# Patient Record
Sex: MALE | Race: ASIAN | NOT HISPANIC OR LATINO | Employment: FULL TIME | URBAN - METROPOLITAN AREA
[De-identification: names, ages, dates, MRNs, and addresses within clinical notes are randomized per-mention and may not be internally consistent; named-entity substitution may affect disease eponyms.]

---

## 2017-01-01 ENCOUNTER — PATIENT MESSAGE (OUTPATIENT)
Dept: GASTROENTEROLOGY | Facility: CLINIC | Age: 72
End: 2017-01-01

## 2017-01-11 ENCOUNTER — TELEPHONE (OUTPATIENT)
Dept: FAMILY MEDICINE | Facility: CLINIC | Age: 72
End: 2017-01-11

## 2017-01-17 ENCOUNTER — TELEPHONE (OUTPATIENT)
Dept: FAMILY MEDICINE | Facility: CLINIC | Age: 72
End: 2017-01-17

## 2017-01-19 RX ORDER — ESOMEPRAZOLE MAGNESIUM 40 MG/1
CAPSULE, DELAYED RELEASE ORAL
Qty: 30 CAPSULE | Refills: 3 | Status: SHIPPED | OUTPATIENT
Start: 2017-01-19 | End: 2017-05-08

## 2017-01-24 ENCOUNTER — LAB VISIT (OUTPATIENT)
Dept: LAB | Facility: HOSPITAL | Age: 72
End: 2017-01-24
Attending: FAMILY MEDICINE
Payer: COMMERCIAL

## 2017-01-24 DIAGNOSIS — Z11.59 NEED FOR HEPATITIS C SCREENING TEST: ICD-10-CM

## 2017-01-24 DIAGNOSIS — R55 SYNCOPE, UNSPECIFIED SYNCOPE TYPE: ICD-10-CM

## 2017-01-24 LAB
ALBUMIN SERPL BCP-MCNC: 3.8 G/DL
ALP SERPL-CCNC: 96 U/L
ALT SERPL W/O P-5'-P-CCNC: 27 U/L
ANION GAP SERPL CALC-SCNC: 11 MMOL/L
AST SERPL-CCNC: 20 U/L
BASOPHILS # BLD AUTO: 0.01 K/UL
BASOPHILS NFR BLD: 0.1 %
BILIRUB SERPL-MCNC: 1 MG/DL
BUN SERPL-MCNC: 11 MG/DL
CALCIUM SERPL-MCNC: 8.9 MG/DL
CHLORIDE SERPL-SCNC: 100 MMOL/L
CHOLEST/HDLC SERPL: 3.5 {RATIO}
CO2 SERPL-SCNC: 27 MMOL/L
CREAT SERPL-MCNC: 1.3 MG/DL
DIFFERENTIAL METHOD: ABNORMAL
EOSINOPHIL # BLD AUTO: 0.9 K/UL
EOSINOPHIL NFR BLD: 9 %
ERYTHROCYTE [DISTWIDTH] IN BLOOD BY AUTOMATED COUNT: 12.5 %
EST. GFR  (AFRICAN AMERICAN): >60 ML/MIN/1.73 M^2
EST. GFR  (NON AFRICAN AMERICAN): 55 ML/MIN/1.73 M^2
ESTIMATED AVG GLUCOSE: 166 MG/DL
GLUCOSE SERPL-MCNC: 151 MG/DL
HBA1C MFR BLD HPLC: 7.4 %
HCT VFR BLD AUTO: 45.4 %
HCV AB SERPL QL IA: NEGATIVE
HDL/CHOLESTEROL RATIO: 28.8 %
HDLC SERPL-MCNC: 170 MG/DL
HDLC SERPL-MCNC: 49 MG/DL
HGB BLD-MCNC: 15.8 G/DL
LDLC SERPL CALC-MCNC: 81.4 MG/DL
LYMPHOCYTES # BLD AUTO: 2.7 K/UL
LYMPHOCYTES NFR BLD: 27 %
MCH RBC QN AUTO: 29.9 PG
MCHC RBC AUTO-ENTMCNC: 34.8 %
MCV RBC AUTO: 86 FL
MONOCYTES # BLD AUTO: 0.6 K/UL
MONOCYTES NFR BLD: 6.4 %
NEUTROPHILS # BLD AUTO: 5.7 K/UL
NEUTROPHILS NFR BLD: 57 %
NONHDLC SERPL-MCNC: 121 MG/DL
PLATELET # BLD AUTO: 226 K/UL
PMV BLD AUTO: 11.6 FL
POTASSIUM SERPL-SCNC: 3.6 MMOL/L
PROT SERPL-MCNC: 6.8 G/DL
RBC # BLD AUTO: 5.28 M/UL
SODIUM SERPL-SCNC: 138 MMOL/L
TRIGL SERPL-MCNC: 198 MG/DL
TSH SERPL DL<=0.005 MIU/L-ACNC: 2.15 UIU/ML
WBC # BLD AUTO: 9.9 K/UL

## 2017-01-24 PROCEDURE — 85025 COMPLETE CBC W/AUTO DIFF WBC: CPT

## 2017-01-24 PROCEDURE — 80053 COMPREHEN METABOLIC PANEL: CPT

## 2017-01-24 PROCEDURE — 80061 LIPID PANEL: CPT

## 2017-01-24 PROCEDURE — 86803 HEPATITIS C AB TEST: CPT

## 2017-01-24 PROCEDURE — 84443 ASSAY THYROID STIM HORMONE: CPT

## 2017-01-24 PROCEDURE — 83036 HEMOGLOBIN GLYCOSYLATED A1C: CPT

## 2017-01-24 PROCEDURE — 36415 COLL VENOUS BLD VENIPUNCTURE: CPT

## 2017-01-31 ENCOUNTER — OFFICE VISIT (OUTPATIENT)
Dept: FAMILY MEDICINE | Facility: CLINIC | Age: 72
End: 2017-01-31
Payer: COMMERCIAL

## 2017-01-31 VITALS
BODY MASS INDEX: 31.25 KG/M2 | WEIGHT: 211 LBS | HEART RATE: 88 BPM | SYSTOLIC BLOOD PRESSURE: 128 MMHG | OXYGEN SATURATION: 96 % | DIASTOLIC BLOOD PRESSURE: 87 MMHG | HEIGHT: 69 IN

## 2017-01-31 DIAGNOSIS — R19.7 DIARRHEA, UNSPECIFIED TYPE: ICD-10-CM

## 2017-01-31 DIAGNOSIS — F32.A DEPRESSION, UNSPECIFIED DEPRESSION TYPE: ICD-10-CM

## 2017-01-31 DIAGNOSIS — E11.22 TYPE 2 DIABETES MELLITUS WITH STAGE 3 CHRONIC KIDNEY DISEASE, WITHOUT LONG-TERM CURRENT USE OF INSULIN: Primary | ICD-10-CM

## 2017-01-31 DIAGNOSIS — I10 ESSENTIAL HYPERTENSION: ICD-10-CM

## 2017-01-31 DIAGNOSIS — J30.9 ALLERGIC RHINITIS, UNSPECIFIED ALLERGIC RHINITIS TRIGGER, UNSPECIFIED RHINITIS SEASONALITY: ICD-10-CM

## 2017-01-31 DIAGNOSIS — N18.30 TYPE 2 DIABETES MELLITUS WITH STAGE 3 CHRONIC KIDNEY DISEASE, WITHOUT LONG-TERM CURRENT USE OF INSULIN: Primary | ICD-10-CM

## 2017-01-31 DIAGNOSIS — N18.30 CKD (CHRONIC KIDNEY DISEASE) STAGE 3, GFR 30-59 ML/MIN: ICD-10-CM

## 2017-01-31 PROCEDURE — 1160F RVW MEDS BY RX/DR IN RCRD: CPT | Mod: S$GLB,,, | Performed by: FAMILY MEDICINE

## 2017-01-31 PROCEDURE — 1125F AMNT PAIN NOTED PAIN PRSNT: CPT | Mod: S$GLB,,, | Performed by: FAMILY MEDICINE

## 2017-01-31 PROCEDURE — 99999 PR PBB SHADOW E&M-EST. PATIENT-LVL III: CPT | Mod: PBBFAC,,, | Performed by: FAMILY MEDICINE

## 2017-01-31 PROCEDURE — 99214 OFFICE O/P EST MOD 30 MIN: CPT | Mod: S$GLB,,, | Performed by: FAMILY MEDICINE

## 2017-01-31 PROCEDURE — 1159F MED LIST DOCD IN RCRD: CPT | Mod: S$GLB,,, | Performed by: FAMILY MEDICINE

## 2017-01-31 PROCEDURE — 3079F DIAST BP 80-89 MM HG: CPT | Mod: S$GLB,,, | Performed by: FAMILY MEDICINE

## 2017-01-31 PROCEDURE — 3045F PR MOST RECENT HEMOGLOBIN A1C LEVEL 7.0-9.0%: CPT | Mod: S$GLB,,, | Performed by: FAMILY MEDICINE

## 2017-01-31 PROCEDURE — 4010F ACE/ARB THERAPY RXD/TAKEN: CPT | Mod: S$GLB,,, | Performed by: FAMILY MEDICINE

## 2017-01-31 PROCEDURE — 3074F SYST BP LT 130 MM HG: CPT | Mod: S$GLB,,, | Performed by: FAMILY MEDICINE

## 2017-01-31 PROCEDURE — 1157F ADVNC CARE PLAN IN RCRD: CPT | Mod: S$GLB,,, | Performed by: FAMILY MEDICINE

## 2017-01-31 RX ORDER — METFORMIN HYDROCHLORIDE 500 MG/1
1000 TABLET, EXTENDED RELEASE ORAL 2 TIMES DAILY WITH MEALS
Qty: 120 TABLET | Refills: 11 | Status: SHIPPED | OUTPATIENT
Start: 2017-01-31 | End: 2018-02-15 | Stop reason: SDUPTHER

## 2017-01-31 RX ORDER — AMOXICILLIN 500 MG
2 CAPSULE ORAL DAILY
COMMUNITY
End: 2018-10-02 | Stop reason: CLARIF

## 2017-01-31 RX ORDER — NAPROXEN SODIUM 220 MG/1
81 TABLET, FILM COATED ORAL DAILY
COMMUNITY
End: 2018-07-26

## 2017-01-31 NOTE — PROGRESS NOTES
(Portions of this note were dictated using voice recognition software and may contain dictation related errors in spelling/grammar/syntax not found on text review)    CC:   Chief Complaint   Patient presents with    Follow-up       HPI: 71 y.o. male     Jerardo Powers    LOV 11/16/16.  Was describing syncopal episodes at the time.  Had a remote history of similar symptoms and had cardiology workup in the past.  EKG in the office at that time was normal.  Had discussed neuro workup including MRI of the brain, if negative consider an cardiology workup such as tilt table testing.  Discussed possibility of vasovagal syndrome given some preceding symptoms of anxiety prior to the syncopal episodes.  He was set up for an MRI of the brain and carotid ultrasound although in the meantime had fallen and broken his arm and has not been able to do the studies yet.      ER visit on 11/20/16 for mechanical fall.  Imaging showed proximal humeral fracture with a greater tuberosity fragment.  Was referred to orthopedics for further management    Diabetes on metformin 1000 twice a day.  Last A1c as below was 7.4, up from 7.2 prior to last visit.  Does have complicating factor of CK D stage III.  Not on a statin. No exercise; dietary indiscretions. Does get diarrhea  ,  Not sure if this is IBS related or metformin related    Sneezing/coughing past couple of months with PND/congestion. No meds tried. Happen seasonally. Cough occ productive.    HTN: avapro and amlodipine, controlled.     Hypothyroid, controlled with synthroid    Anxiety: on celexa 20 mg daily, controlled    GERD: nexium as needed.     Past Medical History   Diagnosis Date    Anxiety     BPH (benign prostatic hypertrophy)     Cataract     CKD (chronic kidney disease) stage 3, GFR 30-59 ml/min 5/5/2014    Colon polyps     Diabetes mellitus type II     Emphysema NEC      mild    Gout     Hyperlipidemia     Hypertension     Hypothyroidism     IBS (irritable  bowel syndrome)     Kidney stones     JANEEN (obstructive sleep apnea)      not using CPAP    Plantar fasciitis     Reflux        Past Surgical History   Procedure Laterality Date    Kidney stone surgery      Cataract extraction  1/28/2013     RIGHT EYE    Cataract extraction       left eye    Colonoscopy  Sep 2011     Repeat recommended in 5 years    Colonoscopy N/A 10/10/2016     Procedure: COLONOSCOPY;  Surgeon: Noah Colunga MD;  Location: [facility] ([address] [place] [address]);  Service: Endoscopy;  Laterality: N/A;  PM Prep       Family History   Problem Relation Age of Onset    Cancer Brother      non-hodgkins T cell lymphoma    Diabetes Mother     Coronary artery disease Neg Hx     Colon cancer Neg Hx     Prostate cancer Neg Hx        Social History     Social History    Marital status:      Spouse name: N/A    Number of children: N/A    Years of education: N/A     Occupational History    Not on file.     Social History Main Topics    Smoking status: Former Smoker     Years: 40.00     Quit date: 9/4/2007    Smokeless tobacco: Never Used    Alcohol use 0.0 oz/week      Comment: one drink per week    Drug use: No    Sexual activity: No     Other Topics Concern    Not on file     Social History Narrative     SCREENINGS  Colonoscopy: [facility] 2011  Prostate : [facility], Dr. Murray.     Immunizations  pvx 2013  Pcv: may have gotten at [facility] will have to find out  Tetanus 7/20/15  Flu   Stress echo August 2015, normal  EKGs at 2014 normal  48 hour Holter monitor April 2014. 3 episodes of shortness of breath reported, corresponding rhythm is sinus rhythm. One syncopal episode reported and corresponding rhythm was sinus rhythm at 79 bpm. One episode of lightheadedness reported, corresponding rhythm was sinus bradycardia 59 bpm. No high-grade AV block. Rare PVCs. No ventricular tachycardia. Very rare PACs    Lab Results   Component Value Date    WBC 9.90 01/24/2017    HGB 15.8 01/24/2017    HCT 45.4 01/24/2017    PLT  226 01/24/2017    CHOL 170 01/24/2017    TRIG 198 (H) 01/24/2017    HDL 49 01/24/2017    ALT 27 01/24/2017    AST 20 01/24/2017     01/24/2017    K 3.6 01/24/2017     01/24/2017    CREATININE 1.3 01/24/2017    BUN 11 01/24/2017    CO2 27 01/24/2017    TSH 2.152 01/24/2017    PSA 0.59 07/17/2015    INR 1.0 01/18/2014    HGBA1C 7.4 (H) 01/24/2017    LDLCALC 81.4 01/24/2017     (H) 01/24/2017       ROS:  GENERAL: No fever, chills, fatigability or weight loss.  SKIN: No rashes, no itching.  HEAD: No headaches.  EYES: No visual changes  EARS: No ear pain or changes in hearing.  NOSE: Above.  MOUTH & THROAT: No hoarseness, change in voice, or sore throat.  NODES: Denies swollen glands.  CHEST: Above  CARDIOVASCULAR: Denies chest pain, PND, orthopnea.  ABDOMEN: No nausea, vomiting, or changes in bowel function.  URINARY: No flank pain, dysuria or hematuria.  PERIPHERAL VASCULAR: No claudication or cyanosis.  MUSCULOSKELETAL: Still has some right arm pain from fracture.  He is currently in physical therapy  NEUROLOGIC: No weakness or numbness.    Vital signs reviewed  PE:   APPEARANCE: Well nourished, well developed, in no acute distress.    HEAD: Normocephalic, atraumatic.  No sinus tenderness  EYES: PERRL. EOMI.   Conjunctivae noninjected.  EARS: TM's intact. Light reflex normal. No retraction or perforation  NOSE: Mild nasal mucosal inflammation bilaterally  MOUTH & THROAT: No tonsillar enlargement. No pharyngeal erythema or exudate.   NECK: Supple with no cervical lymphadenopathy.  No carotid bruits.  No thyromegaly  CHEST: Good inspiratory effort. Lungs clear to auscultation with no wheezes or crackles.  CARDIOVASCULAR: Normal S1, S2. No rubs, murmurs, or gallops.  ABDOMEN: Bowel sounds normal. Not distended. Soft.  Mild left lower quadrant tenderness to palpation without rebound or guarding  EXTREMITIES: No edema.      IMPRESSION  1. Type 2 diabetes mellitus with stage 3 chronic kidney disease,  without long-term current use of insulin    2. CKD (chronic kidney disease) stage 3, GFR 30-59 ml/min    3. Depression, unspecified depression type    4. Essential hypertension    5. Diarrhea, unspecified type    6. Allergic rhinitis, unspecified allergic rhinitis trigger, unspecified rhinitis seasonality            PLAN    Diabetes health maintenance  -advise proper dietary and exercise modification  -advise medication compliance  -advise daily aspirin if there are no other contraindications  -advise consistent blood sugar monitoring  -advise regular dentist and ophthalmology visits  -advise regular foot checks  -Medication management: Switch over to extended release metformin 500 mg, 2 pills twice a day because of diarrhea concerns.  Discussed rationale behind increasing aerobic exercise and decreasing unhealthy foods to help lower her A1c.  We will follow-up in 3 months.  He does admit that some of his activity is certainly dropped off after his arm fracture    Chronic disease: Overall stable.  Continue to monitor next    Hypertension: Stable.  Continue current therapy    Not on a statin.  Discussed rationale behind statin given his diabetes status and with respect to cardiac risk reduction.  Patient elects to hold off on starting a statin    ALLERGIC rhinitis: Trial of Claritin or Zyrtec over-the-counter daily for the next month.  If ineffective, contrast nasal corticosteroids next    F/u 3 mo.

## 2017-02-08 ENCOUNTER — PATIENT MESSAGE (OUTPATIENT)
Dept: UROLOGY | Facility: CLINIC | Age: 72
End: 2017-02-08

## 2017-02-08 ENCOUNTER — TELEPHONE (OUTPATIENT)
Dept: UROLOGY | Facility: CLINIC | Age: 72
End: 2017-02-08

## 2017-02-08 NOTE — TELEPHONE ENCOUNTER
Told him to hold off ASA and fish oil for now until urine gets clear.  Has been on Sue.  May consider re-evaluation of hematuria or TURP if symptoms persist.  He will call me tomorrow whether I need to see him in clinic.

## 2017-02-09 ENCOUNTER — PATIENT MESSAGE (OUTPATIENT)
Dept: UROLOGY | Facility: CLINIC | Age: 72
End: 2017-02-09

## 2017-02-21 DIAGNOSIS — N40.1 BENIGN NON-NODULAR PROSTATIC HYPERPLASIA WITH LOWER URINARY TRACT SYMPTOMS: ICD-10-CM

## 2017-02-21 RX ORDER — DUTASTERIDE AND TAMSULOSIN HYDROCHLORIDE CAPSULES .5; .4 MG/1; MG/1
1 CAPSULE ORAL NIGHTLY
Qty: 90 CAPSULE | Refills: 3 | Status: SHIPPED | OUTPATIENT
Start: 2017-02-21 | End: 2018-02-08 | Stop reason: SDUPTHER

## 2017-03-10 RX ORDER — ALLOPURINOL 100 MG/1
TABLET ORAL
Qty: 90 TABLET | Refills: 3 | Status: SHIPPED | OUTPATIENT
Start: 2017-03-10 | End: 2018-03-09 | Stop reason: SDUPTHER

## 2017-05-03 ENCOUNTER — PATIENT MESSAGE (OUTPATIENT)
Dept: FAMILY MEDICINE | Facility: CLINIC | Age: 72
End: 2017-05-03

## 2017-05-03 ENCOUNTER — PATIENT MESSAGE (OUTPATIENT)
Dept: GASTROENTEROLOGY | Facility: CLINIC | Age: 72
End: 2017-05-03

## 2017-05-05 ENCOUNTER — PATIENT MESSAGE (OUTPATIENT)
Dept: GASTROENTEROLOGY | Facility: CLINIC | Age: 72
End: 2017-05-05

## 2017-05-08 ENCOUNTER — HOSPITAL ENCOUNTER (OUTPATIENT)
Dept: RADIOLOGY | Facility: HOSPITAL | Age: 72
Discharge: HOME | End: 2017-05-08
Attending: FAMILY MEDICINE
Payer: COMMERCIAL

## 2017-05-08 ENCOUNTER — OFFICE VISIT (OUTPATIENT)
Dept: FAMILY MEDICINE | Facility: CLINIC | Age: 72
End: 2017-05-08
Payer: COMMERCIAL

## 2017-05-08 VITALS
BODY MASS INDEX: 29.62 KG/M2 | DIASTOLIC BLOOD PRESSURE: 86 MMHG | RESPIRATION RATE: 16 BRPM | HEART RATE: 99 BPM | WEIGHT: 200 LBS | OXYGEN SATURATION: 97 % | SYSTOLIC BLOOD PRESSURE: 117 MMHG | HEIGHT: 69 IN | TEMPERATURE: 98 F

## 2017-05-08 DIAGNOSIS — R10.13 DYSPEPSIA: Primary | ICD-10-CM

## 2017-05-08 DIAGNOSIS — J40 BRONCHITIS: ICD-10-CM

## 2017-05-08 DIAGNOSIS — J40 BRONCHITIS: Primary | ICD-10-CM

## 2017-05-08 PROCEDURE — 1160F RVW MEDS BY RX/DR IN RCRD: CPT | Mod: S$GLB,,, | Performed by: FAMILY MEDICINE

## 2017-05-08 PROCEDURE — 99214 OFFICE O/P EST MOD 30 MIN: CPT | Mod: S$GLB,,, | Performed by: FAMILY MEDICINE

## 2017-05-08 PROCEDURE — 1126F AMNT PAIN NOTED NONE PRSNT: CPT | Mod: S$GLB,,, | Performed by: FAMILY MEDICINE

## 2017-05-08 PROCEDURE — 71020 XR CHEST PA AND LATERAL: CPT | Mod: 26,,, | Performed by: RADIOLOGY

## 2017-05-08 PROCEDURE — 1157F ADVNC CARE PLAN IN RCRD: CPT | Mod: 8P,S$GLB,, | Performed by: FAMILY MEDICINE

## 2017-05-08 PROCEDURE — 71020 XR CHEST PA AND LATERAL: CPT | Mod: TC

## 2017-05-08 PROCEDURE — 3079F DIAST BP 80-89 MM HG: CPT | Mod: S$GLB,,, | Performed by: FAMILY MEDICINE

## 2017-05-08 PROCEDURE — 1159F MED LIST DOCD IN RCRD: CPT | Mod: S$GLB,,, | Performed by: FAMILY MEDICINE

## 2017-05-08 PROCEDURE — 3074F SYST BP LT 130 MM HG: CPT | Mod: S$GLB,,, | Performed by: FAMILY MEDICINE

## 2017-05-08 PROCEDURE — 99999 PR PBB SHADOW E&M-EST. PATIENT-LVL III: CPT | Mod: PBBFAC,,, | Performed by: FAMILY MEDICINE

## 2017-05-08 RX ORDER — BENZONATATE 200 MG/1
200 CAPSULE ORAL 3 TIMES DAILY PRN
Qty: 30 CAPSULE | Refills: 0 | Status: SHIPPED | OUTPATIENT
Start: 2017-05-08 | End: 2017-05-15

## 2017-05-08 NOTE — MR AVS SNAPSHOT
[address]  [place] [address]  Phone: [phone]  Fax: [phone]                  Jerardo Powers   2017 4:00 PM   Office Visit    Description:  Male : 1945   Provider:  Admas Moore MD   Department:  [facility]           Reason for Visit     Diarrhea     Cough           Diagnoses this Visit        Comments    Bronchitis    -  Primary            To Do List           Goals (5 Years of Data)     None       These Medications        Disp Refills Start End    benzonatate (TESSALON) 200 MG capsule 30 capsule 0 2017 5/15/2017    Take 1 capsule (200 mg total) by mouth 3 (three) times daily as needed. - Oral    Pharmacy: [facility]/pharmacy #[id number] - [place] - [address] AT [facility] #: [phone]         [facility] On Call     [facility] On Call Nurse Care Line -  Assistance  Unless otherwise directed by your provider, please contact [facility], our nurse care line that is available for  assistance.     Registered nurses in the [facility] provide: appointment scheduling, clinical advisement, health education, and other advisory services.  Call: [phone] (toll free)               Medications           Message regarding Medications     Verify the changes and/or additions to your medication regime listed below are the same as discussed with your clinician today.  If any of these changes or additions are incorrect, please notify your healthcare provider.        START taking these NEW medications        Refills    benzonatate (TESSALON) 200 MG capsule 0    Sig: Take 1 capsule (200 mg total) by mouth 3 (three) times daily as needed.    Class: Normal    Route: Oral      STOP taking these medications     esomeprazole (NEXIUM) 40 MG capsule TAKE 1 CAPSULE (40 MG TOTAL) BY MOUTH BEFORE BREAKFAST.           Verify that the below list of medications is an accurate representation of  "the medications you are currently taking.  If none reported, the list may be blank. If incorrect, please contact your healthcare provider. Carry this list with you in case of emergency.           Current Medications     allopurinol (ZYLOPRIM) 100 MG tablet TAKE 1 TABLET (100 MG TOTAL) BY MOUTH ONCE DAILY.    amlodipine (NORVASC) 10 MG tablet TAKE 1 TABLET BY MOUTH EVERY DAY    aspirin 81 MG Chew Take 81 mg by mouth once daily.    blood sugar diagnostic Strp 1 strip by Misc.(Non-Drug; Combo Route) route 3 (three) times daily.    citalopram (CELEXA) 20 MG tablet TAKE 1 TABLET (20 MG TOTAL) BY MOUTH ONCE DAILY.    clorazepate (TRANXENE) 7.5 MG Tab Take 1 tablet (7.5 mg total) by mouth 3 (three) times daily.    dutasteride-tamsulosin (VIVIENNE) 0.5-0.4 mg CM24 Take 1 tablet by mouth every evening.    fish oil-omega-3 fatty acids 300-1,000 mg capsule Take 2 g by mouth once daily.    irbesartan (AVAPRO) 300 MG tablet Take 1 tablet (300 mg total) by mouth once daily.    lancets Misc Test sugars once daily.  Dispense 100 lancets (insurance covered brand)    levothyroxine (SYNTHROID) 75 MCG tablet Take 1 tablet (75 mcg total) by mouth once daily.    metformin (GLUCOPHAGE-XR) 500 MG 24 hr tablet Take 2 tablets (1,000 mg total) by mouth 2 (two) times daily with meals.    benzonatate (TESSALON) 200 MG capsule Take 1 capsule (200 mg total) by mouth 3 (three) times daily as needed.           Clinical Reference Information           Your Vitals Were     BP Pulse Temp Resp Height Weight    117/86 99 98 °F (36.7 °C) (Oral) 16 5' 9" (1.753 m) 90.7 kg (200 lb)    SpO2 BMI             97% 29.53 kg/m2         Blood Pressure          Most Recent Value    BP  117/86      Allergies as of 5/8/2017     Morphine      Immunizations Administered on Date of Encounter - 5/8/2017     None      Orders Placed During Today's Visit     Future Labs/Procedures Expected by Expires    X-Ray Chest PA And Lateral  5/8/2017 5/8/2018      Instructions  "     Bronchitis, Viral (Adult)    You have a viral bronchitis. Bronchitis is inflammation and swelling of the lining of the lungs. This is often caused by an infection. Symptoms include a dry, hacking cough that is worse at night. The cough may bring up yellow-green mucus. You may also feel short of breath or wheeze. Other symptoms may include tiredness, chest discomfort, and chills.  Bronchitis that is caused by a virus is not treated with antibiotics. Instead, medicines may be given to help relieve symptoms. Symptoms can last up to 2 weeks, although the cough may last much longer.  This illness is contagious during the first few days and is spread through the air by coughing and sneezing, or by direct contact (touching the sick person and then touching your own eyes, nose, or mouth).  Most viral illnesses resolve within 10 to 14 days with rest and simple home remedies, although they may sometimes last for several weeks.  Home care  · If symptoms are severe, rest at home for the first 2 to 3 days. When you go back to your usual activities, don't let yourself get too tired.  · Do not smoke. Also avoid being exposed to secondhand smoke.  · You may use over-the-counter medicine to control fever or pain, unless another pain medicine was prescribed. (Note: If you have chronic liver or kidney disease or have ever had a stomach ulcer or gastrointestinal bleeding, talk with your healthcare provider before using these medicines. Also talk to your provider if you are taking medicine to prevent blood clots.) Aspirin should never be given to anyone younger than 18 years of age who is ill with a viral infection or fever. It may cause severe liver or brain damage.  · Your appetite may be poor, so a light diet is fine. Avoid dehydration by drinking 6 to 8 glasses of fluids per day (such as water, soft drinks, sports drinks, juices, tea, or soup). Extra fluids will help loosen secretions in the nose and lungs.  · Over-the-counter  cough, cold, and sore-throat medicines will not shorten the length of the illness, but they may help to reduce symptoms. (Note: Do not use decongestants if you have high blood pressure.)  Follow-up care  Follow up with your healthcare provider, or as advised. If you had an X-ray or ECG (electrocardiogram), a specialist will review it. You will be notified of any new findings that may affect your care.  Note: If you are age 65 or older, or if you have a chronic lung disease or condition that affects your immune system, or you smoke, talk to your healthcare provider about having pneumococcal vaccinations and a yearly influenza vaccination (flu shot).  When to seek medical advice  Call your healthcare provider right away if any of these occur:  · Fever of 100.4°F (38°C) or higher  · Coughing up increased amounts of colored sputum  · Weakness, drowsiness, headache, facial pain, ear pain, or a stiff neck  Call 911, or get immediate medical care  Contact emergency services right away if any of these occur:  · Coughing up blood  · Worsening weakness, drowsiness, headache, or stiff neck  · Trouble breathing, wheezing, or pain with breathing  Date Last Reviewed: 9/13/2015 © 2000-2016 [employer]. [address] [place] [address]. All rights reserved. This information is not intended as a substitute for professional medical care. Always follow your healthcare professional's instructions.             Language Assistance Services     ATTENTION: Language assistance services are available, free of charge. Please call [phone].      ATENCIÓN: Si habla español, tiene a cardenas disposición servicios gratuitos de asistencia lingüística. Llame al [phone].     [facility] Ý: N?u b?n nói Ti?ng Vi?t, có các d?ch v? h? tr? ngôn ng? mi?n phí dành cho b?n. G?i s? [phone].         [facility] complies with applicable Federal civil rights laws and does not discriminate on the basis of race, color,  national origin, age, disability, or sex.

## 2017-05-08 NOTE — PATIENT INSTRUCTIONS
Bronchitis, Viral (Adult)    You have a viral bronchitis. Bronchitis is inflammation and swelling of the lining of the lungs. This is often caused by an infection. Symptoms include a dry, hacking cough that is worse at night. The cough may bring up yellow-green mucus. You may also feel short of breath or wheeze. Other symptoms may include tiredness, chest discomfort, and chills.  Bronchitis that is caused by a virus is not treated with antibiotics. Instead, medicines may be given to help relieve symptoms. Symptoms can last up to 2 weeks, although the cough may last much longer.  This illness is contagious during the first few days and is spread through the air by coughing and sneezing, or by direct contact (touching the sick person and then touching your own eyes, nose, or mouth).  Most viral illnesses resolve within 10 to 14 days with rest and simple home remedies, although they may sometimes last for several weeks.  Home care  · If symptoms are severe, rest at home for the first 2 to 3 days. When you go back to your usual activities, don't let yourself get too tired.  · Do not smoke. Also avoid being exposed to secondhand smoke.  · You may use over-the-counter medicine to control fever or pain, unless another pain medicine was prescribed. (Note: If you have chronic liver or kidney disease or have ever had a stomach ulcer or gastrointestinal bleeding, talk with your healthcare provider before using these medicines. Also talk to your provider if you are taking medicine to prevent blood clots.) Aspirin should never be given to anyone younger than 18 years of age who is ill with a viral infection or fever. It may cause severe liver or brain damage.  · Your appetite may be poor, so a light diet is fine. Avoid dehydration by drinking 6 to 8 glasses of fluids per day (such as water, soft drinks, sports drinks, juices, tea, or soup). Extra fluids will help loosen secretions in the nose and lungs.  · Over-the-counter  cough, cold, and sore-throat medicines will not shorten the length of the illness, but they may help to reduce symptoms. (Note: Do not use decongestants if you have high blood pressure.)  Follow-up care  Follow up with your healthcare provider, or as advised. If you had an X-ray or ECG (electrocardiogram), a specialist will review it. You will be notified of any new findings that may affect your care.  Note: If you are age 65 or older, or if you have a chronic lung disease or condition that affects your immune system, or you smoke, talk to your healthcare provider about having pneumococcal vaccinations and a yearly influenza vaccination (flu shot).  When to seek medical advice  Call your healthcare provider right away if any of these occur:  · Fever of 100.4°F (38°C) or higher  · Coughing up increased amounts of colored sputum  · Weakness, drowsiness, headache, facial pain, ear pain, or a stiff neck  Call 911, or get immediate medical care  Contact emergency services right away if any of these occur:  · Coughing up blood  · Worsening weakness, drowsiness, headache, or stiff neck  · Trouble breathing, wheezing, or pain with breathing  Date Last Reviewed: 9/13/2015  © 7905-4092 [employer]. [address] [place] [address], [place] [address]. All rights reserved. This information is not intended as a substitute for professional medical care. Always follow your healthcare professional's instructions.

## 2017-05-08 NOTE — PROGRESS NOTES
(Portions of this note were dictated using voice recognition software and may contain dictation related errors in spelling/grammar/syntax not found on text review)    CC: No chief complaint on file.      HPI: 71 y.o. male     1.  Cough for 3 weeks.  He went to urgent care about 10 days ago, got amoxicillin for 7 days.  He completed this therapy.  He also got some codeine cough syrup which made him very woozy.  He did not find that he got any better.  He has a constant dry cough but this is worse at night.  He does have some nasal stuffiness and rhinorrhea.  No fevers or headaches.  No shortness of breath no chest pain.  No abdominal pain or vomiting.  He does have some ALLERGIES and takes Zyrtec on occasion but not consistently.  He has Flonase at home but does not take this regularly either.    2.  Diarrhea, going on for several years.  He had seen his GI doctor recommended that he temporarily stop metformin.  For the time being he is staying on metformin but was asking for further discussion on this.  His last A1c in January was 7.4.  Sugars are around 150 range right now    Past Medical History:   Diagnosis Date    Anxiety     BPH (benign prostatic hypertrophy)     Cataract     CKD (chronic kidney disease) stage 3, GFR 30-59 ml/min 5/5/2014    Colon polyps     Diabetes mellitus type II     Emphysema NEC     mild    Gout     Hyperlipidemia     Hypertension     Hypothyroidism     IBS (irritable bowel syndrome)     Kidney stones     JANEEN (obstructive sleep apnea)     not using CPAP    Plantar fasciitis     Reflux        Past Surgical History:   Procedure Laterality Date    CATARACT EXTRACTION  1/28/2013    RIGHT EYE    CATARACT EXTRACTION      left eye    COLONOSCOPY  Sep 2011    Repeat recommended in 5 years    COLONOSCOPY N/A 10/10/2016    Procedure: COLONOSCOPY;  Surgeon: Noah Colunga MD;  Location: [facility] ([address] [place] [address]);  Service: Endoscopy;  Laterality: N/A;  PM Prep    KIDNEY STONE  SURGERY         Family History   Problem Relation Age of Onset    Cancer Brother      non-hodgkins T cell lymphoma    Diabetes Mother     Coronary artery disease Neg Hx     Colon cancer Neg Hx     Prostate cancer Neg Hx        Social History     Social History    Marital status:      Spouse name: N/A    Number of children: N/A    Years of education: N/A     Occupational History    Not on file.     Social History Main Topics    Smoking status: Former Smoker     Years: 40.00     Quit date: 9/4/2007    Smokeless tobacco: Never Used    Alcohol use 0.0 oz/week      Comment: one drink per week    Drug use: No    Sexual activity: No     Other Topics Concern    Not on file     Social History Narrative     Lab Results   Component Value Date    WBC 9.90 01/24/2017    HGB 15.8 01/24/2017    HCT 45.4 01/24/2017     01/24/2017    CHOL 170 01/24/2017    TRIG 198 (H) 01/24/2017    HDL 49 01/24/2017    ALT 27 01/24/2017    AST 20 01/24/2017     01/24/2017    K 3.6 01/24/2017     01/24/2017    CREATININE 1.3 01/24/2017    BUN 11 01/24/2017    CO2 27 01/24/2017    TSH 2.152 01/24/2017    PSA 0.59 07/17/2015    INR 1.0 01/18/2014    HGBA1C 7.4 (H) 01/24/2017    LDLCALC 81.4 01/24/2017     (H) 01/24/2017       ROS:  GENERAL: Fatigue loss.  SKIN: No rashes, no itching.  HEAD: No headaches.  EYES: No visual changes  EARS: No ear pain or changes in hearing.  NOSE: Above.  MOUTH & THROAT: No hoarseness, change in voice, or sore throat.  NODES: Denies swollen glands.  CHEST: Above.  Cough is nonproductive.  CARDIOVASCULAR: Denies chest pain, PND, orthopnea.  ABDOMEN: Above  URINARY: No flank pain, dysuria or hematuria.  PERIPHERAL VASCULAR: No claudication or cyanosis.  MUSCULOSKELETAL: No joint stiffness or swelling. Denies back pain.  NEUROLOGIC: No weakness or numbness.    Vital signs reviewed  PE:   APPEARANCE: Well nourished, well developed, in no acute distress.    HEAD: Normocephalic,  atraumatic.  EYES: PERRL. EOMI.   Conjunctivae noninjected.  EARS: Right TM intact.  Left TM of screw by cerumen  NOSE: Mucosa mildly inflamed bilaterally  MOUTH & THROAT: No tonsillar enlargement. No pharyngeal erythema or exudate.   NECK: Supple with no cervical lymphadenopathy.    CHEST: Good inspiratory effort. Lungs clear to auscultation but questionable subtle basilar crackles bilaterally.  No wheezing.  CARDIOVASCULAR: Normal S1, S2. No rubs, murmurs, or gallops.  ABDOMEN: Bowel sounds normal. Not distended. Soft. No tenderness or masses. No organomegaly.  EXTREMITIES: No edema, cyanosis, or clubbing.      IMPRESSION  1. Bronchitis            PLAN  Bronchitis, likely ALLERGIC versus viral.  Did not receive any benefit from antibiotics although if there was a typical pneumonia, amoxicillin would not technically cover this.  Advise getting on Zyrtec and Flonase daily for the next 2-3 weeks.  Trial of benzonatate.  Chest x-ray given mildly abnormal lung exam.  If Chest x-ray is abnormal, paid may potentially need treatment for atypical pneumonia.    Diarrhea: Once he is feeling better from the above, he can try to get off the metformin for 10-14 days.  If diarrhea gets better off this, this is likely from the medication.  If diarrhea is not affected by this, the metformin likely has nothing to do with his symptoms.  If the metformin is associated with his diarrhea, which could pursue further therapies for his diabetes.

## 2017-05-09 ENCOUNTER — PATIENT MESSAGE (OUTPATIENT)
Dept: FAMILY MEDICINE | Facility: CLINIC | Age: 72
End: 2017-05-09

## 2017-05-15 ENCOUNTER — PATIENT MESSAGE (OUTPATIENT)
Dept: FAMILY MEDICINE | Facility: CLINIC | Age: 72
End: 2017-05-15

## 2017-05-16 ENCOUNTER — PATIENT MESSAGE (OUTPATIENT)
Dept: FAMILY MEDICINE | Facility: CLINIC | Age: 72
End: 2017-05-16

## 2017-05-17 ENCOUNTER — PATIENT MESSAGE (OUTPATIENT)
Dept: FAMILY MEDICINE | Facility: CLINIC | Age: 72
End: 2017-05-17

## 2017-06-04 ENCOUNTER — ANESTHESIA EVENT (OUTPATIENT)
Dept: ENDOSCOPY | Facility: HOSPITAL | Age: 72
End: 2017-06-04
Payer: COMMERCIAL

## 2017-06-05 ENCOUNTER — HOSPITAL ENCOUNTER (OUTPATIENT)
Facility: HOSPITAL | Age: 72
Discharge: HOME | End: 2017-06-05
Attending: INTERNAL MEDICINE | Admitting: INTERNAL MEDICINE
Payer: COMMERCIAL

## 2017-06-05 ENCOUNTER — SURGERY (OUTPATIENT)
Age: 72
End: 2017-06-05

## 2017-06-05 ENCOUNTER — ANESTHESIA (OUTPATIENT)
Dept: ENDOSCOPY | Facility: HOSPITAL | Age: 72
End: 2017-06-05
Payer: COMMERCIAL

## 2017-06-05 VITALS
SYSTOLIC BLOOD PRESSURE: 125 MMHG | HEART RATE: 79 BPM | OXYGEN SATURATION: 98 % | BODY MASS INDEX: 28.49 KG/M2 | TEMPERATURE: 98 F | DIASTOLIC BLOOD PRESSURE: 69 MMHG | RESPIRATION RATE: 12 BRPM | WEIGHT: 199 LBS | HEIGHT: 70 IN | RESPIRATION RATE: 31 BRPM

## 2017-06-05 DIAGNOSIS — R10.9 ABDOMINAL PAIN: ICD-10-CM

## 2017-06-05 DIAGNOSIS — R19.7 DIARRHEA, UNSPECIFIED TYPE: ICD-10-CM

## 2017-06-05 DIAGNOSIS — R10.13 DYSPEPSIA: Primary | ICD-10-CM

## 2017-06-05 LAB — POCT GLUCOSE: 157 MG/DL (ref 70–110)

## 2017-06-05 PROCEDURE — 25000003 PHARM REV CODE 250: Performed by: INTERNAL MEDICINE

## 2017-06-05 PROCEDURE — 27201012 HC FORCEPS, HOT/COLD, DISP: Performed by: INTERNAL MEDICINE

## 2017-06-05 PROCEDURE — 43239 EGD BIOPSY SINGLE/MULTIPLE: CPT | Performed by: INTERNAL MEDICINE

## 2017-06-05 PROCEDURE — 88305 TISSUE EXAM BY PATHOLOGIST: CPT | Mod: 26,,,

## 2017-06-05 PROCEDURE — D9220A PRA ANESTHESIA: Mod: CRNA,,, | Performed by: NURSE ANESTHETIST, CERTIFIED REGISTERED

## 2017-06-05 PROCEDURE — 88305 TISSUE EXAM BY PATHOLOGIST: CPT

## 2017-06-05 PROCEDURE — D9220A PRA ANESTHESIA: Mod: ANES,,, | Performed by: ANESTHESIOLOGY

## 2017-06-05 PROCEDURE — 37000009 HC ANESTHESIA EA ADD 15 MINS: Performed by: INTERNAL MEDICINE

## 2017-06-05 PROCEDURE — 63600175 PHARM REV CODE 636 W HCPCS: Performed by: NURSE ANESTHETIST, CERTIFIED REGISTERED

## 2017-06-05 PROCEDURE — 43239 EGD BIOPSY SINGLE/MULTIPLE: CPT | Mod: ,,, | Performed by: INTERNAL MEDICINE

## 2017-06-05 PROCEDURE — 25000003 PHARM REV CODE 250: Performed by: NURSE ANESTHETIST, CERTIFIED REGISTERED

## 2017-06-05 PROCEDURE — 37000008 HC ANESTHESIA 1ST 15 MINUTES: Performed by: INTERNAL MEDICINE

## 2017-06-05 RX ORDER — LIDOCAINE HCL/PF 100 MG/5ML
SYRINGE (ML) INTRAVENOUS
Status: DISCONTINUED | OUTPATIENT
Start: 2017-06-05 | End: 2017-06-05

## 2017-06-05 RX ORDER — GLYCOPYRROLATE 0.2 MG/ML
INJECTION INTRAMUSCULAR; INTRAVENOUS
Status: DISCONTINUED | OUTPATIENT
Start: 2017-06-05 | End: 2017-06-05

## 2017-06-05 RX ORDER — PROPOFOL 10 MG/ML
VIAL (ML) INTRAVENOUS
Status: DISCONTINUED | OUTPATIENT
Start: 2017-06-05 | End: 2017-06-05

## 2017-06-05 RX ORDER — SODIUM CHLORIDE 9 MG/ML
INJECTION, SOLUTION INTRAVENOUS CONTINUOUS
Status: DISCONTINUED | OUTPATIENT
Start: 2017-06-05 | End: 2017-06-05 | Stop reason: HOSPADM

## 2017-06-05 RX ADMIN — PROPOFOL 100 MG: 10 INJECTION, EMULSION INTRAVENOUS at 11:06

## 2017-06-05 RX ADMIN — PROPOFOL 50 MG: 10 INJECTION, EMULSION INTRAVENOUS at 11:06

## 2017-06-05 RX ADMIN — LIDOCAINE HYDROCHLORIDE 100 MG: 20 INJECTION, SOLUTION INTRAVENOUS at 11:06

## 2017-06-05 RX ADMIN — SODIUM CHLORIDE: 0.9 INJECTION, SOLUTION INTRAVENOUS at 10:06

## 2017-06-05 RX ADMIN — GLYCOPYRROLATE 0.2 MG: 0.2 INJECTION, SOLUTION INTRAMUSCULAR; INTRAVENOUS at 11:06

## 2017-06-05 NOTE — H&P (VIEW-ONLY)
(Portions of this note were dictated using voice recognition software and may contain dictation related errors in spelling/grammar/syntax not found on text review)    CC: No chief complaint on file.      HPI: 71 y.o. male     1.  Cough for 3 weeks.  He went to urgent care about 10 days ago, got amoxicillin for 7 days.  He completed this therapy.  He also got some codeine cough syrup which made him very woozy.  He did not find that he got any better.  He has a constant dry cough but this is worse at night.  He does have some nasal stuffiness and rhinorrhea.  No fevers or headaches.  No shortness of breath no chest pain.  No abdominal pain or vomiting.  He does have some ALLERGIES and takes Zyrtec on occasion but not consistently.  He has Flonase at home but does not take this regularly either.    2.  Diarrhea, going on for several years.  He had seen his GI doctor recommended that he temporarily stop metformin.  For the time being he is staying on metformin but was asking for further discussion on this.  His last A1c in January was 7.4.  Sugars are around 150 range right now    Past Medical History:   Diagnosis Date    Anxiety     BPH (benign prostatic hypertrophy)     Cataract     CKD (chronic kidney disease) stage 3, GFR 30-59 ml/min 5/5/2014    Colon polyps     Diabetes mellitus type II     Emphysema NEC     mild    Gout     Hyperlipidemia     Hypertension     Hypothyroidism     IBS (irritable bowel syndrome)     Kidney stones     JANEEN (obstructive sleep apnea)     not using CPAP    Plantar fasciitis     Reflux        Past Surgical History:   Procedure Laterality Date    CATARACT EXTRACTION  1/28/2013    RIGHT EYE    CATARACT EXTRACTION      left eye    COLONOSCOPY  Sep 2011    Repeat recommended in 5 years    COLONOSCOPY N/A 10/10/2016    Procedure: COLONOSCOPY;  Surgeon: Noah Colunga MD;  Location: [facility] ([address] [place] [address]);  Service: Endoscopy;  Laterality: N/A;  PM Prep    KIDNEY STONE  SURGERY         Family History   Problem Relation Age of Onset    Cancer Brother      non-hodgkins T cell lymphoma    Diabetes Mother     Coronary artery disease Neg Hx     Colon cancer Neg Hx     Prostate cancer Neg Hx        Social History     Social History    Marital status:      Spouse name: N/A    Number of children: N/A    Years of education: N/A     Occupational History    Not on file.     Social History Main Topics    Smoking status: Former Smoker     Years: 40.00     Quit date: 9/4/2007    Smokeless tobacco: Never Used    Alcohol use 0.0 oz/week      Comment: one drink per week    Drug use: No    Sexual activity: No     Other Topics Concern    Not on file     Social History Narrative     Lab Results   Component Value Date    WBC 9.90 01/24/2017    HGB 15.8 01/24/2017    HCT 45.4 01/24/2017     01/24/2017    CHOL 170 01/24/2017    TRIG 198 (H) 01/24/2017    HDL 49 01/24/2017    ALT 27 01/24/2017    AST 20 01/24/2017     01/24/2017    K 3.6 01/24/2017     01/24/2017    CREATININE 1.3 01/24/2017    BUN 11 01/24/2017    CO2 27 01/24/2017    TSH 2.152 01/24/2017    PSA 0.59 07/17/2015    INR 1.0 01/18/2014    HGBA1C 7.4 (H) 01/24/2017    LDLCALC 81.4 01/24/2017     (H) 01/24/2017       ROS:  GENERAL: Fatigue loss.  SKIN: No rashes, no itching.  HEAD: No headaches.  EYES: No visual changes  EARS: No ear pain or changes in hearing.  NOSE: Above.  MOUTH & THROAT: No hoarseness, change in voice, or sore throat.  NODES: Denies swollen glands.  CHEST: Above.  Cough is nonproductive.  CARDIOVASCULAR: Denies chest pain, PND, orthopnea.  ABDOMEN: Above  URINARY: No flank pain, dysuria or hematuria.  PERIPHERAL VASCULAR: No claudication or cyanosis.  MUSCULOSKELETAL: No joint stiffness or swelling. Denies back pain.  NEUROLOGIC: No weakness or numbness.    Vital signs reviewed  PE:   APPEARANCE: Well nourished, well developed, in no acute distress.    HEAD: Normocephalic,  atraumatic.  EYES: PERRL. EOMI.   Conjunctivae noninjected.  EARS: Right TM intact.  Left TM of screw by cerumen  NOSE: Mucosa mildly inflamed bilaterally  MOUTH & THROAT: No tonsillar enlargement. No pharyngeal erythema or exudate.   NECK: Supple with no cervical lymphadenopathy.    CHEST: Good inspiratory effort. Lungs clear to auscultation but questionable subtle basilar crackles bilaterally.  No wheezing.  CARDIOVASCULAR: Normal S1, S2. No rubs, murmurs, or gallops.  ABDOMEN: Bowel sounds normal. Not distended. Soft. No tenderness or masses. No organomegaly.  EXTREMITIES: No edema, cyanosis, or clubbing.      IMPRESSION  1. Bronchitis            PLAN  Bronchitis, likely ALLERGIC versus viral.  Did not receive any benefit from antibiotics although if there was a typical pneumonia, amoxicillin would not technically cover this.  Advise getting on Zyrtec and Flonase daily for the next 2-3 weeks.  Trial of benzonatate.  Chest x-ray given mildly abnormal lung exam.  If Chest x-ray is abnormal, paid may potentially need treatment for atypical pneumonia.    Diarrhea: Once he is feeling better from the above, he can try to get off the metformin for 10-14 days.  If diarrhea gets better off this, this is likely from the medication.  If diarrhea is not affected by this, the metformin likely has nothing to do with his symptoms.  If the metformin is associated with his diarrhea, which could pursue further therapies for his diabetes.

## 2017-06-05 NOTE — TRANSFER OF CARE
"Anesthesia Transfer of Care Note    Patient: Jerardo Powers    Procedure(s) Performed: Procedure(s) (LRB):  ESOPHAGOGASTRODUODENOSCOPY (EGD) (N/A)    Patient location: PACU    Anesthesia Type: general    Transport from OR: Transported from OR on room air with adequate spontaneous ventilation    Post pain: adequate analgesia    Post assessment: no apparent anesthetic complications    Post vital signs: stable    Level of consciousness: awake and sedated    Nausea/Vomiting: no nausea/vomiting    Complications: none    Transfer of care protocol was followed      Last vitals:   Visit Vitals  /63   Pulse 78   Temp 36.7 °C (98 °F)   Resp 12   Ht 5' 9.5" (1.765 m)   Wt 90.3 kg (199 lb)   SpO2 (!) 94%   BMI 28.97 kg/m²     "

## 2017-06-05 NOTE — DISCHARGE INSTRUCTIONS

## 2017-06-05 NOTE — PATIENT INSTRUCTIONS
Discharge Summary/Instructions after an Endoscopic Procedure  Patient Name: Jerardo Powers  Patient MRN: [id number]  Patient YOB: 1945 Monday, June 05, 2017  Noah Colunga MD  RESTRICTIONS ON ACTIVITY:  - DO NOT drive a car, operate machinery, make legal/financial decisions, or   drink alcohol until the day after the procedure.    - The following day: return to full activity including work, except no heavy   lifting, straining or running for 3 days if polyps were removed.  - Diet: Eat and drink normally unless instructed otherwise.  TREATMENT FOR COMMON SIDE EFFECTS:  - Mild abdominal pain, bloating or excessive gas: rest, eat lightly and use   a heating pad.  - Sore Throat - treat with throat lozenges. Gargle with warm salt water.  SYMPTOMS TO WATCH FOR AND REPORT TO YOUR PHYSICIAN:  1. Severe abdominal pain or bloating.  2. Pain in chest.  3. Chills or fever occurring within 24 hours after a procedure.  4. A large amount of rectal bleeding, which would show as bright red,   maroon, or black stools. (A small amount of blood from the rectum is not   serious, especially if hemorrhoids are present.)  5. Because air was used during the procedure, expelling large amounts of air   from your rectum or belching is normal.  6. If a bowel prep was taken, you may not have a bowel movement for 1-3   days.  This is normal.  7. Go directly to the emergency room if you notice any of the following:   Chills and/or fever over 101 F   Persistent vomiting   Severe abdominal pain, other than gas cramps   Severe chest pain   Black, tarry stools   Any bleeding - exceeding one tablespoon  Your doctor recommends these additional instructions:  If any biopsies were performed, my office will call you in 5 to 6 business   days with any results.  You have a contact number available for emergencies.  The signs and symptoms   of potential delayed complications were discussed with you.  You may return   to normal activities  tomorrow.  Written discharge instructions were   provided to you.   You are being discharged to home.   Resume your previous diet.   Continue your present medications.   We are waiting for your pathology results.  For questions, problems or results please call your physician - Noah Colunga MD at Work:  [phone].  [facility], EMERGENCY ROOM PHONE NUMBER: [phone]  IF A COMPLICATION OR EMERGENCY SITUATION ARISES AND YOU ARE UNABLE TO REACH   YOUR PHYSICIAN - GO TO THE EMERGENCY ROOM.  Noah Colunga MD  6/5/2017 11:25:57 AM  This report has been verified and signed electronically.

## 2017-06-05 NOTE — ANESTHESIA PREPROCEDURE EVALUATION
06/05/2017  Jerardo Powers is a 71 y.o., male.    Anesthesia Evaluation         Review of Systems  Anesthesia Hx:  No problems with previous Anesthesia   Social:  Former Smoker, No Alcohol Use    Cardiovascular:   Exercise tolerance: good Hypertension, well controlled    Pulmonary:   Sleep Apnea Does not wear CPAP   Endocrine:   Diabetes        Physical Exam  General:  Well nourished    Airway/Jaw/Neck:  Airway Findings: Mouth Opening: Normal Tongue: Normal  General Airway Assessment: Adult  Mallampati: III  Improves to II with phonation.  TM Distance: Normal, at least 6 cm  Jaw/Neck Findings:  Neck ROM: Normal ROM      Dental:  Dental Findings: In tact   Chest/Lungs:  Chest/Lungs Findings: Clear to auscultation         Mental Status:  Mental Status Findings:  Cooperative         Anesthesia Plan  Type of Anesthesia, risks & benefits discussed:  Anesthesia Type:  general  Patient's Preference:   Intra-op Monitoring Plan: standard ASA monitors  Intra-op Monitoring Plan Comments:   Post Op Pain Control Plan:   Post Op Pain Control Plan Comments:   Induction:   IV  Beta Blocker:  Patient is on a Beta-Blocker and has received one dose within the past 24 hours (No further documentation required).       Informed Consent: Patient understands risks and agrees with Anesthesia plan.  Questions answered. Anesthesia consent signed with patient.  ASA Score: 2     Day of Surgery Review of History & Physical:    H&P update referred to the surgeon.         Ready For Surgery From Anesthesia Perspective.

## 2017-06-05 NOTE — ANESTHESIA RELEASE NOTE
"Anesthesia Release from PACU Note    Patient: Jerardo Powers    Procedure(s) Performed: Procedure(s) (LRB):  ESOPHAGOGASTRODUODENOSCOPY (EGD) (N/A)    Anesthesia type: general    Post pain: Adequate analgesia    Post assessment: no apparent anesthetic complications    Last Vitals:   Visit Vitals  /69   Pulse 79   Temp 36.7 °C (98 °F)   Resp 12   Ht 5' 9.5" (1.765 m)   Wt 90.3 kg (199 lb)   SpO2 98%   BMI 28.97 kg/m²       Post vital signs: stable    Level of consciousness: awake, alert  and oriented    Nausea/Vomiting: no nausea/no vomiting    Complications: none    Airway Patency: patent    Respiratory: unassisted    Cardiovascular: stable and blood pressure at baseline    Hydration: euvolemic  "

## 2017-06-05 NOTE — ANESTHESIA POSTPROCEDURE EVALUATION
"Anesthesia Post Evaluation    Patient: Jerardo Powers    Procedure(s) Performed: Procedure(s) (LRB):  ESOPHAGOGASTRODUODENOSCOPY (EGD) (N/A)    Final Anesthesia Type: general  Patient location during evaluation: PACU  Patient participation: Yes- Able to Participate  Level of consciousness: awake and alert  Post-procedure vital signs: reviewed and stable  Pain management: adequate  Airway patency: patent  PONV status at discharge: No PONV  Anesthetic complications: no      Cardiovascular status: blood pressure returned to baseline  Respiratory status: unassisted  Hydration status: euvolemic  Follow-up not needed.        Visit Vitals  /69   Pulse 79   Temp 36.7 °C (98 °F)   Resp 12   Ht 5' 9.5" (1.765 m)   Wt 90.3 kg (199 lb)   SpO2 98%   BMI 28.97 kg/m²       Pain/Khushbu Score: Presence of Pain: denies (6/5/2017 10:46 AM)  Khushbu Score: 10 (6/5/2017 11:30 AM)      "

## 2017-06-05 NOTE — INTERVAL H&P NOTE
The patient has been examined and the H&P has been reviewed:    There is no interval changes since last encounter.  EGD: Dyspepsia, Diarrhea  Sedation: GA  ASA: Per anesthesia  Mallampati: Per anesthesia    Endoscopy risks, benefits and alternative options discussed and understood by patient/family.          Active Hospital Problems    Diagnosis  POA    Abdominal pain [R10.9]  Yes      Resolved Hospital Problems    Diagnosis Date Resolved POA   No resolved problems to display.

## 2017-06-07 ENCOUNTER — PATIENT MESSAGE (OUTPATIENT)
Dept: GASTROENTEROLOGY | Facility: CLINIC | Age: 72
End: 2017-06-07

## 2017-06-08 ENCOUNTER — TELEPHONE (OUTPATIENT)
Dept: GASTROENTEROLOGY | Facility: CLINIC | Age: 72
End: 2017-06-08

## 2017-06-08 NOTE — TELEPHONE ENCOUNTER
----- Message from Noah Colunga MD sent at 6/7/2017 12:31 PM CDT -----  Biopsies look fine, trial of IB Babs for 4-6 weeks.

## 2017-06-12 ENCOUNTER — TELEPHONE (OUTPATIENT)
Dept: ENDOSCOPY | Facility: HOSPITAL | Age: 72
End: 2017-06-12

## 2017-06-13 ENCOUNTER — PATIENT MESSAGE (OUTPATIENT)
Dept: ADMINISTRATIVE | Facility: HOSPITAL | Age: 72
End: 2017-06-13

## 2017-06-15 DIAGNOSIS — F41.9 ANXIETY: ICD-10-CM

## 2017-06-15 RX ORDER — CITALOPRAM 20 MG/1
TABLET, FILM COATED ORAL
Qty: 90 TABLET | Refills: 3 | Status: SHIPPED | OUTPATIENT
Start: 2017-06-15 | End: 2018-05-31 | Stop reason: SDUPTHER

## 2017-07-11 ENCOUNTER — OFFICE VISIT (OUTPATIENT)
Dept: FAMILY MEDICINE | Facility: CLINIC | Age: 72
End: 2017-07-11
Payer: COMMERCIAL

## 2017-07-11 VITALS
BODY MASS INDEX: 29.8 KG/M2 | TEMPERATURE: 98 F | HEART RATE: 90 BPM | DIASTOLIC BLOOD PRESSURE: 79 MMHG | OXYGEN SATURATION: 97 % | SYSTOLIC BLOOD PRESSURE: 110 MMHG | WEIGHT: 208.13 LBS | HEIGHT: 70 IN

## 2017-07-11 DIAGNOSIS — E03.9 HYPOTHYROIDISM, UNSPECIFIED TYPE: ICD-10-CM

## 2017-07-11 DIAGNOSIS — E11.22 TYPE 2 DIABETES MELLITUS WITH STAGE 3 CHRONIC KIDNEY DISEASE, WITHOUT LONG-TERM CURRENT USE OF INSULIN: ICD-10-CM

## 2017-07-11 DIAGNOSIS — N28.1 RENAL CYST: ICD-10-CM

## 2017-07-11 DIAGNOSIS — G47.33 OSA (OBSTRUCTIVE SLEEP APNEA): ICD-10-CM

## 2017-07-11 DIAGNOSIS — I10 ESSENTIAL HYPERTENSION: ICD-10-CM

## 2017-07-11 DIAGNOSIS — Z12.83 SKIN EXAM FOR MALIGNANT NEOPLASM: ICD-10-CM

## 2017-07-11 DIAGNOSIS — M10.9 GOUT, UNSPECIFIED CAUSE, UNSPECIFIED CHRONICITY, UNSPECIFIED SITE: ICD-10-CM

## 2017-07-11 DIAGNOSIS — E03.9 UNSPECIFIED HYPOTHYROIDISM: ICD-10-CM

## 2017-07-11 DIAGNOSIS — D22.9 NUMEROUS MOLES: ICD-10-CM

## 2017-07-11 DIAGNOSIS — N18.30 TYPE 2 DIABETES MELLITUS WITH STAGE 3 CHRONIC KIDNEY DISEASE, WITHOUT LONG-TERM CURRENT USE OF INSULIN: ICD-10-CM

## 2017-07-11 DIAGNOSIS — Z12.5 SCREENING FOR MALIGNANT NEOPLASM OF PROSTATE: ICD-10-CM

## 2017-07-11 DIAGNOSIS — R31.9 HEMATURIA: ICD-10-CM

## 2017-07-11 DIAGNOSIS — Z00.00 ROUTINE GENERAL MEDICAL EXAMINATION AT A HEALTH CARE FACILITY: Primary | ICD-10-CM

## 2017-07-11 PROCEDURE — 1126F AMNT PAIN NOTED NONE PRSNT: CPT | Mod: S$GLB,,, | Performed by: FAMILY MEDICINE

## 2017-07-11 PROCEDURE — 3045F PR MOST RECENT HEMOGLOBIN A1C LEVEL 7.0-9.0%: CPT | Mod: S$GLB,,, | Performed by: FAMILY MEDICINE

## 2017-07-11 PROCEDURE — 4010F ACE/ARB THERAPY RXD/TAKEN: CPT | Mod: S$GLB,,, | Performed by: FAMILY MEDICINE

## 2017-07-11 PROCEDURE — 99999 PR PBB SHADOW E&M-EST. PATIENT-LVL III: CPT | Mod: PBBFAC,,, | Performed by: FAMILY MEDICINE

## 2017-07-11 PROCEDURE — 1159F MED LIST DOCD IN RCRD: CPT | Mod: S$GLB,,, | Performed by: FAMILY MEDICINE

## 2017-07-11 PROCEDURE — 99215 OFFICE O/P EST HI 40 MIN: CPT | Mod: S$GLB,,, | Performed by: FAMILY MEDICINE

## 2017-07-11 NOTE — PROGRESS NOTES
"(Portions of this note were dictated using voice recognition software and may contain dictation related errors in spelling/grammar/syntax not found on text review)    CC:   Chief Complaint   Patient presents with    Diabetes    Medication Management       HPI: 71 y.o. male medical history below.  He is here for an overall "checkup".  Last visit May 8, 2017.      diarrhea issues, followed by GI.  Had recommended trial off of metformin.  We discussed further management if diarrhea improved off of metformin.  Had been on the extended release metformin, 2000 mg daily.  We discussed given his A1c of 7.4, if elimination of metformin to improve his diarrhea, we would have to investigate other therapeutic options for his diabetes.  He is currently also on Januvia 100 mg daily.  Diabetes is complicated by CK D stage III.  He states that since going down to metformin extended release 1000 mg just in the evening time, his diarrhea has gotten a little bit better.  He thinks that some of this could be related to diet as well.  Not really exercising.  Not taking aspirin but willing to get back on this.  Needs to schedule an eye exam.  No neuropathy symptoms.    HTN: avapro and amlodipine, controlled.  Does get occasional peripheral edema in his ankles     Hypothyroid, controlled with synthroid     Anxiety: on celexa 20 mg daily, controlled     Gout: On allopurinol prophylaxis    GERD: nexium as needed.     He is also not on a statin.  We had discussed the benefits of statin use given his elevated ASCVD risk with his diabetes but patient had elected to decline    States that he had a couple of episodes of hematuria in the past year.  He has a history of kidney stones.  He went to the ER one time after having passed a kidney stone.  Had some right perinephric stranding and mild hydronephrosis at the time.  Also had a left-sided indeterminate lesion possibly hemorrhagic cyst.  It was recommended that he have follow-up ultrasound " to check up on this.  No recurrent issues of hematuria.  No dysuria, frequency, urgency.  He has seen urology in the past and had cystoscopy which showed normal bladder.  He does occasionally drink wine.  No smoking currently but used to smoke.    Past Medical History:   Diagnosis Date    Anxiety     BPH (benign prostatic hypertrophy)     Cataract     CKD (chronic kidney disease) stage 3, GFR 30-59 ml/min 5/5/2014    Colon polyps     Diabetes mellitus type II     Emphysema NEC     mild    Gout     Hyperlipidemia     Hypertension     Hypothyroidism     IBS (irritable bowel syndrome)     Kidney stones     JANEEN (obstructive sleep apnea)     not using CPAP    Plantar fasciitis     Reflux        Past Surgical History:   Procedure Laterality Date    CATARACT EXTRACTION  1/28/2013    RIGHT EYE    CATARACT EXTRACTION      left eye    COLONOSCOPY  Sep 2011    Repeat recommended in 5 years    COLONOSCOPY N/A 10/10/2016    Procedure: COLONOSCOPY;  Surgeon: Noah Colunga MD;  Location: [facility] ([address] [place] [address]);  Service: Endoscopy;  Laterality: N/A;  PM Prep    KIDNEY STONE SURGERY         Family History   Problem Relation Age of Onset    Cancer Brother      non-hodgkins T cell lymphoma    Diabetes Mother     Coronary artery disease Neg Hx     Colon cancer Neg Hx     Prostate cancer Neg Hx        Social History     Social History    Marital status:      Spouse name: N/A    Number of children: N/A    Years of education: N/A     Occupational History    Not on file.     Social History Main Topics    Smoking status: Former Smoker     Years: 40.00     Quit date: 9/4/2007    Smokeless tobacco: Never Used    Alcohol use 0.0 oz/week      Comment: one drink per week    Drug use: No    Sexual activity: No     Other Topics Concern    Not on file     Social History Narrative    No narrative on file     SCREENINGS  Colonoscopy: 2016.  Normal colonoscopy except for medium size internal hemorrhoids  visualized.  Prostate : [facility], Dr. Murray.     Immunizations  pvx 2013  Pcv: may have gotten at [facility] will have to find out  Tetanus 7/20/15     Stress echo August 2015, normal  EKGs  2014 normal  48 hour Holter monitor April 2014. 3 episodes of shortness of breath reported, corresponding rhythm is sinus rhythm. One syncopal episode reported and corresponding rhythm was sinus rhythm at 79 bpm. One episode of lightheadedness reported, corresponding rhythm was sinus bradycardia 59 bpm. No high-grade AV block. Rare PVCs. No ventricular tachycardia. Very rare PACs    Lab Results   Component Value Date    WBC 9.90 01/24/2017    HGB 15.8 01/24/2017    HCT 45.4 01/24/2017     01/24/2017    CHOL 170 01/24/2017    TRIG 198 (H) 01/24/2017    HDL 49 01/24/2017    ALT 27 01/24/2017    AST 20 01/24/2017     01/24/2017    K 3.6 01/24/2017     01/24/2017    CREATININE 1.3 01/24/2017    BUN 11 01/24/2017    CO2 27 01/24/2017    TSH 2.152 01/24/2017    PSA 0.59 07/17/2015    INR 1.0 01/18/2014    HGBA1C 7.4 (H) 01/24/2017    LDLCALC 81.4 01/24/2017     (H) 01/24/2017       Answers for HPI/ROS submitted by the patient on 7/9/2017   Diabetes problem  Diabetes type: type 2  MedicAlert ID: No  Disease duration: 15 years  blurred vision: No  chest pain: No  fatigue: Yes  foot paresthesias: No  foot ulcerations: No  polydipsia: No  polyphagia: No  polyuria: No  visual change: Yes  weakness: Yes  weight loss: No  Symptom course: stable  confusion: No  dizziness: No  headaches: No  hunger: No  mood changes: No  nervous/anxious: No  pallor: No  seizures: No  sleepiness: No  speech difficulty: No  sweats: No  tremors: No  nocturnal hypoglycemia: No  required assistance: No  required glucagon: No  CVA: No  heart disease: No  impotence: No  nephropathy: No  peripheral neuropathy: No  PVD: No  retinopathy: No  autonomic neuropathy: No  CAD risks: no known risk factors  Current treatments: oral agent (dual therapy)  Treatment  compliance: all of the time  Home blood tests: 1-2 x per day  Monitoring compliance: fair  Blood glucose trend: increasing steadily  Current diet: generally healthy  Meal planning: none  Exercise: rarely  Dietitian visit: No  Eye exam current: No  Sees podiatrist: No    ROS:  GENERAL: No fever, chills, fatigability or weight loss.  SKIN: No rashes, no itching.  HEAD: No headaches.  EYES: No visual changes  EARS: No ear pain or changes in hearing.  NOSE: No congestion or rhinorrhea.  MOUTH & THROAT: No hoarseness, change in voice, or sore throat.  NODES: Denies swollen glands.  CHEST: Denies MENON, cyanosis, wheezing, cough and sputum production.  CARDIOVASCULAR: Denies chest pain, PND, orthopnea.  ABDOMEN: No nausea, vomiting, or changes in bowel function.  URINARY: Above.  PERIPHERAL VASCULAR: No claudication or cyanosis.  MUSCULOSKELETAL: Does get some pains in his legs pain.  NEUROLOGIC: No weakness or numbness.    Vital signs reviewed  PE:   APPEARANCE: Well nourished, well developed, in no acute distress.    HEAD: Normocephalic, atraumatic.  EYES: PERRL. EOMI.   Conjunctivae noninjected.  EARS: TM's intact. Light reflex normal. No retraction or perforation  NOSE: Mucosa pink. Airway clear.  MOUTH & THROAT: No tonsillar enlargement. No pharyngeal erythema or exudate.   NECK: Supple with no cervical lymphadenopathy.  No carotid bruits.  No thyromegaly  CHEST: Good inspiratory effort. Lungs clear to auscultation with no wheezes or crackles.  CARDIOVASCULAR: Normal S1, S2. No rubs, murmurs, or gallops.  ABDOMEN: Bowel sounds normal. Not distended. Soft. No tenderness or masses. No organomegaly.  EXTREMITIES: No edema, cyanosis, or clubbing.  DIABETIC FOOT EXAM: Protective Sensation (w/ 10 gram monofilament):  Right: Intact  Left: Intact    Visual Inspection:  Normal -  Bilateral    Pedal Pulses:   Right: Present  Left: Present    Posterior tibialis:   Right:Present  Left: Present          IMPRESSION  1. Type 2  diabetes mellitus with stage 3 chronic kidney disease, without long-term current use of insulin    2. Essential hypertension    3. Gout, unspecified cause, unspecified chronicity, unspecified site    4. JANEEN (obstructive sleep apnea)    5. Unspecified hypothyroidism    6. Numerous moles    7. Skin exam for malignant neoplasm    8. Screening for malignant neoplasm of prostate    9. Renal cyst    10. Hematuria    11. Hypothyroidism, unspecified type            PLAN  Diabetes health maintenance:  -- advise regular and consistent glucose monitoring and medication compliance.  -- advise daily foot checks  -- advise yearly ophthalmologic exams  -- advise adequate dietary and exercise modification  -- advise regular dental visits  -- advise daily low-dose aspirin use if patient is not on other anticoagulants and there are no other contraindications.  -- Medication management: He will continue metformin 1000 mg extended release in the evening time only because of increased diarrhea with twice a day dosing.  Not interested in increasing to 2000 mg in the evening.  He is advised on  elimination diet to see if other factors such as dairy could be contributing to diarrhea    Hypertension: Controlled.  Continue amlodipine and Avapro    Gout: Controlled.  Continue allopurinol prophylaxis the patient interested in possibly getting off of this if his uric acid level is controlled.  Will check with labs below    Hypothyroidism: Continue Synthroid.  Check TSH    Prior studies from [facility] September 2016 reviewed including CT examination.  I reviewed prior cystoscopy report.  Would recommend follow-up renal ultrasound to track the indeterminate possibly hemorrhagic cystic lesion noted on the left side.  We will also check urinalysis and culture given a couple hematuria episodes listed as above    Patient will have a PSA checked as well.  He plans to follow back up with Dr. Murray at some point    Patient also during the encounter does  request a referral to dermatology to have some moles checked.  This has been provided    Orders Placed This Encounter   Procedures    Urine culture    US Retroperitoneal Complete    Comprehensive metabolic panel    Hemoglobin A1c    Lipid panel    Uric acid    PSA, Screening    Urinalysis    TSH    Ambulatory referral to Dermatology

## 2017-07-11 NOTE — PATIENT INSTRUCTIONS
Nutrition: How to Make Healthier Food Choices     Nutrition: How to Make Healthier Food Choices     Why is healthy eating important?  When combined with exercise, a healthy diet can help you lose weight, lower your cholesterol level and improve the way your body functions on a daily basis.  The U.[place]. Department of Agricultures (USDA) Food Guide Pyramid divides food into 6 basic food groups, consisting of 1) grains, 2) fruits, 3) vegetables, 4) meats and beans, 5) dairy and 6) fats.  The USDA recommends an adult daily diet include the following:  3 ounces of whole grains, and 6 ounces of grains total   2 cups of fruit   2 1/2 cups of vegetables   3 cups fat-free or low-fat dairy   The following are some ways to make healthier food choices and to get the recommended amounts of whole grains, fruits, vegetables and dairy.    Grains  Whole-grain breads are low in fat; they're also high in fiber and complex carbohydrates, which help you feel atwood longer and prevent overeating. Choose breads whose first ingredient says whole in front of the grain, for example, whole wheat flour or whole white flour; enriched or other types of flour have the important fiber and nutrients removed. Choose whole grain breads for sandwiches and as additions to meals.  Avoid rich bakery foods such as donuts, sweet rolls and muffins. These foods can contain more than 50% fat calories. Snacks such as margarito food cake and gingersnap cookies can satisfy your sweet tooth without adding fat to your diet.  Hot and cold cereals are usually low in fat. But instant cereals with cream may contain high-fat oils or butterfat. Granola cereals may also contain high-fat oils and extra sugars. Look for low-sugar options for both instant and granola cereals.  Avoid fried snacks such as potato chips and tortilla chips. Try the low-fat or baked versions instead.  Instead of this: Try this:   Croissants, biscuits, white breads and rolls Low-fat whole grain  "breads and rolls (wheat, rye and pumpernickel)   Doughnuts, pastries and scones English muffins and small whole grain bagels   Fried tortillas Soft tortillas (corn or whole wheat)   Sugar cereals and regular granola Oatmeal, low-fat granola and whole-grain cereal   Snack crackers Crackers (animal, татьяна, rye, soda, saltine, oyster)   Potato or corn chips and buttered popcorn Pretzels (unsalted) and popcorn (unbuttered)   White pasta Whole-wheat pasta   White rice Brown rice   Fried rice, or pasta and rice mixes that contain high-fat sauces Rice or pasta (without egg yolk) with vegetable sauces   All-purpose white flour 100% whole-wheat flour     Fruits and Vegetables  Fruits and vegetables are naturally low in fat. They add flavor and variety to your diet. They also contain fiber, vitamins and minerals.  Margarine, butter, mayonnaise and sour cream add fat to vegetables and fruits. Try using nonfat or low-fat versions of these foods. You can also use nonfat or low-fat yogurt or herbs as seasonings instead.  Instead of this: Try this:   Fried vegetables or vegetables served with cream, cheese or butter sauces All vegetables raw, steamed, broiled, baked or tossed with a very small amount of olive oil and salt and pepper   Coconut Fruit (fresh)   French fries, hash browns and potato   chips Baked white or sweet potatoes     Meat, Poultry and Fish  Beef, Pork, Veal and Lamb  Baking, broiling and roasting are the healthiest ways to prepare meat. Lean cuts can be pan-broiled or stir-fried. Use either a nonstick pan or nonstick spray coating instead of butter or margarine.  Trim outside fat before cooking. Trim any inside, separable fat before eating. Select low-fat, lean cuts of meat. Lean beef and veal cuts have the word "loin" or "round" in their names. Lean pork cuts have the word "loin" or "leg" in their names.  Use herbs, spices, fresh vegetables and nonfat marinades to season meat. Avoid high-fat sauces and " "gravies.  Poultry  Baking, broiling and roasting are the healthiest ways to prepare poultry. Skinless poultry can be pan-broiled or stir-fried. Use either a nonstick pan or nonstick spray coating instead of butter or margarine.  Remove skin and visible fat before cooking. Chicken breasts are a good choice because they are low in fat and high in protein. Use domestic goose and duck only once in a while because both are high in fat.  Fish  Poaching, steaming, baking and broiling are the healthiest ways to prepare fish. Fresh fish should have a clear color, a moist look, a clean smell and firm, springy flesh. If good-quality fresh fish isn't available, buy frozen fish.  Most seafood is high in healthy polyunsaturated fat. Omega-3 fatty acids are also found in some fatty fish, such as salmon and cold-water trout. They may help lower the risk of heart disease in some people.  Cross-over Foods  Dry beans, peas and lentils offer protein and fiber without the cholesterol and fat of meats. Once in a while, try substituting beans for meat in a favorite recipe, such as lasagna or chili.  TVP, or textured vegetable protein, is widely available in many foods. Vegetarian "hot dogs," "hamburger" and "chicken nuggets" are low-fat, cholesterol-free alternatives to meat.  Instead of this: Try this:   Regular or breaded fish sticks or cakes, fish canned in oil, seafood prepared with butter or served in high-fat sauce Fish (fresh, frozen, canned in water), low-fat fish sticks or cakes and shellfish (such as shrimp)   Prime and marbled cuts Select-grade lean beef (round, sirloin and loin)   Pork spare ribs and chaney Lean pork (tenderloin and loin chop) and turkey chaney   Regular ground beef Lean or extra-lean ground beef, ground chicken and turkey breast   Lunch meats such as pepperoni, salami, bologna and liverwurst Lean lunch meats such as turkey, chicken and ham   Regular hot dogs or sausage Fat-free hot dogs and turkey dogs "     Dairy  Choose skim milk or low-fat buttermilk. Substitute evaporated skim milk for cream in recipes for soups, sauces and coffee.  Try low-fat cheeses. Skim ricotta can replace cream cheese on a bagel or in a vegetable dip. Use part-skim cheeses in recipes. Use 1% cottage cheese for salads and cooking. String cheese is a low-fat, high-calcium snack option.  Plain nonfat yogurt can replace sour cream in many recipes. (To maintain texture, stir 1 tablespoon of cornstarch into each cup of yogurt that you use in cooking.) Try mixing frozen nonfat or low-fat yogurt with fruit for dessert.  Skim sherbet is an alternative to ice cream. Soft-serve and regular ice creams are also lower in fat than premium styles.  Instead of this: Try this:   Whole or 2% milk Non-fat or 1% milk   Evaporated milk Evaporated non-fat milk   Regular buttermilk Buttermilk made from non-fat (or 1%) milk   Yogurt made with whole milk Nonfat or low-fat yogurt   Regular cheese (examples: American, blue, Brie, cheddar, Farshad and Parmesan) Low-fat cheese with less than 3 grams of fat per serving (example: natural cheese, processed cheese and nondairy cheese such as soy cheese)   Regular cottage cheese Low-fat, nonfat, and dry-curd cottage cheese with less than 2% fat   Regular cream cheese Low-fat cream cheese (no more than 3 grams of fat per ounce)   Regular ice cream Sorbet, sherbet and nonfat or low-fat ice cream (no more than 3 grams of fat per 1/2 cup serving)     Fats, Oils and Sweets  Eating too many high-fat foods not only adds excess calories (which can lead to obesity and weight gain), but can increase your risk factor for several diseases. Heart disease, diabetes, certain types of cancer and osteoarthritis have all been linked to diets too high in fat. If you consume too much saturated and trans fats, you are more likely to develop high cholesterol and coronary artery disease.  Sugar-sweetened drinks, such as fruit juice, fruit drinks,  "regular soft drinks, sports drinks, energy drinks, sweetened or flavored milk and sweetened iced tea can add lots of sugar and calories to your diet. But staying hydrated is important for good health. Substitute water, zero-calorie flavored water, non-fat or reduced-fat milk, unsweetened tea or diet soda for sweetened drinks. Talk with your family doctor or a dietitian if you have questions about your diet or healthy eating for your family.  Instead of this: Try this:   Cookies Fig bars, gingersnaps and molasses cookies   Shortening, butter or margarine Olive, soybean and canola oils   Regular mayonnaise Nonfat or light mayonnaise   Regular salad dressing Nonfat or light salad dressing   Using fat (including butter) to grease pan Nonstick cooking spray       What it Takes to Lose Weight     What it Takes to Lose Weight     What does it take to lose weight?  To lose weight, you have to eat fewer calories than your body uses. Calories are the amount of energy in the food you eat. Some foods have more calories than others. For example, foods that are high in fat and sugar are also high in calories. If you eat more calories than your body uses, the extra calories will be stored as body excess fat.  A pound of fat is about 3,500 calories. To lose 1 pound of fat in a week, you have to eat 3,500 fewer calories (that is 500 fewer calories a day), or you have to "burn off" an extra 3,500 calories. You can burn off calories by exercising or just by being more active. (Talk to your family doctor before you begin any type of exercise program. Your doctor can help you determine what kind of exercise program is right for you.)  The best way to lose weight and keep it off is to eat fewer calories and be active. If you cut 250 calories from your diet each day and exercise enough to burn off 250 calories, that adds up to 500 fewer calories in one day. If you do this for 7 days, you can lose 1 pound of fat in a week.  Many experts " "believe you should not try to lose more than 2 pounds per week. Losing more than 2 pounds in a week usually means that you are losing water weight and lean muscle mass instead of losing excess fat. If you do this, you will have less energy, and you will most likely gain the weight back.    How often should I eat?  Most people can eat 3 regular meals and 1 snack every day. The 3 meals should be about the same size and should be low in fat. Try to eat 1 to 2 cups of fruits and vegetables, 2 to 3 ounces of whole grains and 1 to 2 ounces of meat (or a meat alternative) at most meals.  Some people benefit more if they eat 5 to 6 smaller meals throughout the day, about 2 to 3 hours apart. For example, their first meal of the day might be a cup of low-fat or nonfat yogurt and a banana. Three hours later they might eat a simple deli sandwich with whole-grain bread and fat-free mayonnaise.  Eat breakfast and don't skip meals. While skipping meals may help you lose weight in the beginning, it fails in the long run. Skipping meals may make you feel too hungry later in the day, causing you to overeat at your next meal.  After about a month of eating a normal breakfast, lunch and a light dinner, your body will adjust.    What is so bad about high-fat foods?  Foods high in fat are usually high in calories, which could lead to unwanted weight gain. Consuming too much saturated and trans fats may increase LDL cholesterol ("bad" cholesterol) level, and increase your risk of heart disease. The USDA suggests that you eat no more than 20 grams of saturated fat, and that you limit the amount of trans fat to as close to 0 grams as possible. Click here for more information on reading nutrition labels.  It is important to remember that some fats can be beneficial to your overall health. "Good" fat, such as polyunsaturated and monounsaturated fats are found in fish, nuts, and low- or nonfat dairy products.    What are empty calories?  Some " "foods are referred to as "empty calories" because they add lots of calories to your diet without providing much nutritional benefit. An example is sugar-sweetened drinks, such as fruit juice, fruit drinks, regular soft drinks, sports drinks, energy drinks, sweetened or flavored milk and sweetened iced tea. These drinks can add lots of sugar and calories to your diet.  But staying hydrated is important for good health. To reduce the calories and sugar in your diet, substitute water, zero-calorie flavored water, non-fat or reduced-fat milk, unsweetened tea or diet soda for sweetened drinks. Talk with your family doctor or a dietitian if you have questions about your diet or healthy eating for your family.    Can I trust nutrition information I get from newspapers and magazines?  Nutrition tips and diet information from different sources often conflict with each other. You should always check with your doctor first. Also, keep in mind the following advice:  There is no "magic bullet" when it comes to nutrition. There isn't one single diet that works for every person. You need to find an eating plan that works for you.   Good nutrition doesn't come in a vitamin supplement. Only take a vitamin with your doctor's recommendation, as your body benefits the most when you eat healthy foods.   Eating all different kinds of foods is best for your body, so learn to try new foods.   Fad diets offer short-term changes, but good health comes from long-term effort and commitment.   Stories from people who have used a diet program or product, especially in commercials and [employer], are advertisements. These people are usually paid to endorse what the ad is selling. Remember, regained weight or other problems that develop after someone has completed the diet program are never talked about in those ads.     Will diet drugs help?  Although diet drugs may help you lose weight at first, they usually don't help you keep the weight off " and may have damaging side effects. Most diet pills have not been tested by the Food and Drug Administration, which means you can't be sure if the drugs are safe. Taking drugs also does not help you learn how to change your eating and exercise habits. Making lasting changes in these habits is the way to lose weight and keep it off.

## 2017-07-12 RX ORDER — LEVOTHYROXINE SODIUM 75 UG/1
75 TABLET ORAL DAILY
Qty: 90 TABLET | Refills: 3 | Status: SHIPPED | OUTPATIENT
Start: 2017-07-12 | End: 2018-07-02 | Stop reason: SDUPTHER

## 2017-07-13 ENCOUNTER — OFFICE VISIT (OUTPATIENT)
Dept: OPTOMETRY | Facility: CLINIC | Age: 72
End: 2017-07-13
Payer: COMMERCIAL

## 2017-07-13 DIAGNOSIS — H52.4 PRESBYOPIA: ICD-10-CM

## 2017-07-13 DIAGNOSIS — E11.9 TYPE 2 DIABETES MELLITUS WITHOUT OPHTHALMIC MANIFESTATIONS: Primary | ICD-10-CM

## 2017-07-13 DIAGNOSIS — H35.413 LATTICE DEGENERATION OF BOTH RETINAS: ICD-10-CM

## 2017-07-13 PROCEDURE — 99999 PR PBB SHADOW E&M-EST. PATIENT-LVL II: CPT | Mod: PBBFAC,,, | Performed by: OPTOMETRIST

## 2017-07-13 PROCEDURE — 92014 COMPRE OPH EXAM EST PT 1/>: CPT | Mod: S$GLB,,, | Performed by: OPTOMETRIST

## 2017-07-13 PROCEDURE — 92015 DETERMINE REFRACTIVE STATE: CPT | Mod: S$GLB,,, | Performed by: OPTOMETRIST

## 2017-07-13 NOTE — PROGRESS NOTES
HPI     Concerns About Ocular Health    Additional comments: Retinal lattice degeneration, right           Comments   Patient's last dilated exam was: 9/9/2014 Dr. Burrell  Pt states: here for updated glasses rx. Not happy with the results of CE   OS not happy with the lens he chose, did not want to have to wear glasses.   Happy with distance OD. Floaters ou, longstanding and stable. Patient   denies flashes, pain and double vision. Not using any gtts    Hemoglobin A1C       Date                     Value               Ref Range             Status                01/24/2017               7.4 (H)             4.5 - 6.2 %           Final                 04/15/2016               7.2 (H)             4.5 - 6.2 %           Final                 12/03/2015               6.8 (H)             4.5 - 6.2 %           Final            ----------           Last edited by Deedee Morel, PCT on 7/13/2017  9:46 AM.   (History)            Assessment /Plan     For exam results, see Encounter Report.    Type 2 diabetes mellitus without ophthalmic manifestations    Lattice degeneration of both retinas    Presbyopia          1.  No retinopathy--monitor yearly.  Educated pt eye health correlates with blood sugar control.    2.  Laser treatment OU--no new holes, tears, or breaks.  3.  Bifocal rx given        RTC 1 year for dm exam.

## 2017-07-14 ENCOUNTER — HOSPITAL ENCOUNTER (OUTPATIENT)
Dept: RADIOLOGY | Facility: HOSPITAL | Age: 72
Discharge: HOME | End: 2017-07-14
Attending: FAMILY MEDICINE
Payer: COMMERCIAL

## 2017-07-14 ENCOUNTER — LAB VISIT (OUTPATIENT)
Dept: LAB | Facility: HOSPITAL | Age: 72
End: 2017-07-14
Attending: FAMILY MEDICINE
Payer: COMMERCIAL

## 2017-07-14 DIAGNOSIS — E11.22 TYPE 2 DIABETES MELLITUS WITH STAGE 3 CHRONIC KIDNEY DISEASE, WITHOUT LONG-TERM CURRENT USE OF INSULIN: ICD-10-CM

## 2017-07-14 DIAGNOSIS — M10.9 GOUT, UNSPECIFIED CAUSE, UNSPECIFIED CHRONICITY, UNSPECIFIED SITE: ICD-10-CM

## 2017-07-14 DIAGNOSIS — R31.9 HEMATURIA: ICD-10-CM

## 2017-07-14 DIAGNOSIS — N18.30 TYPE 2 DIABETES MELLITUS WITH STAGE 3 CHRONIC KIDNEY DISEASE, WITHOUT LONG-TERM CURRENT USE OF INSULIN: ICD-10-CM

## 2017-07-14 DIAGNOSIS — I10 ESSENTIAL HYPERTENSION: ICD-10-CM

## 2017-07-14 DIAGNOSIS — N28.1 RENAL CYST: ICD-10-CM

## 2017-07-14 DIAGNOSIS — E03.9 HYPOTHYROIDISM, UNSPECIFIED TYPE: ICD-10-CM

## 2017-07-14 DIAGNOSIS — Z12.5 SCREENING FOR MALIGNANT NEOPLASM OF PROSTATE: ICD-10-CM

## 2017-07-14 LAB
ALBUMIN SERPL BCP-MCNC: 3.8 G/DL
ALP SERPL-CCNC: 96 U/L
ALT SERPL W/O P-5'-P-CCNC: 18 U/L
ANION GAP SERPL CALC-SCNC: 10 MMOL/L
AST SERPL-CCNC: 17 U/L
BILIRUB SERPL-MCNC: 1 MG/DL
BUN SERPL-MCNC: 10 MG/DL
CALCIUM SERPL-MCNC: 8.9 MG/DL
CHLORIDE SERPL-SCNC: 106 MMOL/L
CHOLEST/HDLC SERPL: 3 {RATIO}
CO2 SERPL-SCNC: 23 MMOL/L
COMPLEXED PSA SERPL-MCNC: 0.74 NG/ML
CREAT SERPL-MCNC: 1.2 MG/DL
EST. GFR  (AFRICAN AMERICAN): >60 ML/MIN/1.73 M^2
EST. GFR  (NON AFRICAN AMERICAN): >60 ML/MIN/1.73 M^2
ESTIMATED AVG GLUCOSE: 146 MG/DL
GLUCOSE SERPL-MCNC: 163 MG/DL
HBA1C MFR BLD HPLC: 6.7 %
HDL/CHOLESTEROL RATIO: 33.3 %
HDLC SERPL-MCNC: 177 MG/DL
HDLC SERPL-MCNC: 59 MG/DL
LDLC SERPL CALC-MCNC: 85 MG/DL
NONHDLC SERPL-MCNC: 118 MG/DL
POTASSIUM SERPL-SCNC: 3.7 MMOL/L
PROT SERPL-MCNC: 7.1 G/DL
SODIUM SERPL-SCNC: 139 MMOL/L
TRIGL SERPL-MCNC: 165 MG/DL
TSH SERPL DL<=0.005 MIU/L-ACNC: 1.71 UIU/ML
URATE SERPL-MCNC: 5.9 MG/DL

## 2017-07-14 PROCEDURE — 80053 COMPREHEN METABOLIC PANEL: CPT

## 2017-07-14 PROCEDURE — 84443 ASSAY THYROID STIM HORMONE: CPT

## 2017-07-14 PROCEDURE — 76770 US EXAM ABDO BACK WALL COMP: CPT | Mod: 26,,, | Performed by: RADIOLOGY

## 2017-07-14 PROCEDURE — 84550 ASSAY OF BLOOD/URIC ACID: CPT

## 2017-07-14 PROCEDURE — 76770 US EXAM ABDO BACK WALL COMP: CPT | Mod: TC

## 2017-07-14 PROCEDURE — 83036 HEMOGLOBIN GLYCOSYLATED A1C: CPT

## 2017-07-14 PROCEDURE — 84153 ASSAY OF PSA TOTAL: CPT

## 2017-07-14 PROCEDURE — 80061 LIPID PANEL: CPT

## 2017-07-14 PROCEDURE — 36415 COLL VENOUS BLD VENIPUNCTURE: CPT | Mod: PO

## 2017-07-21 ENCOUNTER — PATIENT MESSAGE (OUTPATIENT)
Dept: FAMILY MEDICINE | Facility: CLINIC | Age: 72
End: 2017-07-21

## 2017-07-22 ENCOUNTER — PATIENT MESSAGE (OUTPATIENT)
Dept: FAMILY MEDICINE | Facility: CLINIC | Age: 72
End: 2017-07-22

## 2017-07-26 ENCOUNTER — PATIENT MESSAGE (OUTPATIENT)
Dept: ADMINISTRATIVE | Facility: OTHER | Age: 72
End: 2017-07-26

## 2017-07-27 ENCOUNTER — LAB VISIT (OUTPATIENT)
Dept: LAB | Facility: HOSPITAL | Age: 72
End: 2017-07-27
Attending: FAMILY MEDICINE
Payer: COMMERCIAL

## 2017-07-27 DIAGNOSIS — R31.9 HEMATURIA: ICD-10-CM

## 2017-07-27 LAB
BILIRUB UR QL STRIP: NEGATIVE
CLARITY UR REFRACT.AUTO: CLEAR
COLOR UR AUTO: YELLOW
GLUCOSE UR QL STRIP: NEGATIVE
HGB UR QL STRIP: NEGATIVE
KETONES UR QL STRIP: NEGATIVE
LEUKOCYTE ESTERASE UR QL STRIP: NEGATIVE
NITRITE UR QL STRIP: NEGATIVE
PH UR STRIP: 6 [PH] (ref 5–8)
PROT UR QL STRIP: NEGATIVE
SP GR UR STRIP: 1.01 (ref 1–1.03)
URN SPEC COLLECT METH UR: NORMAL
UROBILINOGEN UR STRIP-ACNC: NEGATIVE EU/DL

## 2017-07-27 PROCEDURE — 81003 URINALYSIS AUTO W/O SCOPE: CPT

## 2017-07-27 PROCEDURE — 87086 URINE CULTURE/COLONY COUNT: CPT

## 2017-07-28 LAB — BACTERIA UR CULT: NORMAL

## 2017-08-03 ENCOUNTER — OFFICE VISIT (OUTPATIENT)
Dept: CARDIOLOGY | Facility: CLINIC | Age: 72
End: 2017-08-03
Payer: COMMERCIAL

## 2017-08-03 VITALS
SYSTOLIC BLOOD PRESSURE: 128 MMHG | DIASTOLIC BLOOD PRESSURE: 80 MMHG | BODY MASS INDEX: 30.89 KG/M2 | HEIGHT: 69 IN | WEIGHT: 208.56 LBS | HEART RATE: 72 BPM

## 2017-08-03 DIAGNOSIS — I10 ESSENTIAL HYPERTENSION: Primary | ICD-10-CM

## 2017-08-03 DIAGNOSIS — G47.33 OSA (OBSTRUCTIVE SLEEP APNEA): ICD-10-CM

## 2017-08-03 PROCEDURE — 3008F BODY MASS INDEX DOCD: CPT | Mod: S$GLB,,, | Performed by: INTERNAL MEDICINE

## 2017-08-03 PROCEDURE — 99213 OFFICE O/P EST LOW 20 MIN: CPT | Mod: S$GLB,,, | Performed by: INTERNAL MEDICINE

## 2017-08-03 PROCEDURE — 1126F AMNT PAIN NOTED NONE PRSNT: CPT | Mod: S$GLB,,, | Performed by: INTERNAL MEDICINE

## 2017-08-03 PROCEDURE — 99999 PR PBB SHADOW E&M-EST. PATIENT-LVL IV: CPT | Mod: PBBFAC,,, | Performed by: INTERNAL MEDICINE

## 2017-08-03 PROCEDURE — 1159F MED LIST DOCD IN RCRD: CPT | Mod: S$GLB,,, | Performed by: INTERNAL MEDICINE

## 2017-08-03 NOTE — PROGRESS NOTES
Subjective:   Patient ID:  Jerardo Powers is a 71 y.o. male who presents for follow-up of Hypertension      HPI:   Jerardo Powers presents for follow up of hypertension with BP at home averaging 117/. Jerardo Powers is not exercising.Jerardo Powers denies chest pain, shortness of breath, palpitations, presyncope , or syncope. Mild elevation of TG and has DM. Jerardo Powers has obstructive sleep apnea not treated. States after he awakens, will have 3 hours of good sleep..  Review of Systems   Constitution: Negative for weakness, malaise/fatigue, weight gain and weight loss.   HENT: Negative for headaches.    Eyes: Negative for blurred vision.   Cardiovascular: Negative for chest pain, claudication, cyanosis, dyspnea on exertion, irregular heartbeat, leg swelling, near-syncope, orthopnea, palpitations, paroxysmal nocturnal dyspnea and syncope.   Respiratory: Negative for cough, shortness of breath and wheezing.    Musculoskeletal: Negative for falls and myalgias.   Gastrointestinal: Positive for constipation and diarrhea. Negative for abdominal pain, heartburn, nausea and vomiting.   Genitourinary: Negative for nocturia.   Neurological: Positive for excessive daytime sleepiness. Negative for brief paralysis, dizziness, focal weakness, numbness and paresthesias.   Psychiatric/Behavioral: Negative for altered mental status.       Current Outpatient Prescriptions   Medication Sig    allopurinol (ZYLOPRIM) 100 MG tablet TAKE 1 TABLET (100 MG TOTAL) BY MOUTH ONCE DAILY.    amlodipine (NORVASC) 10 MG tablet TAKE 1 TABLET BY MOUTH EVERY DAY    blood sugar diagnostic Strp 1 strip by Misc.(Non-Drug; Combo Route) route 3 (three) times daily.    citalopram (CELEXA) 20 MG tablet TAKE 1 TABLET (20 MG TOTAL) BY MOUTH ONCE DAILY.    dutasteride-tamsulosin (VIVIENNE) 0.5-0.4 mg CM24 Take 1 tablet by mouth every evening.    fish oil-omega-3 fatty acids 300-1,000 mg capsule Take 2 g by mouth once daily.    irbesartan (AVAPRO) 300  "MG tablet Take 1 tablet (300 mg total) by mouth once daily.    lancets Misc Test sugars once daily.  Dispense 100 lancets (insurance covered brand)    levothyroxine (SYNTHROID) 75 MCG tablet TAKE 1 TABLET (75 MCG TOTAL) BY MOUTH ONCE DAILY.    metformin (GLUCOPHAGE-XR) 500 MG 24 hr tablet Take 2 tablets (1,000 mg total) by mouth 2 (two) times daily with meals. (Patient taking differently: Take 1,000 mg by mouth once daily. )    SITagliptan (JANUVIA) 100 MG Tab Take 1 tablet (100 mg total) by mouth once daily.    aspirin 81 MG Chew Take 81 mg by mouth once daily.     No current facility-administered medications for this visit.      Objective:   Physical Exam   Constitutional: He is oriented to person, place, and time. He appears well-developed. No distress.   /80   Pulse 72   Ht 5' 9" (1.753 m)   Wt 94.6 kg (208 lb 8.9 oz)   BMI 30.80 kg/m² ( central obesity )   HENT:   Head: Normocephalic.   Right Ear: External ear normal.   Left Ear: External ear normal.   Eyes: EOM are normal. Pupils are equal, round, and reactive to light. No scleral icterus.   Neck: Neck supple. No JVD present. No thyromegaly present.   Cardiovascular: Normal rate, regular rhythm, normal heart sounds and intact distal pulses.  PMI is not displaced.  Exam reveals no gallop and no friction rub.    No murmur heard.  Pulmonary/Chest: Effort normal and breath sounds normal. No respiratory distress. He has no wheezes. He has no rales.   Abdominal: Soft. He exhibits no distension. There is no hepatosplenomegaly. There is no tenderness.   Musculoskeletal: He exhibits no edema or tenderness.   Gait normal   Neurological: He is alert and oriented to person, place, and time.   Skin: Skin is warm and dry. No rash noted.   Psychiatric: He has a normal mood and affect. His behavior is normal.       Lab Results   Component Value Date     07/14/2017    K 3.7 07/14/2017     07/14/2017    CO2 23 07/14/2017    BUN 10 07/14/2017    " CREATININE 1.2 07/14/2017     (H) 07/14/2017    HGBA1C 6.7 (H) 07/14/2017    MG 1.7 03/31/2014    AST 17 07/14/2017    ALT 18 07/14/2017    ALBUMIN 3.8 07/14/2017    PROT 7.1 07/14/2017    BILITOT 1.0 07/14/2017    WBC 9.90 01/24/2017    HGB 15.8 01/24/2017    HCT 45.4 01/24/2017    HCT 47 01/18/2014    MCV 86 01/24/2017     01/24/2017    TSH 1.706 07/14/2017    CHOL 177 07/14/2017    HDL 59 07/14/2017    LDLCALC 85.0 07/14/2017    TRIG 165 (H) 07/14/2017       Assessment:     1. Essential hypertension : Good control.   2. JANEEN (obstructive sleep apnea): Not treated        Plan:     Jerardo was seen today for hypertension.    Diagnoses and all orders for this visit:    Essential hypertension  Continue current regimen  Graded exercise for 30 minutes a day at least 5 days a week suggested.  .[place] diet recommended with CHO restriction.  JANEEN (obstructive sleep apnea)  -     Ambulatory consult for Sleep Study

## 2017-08-03 NOTE — PATIENT INSTRUCTIONS
Graded exercise for 30 minutes a day at least 5 days a week suggested.  .[place] diet recommended with carbohydrate restriction.

## 2017-09-05 RX ORDER — IRBESARTAN 300 MG/1
TABLET ORAL
Qty: 30 TABLET | Refills: 11 | Status: SHIPPED | OUTPATIENT
Start: 2017-09-05 | End: 2018-06-04

## 2017-09-18 ENCOUNTER — OFFICE VISIT (OUTPATIENT)
Dept: URGENT CARE | Facility: CLINIC | Age: 72
End: 2017-09-18
Payer: COMMERCIAL

## 2017-09-18 VITALS
TEMPERATURE: 97 F | BODY MASS INDEX: 30.81 KG/M2 | HEIGHT: 69 IN | HEART RATE: 92 BPM | DIASTOLIC BLOOD PRESSURE: 68 MMHG | OXYGEN SATURATION: 96 % | RESPIRATION RATE: 18 BRPM | WEIGHT: 208 LBS | SYSTOLIC BLOOD PRESSURE: 98 MMHG

## 2017-09-18 DIAGNOSIS — H60.502 ACUTE NONINFECTIVE OTITIS EXTERNA OF LEFT EAR, UNSPECIFIED TYPE: Primary | ICD-10-CM

## 2017-09-18 PROCEDURE — 3078F DIAST BP <80 MM HG: CPT | Mod: S$GLB,,, | Performed by: PHYSICIAN ASSISTANT

## 2017-09-18 PROCEDURE — 69209 REMOVE IMPACTED EAR WAX UNI: CPT | Mod: LT,S$GLB,, | Performed by: PHYSICIAN ASSISTANT

## 2017-09-18 PROCEDURE — 99203 OFFICE O/P NEW LOW 30 MIN: CPT | Mod: 25,S$GLB,, | Performed by: PHYSICIAN ASSISTANT

## 2017-09-18 PROCEDURE — 3074F SYST BP LT 130 MM HG: CPT | Mod: S$GLB,,, | Performed by: PHYSICIAN ASSISTANT

## 2017-09-18 PROCEDURE — 3008F BODY MASS INDEX DOCD: CPT | Mod: S$GLB,,, | Performed by: PHYSICIAN ASSISTANT

## 2017-09-18 PROCEDURE — 1159F MED LIST DOCD IN RCRD: CPT | Mod: S$GLB,,, | Performed by: PHYSICIAN ASSISTANT

## 2017-09-18 RX ORDER — NEOMYCIN SULFATE, POLYMYXIN B SULFATE, HYDROCORTISONE 3.5; 10000; 1 MG/ML; [USP'U]/ML; MG/ML
4 SOLUTION/ DROPS AURICULAR (OTIC) 3 TIMES DAILY
Qty: 10 ML | Refills: 0 | Status: SHIPPED | OUTPATIENT
Start: 2017-09-18 | End: 2017-09-28

## 2017-09-18 NOTE — PATIENT INSTRUCTIONS

## 2017-09-18 NOTE — PROGRESS NOTES
"Subjective:       Patient ID: Ujchristiane Powers is a 71 y.o. male.    Vitals:  height is 5' 9" (1.753 m) and weight is 94.3 kg (208 lb). His temperature is 97 °F (36.1 °C). His blood pressure is 98/68 and his pulse is 92. His respiration is 18 and oxygen saturation is 96%.     Chief Complaint: Otalgia    Pt complains of left ear "fullness" and pain. Denies any drainage from ear. Report recent air travel but reports he doesn't believe the pain is related to that. Reports some mild nasal congestion.       Otalgia    There is pain in the left ear. This is a new problem. The current episode started yesterday. The problem occurs constantly. The problem has been unchanged. The pain is at a severity of 3/10. Pertinent negatives include no abdominal pain, coughing, diarrhea, headaches, rash, sore throat or vomiting. He has tried nothing for the symptoms.     Review of Systems   Constitution: Negative for chills, fever and malaise/fatigue.   HENT: Positive for congestion and ear pain. Negative for hoarse voice and sore throat.    Eyes: Negative for blurred vision, discharge and redness.   Cardiovascular: Negative for chest pain, dyspnea on exertion and leg swelling.   Respiratory: Negative for cough, shortness of breath, sputum production and wheezing.    Skin: Negative for rash.   Musculoskeletal: Negative for back pain, joint pain and myalgias.   Gastrointestinal: Negative for abdominal pain, diarrhea, nausea and vomiting.   Neurological: Negative for headaches.   Psychiatric/Behavioral: The patient is not nervous/anxious.        Objective:      Physical Exam   Constitutional: He is oriented to person, place, and time. He appears well-developed and well-nourished.   HENT:   Head: Normocephalic and atraumatic.   Right Ear: External ear normal.   Left external ear is impacted with cerumen. Following irrigation there is a mild clear effusion to the left TM without injection or bulging. The external canal is erythematous and " "edematous.   Eyes: Conjunctivae and EOM are normal. Pupils are equal, round, and reactive to light.   Neck: Normal range of motion. No thyromegaly present.   Cardiovascular: Normal rate and regular rhythm.    Pulmonary/Chest: Effort normal and breath sounds normal. No respiratory distress. He has no wheezes. He has no rales.   Musculoskeletal: Normal range of motion.   Lymphadenopathy:     He has no cervical adenopathy.   Neurological: He is alert and oriented to person, place, and time.   Skin: Skin is warm and dry. No rash noted. No erythema.     Ear Cerumen Removal  Date/Time: 9/18/2017 1:21 PM  Performed by: JULIO MENDEZ.  Authorized by: JULIO MENDEZ     Time out: Immediately prior to procedure a "time out" was called to verify the correct patient, procedure, equipment, support staff and site/side marked as required.    Consent Done?:  Yes (Verbal)  Location details:  Left ear  Procedure type: irrigation    Patient tolerance:  Patient tolerated the procedure well with no immediate complications     Pt reports had this done before and that the "medication" used to soften the wax caused him to pass out.     Irrigation successful, several pieces of cotton swab also irrigated from behind the cerumen. Will treat as an external otitis at this point. TM not suggestive or otitis media at this time. Advised pt to return for re-evaluation if continued pain.   Assessment:       1. Acute noninfective otitis externa of left ear, unspecified type        Plan:         Acute noninfective otitis externa of left ear, unspecified type    Other orders  -     EAR CERUMEN REMOVAL  -     neomycin-polymyxin-hydrocortisone (CORTISPORIN) otic solution; Place 4 drops into the left ear 3 (three) times daily.  Dispense: 10 mL; Refill: 0        "

## 2017-09-26 RX ORDER — AMLODIPINE BESYLATE 10 MG/1
TABLET ORAL
Qty: 30 TABLET | Refills: 11 | Status: SHIPPED | OUTPATIENT
Start: 2017-09-26 | End: 2018-09-15 | Stop reason: SDUPTHER

## 2017-10-12 ENCOUNTER — PATIENT MESSAGE (OUTPATIENT)
Dept: SLEEP MEDICINE | Facility: CLINIC | Age: 72
End: 2017-10-12

## 2017-10-12 ENCOUNTER — TELEPHONE (OUTPATIENT)
Dept: SLEEP MEDICINE | Facility: CLINIC | Age: 72
End: 2017-10-12

## 2017-10-20 ENCOUNTER — OFFICE VISIT (OUTPATIENT)
Dept: SLEEP MEDICINE | Facility: CLINIC | Age: 72
End: 2017-10-20
Payer: COMMERCIAL

## 2017-10-20 VITALS
WEIGHT: 211.63 LBS | BODY MASS INDEX: 31.34 KG/M2 | HEART RATE: 73 BPM | HEIGHT: 69 IN | SYSTOLIC BLOOD PRESSURE: 130 MMHG | DIASTOLIC BLOOD PRESSURE: 83 MMHG

## 2017-10-20 DIAGNOSIS — G47.33 OSA (OBSTRUCTIVE SLEEP APNEA): Primary | ICD-10-CM

## 2017-10-20 PROCEDURE — 99204 OFFICE O/P NEW MOD 45 MIN: CPT | Mod: S$GLB,,, | Performed by: PSYCHIATRY & NEUROLOGY

## 2017-10-20 PROCEDURE — 99999 PR PBB SHADOW E&M-EST. PATIENT-LVL III: CPT | Mod: PBBFAC,,, | Performed by: PSYCHIATRY & NEUROLOGY

## 2017-10-20 NOTE — PROGRESS NOTES
Jerardo Powers  was seen at the request of  Self, Aaareferral for sleep evaluation.    10/20/2017 INITIAL HISTORY OF PRESENT ILLNESS:  Jerardo Powers is a 71 y.o. male is here to be evaluated for a sleep disorder.       CHIEF COMPLAINT:      The patient's complaints include excessive daytime sleepiness, excessive daytime fatigue, snoring,  witnessed breathing pauses,  gasping for air in sleep and interrupted sleep since  Since 2008 at least when he was diagnosed with severe JANEEN.    Several near misses.    He has been using CPAP 13 cm. His machine is old and needs replacement. He may have gained some weight since.     Reports occasional  dry mouth and sore throat  Denies nasal congestion   Denies  morning headaches  Reports  interrupted sleep  Denies frequent leg movements  Denies symptoms concerning for parasomnia    The ESS (Milton Sleepiness Score) taken on initial visit is 24 /24    The patient never had tonsillectomy, adenoidectomy or UPPP      SLEEP ROUTINE AND LIFESTYLE 10/20/2017 :    Occupation:working    Bed partner:      Time to bed - wake up time on a workday : 1-2 to 10 AM  Time to bed - wake up time on a day off: 1-2 AM to  11-2  Sleep onset latency: 30 min  Disruptions or awakenings: 2  Time to fall back into sleep: 30 min   Perceived sleep quality: 0/  Perceived total sleep time:  5-6  hours.  Daytime naps: one    Exercise routine: no     PREVIOUS SLEEP STUDIES:     PSG/ SPLIT night study  In 5/28/08 showed significant JANEEN with the AHI of 34.7/hour and SaO2 minimum of 81 %.     DME:       PAST MEDICAL HISTORY:    Active Ambulatory Problems     Diagnosis Date Noted    BPH (benign prostatic hypertrophy)     Reflux     IBS (irritable bowel syndrome)     Emphysema NEC     Kidney stones     Diabetes mellitus, type 2 10/11/2012    Hypothyroidism 10/11/2012    Hypertension 10/11/2012    Postural dizziness 03/31/2014    Hypokalemia 03/31/2014    Gout 05/05/2014    Diarrhea 12/18/2014     Dyspepsia 12/23/2014    Depression 03/07/2015    Anxiety and depression 03/07/2015    Fatigue 07/01/2015    JANEEN (obstructive sleep apnea) 07/01/2015    Hematuria, microscopic 02/23/2016    Renal colic on right side 09/14/2016    History of colon polyps 10/10/2016    Abdominal pain 06/05/2017     Resolved Ambulatory Problems     Diagnosis Date Noted    Bloating 09/26/2013    CKD (chronic kidney disease) stage 3, GFR 30-59 ml/min 05/05/2014     Past Medical History:   Diagnosis Date    Anxiety     BPH (benign prostatic hypertrophy)     Cataract     CKD (chronic kidney disease) stage 3, GFR 30-59 ml/min 5/5/2014    Colon polyps     Diabetes mellitus type II     Emphysema NEC     Gout     Hyperlipidemia     Hypertension     Hypothyroidism     IBS (irritable bowel syndrome)     Kidney stones     JANEEN (obstructive sleep apnea)     Plantar fasciitis     Reflux                 PAST SURGICAL HISTORY:    Past Surgical History:   Procedure Laterality Date    CATARACT EXTRACTION  1/28/2013    RIGHT EYE    CATARACT EXTRACTION      left eye    COLONOSCOPY  Sep 2011    Repeat recommended in 5 years    COLONOSCOPY N/A 10/10/2016    Procedure: COLONOSCOPY;  Surgeon: Noah Colunga MD;  Location: [facility] ([address] [place] [address]);  Service: Endoscopy;  Laterality: N/A;  PM Prep    KIDNEY STONE SURGERY           FAMILY HISTORY:                Family History   Problem Relation Age of Onset    Cancer Brother      non-hodgkins T cell lymphoma    Diabetes Mother     Macular degeneration Brother     Coronary artery disease Neg Hx     Colon cancer Neg Hx     Prostate cancer Neg Hx        SOCIAL HISTORY:          Tobacco:   History   Smoking Status    Former Smoker    Years: 40.00    Quit date: 9/4/2007   Smokeless Tobacco    Never Used       alcohol use:    History   Alcohol Use    0.0 oz/week     Comment: one drink per week                   ALLERGIES:    Review of patient's allergies indicates:   Allergen  "Reactions    Morphine Hives       CURRENT MEDICATIONS:    Current Outpatient Prescriptions   Medication Sig Dispense Refill    allopurinol (ZYLOPRIM) 100 MG tablet TAKE 1 TABLET (100 MG TOTAL) BY MOUTH ONCE DAILY. 90 tablet 3    amlodipine (NORVASC) 10 MG tablet TAKE 1 TABLET BY MOUTH EVERY DAY 30 tablet 11    aspirin 81 MG Chew Take 81 mg by mouth once daily.      blood sugar diagnostic Strp 1 strip by Misc.(Non-Drug; Combo Route) route 3 (three) times daily. 100 strip 11    citalopram (CELEXA) 20 MG tablet TAKE 1 TABLET (20 MG TOTAL) BY MOUTH ONCE DAILY. 90 tablet 3    dutasteride-tamsulosin (VIVIENNE) 0.5-0.4 mg CM24 Take 1 tablet by mouth every evening. 90 capsule 3    fish oil-omega-3 fatty acids 300-1,000 mg capsule Take 2 g by mouth once daily.      irbesartan (AVAPRO) 300 MG tablet TAKE 1 TABLET BY MOUTH EVERY DAY 30 tablet 11    lancets Misc Test sugars once daily.  Dispense 100 lancets (insurance covered brand) 100 each 11    levothyroxine (SYNTHROID) 75 MCG tablet TAKE 1 TABLET (75 MCG TOTAL) BY MOUTH ONCE DAILY. 90 tablet 3    metformin (GLUCOPHAGE-XR) 500 MG 24 hr tablet Take 2 tablets (1,000 mg total) by mouth 2 (two) times daily with meals. (Patient taking differently: Take 1,000 mg by mouth once daily. ) 120 tablet 11    SITagliptan (JANUVIA) 100 MG Tab Take 1 tablet (100 mg total) by mouth once daily. 30 tablet 11     No current facility-administered medications for this visit.                       REVIEW OF SYSTEMS:   Sleep related symptoms as per HPI    denies weight gain  Denies dyspnea  Denies palpitations  Denies acid reflux   Denies polyuria  Denies  mood diturbance  Denies  anemia  Denies  muscle pain  Denies  Gait imbalance    Otherwise, a balance of 10 systems reviewed is negative.    PHYSICAL EXAM:  /83 (BP Location: Left arm, Patient Position: Sitting, BP Method: Large (Automatic))   Pulse 73   Ht 5' 9" (1.753 m)   Wt 96 kg (211 lb 10.3 oz)   BMI 31.25 kg/m² " "  GENERAL: Normal development, well groomed.  HEENT:   HEENT:  Conjunctivae are non-erythematous; Pupils equal, round, and reactive to light; Nose is symmetrical; Nasal mucosa is pink and moist; Septum is midline; Inferior turbinates are normal; Nasal airflow is restricted through the anterior nasal canal; Posterior pharynx is pink; Modified Mallampati:IV; Posterior palate is low; Tonsils not visualized; Uvula is wide and elongated;Tongue is enlarged; Dentition is fair; No TMJ tenderness; Jaw opening and protrusion without click and without discomfort.  NECK: Supple. Neck circumference is 17 inches. No thyromegaly. No palpable nodes.     SKIN: On face and neck: No abrasions, no rashes, no lesions.  No subcutaneous nodules are palpable.  RESPIRATORY: Chest is clear to auscultation.  Normal chest expansion and non-labored breathing at rest.  CARDIOVASCULAR: Normal S1, S2.  No murmurs, gallops or rubs. No carotid bruits bilaterally.  No edema. No clubbing. No cyanosis.    NEURO: Oriented to time, place and person. Normal attention span and concentration. Gait normal.    PSYCH: Affect is full. Mood is normal  MUSCULOSKELETAL: Moves 4 extremities. Gait normal.         Using My [facility]: 18      ASSESSMENT:    1. JANEEN (obstructive sleep apnea). The patient symptomatically has  excessive daytime sleepiness, snoring,  witnessed breathing pauses, excessive daytime fatigue, gasping for air in sleep and interrupted sleep  with exam findings of "a crowded oral airway and elevated body mass index. The patient has medical co-morbidities of diabetes and hypertension,  which can be worsened by JANEEN. This warrants treatment.          PLAN:      Treatment: prescription  for CPAP 11-18 cm with the mask of a patient's choice was given to the patient.    Education: During our discussion today, we talked about the etiology of obstructive sleep apnea as well as the potential ramifications of untreated sleep apnea, which could include daytime " sleepiness, hypertension, heart disease and/or stroke.      We discussed potential treatment options, which could include weight loss, body positioning, continuous positive airway pressure (CPAP), or referral for surgical consideration. The patient preferred CPAP option.     Discussed purpose of PAP therapy, health benefits of CPAP, as well as the potential ramifications of untreated sleep apnea, which could include daytime sleepiness, hypertension, heart disease and/or stroke. An AHI of 15 is associated with increased risk CVD.     The patient should avoid ETOH and sedatives at night, as it tends to aggravate JANEEN. Regular replacement of CPAP mask, tubing and filter was recommended.    Precautions: The patient was advised to abstain from driving should he feel sleepy or drowsy.    Follow up: MD/NP after 1 month of PAP use.    Thank you for allowing me the opportunity to participate in the care of your patient.

## 2017-11-15 ENCOUNTER — PATIENT MESSAGE (OUTPATIENT)
Dept: UROLOGY | Facility: CLINIC | Age: 72
End: 2017-11-15

## 2017-11-17 ENCOUNTER — OFFICE VISIT (OUTPATIENT)
Dept: DERMATOLOGY | Facility: CLINIC | Age: 72
End: 2017-11-17
Payer: COMMERCIAL

## 2017-11-17 VITALS — BODY MASS INDEX: 31.16 KG/M2 | WEIGHT: 211 LBS

## 2017-11-17 DIAGNOSIS — D22.9 NEVUS OF MULTIPLE SITES: ICD-10-CM

## 2017-11-17 DIAGNOSIS — L82.1 SEBORRHEIC KERATOSES: Primary | ICD-10-CM

## 2017-11-17 DIAGNOSIS — L91.8 CUTANEOUS SKIN TAGS: ICD-10-CM

## 2017-11-17 PROCEDURE — 99999 PR PBB SHADOW E&M-EST. PATIENT-LVL II: CPT | Mod: PBBFAC,,, | Performed by: DERMATOLOGY

## 2017-11-17 PROCEDURE — 99202 OFFICE O/P NEW SF 15 MIN: CPT | Mod: S$GLB,,, | Performed by: DERMATOLOGY

## 2017-11-17 RX ORDER — AMOXICILLIN AND CLAVULANATE POTASSIUM 500; 125 MG/1; MG/1
TABLET, FILM COATED ORAL
COMMUNITY
Start: 2017-10-12 | End: 2017-11-21

## 2017-11-17 NOTE — PROGRESS NOTES
Subjective:       Patient ID:  Jerardo Powers is a 71 y.o. male who presents for   Chief Complaint   Patient presents with    Mole     UBSE    Skin Tags     History of Present Illness: The patient presents with chief complaint of spots .  Location: back  Duration: years  Signs/Symptoms: none    Prior treatments: none          Review of Systems   Constitutional: Negative for fever.   Skin: Negative for itching and rash.   Hematologic/Lymphatic: Does not bruise/bleed easily.        Objective:    Physical Exam   Constitutional: He appears well-developed and well-nourished. No distress.   Neurological: He is alert and oriented to person, place, and time. He is not disoriented.   Psychiatric: He has a normal mood and affect.   Skin:   Areas Examined (abnormalities noted in diagram):   Head / Face Inspection Performed  Neck Inspection Performed  Chest / Axilla Inspection Performed  Abdomen Inspection Performed  Back Inspection Performed  RUE Inspected  LUE Inspection Performed                   Diagram Legend     Erythematous scaling macule/papule c/w actinic keratosis       Vascular papule c/w angioma      Pigmented verrucoid papule/plaque c/w seborrheic keratosis      Yellow umbilicated papule c/w sebaceous hyperplasia      Irregularly shaped tan macule c/w lentigo     1-2 mm smooth white papules consistent with Milia      Movable subcutaneous cyst with punctum c/w epidermal inclusion cyst      Subcutaneous movable cyst c/w pilar cyst      Firm pink to brown papule c/w dermatofibroma      Pedunculated fleshy papule(s) c/w skin tag(s)      Evenly pigmented macule c/w junctional nevus     Mildly variegated pigmented, slightly irregular-bordered macule c/w mildly atypical nevus      Flesh colored to evenly pigmented papule c/w intradermal nevus       Pink pearly papule/plaque c/w basal cell carcinoma      Erythematous hyperkeratotic cursted plaque c/w SCC      Surgical scar with no sign of skin cancer recurrence       "Open and closed comedones      Inflammatory papules and pustules      Verrucoid papule consistent consistent with wart     Erythematous eczematous patches and plaques     Dystrophic onycholytic nail with subungual debris c/w onychomycosis     Umbilicated papule    Erythematous-base heme-crusted tan verrucoid plaque consistent with inflamed seborrheic keratosis     Erythematous Silvery Scaling Plaque c/w Psoriasis     See annotation      Assessment / Plan:        Seborrheic keratoses  Brochure provided  Cryosurgery procedure note:    Verbal consent from the patient is obtained. Liquid nitrogen cryosurgery is applied to 3 lesions to produce a freeze injury. The patient is aware that blisters may form, call if lesions do not completely resolve.        Nevus of multiple sites  The "ABCD" rules to observe pigmented lesions were reviewed.      Cutaneous skin tags  reassurance               Return in about 1 year (around 11/17/2018).  "

## 2017-11-20 ENCOUNTER — PATIENT MESSAGE (OUTPATIENT)
Dept: DERMATOLOGY | Facility: CLINIC | Age: 72
End: 2017-11-20

## 2017-11-21 ENCOUNTER — OFFICE VISIT (OUTPATIENT)
Dept: UROLOGY | Facility: CLINIC | Age: 72
End: 2017-11-21
Payer: COMMERCIAL

## 2017-11-21 VITALS — RESPIRATION RATE: 16 BRPM | WEIGHT: 211 LBS | HEIGHT: 69 IN | BODY MASS INDEX: 31.25 KG/M2

## 2017-11-21 DIAGNOSIS — R30.0 DYSURIA: Primary | ICD-10-CM

## 2017-11-21 DIAGNOSIS — R31.0 GROSS HEMATURIA: ICD-10-CM

## 2017-11-21 DIAGNOSIS — N42.81 PROSTADYNIA: ICD-10-CM

## 2017-11-21 LAB — POC RESIDUAL URINE VOLUME: NORMAL ML (ref 0–100)

## 2017-11-21 PROCEDURE — 99213 OFFICE O/P EST LOW 20 MIN: CPT | Mod: 25,S$GLB,, | Performed by: NURSE PRACTITIONER

## 2017-11-21 PROCEDURE — 87086 URINE CULTURE/COLONY COUNT: CPT

## 2017-11-21 PROCEDURE — 99999 PR PBB SHADOW E&M-EST. PATIENT-LVL IV: CPT | Mod: PBBFAC,,, | Performed by: NURSE PRACTITIONER

## 2017-11-21 PROCEDURE — 88112 CYTOPATH CELL ENHANCE TECH: CPT | Performed by: PATHOLOGY

## 2017-11-21 PROCEDURE — 51798 US URINE CAPACITY MEASURE: CPT | Mod: S$GLB,,, | Performed by: NURSE PRACTITIONER

## 2017-11-21 RX ORDER — CIPROFLOXACIN 500 MG/1
500 TABLET ORAL 2 TIMES DAILY
Qty: 60 TABLET | Refills: 0 | Status: SHIPPED | OUTPATIENT
Start: 2017-11-21 | End: 2017-12-21

## 2017-11-21 RX ORDER — OXAPROZIN 600 MG/1
600 TABLET, FILM COATED ORAL 2 TIMES DAILY
Qty: 60 TABLET | Refills: 0 | Status: SHIPPED | OUTPATIENT
Start: 2017-11-21 | End: 2017-12-21

## 2017-11-21 NOTE — PROGRESS NOTES
Jerardo Powers is a 71 y.o. man who is here for the evaluation of Benign Prostatic Hypertrophy (yrly ck /ep of ) and Hematuria  Last seen with Dr. Murray on 2/2016.     He reports that he had gross hematuria last week x 3 episodes on the same day, dysuria, frequency, urgency, and nocturia x 3-4. Denies incontinence. No bladder pain or flank pain.  Denies fever, chills, nausea, or vomiting.   Has been on Sue since 2009. He is wondering whether he needs to take Sue for a long term.  hx of BPH with obstruction and prostatitis.    Cysto w/ Dr. Murray on 3/2016:  Findings:  Urethra:  Normal urethra.   Sphincter: competent.  Prostate: Estimated Length Prostatic Urethra: 4 cm with bilateral obstruction  Bladder neck: patent with no stricture  Bladder:  Normal bladder.   Normal ureteral orifices bilaterally.   Moderate trabeculation.   Hyperemic area of the bladder mucosa with early glomerulation    Conclusion:  Hematuria.  Bladder condition, enlarged prostate, and kidney stones are all potential causes for hematuria.  He is not interested in OAB meds at this time.  Avoid bladder irritants.  May consider laser TURP to improve his DIAZ.      Past Medical History:   Diagnosis Date    Anxiety     BPH (benign prostatic hypertrophy)     Cataract     CKD (chronic kidney disease) stage 3, GFR 30-59 ml/min 5/5/2014    Colon polyps     Diabetes mellitus type II     Emphysema NEC     mild    Gout     Hyperlipidemia     Hypertension     Hypothyroidism     IBS (irritable bowel syndrome)     Kidney stones     JANEEN (obstructive sleep apnea)     not using CPAP    Plantar fasciitis     Reflux      Past Surgical History:   Procedure Laterality Date    CATARACT EXTRACTION  1/28/2013    RIGHT EYE    CATARACT EXTRACTION      left eye    COLONOSCOPY  Sep 2011    Repeat recommended in 5 years    COLONOSCOPY N/A 10/10/2016    Procedure: COLONOSCOPY;  Surgeon: Noah Colunga MD;  Location: [facility] ([address] [place] [address]);  Service:  Endoscopy;  Laterality: N/A;  PM Prep    KIDNEY STONE SURGERY       Social History   Substance Use Topics    Smoking status: Former Smoker     Years: 40.00     Quit date: 9/4/2007    Smokeless tobacco: Never Used    Alcohol use 0.0 oz/week      Comment: one drink per week     Family History   Problem Relation Age of Onset    Cancer Brother      non-hodgkins T cell lymphoma    Diabetes Mother     Macular degeneration Brother     Coronary artery disease Neg Hx     Colon cancer Neg Hx     Prostate cancer Neg Hx      Allergy:  Allergies   Allergen Reactions    Morphine Hives     Outpatient Encounter Prescriptions as of 11/21/2017   Medication Sig Dispense Refill    allopurinol (ZYLOPRIM) 100 MG tablet TAKE 1 TABLET (100 MG TOTAL) BY MOUTH ONCE DAILY. 90 tablet 3    amlodipine (NORVASC) 10 MG tablet TAKE 1 TABLET BY MOUTH EVERY DAY 30 tablet 11    aspirin 81 MG Chew Take 81 mg by mouth once daily.      blood sugar diagnostic Strp 1 strip by Misc.(Non-Drug; Combo Route) route 3 (three) times daily. 100 strip 11    ciprofloxacin HCl (CIPRO) 500 MG tablet Take 1 tablet (500 mg total) by mouth 2 (two) times daily. 60 tablet 0    citalopram (CELEXA) 20 MG tablet TAKE 1 TABLET (20 MG TOTAL) BY MOUTH ONCE DAILY. 90 tablet 3    dutasteride-tamsulosin (VIVIENNE) 0.5-0.4 mg CM24 Take 1 tablet by mouth every evening. 90 capsule 3    fish oil-omega-3 fatty acids 300-1,000 mg capsule Take 2 g by mouth once daily.      irbesartan (AVAPRO) 300 MG tablet TAKE 1 TABLET BY MOUTH EVERY DAY 30 tablet 11    lancets Misc Test sugars once daily.  Dispense 100 lancets (insurance covered brand) 100 each 11    levothyroxine (SYNTHROID) 75 MCG tablet TAKE 1 TABLET (75 MCG TOTAL) BY MOUTH ONCE DAILY. 90 tablet 3    metformin (GLUCOPHAGE-XR) 500 MG 24 hr tablet Take 2 tablets (1,000 mg total) by mouth 2 (two) times daily with meals. (Patient taking differently: Take 1,000 mg by mouth once daily. ) 120 tablet 11    oxaprozin  (DAYPRO) 600 mg tablet Take 1 tablet (600 mg total) by mouth 2 (two) times daily. 60 tablet 0    SITagliptan (JANUVIA) 100 MG Tab Take 1 tablet (100 mg total) by mouth once daily. 30 tablet 11    [DISCONTINUED] amoxicillin-clavulanate 500-125mg (AUGMENTIN) 500-125 mg Tab        No facility-administered encounter medications on file as of 11/21/2017.      Review of Systems   General ROS: GENERAL:  No weight gain or loss  SKIN:  No rashes or lacerations  HEAD:  No headaches  EYES:  No exophthalmos, jaundice or blindness  EARS:  No dizziness, tinnitus or hearing loss  NOSE:  No changes in smell  MOUTH & THROAT:  No dyskinetic movements or obvious goiter  CHEST:  No shortness of breath, hyperventilation or cough  CARDIOVASCULAR:  No tachycardia or chest pain  ABDOMEN:  No nausea, vomiting, pain, constipation or diarrhea  URINARY:  No frequency, dysuria or sexual dysfunction  ENDOCRINE:  No polydipsia, polyuria  MUSCULOSKELETAL:  No pain or stiffness of the joints  NEUROLOGIC:  No weakness, sensory changes, seizures, confusion, memory loss, tremor or other abnormal movements  Physical Exam     Vitals:    11/21/17 1529   Resp: 16     General Appearance:  Alert, cooperative, no distress, appears stated age   Head:  Normocephalic, without obvious abnormality, atraumatic   Eyes:  PERRL, conjunctiva/corneas clear, EOM's intact, fundi benign, both eyes   Ears:  Normal TM's and external ear canals, both ears   Nose: Nares normal, septum midline, mucosa normal, no drainage or sinus tenderness   Throat: Lips, mucosa, and tongue normal; teeth and gums normal   Neck: Supple, symmetrical, trachea midline, no adenopathy, thyroid: not enlarged, symmetric, no tenderness/mass/nodules, no carotid bruit or JVD   Back:   Symmetric, no curvature, ROM normal, no CVA tenderness   Lungs:   Clear to auscultation bilaterally, respirations unlabored   Chest Wall:  No tenderness or deformity   Heart:  Regular rate and rhythm, S1, S2 normal, no  murmur, rub or gallop   Abdomen:   Soft, non-tender, bowel sounds active all four quadrants,  no masses, no organomegaly of liver and spleen  No hernia noted   Genitalia:  Scrotum: no rash or lesion  Normal symmetric epididymis without masses  Normal vas palpated  Normal size, symmetric testicles with no masses   Normal urethral meatus with no discharge  Normal circumcised penis with no lesion   Rectal:  Normal perineum and anus upon inspection.  Normal tone, no masses or tenderness;   Extremities: Extremities normal, atraumatic, no cyanosis or edema   Pulses: 2+ and symmetric   Skin: Skin color, texture, turgor normal, no rashes or lesions   Lymph nodes: Cervical, supraclavicular, and axillary nodes normal   Neurologic: Normal     Prostate 20 grams smooth with tenderness and no nodules    UA + trace of blood on dip stick today.    PVR per bladder scan 20 ml.    No EPS after prostate message. He was able to urinate again and urine was sent to the lab.     LABS:  Lab Results   Component Value Date    PSA 0.74 07/14/2017    PSA 0.59 07/17/2015    PSA 0.96 08/22/2012    PSA 1.11 04/16/2012    PSA 1.4 02/14/2011    PSA 2.4 05/12/2010    PSA 4.0 02/15/2010    PSA 4.7 (H) 08/20/2009    PSA 4.2 (H) 07/06/2009    PSA 3.6 03/25/2008    PSADIAG 1.3 02/19/2016    PSADIAG 0.62 12/01/2014    PSADIAG 0.97 12/04/2013     Results for orders placed or performed in visit on 02/19/16   Prostate Specific Antigen, Diagnostic   Result Value Ref Range    PSA DIAGNOSTIC 1.3 0.00-4.00 ng/mL   Results for orders placed or performed in visit on 12/01/14   Prostate Specific Antigen, Diagnostic   Result Value Ref Range    PSA DIAGNOSTIC 0.62 0.00-4.00 ng/mL   Results for orders placed or performed in visit on 12/04/13   Prostate Specific Antigen, Diagnostic   Result Value Ref Range    PSA DIAGNOSTIC 0.97 0.00-4.00 ng/mL     Lab Results   Component Value Date    CREATININE 1.2 07/14/2017    CREATININE 1.3 01/24/2017    CREATININE 1.5 (H)  09/13/2016     Urine Culture, Routine   Date Value Ref Range Status   07/27/2017 No significant growth  Final     Assessment and Plan:  Jerardo was seen today for benign prostatic hypertrophy and hematuria.    Diagnoses and all orders for this visit:    Dysuria  -     Urine culture  -     ciprofloxacin HCl (CIPRO) 500 MG tablet; Take 1 tablet (500 mg total) by mouth 2 (two) times daily.  -     POCT Bladder Scan  -     oxaprozin (DAYPRO) 600 mg tablet; Take 1 tablet (600 mg total) by mouth 2 (two) times daily.    Gross hematuria  -     Urine culture  -     ciprofloxacin HCl (CIPRO) 500 MG tablet; Take 1 tablet (500 mg total) by mouth 2 (two) times daily.  -     POCT Bladder Scan  -     oxaprozin (DAYPRO) 600 mg tablet; Take 1 tablet (600 mg total) by mouth 2 (two) times daily.  -     Cytology, urine    Prostadynia  -     oxaprozin (DAYPRO) 600 mg tablet; Take 1 tablet (600 mg total) by mouth 2 (two) times daily.       Discussed prostatitis, bacterial vs nonbacterial.  Discussed side effects, indications, and MOA for daypro and cipro. Prescription sent to the pharmacy. Pt verbalized understanding.  UC sent to the lab from post prostate massage. Will notify him with results.   Urine cytology sent to the lab  Continue Sue  Discussed PSA from 7/2017. Explained that since it is stable no more need for PSA.  RTC in 1 month to reevaluate.     Follow-up:  No Follow-up on file.

## 2017-11-21 NOTE — PATIENT INSTRUCTIONS
Prostatitis    The prostate gland is located deep inside the body at the base of the bladder. Prostatitis is an inflammation of the prostate gland. This can occur with or without infection. Most cases of prostatitis are long term (chronic). Most do not involve a bacterial infection.  · Chronic prostatitis is more common in older men. It is usually an inflammatory condition and not an infection. But, bacterial infection can also cause chronic prostatitis. It can cause pain in the rectum, urethra, bladder, or scrotum. It can also make you unable to fully empty the bladder.  You may urinate often, or have burning with urination. Prostatitis may also cause painful ejaculation and erectile dysfunction.  · Sudden onset (acute) prostatitis usually occurs in men younger than 35. It is from a bacterial infection. You may have severe symptoms such as fever, chills, muscle aches, and pain in the area between the scrotum and anus (perineum). You may have a hard time urinating, or have pain or burning when urinating. There may be blood or pus in the urine.  Your healthcare provider may do a culture test on prostate fluids or discharge from the penis. This will help determine if bacteria are the cause. Treatment can include antibiotics, anti-inflammatory medicine, prostate medicines, and stool softeners.  Home care  These guidelines will help you care for yourself at home:  · Rest at home until the fever is gone and you are feeling better.  · A hot sitz bath may offer some relief. Fill a tub with 6 inches of hot water. Allow the water to run so you can keep it hot for 10 to 15 minutes.  · Drink plenty of fluids. Do not drink alcohol or caffeine until all symptoms are gone.  · If your healthcare gives you an antibiotic, take it exactly as you are told. Take it until it is all gone.  · Constipation causes straining and pain. Avoid constipation by eating natural laxatives such as prunes, fresh fruits, and whole-grain cereals. If  needed, use a mild over-the-counter (OTC) laxative for constipation. An OTC stool softener may be used to keep the stools soft.  · If sex is uncomfortable or painful, avoid until symptoms get better.  · You may use OTC medicines for pain and fever, unless another medicine was given. If you have chronic liver or kidney disease, talk with your healthcare provider before using these medicines. Also talk with your provider if you've ever had a stomach ulcer or GI bleeding.  Follow-up care  Follow up with your healthcare provider, a urologist, or as advised to be sure you are responding to treatment. Your healthcare provider may want to see you after you finish your antibiotics to be sure the infection has cleared. If a culture was taken, you may call for the results as directed. A culture test can help your healthcare provider know if you are on the correct antibiotic.  Call 911  Call 911 if any of these occur:  · Weakness, dizziness, or fainting  When to seek medical advice  Call your healthcare provider right away if any of these occur:  · Fever of 101.4°F (38°C) or higher after 3 days of treatment, or as advised  · Unable to pass urine for 8 hours  · Pressure or pain in your bladder gets worse  · Painful swelling of the testicle or scrotum  Date Last Reviewed: 10/1/2016  © 3622-5138 [employer] [address] [place] [address], [place] [address]. All rights reserved. This information is not intended as a substitute for professional medical care. Always follow your healthcare professional's instructions.

## 2017-11-22 LAB — BACTERIA UR CULT: NORMAL

## 2017-11-24 ENCOUNTER — PATIENT MESSAGE (OUTPATIENT)
Dept: UROLOGY | Facility: CLINIC | Age: 72
End: 2017-11-24

## 2017-12-17 DIAGNOSIS — R30.0 DYSURIA: ICD-10-CM

## 2017-12-17 DIAGNOSIS — R31.0 GROSS HEMATURIA: ICD-10-CM

## 2017-12-17 DIAGNOSIS — N42.81 PROSTADYNIA: ICD-10-CM

## 2017-12-18 RX ORDER — OXAPROZIN 600 MG/1
600 TABLET, FILM COATED ORAL 2 TIMES DAILY
Qty: 60 TABLET | Refills: 0 | OUTPATIENT
Start: 2017-12-18 | End: 2018-01-17

## 2017-12-20 ENCOUNTER — OFFICE VISIT (OUTPATIENT)
Dept: SLEEP MEDICINE | Facility: CLINIC | Age: 72
End: 2017-12-20
Payer: COMMERCIAL

## 2017-12-20 VITALS
HEART RATE: 75 BPM | SYSTOLIC BLOOD PRESSURE: 127 MMHG | WEIGHT: 210 LBS | BODY MASS INDEX: 31.1 KG/M2 | DIASTOLIC BLOOD PRESSURE: 72 MMHG | HEIGHT: 69 IN

## 2017-12-20 DIAGNOSIS — G47.33 OSA (OBSTRUCTIVE SLEEP APNEA): Primary | ICD-10-CM

## 2017-12-20 PROCEDURE — 99204 OFFICE O/P NEW MOD 45 MIN: CPT | Mod: S$GLB,,, | Performed by: PSYCHIATRY & NEUROLOGY

## 2017-12-20 PROCEDURE — 99999 PR PBB SHADOW E&M-EST. PATIENT-LVL III: CPT | Mod: PBBFAC,,, | Performed by: PSYCHIATRY & NEUROLOGY

## 2017-12-20 NOTE — PROGRESS NOTES
Jerardo Powers  was seen at the request of  No ref. provider found for sleep evaluation.    10/20/2017 INITIAL HISTORY OF PRESENT ILLNESS:  Jerardo Powers is a 72 y.o. male is here to be evaluated for a sleep disorder.       CHIEF COMPLAINT:      The patient's complaints include excessive daytime sleepiness, excessive daytime fatigue, snoring,  witnessed breathing pauses,  gasping for air in sleep and interrupted sleep since  Since 2008 at least when he was diagnosed with severe JANEEN.    Several near misses.    He has been using CPAP 13 cm. His machine is old and needs replacement. He may have gained some weight since.     Reports occasional  dry mouth and sore throat  Denies nasal congestion   Denies  morning headaches  Reports  interrupted sleep  Denies frequent leg movements  Denies symptoms concerning for parasomnia    The ESS (Gap Sleepiness Score) taken on initial visit is 24 /24    The patient never had tonsillectomy, adenoidectomy or UPPP       INTERVAL HISTORY:    12/20/2017  The patient has not presented any new complaints since the previous visit.   The patient reports improved sleep continuity and daytime sleepiness on PAP. ESS today is 11/24.  Denies break through snoring. No dry mouth.   Therapy Event Summary Date Range: 11/20/2017 - 12/19/2017   Some periodic breathing was noted (possibly due to arousals), but no significant centrals.  Hide APAP 11-18; 90% 11-13 with Dreamwear mask     Compliance Summary  Apnea Indices  Ventilator Statistics    Days with Device Usage:  22 days  Average AHI:  5.2  Average Breath Rate:  14.3 bpm    Percentage of Days >=4 Hours:  66.7%  Average OA Index:  1.2  Average % Patient Triggered Breaths:  N/A    Average Usage (Days Used):  6 hrs. 28 mins. 37 secs.  Average CA Index:  1.6  Average Tidal Volume:  471.7 ml    Average Usage (All Days):  4 hrs. 44 mins. 59 secs.    Average Minute Vent:  N/A        Large Leak  Periodic Breathing     Average Time in Large Leak:   30 secs.  Average % of Night in PB:  8.0%     Average % of Night in Large Leak:  0.1%                    SLEEP ROUTINE AND LIFESTYLE 12/20/2017 :    Occupation:working    Bed partner:      Time to bed - wake up time on a workday : 1-2 to 10 AM  Time to bed - wake up time on a day off: 1-2 AM to  11-2  Sleep onset latency: 30 min  Disruptions or awakenings: 2  Time to fall back into sleep: 30 min   Perceived sleep quality: 0/  Perceived total sleep time:  5-6  hours.  Daytime naps: one    Exercise routine: no     PREVIOUS SLEEP STUDIES:     PSG/ SPLIT night study  In 5/28/08 showed significant JANEEN with the AHI of 34.7/hour and SaO2 minimum of 81 %.     DME:       PAST MEDICAL HISTORY:    Active Ambulatory Problems     Diagnosis Date Noted    BPH (benign prostatic hypertrophy)     Reflux     IBS (irritable bowel syndrome)     Emphysema NEC     Kidney stones     Diabetes mellitus, type 2 10/11/2012    Hypothyroidism 10/11/2012    Hypertension 10/11/2012    Postural dizziness 03/31/2014    Hypokalemia 03/31/2014    Gout 05/05/2014    Diarrhea 12/18/2014    Dyspepsia 12/23/2014    Depression 03/07/2015    Anxiety and depression 03/07/2015    Fatigue 07/01/2015    JANEEN (obstructive sleep apnea) 07/01/2015    Hematuria, microscopic 02/23/2016    Renal colic on right side 09/14/2016    History of colon polyps 10/10/2016    Abdominal pain 06/05/2017     Resolved Ambulatory Problems     Diagnosis Date Noted    Bloating 09/26/2013    CKD (chronic kidney disease) stage 3, GFR 30-59 ml/min 05/05/2014     Past Medical History:   Diagnosis Date    Anxiety     BPH (benign prostatic hypertrophy)     Cataract     CKD (chronic kidney disease) stage 3, GFR 30-59 ml/min 5/5/2014    Colon polyps     Diabetes mellitus type II     Emphysema NEC     Gout     Hyperlipidemia     Hypertension     Hypothyroidism     IBS (irritable bowel syndrome)     Kidney stones     JANEEN (obstructive sleep apnea)      Plantar fasciitis     Reflux                 PAST SURGICAL HISTORY:    Past Surgical History:   Procedure Laterality Date    CATARACT EXTRACTION  1/28/2013    RIGHT EYE    CATARACT EXTRACTION      left eye    COLONOSCOPY  Sep 2011    Repeat recommended in 5 years    COLONOSCOPY N/A 10/10/2016    Procedure: COLONOSCOPY;  Surgeon: Noah Colunga MD;  Location: [facility] ([address] [place] [address]);  Service: Endoscopy;  Laterality: N/A;  PM Prep    KIDNEY STONE SURGERY           FAMILY HISTORY:                Family History   Problem Relation Age of Onset    Cancer Brother      non-hodgkins T cell lymphoma    Diabetes Mother     Macular degeneration Brother     Coronary artery disease Neg Hx     Colon cancer Neg Hx     Prostate cancer Neg Hx        SOCIAL HISTORY:          Tobacco:   History   Smoking Status    Former Smoker    Years: 40.00    Quit date: 9/4/2007   Smokeless Tobacco    Never Used       alcohol use:    History   Alcohol Use    0.0 oz/week     Comment: one drink per week                   ALLERGIES:    Review of patient's allergies indicates:   Allergen Reactions    Morphine Hives       CURRENT MEDICATIONS:    Current Outpatient Prescriptions   Medication Sig Dispense Refill    allopurinol (ZYLOPRIM) 100 MG tablet TAKE 1 TABLET (100 MG TOTAL) BY MOUTH ONCE DAILY. 90 tablet 3    amlodipine (NORVASC) 10 MG tablet TAKE 1 TABLET BY MOUTH EVERY DAY 30 tablet 11    aspirin 81 MG Chew Take 81 mg by mouth once daily.      blood sugar diagnostic Strp 1 strip by Misc.(Non-Drug; Combo Route) route 3 (three) times daily. 100 strip 11    ciprofloxacin HCl (CIPRO) 500 MG tablet Take 1 tablet (500 mg total) by mouth 2 (two) times daily. 60 tablet 0    citalopram (CELEXA) 20 MG tablet TAKE 1 TABLET (20 MG TOTAL) BY MOUTH ONCE DAILY. 90 tablet 3    dutasteride-tamsulosin (VIVIENNE) 0.5-0.4 mg CM24 Take 1 tablet by mouth every evening. 90 capsule 3    fish oil-omega-3 fatty acids 300-1,000 mg capsule Take 2 g by  "mouth once daily.      irbesartan (AVAPRO) 300 MG tablet TAKE 1 TABLET BY MOUTH EVERY DAY 30 tablet 11    lancets Misc Test sugars once daily.  Dispense 100 lancets (insurance covered brand) 100 each 11    levothyroxine (SYNTHROID) 75 MCG tablet TAKE 1 TABLET (75 MCG TOTAL) BY MOUTH ONCE DAILY. 90 tablet 3    metformin (GLUCOPHAGE-XR) 500 MG 24 hr tablet Take 2 tablets (1,000 mg total) by mouth 2 (two) times daily with meals. (Patient taking differently: Take 1,000 mg by mouth once daily. ) 120 tablet 11    oxaprozin (DAYPRO) 600 mg tablet Take 1 tablet (600 mg total) by mouth 2 (two) times daily. 60 tablet 0    SITagliptan (JANUVIA) 100 MG Tab Take 1 tablet (100 mg total) by mouth once daily. 30 tablet 11     No current facility-administered medications for this visit.                       REVIEW OF SYSTEMS:   Sleep related symptoms as per HPI    denies weight gain  Denies dyspnea  Denies palpitations  Denies acid reflux   Denies polyuria  Denies  mood diturbance  Denies  anemia  Denies  muscle pain  Denies  Gait imbalance    Otherwise, a balance of 10 systems reviewed is negative.    PHYSICAL EXAM:  /72 (BP Location: Right arm, Patient Position: Sitting, BP Method: Large (Automatic))   Pulse 75   Ht 5' 9" (1.753 m)   Wt 95.3 kg (210 lb)   BMI 31.01 kg/m²   GENERAL: Normal development, well groomed.  HEENT:   HEENT:  Conjunctivae are non-erythematous; Pupils equal, round, and reactive to light; Nose is symmetrical; Nasal mucosa is pink and moist; Septum is midline; Inferior turbinates are normal; Nasal airflow is restricted through the anterior nasal canal; Posterior pharynx is pink; Modified Mallampati:IV; Posterior palate is low; Tonsils not visualized; Uvula is wide and elongated;Tongue is enlarged; Dentition is fair; No TMJ tenderness; Jaw opening and protrusion without click and without discomfort.  NECK: Supple. Neck circumference is 17 inches. No thyromegaly. No palpable nodes.     SKIN: On " "face and neck: No abrasions, no rashes, no lesions.  No subcutaneous nodules are palpable.  RESPIRATORY: Chest is clear to auscultation.  Normal chest expansion and non-labored breathing at rest.  CARDIOVASCULAR: Normal S1, S2.  No murmurs, gallops or rubs. No carotid bruits bilaterally.  No edema. No clubbing. No cyanosis.    NEURO: Oriented to time, place and person. Normal attention span and concentration. Gait normal.    PSYCH: Affect is full. Mood is normal  MUSCULOSKELETAL: Moves 4 extremities. Gait normal.         Using [facility]: 18      ASSESSMENT:    1. JANEEN (obstructive sleep apnea). The patient symptomatically has  excessive daytime sleepiness, snoring,  witnessed breathing pauses, excessive daytime fatigue, gasping for air in sleep and interrupted sleep  with exam findings of "a crowded oral airway and elevated body mass index. The patient has medical co-morbidities of diabetes and hypertension,  which can be worsened by JANEEN. This warrants treatment. Benefiting from CPAP use in terms of sleep continuity and daytime sleepiness. Met compliance.          PLAN:      Continue CPAP 11-18 cm with the mask of a patient's choice was given to the patient.    Education: During our discussion today, we talked about the etiology of obstructive sleep apnea as well as the potential ramifications of untreated sleep apnea, which could include daytime sleepiness, hypertension, heart disease and/or stroke.      We discussed potential treatment options, which could include weight loss, body positioning, continuous positive airway pressure (CPAP), or referral for surgical consideration. The patient preferred CPAP option.     Discussed purpose of PAP therapy, health benefits of CPAP, as well as the potential ramifications of untreated sleep apnea, which could include daytime sleepiness, hypertension, heart disease and/or stroke. An AHI of 15 is associated with increased risk CVD.     The patient should avoid ETOH and " sedatives at night, as it tends to aggravate JANEEN. Regular replacement of CPAP mask, tubing and filter was recommended.    Precautions: The patient was advised to abstain from driving should he feel sleepy or drowsy.    Follow up: MD/NP after 12 months    Thank you for allowing me the opportunity to participate in the care of your patient.

## 2018-01-03 ENCOUNTER — PATIENT MESSAGE (OUTPATIENT)
Dept: GASTROENTEROLOGY | Facility: CLINIC | Age: 73
End: 2018-01-03

## 2018-01-26 DIAGNOSIS — E11.9 TYPE 2 DIABETES MELLITUS WITHOUT COMPLICATION: ICD-10-CM

## 2018-02-06 ENCOUNTER — PATIENT MESSAGE (OUTPATIENT)
Dept: FAMILY MEDICINE | Facility: CLINIC | Age: 73
End: 2018-02-06

## 2018-02-07 ENCOUNTER — TELEPHONE (OUTPATIENT)
Dept: GASTROENTEROLOGY | Facility: CLINIC | Age: 73
End: 2018-02-07

## 2018-02-07 ENCOUNTER — TELEPHONE (OUTPATIENT)
Dept: DERMATOLOGY | Facility: CLINIC | Age: 73
End: 2018-02-07

## 2018-02-07 NOTE — TELEPHONE ENCOUNTER
Ma spoke with pt , appt schedule and pt confirm appt , patient ask to be placed on the waitlist for a sooner appt

## 2018-02-07 NOTE — TELEPHONE ENCOUNTER
----- Message from Maren Marc sent at 2/6/2018 11:45 AM CST -----  Contact: Self- [phone]  Keanu- pt called to schedule a f/u appt- offered next available np and pa appts but pt declined- please call pt back at [phone]

## 2018-02-07 NOTE — TELEPHONE ENCOUNTER
----- Message from Karen Ng sent at 2/6/2018 11:49 AM CST -----  Contact: patient   [phone]-please call above patient need to get in has a problem with the area moles removed from has spots that will not go away please call number in message also has question for the nurse waiting on your call thanks

## 2018-02-08 ENCOUNTER — OFFICE VISIT (OUTPATIENT)
Dept: NEUROLOGY | Facility: CLINIC | Age: 73
End: 2018-02-08
Payer: COMMERCIAL

## 2018-02-08 VITALS
WEIGHT: 217.38 LBS | SYSTOLIC BLOOD PRESSURE: 126 MMHG | HEIGHT: 69 IN | BODY MASS INDEX: 32.2 KG/M2 | DIASTOLIC BLOOD PRESSURE: 78 MMHG | HEART RATE: 69 BPM

## 2018-02-08 DIAGNOSIS — I10 ESSENTIAL HYPERTENSION: ICD-10-CM

## 2018-02-08 DIAGNOSIS — M54.16 LUMBAR RADICULOPATHY, CHRONIC: ICD-10-CM

## 2018-02-08 DIAGNOSIS — M54.32 BILATERAL SCIATICA: Chronic | ICD-10-CM

## 2018-02-08 DIAGNOSIS — E11.22 TYPE 2 DIABETES MELLITUS WITH STAGE 3 CHRONIC KIDNEY DISEASE, WITHOUT LONG-TERM CURRENT USE OF INSULIN: Primary | ICD-10-CM

## 2018-02-08 DIAGNOSIS — N18.30 TYPE 2 DIABETES MELLITUS WITH STAGE 3 CHRONIC KIDNEY DISEASE, WITHOUT LONG-TERM CURRENT USE OF INSULIN: Primary | ICD-10-CM

## 2018-02-08 DIAGNOSIS — M54.31 BILATERAL SCIATICA: Chronic | ICD-10-CM

## 2018-02-08 DIAGNOSIS — N40.1 BENIGN NON-NODULAR PROSTATIC HYPERPLASIA WITH LOWER URINARY TRACT SYMPTOMS: ICD-10-CM

## 2018-02-08 PROBLEM — M54.30 SCIATICA: Chronic | Status: ACTIVE | Noted: 2018-02-08

## 2018-02-08 PROCEDURE — 1159F MED LIST DOCD IN RCRD: CPT | Mod: S$GLB,,, | Performed by: PSYCHIATRY & NEUROLOGY

## 2018-02-08 PROCEDURE — 99205 OFFICE O/P NEW HI 60 MIN: CPT | Mod: S$GLB,,, | Performed by: PSYCHIATRY & NEUROLOGY

## 2018-02-08 PROCEDURE — 3008F BODY MASS INDEX DOCD: CPT | Mod: S$GLB,,, | Performed by: PSYCHIATRY & NEUROLOGY

## 2018-02-08 PROCEDURE — 99213 OFFICE O/P EST LOW 20 MIN: CPT | Performed by: PSYCHIATRY & NEUROLOGY

## 2018-02-08 PROCEDURE — 99999 PR PBB SHADOW E&M-EST. PATIENT-LVL III: CPT | Mod: PBBFAC,,, | Performed by: PSYCHIATRY & NEUROLOGY

## 2018-02-08 PROCEDURE — 1126F AMNT PAIN NOTED NONE PRSNT: CPT | Mod: S$GLB,,, | Performed by: PSYCHIATRY & NEUROLOGY

## 2018-02-08 RX ORDER — DUTASTERIDE AND TAMSULOSIN HYDROCHLORIDE CAPSULES .5; .4 MG/1; MG/1
CAPSULE ORAL
Qty: 90 CAPSULE | Refills: 3 | Status: SHIPPED | OUTPATIENT
Start: 2018-02-08 | End: 2018-04-17

## 2018-02-09 ENCOUNTER — LAB VISIT (OUTPATIENT)
Dept: LAB | Facility: HOSPITAL | Age: 73
End: 2018-02-09
Attending: FAMILY MEDICINE
Payer: COMMERCIAL

## 2018-02-09 DIAGNOSIS — E11.9 TYPE 2 DIABETES MELLITUS WITHOUT COMPLICATION: ICD-10-CM

## 2018-02-09 LAB
ESTIMATED AVG GLUCOSE: 137 MG/DL
HBA1C MFR BLD HPLC: 6.4 %

## 2018-02-09 PROCEDURE — 36415 COLL VENOUS BLD VENIPUNCTURE: CPT | Mod: PO

## 2018-02-09 PROCEDURE — 83036 HEMOGLOBIN GLYCOSYLATED A1C: CPT

## 2018-02-09 NOTE — ASSESSMENT & PLAN NOTE
On appropriate medications and managed by PCP. We discussed the importance of managing all secondary stroke risk factors, including hypertension.

## 2018-02-09 NOTE — ASSESSMENT & PLAN NOTE
On appropriate medications and managed by PCP. We discussed the importance of managing all secondary stroke risk factors, including DM2.

## 2018-02-09 NOTE — PROGRESS NOTES
"Subjective:     Chief Complaint:  Consult      History of Present Illness:  Jerardo Powers is a 72 y.o. male who presents today for initial visit of R>L sciatica type pain and tingling which originates at the upper buttock region and lancinates along the lateral aspects of the thighs and to the lateral aspect of the area just distal to the knee. Symptoms are about 1+ month old and there was no inciting injury. Worsened by standing and leg lift.Some relief with sitting or laying down. No associated weaknesses and no change of gait.    Review of Systems  Review of Systems   Constitutional: Negative for activity change, fatigue and fever.   HENT: Negative for hearing loss, trouble swallowing and voice change.    Eyes: Negative for pain, redness and visual disturbance.   Respiratory: Negative for choking, chest tightness and shortness of breath.    Cardiovascular: Negative for chest pain.   Gastrointestinal: Negative for abdominal pain, nausea and vomiting.   Endocrine: Negative for cold intolerance.   Genitourinary: Negative for frequency.   Musculoskeletal: Negative for arthralgias, back pain, gait problem, joint swelling, myalgias and neck pain.   Skin: Negative for color change.   Allergic/Immunologic: Negative for immunocompromised state.   Neurological: Negative for dizziness, seizures, speech difficulty, weakness, numbness and headaches.        Sciatic-type pain and tingling R>L   Hematological: Negative for adenopathy.   Psychiatric/Behavioral: Negative for agitation, behavioral problems, dysphoric mood and suicidal ideas.        Objective:     Vitals:    02/08/18 1546   BP: 126/78   Pulse: 69   Weight: 98.6 kg (217 lb 6 oz)   Height: 5' 9" (1.753 m)   PainSc: 0-No pain       Neurologic Exam     Mental Status   Oriented to person, place, and time.   Attention: normal.   Speech: speech is normal   Level of consciousness: alert  Knowledge: good.     Cranial Nerves     CN II   Visual fields full to confrontation. "     CN III, IV, VI   Pupils are equal, round, and reactive to light.  Extraocular motions are normal.     CN V   Facial sensation intact.     CN VII   Facial expression full, symmetric.     CN VIII   Hearing: intact    CN IX, X   CN IX normal.     CN XI   CN XI normal.     CN XII   CN XII normal.     Motor Exam   Muscle bulk: normal  Overall muscle tone: normal    Strength   Strength 5/5 throughout.     Sensory Exam   Light touch normal.   Vibration normal.   Sensory deficit distribution on right: sciatic  Sensory deficit distribution on left: sciatic    Gait, Coordination, and Reflexes     Gait  Gait: normal    Tremor   Resting tremor: absent  Intention tremor: absent  Action tremor: absent    Reflexes   Right brachioradialis: 2+  Left brachioradialis: 2+  Right biceps: 2+  Left biceps: 2+  Right triceps: 2+  Left triceps: 2+  Right patellar: 2+  Left patellar: 2+  Right achilles: 2+  Left achilles: 2+      Physical Exam   Constitutional: He is oriented to person, place, and time. He appears well-developed and well-nourished.   HENT:   Head: Normocephalic and atraumatic.   Eyes: EOM are normal. Pupils are equal, round, and reactive to light.   Neck: Normal range of motion. Neck supple. No thyromegaly present.   Cardiovascular: Normal rate.    Pulmonary/Chest: Effort normal.   Abdominal: Soft.   Lymphadenopathy:     He has no cervical adenopathy.   Neurological: He is oriented to person, place, and time. He has normal strength. Gait normal.   Reflex Scores:       Tricep reflexes are 2+ on the right side and 2+ on the left side.       Bicep reflexes are 2+ on the right side and 2+ on the left side.       Brachioradialis reflexes are 2+ on the right side and 2+ on the left side.       Patellar reflexes are 2+ on the right side and 2+ on the left side.       Achilles reflexes are 2+ on the right side and 2+ on the left side.  Skin: Skin is warm and dry.   Psychiatric: He has a normal mood and affect. His speech is  normal and behavior is normal. Thought content normal.   Vitals reviewed.        Lab Results   Component Value Date    WBC 9.90 01/24/2017    HGB 15.8 01/24/2017    HCT 45.4 01/24/2017     01/24/2017    CHOL 177 07/14/2017    TRIG 165 (H) 07/14/2017    HDL 59 07/14/2017    ALT 18 07/14/2017    AST 17 07/14/2017     07/14/2017    K 3.7 07/14/2017     07/14/2017    CREATININE 1.2 07/14/2017    BUN 10 07/14/2017    CO2 23 07/14/2017    TSH 1.706 07/14/2017    HGBA1C 6.7 (H) 07/14/2017    [id number] 306 10/11/2012         Assessment and Plan:     Problem List Items Addressed This Visit     Sciatica (Chronic)    Current Assessment & Plan     Symptoms are bilateral but R>L. Exacerbated by standing. No inciting injury or event he knows. Preference of MRI L-spine to EDX.   - MRI L-spine ordered.         Diabetes mellitus, type 2 - Primary    Current Assessment & Plan     On appropriate medications and managed by PCP. We discussed the importance of managing all secondary stroke risk factors, including DM2.           Hypertension    Current Assessment & Plan     On appropriate medications and managed by PCP. We discussed the importance of managing all secondary stroke risk factors, including hypertension.             Other Visit Diagnoses     Lumbar radiculopathy, chronic        Relevant Orders    MRI Lumbar Spine Without Contrast            Speedy Quintero MD  [facility]

## 2018-02-09 NOTE — ASSESSMENT & PLAN NOTE
Symptoms are bilateral but R>L. Exacerbated by standing. No inciting injury or event he knows. Preference of MRI L-spine to EDX.   - MRI L-spine ordered.

## 2018-02-15 RX ORDER — METFORMIN HYDROCHLORIDE 500 MG/1
1000 TABLET, EXTENDED RELEASE ORAL 2 TIMES DAILY WITH MEALS
Qty: 120 TABLET | Refills: 11 | Status: SHIPPED | OUTPATIENT
Start: 2018-02-15 | End: 2018-04-26

## 2018-02-20 ENCOUNTER — PATIENT MESSAGE (OUTPATIENT)
Dept: FAMILY MEDICINE | Facility: CLINIC | Age: 73
End: 2018-02-20

## 2018-02-21 ENCOUNTER — HOSPITAL ENCOUNTER (EMERGENCY)
Facility: HOSPITAL | Age: 73
Discharge: HOME | End: 2018-02-22
Attending: EMERGENCY MEDICINE
Payer: COMMERCIAL

## 2018-02-21 DIAGNOSIS — N20.1 URETEROLITHIASIS: Primary | ICD-10-CM

## 2018-02-21 DIAGNOSIS — N13.30 HYDRONEPHROSIS, UNSPECIFIED HYDRONEPHROSIS TYPE: ICD-10-CM

## 2018-02-21 LAB
BACTERIA #/AREA URNS HPF: ABNORMAL /HPF
BASOPHILS # BLD AUTO: 0.01 K/UL
BASOPHILS NFR BLD: 0.1 %
BILIRUB UR QL STRIP: NEGATIVE
CLARITY UR: CLEAR
COLOR UR: YELLOW
DIFFERENTIAL METHOD: ABNORMAL
EOSINOPHIL # BLD AUTO: 0.6 K/UL
EOSINOPHIL NFR BLD: 5 %
ERYTHROCYTE [DISTWIDTH] IN BLOOD BY AUTOMATED COUNT: 12.7 %
GLUCOSE UR QL STRIP: NEGATIVE
HCT VFR BLD AUTO: 46.7 %
HGB BLD-MCNC: 16 G/DL
HGB UR QL STRIP: ABNORMAL
KETONES UR QL STRIP: NEGATIVE
LEUKOCYTE ESTERASE UR QL STRIP: NEGATIVE
LYMPHOCYTES # BLD AUTO: 3.4 K/UL
LYMPHOCYTES NFR BLD: 27.9 %
MCH RBC QN AUTO: 29.6 PG
MCHC RBC AUTO-ENTMCNC: 34.3 G/DL
MCV RBC AUTO: 86 FL
MICROSCOPIC COMMENT: ABNORMAL
MONOCYTES # BLD AUTO: 0.7 K/UL
MONOCYTES NFR BLD: 6 %
NEUTROPHILS # BLD AUTO: 7.5 K/UL
NEUTROPHILS NFR BLD: 60.4 %
NITRITE UR QL STRIP: NEGATIVE
PH UR STRIP: 7 [PH] (ref 5–8)
PLATELET # BLD AUTO: 218 K/UL
PMV BLD AUTO: 11.5 FL
POCT GLUCOSE: 136 MG/DL (ref 70–110)
PROT UR QL STRIP: NEGATIVE
RBC # BLD AUTO: 5.41 M/UL
RBC #/AREA URNS HPF: >100 /HPF (ref 0–4)
SP GR UR STRIP: 1.01 (ref 1–1.03)
URN SPEC COLLECT METH UR: ABNORMAL
UROBILINOGEN UR STRIP-ACNC: NEGATIVE EU/DL
WBC # BLD AUTO: 12.31 K/UL

## 2018-02-21 PROCEDURE — 85025 COMPLETE CBC W/AUTO DIFF WBC: CPT

## 2018-02-21 PROCEDURE — 99284 EMERGENCY DEPT VISIT MOD MDM: CPT | Mod: 25

## 2018-02-21 PROCEDURE — 80053 COMPREHEN METABOLIC PANEL: CPT

## 2018-02-21 PROCEDURE — 81000 URINALYSIS NONAUTO W/SCOPE: CPT

## 2018-02-21 PROCEDURE — 83690 ASSAY OF LIPASE: CPT

## 2018-02-21 PROCEDURE — 82962 GLUCOSE BLOOD TEST: CPT

## 2018-02-22 ENCOUNTER — PATIENT MESSAGE (OUTPATIENT)
Dept: UROLOGY | Facility: CLINIC | Age: 73
End: 2018-02-22

## 2018-02-22 VITALS
DIASTOLIC BLOOD PRESSURE: 88 MMHG | HEART RATE: 77 BPM | HEIGHT: 70 IN | SYSTOLIC BLOOD PRESSURE: 126 MMHG | BODY MASS INDEX: 30.35 KG/M2 | RESPIRATION RATE: 18 BRPM | WEIGHT: 212 LBS | TEMPERATURE: 98 F | OXYGEN SATURATION: 96 %

## 2018-02-22 LAB
ALBUMIN SERPL BCP-MCNC: 3.8 G/DL
ALP SERPL-CCNC: 107 U/L
ALT SERPL W/O P-5'-P-CCNC: 27 U/L
ANION GAP SERPL CALC-SCNC: 11 MMOL/L
AST SERPL-CCNC: 19 U/L
BILIRUB SERPL-MCNC: 0.6 MG/DL
BUN SERPL-MCNC: 14 MG/DL
CALCIUM SERPL-MCNC: 9.4 MG/DL
CHLORIDE SERPL-SCNC: 103 MMOL/L
CO2 SERPL-SCNC: 25 MMOL/L
CREAT SERPL-MCNC: 1.5 MG/DL
EST. GFR  (AFRICAN AMERICAN): 53 ML/MIN/1.73 M^2
EST. GFR  (NON AFRICAN AMERICAN): 46 ML/MIN/1.73 M^2
GLUCOSE SERPL-MCNC: 143 MG/DL
LIPASE SERPL-CCNC: 79 U/L
POTASSIUM SERPL-SCNC: 3.7 MMOL/L
PROT SERPL-MCNC: 6.9 G/DL
SODIUM SERPL-SCNC: 139 MMOL/L

## 2018-02-22 RX ORDER — KETOROLAC TROMETHAMINE 10 MG/1
10 TABLET, FILM COATED ORAL EVERY 8 HOURS
Qty: 10 TABLET | Refills: 0 | Status: SHIPPED | OUTPATIENT
Start: 2018-02-22 | End: 2018-02-26

## 2018-02-22 RX ORDER — ONDANSETRON 4 MG/1
8 TABLET, FILM COATED ORAL EVERY 8 HOURS PRN
Qty: 14 TABLET | Refills: 0 | Status: SHIPPED | OUTPATIENT
Start: 2018-02-22 | End: 2018-04-17

## 2018-02-22 NOTE — DISCHARGE INSTRUCTIONS
DRINK PLENTY OF WATER    TAKE MEDS AS NEEDED    CALL TO SCHEDULE A FOLLOW UP EVALUATION WITH UROLOGY    RETURN TO ED IF SYMPTOMS WORSEN

## 2018-02-22 NOTE — ED PROVIDER NOTES
Encounter Date: 2/21/2018       History     Chief Complaint   Patient presents with    Abdominal Pain     lower abd pain, reports nausea and diarrhea, denies vomiting. Present X 1 week.      73 y/o male with pmhx of renal stones, UTI, BPH, DM, HLD, HTN, anxiety, GERD and IBS presents to ED for lower abd pain x 5 days.  Pain is constant and achy, pain does not radiate.  No flank or back pain.  Pt has not taken any meds for the pain pta.  He denies any aggravating or relieving factors.  He reports decreased po intake due to decreased appetite.  No fever/chills. No urinary symptoms.  Pt reports chronic diarrhea and states that he has had loose stools.  No blood in stool or urine.            Review of patient's allergies indicates:   Allergen Reactions    Morphine Hives     Past Medical History:   Diagnosis Date    Anxiety     BPH (benign prostatic hypertrophy)     Cataract     CKD (chronic kidney disease) stage 3, GFR 30-59 ml/min 5/5/2014    Colon polyps     Diabetes mellitus type II     Emphysema NEC     mild    Gout     Hyperlipidemia     Hypertension     Hypothyroidism     IBS (irritable bowel syndrome)     Kidney stones     JANEEN (obstructive sleep apnea)     not using CPAP    Plantar fasciitis     Reflux      Past Surgical History:   Procedure Laterality Date    CATARACT EXTRACTION  1/28/2013    RIGHT EYE    CATARACT EXTRACTION      left eye    COLONOSCOPY  Sep 2011    Repeat recommended in 5 years    COLONOSCOPY N/A 10/10/2016    Procedure: COLONOSCOPY;  Surgeon: Noah Colunga MD;  Location: [facility] ([address] [place] [address]);  Service: Endoscopy;  Laterality: N/A;  PM Prep    KIDNEY STONE SURGERY       Family History   Problem Relation Age of Onset    Cancer Brother      non-hodgkins T cell lymphoma    Diabetes Mother     Macular degeneration Brother     Coronary artery disease Neg Hx     Colon cancer Neg Hx     Prostate cancer Neg Hx      Social History   Substance Use Topics    Smoking  status: Former Smoker     Years: 40.00     Quit date: 9/4/2007    Smokeless tobacco: Never Used    Alcohol use 0.0 oz/week      Comment: one drink per week     Review of Systems   Constitutional: Positive for appetite change. Negative for activity change, chills, diaphoresis, fatigue and fever.   Respiratory: Negative for chest tightness and shortness of breath.    Cardiovascular: Negative for chest pain and palpitations.   Gastrointestinal: Positive for abdominal pain and diarrhea. Negative for blood in stool, constipation, nausea and vomiting.   Endocrine: Negative for polydipsia and polyphagia.   Genitourinary: Negative for decreased urine volume, difficulty urinating, dysuria, flank pain, frequency and hematuria.   Musculoskeletal: Negative for back pain.   Skin: Negative for pallor and rash.   Neurological: Negative for dizziness and headaches.   Psychiatric/Behavioral: Negative for confusion.   All other systems reviewed and are negative.      Physical Exam     Initial Vitals [02/21/18 2318]   BP Pulse Resp Temp SpO2   (!) 164/93 92 18 98 °F (36.7 °C) 96 %      MAP       116.67         Physical Exam    Nursing note and vitals reviewed.  Constitutional: He appears well-developed and well-nourished. He is not diaphoretic. No distress.   HENT:   Head: Normocephalic and atraumatic.   Mouth/Throat: Oropharynx is clear and moist.   Eyes: Conjunctivae and EOM are normal. No scleral icterus.   Neck: Normal range of motion. Neck supple.   Cardiovascular: Normal rate, regular rhythm and normal heart sounds. Exam reveals no gallop and no friction rub.    No murmur heard.  Pulmonary/Chest: Breath sounds normal. No respiratory distress. He has no wheezes. He has no rhonchi. He has no rales.   Abdominal: Soft. Bowel sounds are normal. He exhibits no distension. There is tenderness. There is no rebound and no guarding.       Musculoskeletal: Normal range of motion.   Neurological: He is alert and oriented to person, place,  and time.   Skin: Skin is warm and dry. No erythema. No pallor.   Psychiatric: He has a normal mood and affect. His behavior is normal. Judgment and thought content normal.         ED Course   Procedures  Labs Reviewed   CBC W/ AUTO DIFFERENTIAL - Abnormal; Notable for the following:        Result Value    Eos # 0.6 (*)     All other components within normal limits   URINALYSIS - Abnormal; Notable for the following:     Occult Blood UA 3+ (*)     All other components within normal limits   URINALYSIS MICROSCOPIC - Abnormal; Notable for the following:     RBC, UA >100 (*)     All other components within normal limits   POCT GLUCOSE - Abnormal; Notable for the following:     POCT Glucose 136 (*)     All other components within normal limits   COMPREHENSIVE METABOLIC PANEL   LIPASE             Medical Decision Making:   Initial Assessment:   73 y/o male with pmhx of renal stones, UTI, BPH, DM, HLD, HTN, anxiety, GERD and IBS presents to ED for lower abd pain x 5 days.  Pain is constant and achy, pain does not radiate.  No flank or back pain.  Pt has not taken any meds for the pain pta.  He denies any aggravating or relieving factors.  He reports decreased po intake due to decreased appetite.  No fever/chills. No urinary symptoms.  Pt reports chronic diarrhea and states that he has had loose stools.  No blood in stool or urine.            Differential Diagnosis:   DDX:  gastritis, gastroenteritis, pancreatitis, cholecystitis, ileus, small bowel obstruction, appendicitis, GERD, intestinal spasm, gastroenteritis, gastritis, ulcer, cholecystitis, gallstones, pancreatitis, ileus, small bowel obstruction, appendicitis, constipation, intestinal gas pain    Clinical Tests:   Lab Tests: Ordered and Reviewed  The following lab test(s) were unremarkable: CBC       <> Summary of Lab: U/a:  + rbc  Cr 1.5    Radiological Study: Ordered and Reviewed  ED Management:  Labs and CT  Pt refuses pain meds at this time    CT renal stone +  for 4mm obstructing ureterolithiasis, normal renal function, no associated infection.  Pt remains comfortable and refuses pain meds.  He is established with urology at [facility].  He understands that he must drink plenty of fluids and take meds as directed.  He will strain his urine and f/u with Dr. Murray.    Pt counseled on their diagnosis and the importance of following up with PCP.  Pt also cautioned on when to return to ED.  Pt verbalizes understanding of discharge plan and will return to ED immediately if symptoms worsen                        Clinical Impression:   The primary encounter diagnosis was Ureterolithiasis. A diagnosis of Hydronephrosis, unspecified hydronephrosis type was also pertinent to this visit.    Disposition:   Disposition: Discharged  Condition: Stable                        Nina Oliva MD  02/22/18 0997

## 2018-02-22 NOTE — ED NOTES
Patient identifiers for Jerardo Powers checked and correct.  LOC: The patient is awake, alert and aware of environment with an appropriate affect, the patient is oriented x 3 and speaking appropriately.  APPEARANCE: Patient resting comfortably and in no acute distress, patient is clean and well groomed, patient's clothing are properly fastened.  SKIN: The skin is warm and dry, patient has normal skin turgor and moist mucus membranes.  MUSKULOSKELETAL: Patient moving all extremities well, no obvious swelling or deformities noted.  RESPIRATORY: Airway is open and patent, respirations are spontaneous, patient has a normal effort and rate.  ABDOMEN: Tender to palpation.

## 2018-02-22 NOTE — ED NOTES
Resting quietly.  Respirations even and unlabored.  Voices no complaints.  Family member at bedside.

## 2018-02-23 ENCOUNTER — OFFICE VISIT (OUTPATIENT)
Dept: UROLOGY | Facility: CLINIC | Age: 73
End: 2018-02-23
Payer: COMMERCIAL

## 2018-02-23 VITALS
WEIGHT: 212.94 LBS | HEIGHT: 70 IN | HEART RATE: 72 BPM | DIASTOLIC BLOOD PRESSURE: 79 MMHG | BODY MASS INDEX: 30.49 KG/M2 | SYSTOLIC BLOOD PRESSURE: 125 MMHG

## 2018-02-23 DIAGNOSIS — N20.1 LEFT URETERAL STONE: ICD-10-CM

## 2018-02-23 DIAGNOSIS — N13.30 HYDRONEPHROSIS, LEFT: ICD-10-CM

## 2018-02-23 DIAGNOSIS — R10.30 LOWER ABDOMINAL PAIN: Primary | ICD-10-CM

## 2018-02-23 PROCEDURE — 87086 URINE CULTURE/COLONY COUNT: CPT

## 2018-02-23 PROCEDURE — 99215 OFFICE O/P EST HI 40 MIN: CPT | Mod: S$GLB,,, | Performed by: UROLOGY

## 2018-02-23 PROCEDURE — 3008F BODY MASS INDEX DOCD: CPT | Mod: S$GLB,,, | Performed by: UROLOGY

## 2018-02-23 PROCEDURE — 1159F MED LIST DOCD IN RCRD: CPT | Mod: S$GLB,,, | Performed by: UROLOGY

## 2018-02-23 PROCEDURE — 99999 PR PBB SHADOW E&M-EST. PATIENT-LVL V: CPT | Mod: PBBFAC,,, | Performed by: UROLOGY

## 2018-02-23 PROCEDURE — 1125F AMNT PAIN NOTED PAIN PRSNT: CPT | Mod: S$GLB,,, | Performed by: UROLOGY

## 2018-02-23 RX ORDER — OXYCODONE AND ACETAMINOPHEN 5; 325 MG/1; MG/1
1 TABLET ORAL EVERY 6 HOURS PRN
Qty: 20 TABLET | Refills: 0 | Status: SHIPPED | OUTPATIENT
Start: 2018-02-23 | End: 2018-02-26

## 2018-02-23 NOTE — PROGRESS NOTES
CC: gross hematuria, lower abdominal pain, hx of IBS    Jerardo Powers is a 72 y.o. man who is here for the evaluation of Nephrolithiasis (pt follow up from [facility] kidney stones . he had a ct/scan on 02/22/2018 )  recently seen by Lily Reynolds for the above problems.  CT urogram was done on 2/22/18. It reveal the following findings:  Left-sided nephrolithiasis with 4 mm obstructing stone in the left proximal ureter with associated moderate left-sided hydroureteronephrosis.    Scattered colonic diverticula without evidence of acute diverticulitis.    He is here to discuss these findings and management of left obstructive ureteral stone.  Reports no more gross hematuria.  No fever or chills.  C/o lower abdominal pain.  Denies flank pain, nausea or vomiting.    He reports that he had gross hematuria last week x 3 episodes on the same day, dysuria, frequency, urgency, and nocturia x 3-4. Denies incontinence. No bladder pain or flank pain.  Denies fever, chills, nausea, or vomiting.   Has been on Sue since 2009. He is wondering whether he needs to take Sue for a long term.  hx of BPH with obstruction and prostatitis.    Cysto w/ Dr. Murray on 3/2016:  Findings:  Urethra:  Normal urethra.   Sphincter: competent.  Prostate: Estimated Length Prostatic Urethra: 4 cm with bilateral obstruction  Bladder neck: patent with no stricture  Bladder:  Normal bladder.   Normal ureteral orifices bilaterally.   Moderate trabeculation.   Hyperemic area of the bladder mucosa with early glomerulation    Conclusion:  Hematuria.  Bladder condition, enlarged prostate, and kidney stones are all potential causes for hematuria.  He is not interested in OAB meds at this time.  Avoid bladder irritants.  May consider laser TURP to improve his DIAZ.      Past Medical History:   Diagnosis Date    Anxiety     BPH (benign prostatic hypertrophy)     Cataract     CKD (chronic kidney disease) stage 3, GFR 30-59 ml/min 5/5/2014    Colon  polyps     Diabetes mellitus type II     Emphysema NEC     mild    Gout     Hyperlipidemia     Hypertension     Hypothyroidism     IBS (irritable bowel syndrome)     Kidney stones     JANEEN (obstructive sleep apnea)     not using CPAP    Plantar fasciitis     Reflux      Past Surgical History:   Procedure Laterality Date    CATARACT EXTRACTION  1/28/2013    RIGHT EYE    CATARACT EXTRACTION      left eye    COLONOSCOPY  Sep 2011    Repeat recommended in 5 years    COLONOSCOPY N/A 10/10/2016    Procedure: COLONOSCOPY;  Surgeon: Noah Colunga MD;  Location: [address]);  Service: Endoscopy;  Laterality: N/A;  PM Prep    KIDNEY STONE SURGERY       Social History   Substance Use Topics    Smoking status: Former Smoker     Years: 40.00     Quit date: 9/4/2007    Smokeless tobacco: Never Used    Alcohol use 0.0 oz/week      Comment: one drink per week     Family History   Problem Relation Age of Onset    Cancer Brother      non-hodgkins T cell lymphoma    Diabetes Mother     Macular degeneration Brother     Coronary artery disease Neg Hx     Colon cancer Neg Hx     Prostate cancer Neg Hx      Allergy:  Allergies   Allergen Reactions    Morphine Hives     Outpatient Encounter Prescriptions as of 2/23/2018   Medication Sig Dispense Refill    allopurinol (ZYLOPRIM) 100 MG tablet TAKE 1 TABLET (100 MG TOTAL) BY MOUTH ONCE DAILY. 90 tablet 3    amlodipine (NORVASC) 10 MG tablet TAKE 1 TABLET BY MOUTH EVERY DAY 30 tablet 11    aspirin 81 MG Chew Take 81 mg by mouth once daily.      blood sugar diagnostic Strp 1 strip by Misc.(Non-Drug; Combo Route) route 3 (three) times daily. 100 strip 11    citalopram (CELEXA) 20 MG tablet TAKE 1 TABLET (20 MG TOTAL) BY MOUTH ONCE DAILY. 90 tablet 3    dutasteride-tamsulosin 0.5-0.4 mg CM24 TAKE 1 CAPSULE BY MOUTH EVERY EVENING. 90 capsule 3    fish oil-omega-3 fatty acids 300-1,000 mg capsule Take 2 g by mouth once daily.      irbesartan (AVAPRO)  300 MG tablet TAKE 1 TABLET BY MOUTH EVERY DAY 30 tablet 11    ketorolac (TORADOL) 10 mg tablet Take 1 tablet (10 mg total) by mouth every 8 (eight) hours. 10 tablet 0    lancets Misc Test sugars once daily.  Dispense 100 lancets (insurance covered brand) 100 each 11    levothyroxine (SYNTHROID) 75 MCG tablet TAKE 1 TABLET (75 MCG TOTAL) BY MOUTH ONCE DAILY. 90 tablet 3    metFORMIN (GLUCOPHAGE-XR) 500 MG 24 hr tablet TAKE 2 TABLETS (1,000 MG TOTAL) BY MOUTH 2 (TWO) TIMES DAILY WITH MEALS. 120 tablet 11    ondansetron (ZOFRAN) 4 MG tablet Take 2 tablets (8 mg total) by mouth every 8 (eight) hours as needed for Nausea. 14 tablet 0    SITagliptan (JANUVIA) 100 MG Tab Take 1 tablet (100 mg total) by mouth once daily. 30 tablet 11    oxyCODONE-acetaminophen (PERCOCET) 5-325 mg per tablet Take 1 tablet by mouth every 6 (six) hours as needed for Pain. 20 tablet 0     No facility-administered encounter medications on file as of 2/23/2018.      Review of Systems   General ROS: GENERAL:  No weight gain or loss  SKIN:  No rashes or lacerations  HEAD:  No headaches  EYES:  No exophthalmos, jaundice or blindness  EARS:  No dizziness, tinnitus or hearing loss  NOSE:  No changes in smell  MOUTH & THROAT:  No dyskinetic movements or obvious goiter  CHEST:  No shortness of breath, hyperventilation or cough  CARDIOVASCULAR:  No tachycardia or chest pain  ABDOMEN:  No nausea, vomiting, pain, constipation or diarrhea  URINARY:  No frequency, dysuria or sexual dysfunction  ENDOCRINE:  No polydipsia, polyuria  MUSCULOSKELETAL:  No pain or stiffness of the joints  NEUROLOGIC:  No weakness, sensory changes, seizures, confusion, memory loss, tremor or other abnormal movements  Physical Exam     Vitals:    02/23/18 1009   BP: 125/79   Pulse: 72     General Appearance:  Alert, cooperative, no distress, appears stated age   Head:  Normocephalic, without obvious abnormality, atraumatic   Eyes:  PERRL, conjunctiva/corneas clear, EOM's  intact, fundi benign, both eyes   Ears:  Normal TM's and external ear canals, both ears   Nose: Nares normal, septum midline, mucosa normal, no drainage or sinus tenderness   Throat: Lips, mucosa, and tongue normal; teeth and gums normal   Neck: Supple, symmetrical, trachea midline, no adenopathy, thyroid: not enlarged, symmetric, no tenderness/mass/nodules, no carotid bruit or JVD   Back:   Symmetric, no curvature, ROM normal, no CVA tenderness   Lungs:   Clear to auscultation bilaterally, respirations unlabored   Chest Wall:  No tenderness or deformity   Heart:  Regular rate and rhythm, S1, S2 normal, no murmur, rub or gallop   Abdomen:   Soft, non-tender, bowel sounds active all four quadrants,  no masses, no organomegaly of liver and spleen  No hernia noted   Genitalia:  Scrotum: no rash or lesion  Normal symmetric epididymis without masses  Normal vas palpated  Normal size, symmetric testicles with no masses   Normal urethral meatus with no discharge  Normal circumcised penis with no lesion   Rectal:  Normal perineum and anus upon inspection.  Normal tone, no masses or tenderness;   Extremities: Extremities normal, atraumatic, no cyanosis or edema   Pulses: 2+ and symmetric   Skin: Skin color, texture, turgor normal, no rashes or lesions   Lymph nodes: Cervical, supraclavicular, and axillary nodes normal   Neurologic: Normal     Prostate 20 grams smooth with tenderness and no nodules      LABS:  Lab Results   Component Value Date    PSA 0.74 07/14/2017    PSA 0.59 07/17/2015    PSA 0.96 08/22/2012    PSA 1.11 04/16/2012    PSA 1.4 02/14/2011    PSA 2.4 05/12/2010    PSA 4.0 02/15/2010    PSA 4.7 (H) 08/20/2009    PSA 4.2 (H) 07/06/2009    PSA 3.6 03/25/2008    PSADIAG 1.3 02/19/2016    PSADIAG 0.62 12/01/2014    PSADIAG 0.97 12/04/2013     Results for orders placed or performed in visit on 02/19/16   Prostate Specific Antigen, Diagnostic   Result Value Ref Range    PSA DIAGNOSTIC 1.3 0.00-4.00 ng/mL   Results  for orders placed or performed in visit on 12/01/14   Prostate Specific Antigen, Diagnostic   Result Value Ref Range    PSA DIAGNOSTIC 0.62 0.00-4.00 ng/mL   Results for orders placed or performed in visit on 12/04/13   Prostate Specific Antigen, Diagnostic   Result Value Ref Range    PSA DIAGNOSTIC 0.97 0.00-4.00 ng/mL     Lab Results   Component Value Date    CREATININE 1.5 (H) 02/21/2018    CREATININE 1.2 07/14/2017    CREATININE 1.3 01/24/2017     Urine Culture, Routine   Date Value Ref Range Status   11/21/2017 No significant growth  Final     Assessment and Plan:  Jerardo was seen today for nephrolithiasis.    Diagnoses and all orders for this visit:    Lower abdominal pain  -     oxyCODONE-acetaminophen (PERCOCET) 5-325 mg per tablet; Take 1 tablet by mouth every 6 (six) hours as needed for Pain.    Left ureteral stone  -     X-Ray Abdomen AP 1 View; Future  -     CT Renal Stone Study ABD Pelvis WO; Future  -     oxyCODONE-acetaminophen (PERCOCET) 5-325 mg per tablet; Take 1 tablet by mouth every 6 (six) hours as needed for Pain.    Hydronephrosis, left  -     Basic metabolic panel; Future  -     Uric acid; Future  -     oxyCODONE-acetaminophen (PERCOCET) 5-325 mg per tablet; Take 1 tablet by mouth every 6 (six) hours as needed for Pain.    I reviewed his CT and went over all the findings in detail.  I believe that his lower abdominal pain is related to GI rather than  system.  Recommend to see his GI doctor.    For his left ureteral stone, I recommended:  Strain urine.  Will try expulsion therapy.  Hydration.  Continue Sue.  Follow up in 2 wks with the above studies.  I spent 40 minutes with the patient of which more than half was spent in direct consultation with the patient in regards to our treatment and plan.    Follow-up:  Follow-up in about 2 weeks (around 3/9/2018) for blood tests, CT RSS and KUB.

## 2018-02-24 ENCOUNTER — HOSPITAL ENCOUNTER (OUTPATIENT)
Dept: RADIOLOGY | Facility: HOSPITAL | Age: 73
Discharge: HOME | End: 2018-02-24
Attending: PSYCHIATRY & NEUROLOGY
Payer: COMMERCIAL

## 2018-02-24 DIAGNOSIS — M54.16 LUMBAR RADICULOPATHY, CHRONIC: ICD-10-CM

## 2018-02-24 LAB — BACTERIA UR CULT: NORMAL

## 2018-02-24 PROCEDURE — 72148 MRI LUMBAR SPINE W/O DYE: CPT | Mod: 26,,, | Performed by: RADIOLOGY

## 2018-02-24 PROCEDURE — 72148 MRI LUMBAR SPINE W/O DYE: CPT | Mod: TC

## 2018-02-26 ENCOUNTER — OFFICE VISIT (OUTPATIENT)
Dept: FAMILY MEDICINE | Facility: CLINIC | Age: 73
End: 2018-02-26
Payer: COMMERCIAL

## 2018-02-26 VITALS
HEIGHT: 70 IN | SYSTOLIC BLOOD PRESSURE: 112 MMHG | HEART RATE: 74 BPM | BODY MASS INDEX: 30.87 KG/M2 | TEMPERATURE: 98 F | OXYGEN SATURATION: 96 % | DIASTOLIC BLOOD PRESSURE: 72 MMHG | WEIGHT: 215.63 LBS

## 2018-02-26 DIAGNOSIS — E11.22 TYPE 2 DIABETES MELLITUS WITH STAGE 3 CHRONIC KIDNEY DISEASE, WITHOUT LONG-TERM CURRENT USE OF INSULIN: Primary | ICD-10-CM

## 2018-02-26 DIAGNOSIS — R19.7 DIARRHEA, UNSPECIFIED TYPE: ICD-10-CM

## 2018-02-26 DIAGNOSIS — N20.1 URETEROLITHIASIS: ICD-10-CM

## 2018-02-26 DIAGNOSIS — R10.30 LOWER ABDOMINAL PAIN: ICD-10-CM

## 2018-02-26 DIAGNOSIS — G47.33 OSA (OBSTRUCTIVE SLEEP APNEA): ICD-10-CM

## 2018-02-26 DIAGNOSIS — I10 ESSENTIAL HYPERTENSION: ICD-10-CM

## 2018-02-26 DIAGNOSIS — N18.30 TYPE 2 DIABETES MELLITUS WITH STAGE 3 CHRONIC KIDNEY DISEASE, WITHOUT LONG-TERM CURRENT USE OF INSULIN: Primary | ICD-10-CM

## 2018-02-26 DIAGNOSIS — M10.9 GOUT, UNSPECIFIED CAUSE, UNSPECIFIED CHRONICITY, UNSPECIFIED SITE: ICD-10-CM

## 2018-02-26 DIAGNOSIS — R53.83 FATIGUE, UNSPECIFIED TYPE: ICD-10-CM

## 2018-02-26 PROCEDURE — 3008F BODY MASS INDEX DOCD: CPT | Mod: S$GLB,,, | Performed by: FAMILY MEDICINE

## 2018-02-26 PROCEDURE — 99999 PR PBB SHADOW E&M-EST. PATIENT-LVL III: CPT | Mod: PBBFAC,,, | Performed by: FAMILY MEDICINE

## 2018-02-26 PROCEDURE — 99215 OFFICE O/P EST HI 40 MIN: CPT | Mod: S$GLB,,, | Performed by: FAMILY MEDICINE

## 2018-02-26 PROCEDURE — 1159F MED LIST DOCD IN RCRD: CPT | Mod: S$GLB,,, | Performed by: FAMILY MEDICINE

## 2018-02-26 PROCEDURE — 1125F AMNT PAIN NOTED PAIN PRSNT: CPT | Mod: S$GLB,,, | Performed by: FAMILY MEDICINE

## 2018-02-26 NOTE — PROGRESS NOTES
(Portions of this note were dictated using voice recognition software and may contain dictation related errors in spelling/grammar/syntax not found on text review)    CC:   Chief Complaint   Patient presents with    Annual Exam       HPI: 72 y.o. male     Last seen in July 2017    Hypertension: On amlodipine 10 mg daily and Avapro 300 mg daily.  Blood pressures controlled    Diabetes with chronic kidney disease stage III: On metformin 1000 mg extended release was taking in the evening only, did get more diarrhea with twice a day dosing.  However, he states that he is now taking 2 pills in the morning and one pill in the evening.  Also on Januvia 100 mg daily.  Last A1c as below is controlled.  He had been followed by GI for diarrhea in the past and it was mentioned that he try an elimination for metformin temporarily but he does admit that he has not completely eliminated metformin for any timeframe.  He continues to have lower abdominal pains and diarrhea.  He states that often he notices an oval-shaped transparent object looks like a fish oil capsule in his stool on a regular basis.  He does not regularly take fish oil.  Sounds like some of this could be some undigested medication perhaps given his diarrhea issues.    Eye exam July 2017, foot exam utd July 2017    He is not on a statin.  We have discussed the benefits of statin use in patients with diabetes but he had declined    Gout on allopurinol 100 mg daily    Hypothyroidism on Synthroid 75 µg daily    BPH, followed by urology.  On kavita    Anxiety on citalopram 20 mg daily    JANEEN, followed by sleep medicine, on CPAP    Was seen in ED for left-sided urolithiasis and hydronephrosis on 2/21/18.  Has subsequently followed with urology on 2/23/18.  Urine culture negative    Son neurology early February secondary to concerns about lumbar radiculopathy.  Spinal stenosis noted more prominent at lumbosacral junction and mild DDD at L4-5 and L5-S1 with moderate facet  arthropathy    Does have lethargy overall and just feels low energy.  Does admit to getting not much physical activity at all.  Does not exercise.  BMI is 30.94    Past Medical History:   Diagnosis Date    Anxiety     BPH (benign prostatic hypertrophy)     Cataract     CKD (chronic kidney disease) stage 3, GFR 30-59 ml/min 5/5/2014    Colon polyps     Diabetes mellitus type II     Emphysema NEC     mild    Gout     Hyperlipidemia     Hypertension     Hypothyroidism     IBS (irritable bowel syndrome)     Kidney stones     JANEEN (obstructive sleep apnea)     not using CPAP    Plantar fasciitis     Reflux        Past Surgical History:   Procedure Laterality Date    CATARACT EXTRACTION  1/28/2013    RIGHT EYE    CATARACT EXTRACTION      left eye    COLONOSCOPY  Sep 2011    Repeat recommended in 5 years    COLONOSCOPY N/A 10/10/2016    Procedure: COLONOSCOPY;  Surgeon: Noah Colunga MD;  Location: [facility] ([address] [place] [address]);  Service: Endoscopy;  Laterality: N/A;  PM Prep    KIDNEY STONE SURGERY         Family History   Problem Relation Age of Onset    Cancer Brother      non-hodgkins T cell lymphoma    Diabetes Mother     Macular degeneration Brother     Coronary artery disease Neg Hx     Colon cancer Neg Hx     Prostate cancer Neg Hx        Social History     Social History    Marital status:      Spouse name: N/A    Number of children: N/A    Years of education: N/A     Occupational History    Not on file.     Social History Main Topics    Smoking status: Former Smoker     Years: 40.00     Quit date: 9/4/2007    Smokeless tobacco: Never Used    Alcohol use 0.0 oz/week      Comment: one drink per week    Drug use: No    Sexual activity: No     Other Topics Concern    Not on file     Social History Narrative    No narrative on file     SCREENINGS  Colonoscopy: 2016.  Normal colonoscopy except for medium size internal hemorrhoids visualized.  Prostate : Dr. CJ  Murray.     Immunizations  pvx 2013  Pcv: utd through outside pharmacy  Tetanus 7/20/15   flu: declines    Stress echo August 2015, normal  EKGs  2014 normal  48 hour Holter monitor April 2014. 3 episodes of shortness of breath reported, corresponding rhythm is sinus rhythm. One syncopal episode reported and corresponding rhythm was sinus rhythm at 79 bpm. One episode of lightheadedness reported, corresponding rhythm was sinus bradycardia 59 bpm. No high-grade AV block. Rare PVCs. No ventricular tachycardia. Very rare PACs    Lab Results   Component Value Date    WBC 12.31 02/21/2018    HGB 16.0 02/21/2018    HCT 46.7 02/21/2018     02/21/2018    CHOL 177 07/14/2017    TRIG 165 (H) 07/14/2017    HDL 59 07/14/2017    ALT 27 02/21/2018    AST 19 02/21/2018     02/23/2018    K 4.1 02/23/2018     02/23/2018    CREATININE 1.5 (H) 02/23/2018    CALCIUM 9.6 02/23/2018    BUN 13 02/23/2018    CO2 30 (H) 02/23/2018    TSH 1.706 07/14/2017    PSA 0.74 07/14/2017    INR 1.0 01/18/2014    HGBA1C 6.4 (H) 02/09/2018    LDLCALC 85.0 07/14/2017     (H) 02/23/2018       Uric acid done 2/23/18 was normal at 6.9    ROS:  GENERAL: Fatigue.  SKIN: No rashes, no itching.  HEAD: No headaches.  EYES: No visual changes  EARS: No ear pain or changes in hearing.  NOSE: No congestion or rhinorrhea.  MOUTH & THROAT: No hoarseness, change in voice, or sore throat.  NODES: Denies swollen glands.  CHEST: Denies MENON, cyanosis, wheezing, cough and sputum production.  CARDIOVASCULAR: Denies chest pain, PND, orthopnea.  ABDOMEN: Lower abdominal pain, diarrhea.  URINARY: No flank pain, dysuria or hematuria.  PERIPHERAL VASCULAR: No claudication or cyanosis.  MUSCULOSKELETAL: Joint pains.  NEUROLOGIC: No weakness or numbness.    Vital signs reviewed  PE:   APPEARANCE: Well nourished, well developed, in no acute distress.    HEAD: Normocephalic, atraumatic.  EYES: PERRL. EOMI.   Conjunctivae noninjected.  EARS: TM's intact. Light  reflex normal. No retraction or perforation  NOSE: Mucosa pink. Airway clear.  MOUTH & THROAT: No tonsillar enlargement. No pharyngeal erythema or exudate.   NECK: Supple with no cervical lymphadenopathy.  No carotid bruits.  No thyromegaly  CHEST: Good inspiratory effort. Lungs clear to auscultation with no wheezes or crackles.  CARDIOVASCULAR: Normal S1, S2. No rubs, murmurs, or gallops.  ABDOMEN: Bowel sounds normal. Not distended. Soft.  Does have bilateral lower quadrant tenderness to palpation. No organomegaly.  EXTREMITIES: No edema, cyanosis, or clubbing.  Dm foot exam 7/14/17.     IMPRESSION  1. Type 2 diabetes mellitus with stage 3 chronic kidney disease, without long-term current use of insulin    2. Gout, unspecified cause, unspecified chronicity, unspecified site    3. Essential hypertension    4. JANEEN (obstructive sleep apnea)    5. Lower abdominal pain    6. Diarrhea, unspecified type    7. Ureterolithiasis    8. Fatigue, unspecified type            PLAN  Diabetes health maintenance:  -- advise regular and consistent glucose monitoring and medication compliance.  -- advise daily foot checks  -- advise yearly ophthalmologic exams  -- advise adequate dietary and exercise modification  -- advise regular dental visits  -- advise daily low-dose aspirin use if patient is not on other anticoagulants and there are no other contraindications.  -- Medication management: Cutdown metformin extended release to 500 mg pill just once daily for the next several weeks to see if this will help with his abdominal discomfort and diarrhea.  If not, he can eliminate metformin altogether for 3-4 weeks.  Continue Januvia 100 mg daily    Hypertension well controlled    Offered statin again the patient declines    Reviewed his recent emergency room visit and imaging.  He has a repeat CT scheduled through his urologist to follow-up the ureteral stone and hydronephrosis.  Patient was concerned about the finding of diverticulosis  and the implication for lower abdominal pain but there is no sign of diverticulitis and patient was reassured the diverticulosis by itself should not cause pain    Sleep apnea: Reviewed sleep Clinic documentation    Fatigue: Reviewed his recent labs.  I would recommend that he start to get some regular physical exercise.  Would recommend dietary precautions to help lose weight in addition.  He is on Synthroid for hypothyroidism.  Dose has not changed.  I will add thyroid profile to his next set of labs in 3 months    We will recheck his diabetes profile in 3 months especially given medication adjustment documented above    Diarrhea: Try metformin adjustment as above.  Also has a diagnosis of IBS and so this could potentially be playing a part as well.    Over 40 minutes spent with patient greater than 50% of the time involved with counseling      Answers for HPI/ROS submitted by the patient on 2/24/2018   Abdominal pain  Chronicity: new  Onset: 1 to 4 weeks ago  Onset quality: gradual  Frequency: constantly  Episode duration: 8 days  Progression since onset: waxing and waning  Pain location: suprapubic region  Pain - numeric: 7/10  Pain quality: cramping  Radiates to: suprapubic region  anorexia: Yes  arthralgias: No  belching: No  constipation: Yes  diarrhea: Yes  dysuria: No  fever: No  flatus: Yes  frequency: Yes  headaches: No  hematochezia: No  hematuria: No  melena: No  myalgias: No  nausea: Yes  weight loss: No  vomiting: No  Aggravated by: nothing  Relieved by: nothing  Diagnostic workup: CT scan, lower endoscopy, ultrasound, upper endoscopy  Pain severity: moderate  Treatments tried: nothing  abdominal surgery: No  colon cancer: No  Crohn's disease: No  gallstones: No  GERD: Yes  irritable bowel syndrome: Yes  kidney stones: Yes  pancreatitis: No  PUD: No  ulcerative colitis: No  UTI: Yes

## 2018-02-26 NOTE — PATIENT INSTRUCTIONS
Drop down your metformin to 1 pill daily (500 mg) to see if this helps your lower abdominal pain/diarrhea. If it only mildly helps, stop the metformin altogether for 3-4 weeks to see if this resolves the issue    Also make sure to stay on the Januvia to control your diabetes      Nutrition: How to Make Healthier Food Choices     Nutrition: How to Make Healthier Food Choices     Why is healthy eating important?  When combined with exercise, a healthy diet can help you lose weight, lower your cholesterol level and improve the way your body functions on a daily basis.  The U.S. Department of Agricultures (USDA) Food Guide Pyramid divides food into 6 basic food groups, consisting of 1) grains, 2) fruits, 3) vegetables, 4) meats and beans, 5) dairy and 6) fats.  The USDA recommends an adult daily diet include the following:  3 ounces of whole grains, and 6 ounces of grains total   2 cups of fruit   2 1/2 cups of vegetables   3 cups fat-free or low-fat dairy   The following are some ways to make healthier food choices and to get the recommended amounts of whole grains, fruits, vegetables and dairy.    Grains  Whole-grain breads are low in fat; they're also high in fiber and complex carbohydrates, which help you feel atwood longer and prevent overeating. Choose breads whose first ingredient says whole in front of the grain, for example, whole wheat flour or whole white flour; enriched or other types of flour have the important fiber and nutrients removed. Choose whole grain breads for sandwiches and as additions to meals.  Avoid rich bakery foods such as donuts, sweet rolls and muffins. These foods can contain more than 50% fat calories. Snacks such as margarito food cake and gingersnap cookies can satisfy your sweet tooth without adding fat to your diet.  Hot and cold cereals are usually low in fat. But instant cereals with cream may contain high-fat oils or butterfat. Granola cereals may also contain high-fat oils and  extra sugars. Look for low-sugar options for both instant and granola cereals.  Avoid fried snacks such as potato chips and tortilla chips. Try the low-fat or baked versions instead.  Instead of this: Try this:   Croissants, biscuits, white breads and rolls Low-fat whole grain breads and rolls (wheat, rye and pumpernickel)   Doughnuts, pastries and scones English muffins and small whole grain bagels   Fried tortillas Soft tortillas (corn or whole wheat)   Sugar cereals and regular granola Oatmeal, low-fat granola and whole-grain cereal   Snack crackers Crackers (animal, татьяна, rye, soda, saltine, oyster)   Potato or corn chips and buttered popcorn Pretzels (unsalted) and popcorn (unbuttered)   White pasta Whole-wheat pasta   White rice Brown rice   Fried rice, or pasta and rice mixes that contain high-fat sauces Rice or pasta (without egg yolk) with vegetable sauces   All-purpose white flour 100% whole-wheat flour     Fruits and Vegetables  Fruits and vegetables are naturally low in fat. They add flavor and variety to your diet. They also contain fiber, vitamins and minerals.  Margarine, butter, mayonnaise and sour cream add fat to vegetables and fruits. Try using nonfat or low-fat versions of these foods. You can also use nonfat or low-fat yogurt or herbs as seasonings instead.  Instead of this: Try this:   Fried vegetables or vegetables served with cream, cheese or butter sauces All vegetables raw, steamed, broiled, baked or tossed with a very small amount of olive oil and salt and pepper   Coconut Fruit (fresh)   French fries, hash browns and potato   chips Baked white or sweet potatoes     Meat, Poultry and Fish  Beef, Pork, Veal and Lamb  Baking, broiling and roasting are the healthiest ways to prepare meat. Lean cuts can be pan-broiled or stir-fried. Use either a nonstick pan or nonstick spray coating instead of butter or margarine.  Trim outside fat before cooking. Trim any inside, separable fat before  "eating. Select low-fat, lean cuts of meat. Lean beef and veal cuts have the word "loin" or "round" in their names. Lean pork cuts have the word "loin" or "leg" in their names.  Use herbs, spices, fresh vegetables and nonfat marinades to season meat. Avoid high-fat sauces and gravies.  Poultry  Baking, broiling and roasting are the healthiest ways to prepare poultry. Skinless poultry can be pan-broiled or stir-fried. Use either a nonstick pan or nonstick spray coating instead of butter or margarine.  Remove skin and visible fat before cooking. Chicken breasts are a good choice because they are low in fat and high in protein. Use domestic goose and duck only once in a while because both are high in fat.  Fish  Poaching, steaming, baking and broiling are the healthiest ways to prepare fish. Fresh fish should have a clear color, a moist look, a clean smell and firm, springy flesh. If good-quality fresh fish isn't available, buy frozen fish.  Most seafood is high in healthy polyunsaturated fat. Omega-3 fatty acids are also found in some fatty fish, such as salmon and cold-water trout. They may help lower the risk of heart disease in some people.  Cross-over Foods  Dry beans, peas and lentils offer protein and fiber without the cholesterol and fat of meats. Once in a while, try substituting beans for meat in a favorite recipe, such as lasagna or chili.  TVP, or textured vegetable protein, is widely available in many foods. Vegetarian "hot dogs," "hamburger" and "chicken nuggets" are low-fat, cholesterol-free alternatives to meat.  Instead of this: Try this:   Regular or breaded fish sticks or cakes, fish canned in oil, seafood prepared with butter or served in high-fat sauce Fish (fresh, frozen, canned in water), low-fat fish sticks or cakes and shellfish (such as shrimp)   Prime and marbled cuts Select-grade lean beef (round, sirloin and loin)   Pork spare ribs and chaney Lean pork (tenderloin and loin chop) and turkey " chaney   Regular ground beef Lean or extra-lean ground beef, ground chicken and turkey breast   Lunch meats such as pepperoni, salami, bologna and liverwurst Lean lunch meats such as turkey, chicken and ham   Regular hot dogs or sausage Fat-free hot dogs and turkey dogs     Dairy  Choose skim milk or low-fat buttermilk. Substitute evaporated skim milk for cream in recipes for soups, sauces and coffee.  Try low-fat cheeses. Skim ricotta can replace cream cheese on a bagel or in a vegetable dip. Use part-skim cheeses in recipes. Use 1% cottage cheese for salads and cooking. String cheese is a low-fat, high-calcium snack option.  Plain nonfat yogurt can replace sour cream in many recipes. (To maintain texture, stir 1 tablespoon of cornstarch into each cup of yogurt that you use in cooking.) Try mixing frozen nonfat or low-fat yogurt with fruit for dessert.  Skim sherbet is an alternative to ice cream. Soft-serve and regular ice creams are also lower in fat than premium styles.  Instead of this: Try this:   Whole or 2% milk Non-fat or 1% milk   Evaporated milk Evaporated non-fat milk   Regular buttermilk Buttermilk made from non-fat (or 1%) milk   Yogurt made with whole milk Nonfat or low-fat yogurt   Regular cheese (examples: American, blue, Brie, cheddar, Farshad and Parmesan) Low-fat cheese with less than 3 grams of fat per serving (example: natural cheese, processed cheese and nondairy cheese such as soy cheese)   Regular cottage cheese Low-fat, nonfat, and dry-curd cottage cheese with less than 2% fat   Regular cream cheese Low-fat cream cheese (no more than 3 grams of fat per ounce)   Regular ice cream Sorbet, sherbet and nonfat or low-fat ice cream (no more than 3 grams of fat per 1/2 cup serving)     Fats, Oils and Sweets  Eating too many high-fat foods not only adds excess calories (which can lead to obesity and weight gain), but can increase your risk factor for several diseases. Heart disease, diabetes, certain  "types of cancer and osteoarthritis have all been linked to diets too high in fat. If you consume too much saturated and trans fats, you are more likely to develop high cholesterol and coronary artery disease.  Sugar-sweetened drinks, such as fruit juice, fruit drinks, regular soft drinks, sports drinks, energy drinks, sweetened or flavored milk and sweetened iced tea can add lots of sugar and calories to your diet. But staying hydrated is important for good health. Substitute water, zero-calorie flavored water, non-fat or reduced-fat milk, unsweetened tea or diet soda for sweetened drinks. Talk with your family doctor or a dietitian if you have questions about your diet or healthy eating for your family.  Instead of this: Try this:   Cookies Fig bars, gingersnaps and molasses cookies   Shortening, butter or margarine Olive, soybean and canola oils   Regular mayonnaise Nonfat or light mayonnaise   Regular salad dressing Nonfat or light salad dressing   Using fat (including butter) to grease pan Nonstick cooking spray       What it Takes to Lose Weight     What it Takes to Lose Weight     What does it take to lose weight?  To lose weight, you have to eat fewer calories than your body uses. Calories are the amount of energy in the food you eat. Some foods have more calories than others. For example, foods that are high in fat and sugar are also high in calories. If you eat more calories than your body uses, the extra calories will be stored as body excess fat.  A pound of fat is about 3,500 calories. To lose 1 pound of fat in a week, you have to eat 3,500 fewer calories (that is 500 fewer calories a day), or you have to "burn off" an extra 3,500 calories. You can burn off calories by exercising or just by being more active. (Talk to your family doctor before you begin any type of exercise program. Your doctor can help you determine what kind of exercise program is right for you.)  The best way to lose weight and keep " "it off is to eat fewer calories and be active. If you cut 250 calories from your diet each day and exercise enough to burn off 250 calories, that adds up to 500 fewer calories in one day. If you do this for 7 days, you can lose 1 pound of fat in a week.  Many experts believe you should not try to lose more than 2 pounds per week. Losing more than 2 pounds in a week usually means that you are losing water weight and lean muscle mass instead of losing excess fat. If you do this, you will have less energy, and you will most likely gain the weight back.    How often should I eat?  Most people can eat 3 regular meals and 1 snack every day. The 3 meals should be about the same size and should be low in fat. Try to eat 1 to 2 cups of fruits and vegetables, 2 to 3 ounces of whole grains and 1 to 2 ounces of meat (or a meat alternative) at most meals.  Some people benefit more if they eat 5 to 6 smaller meals throughout the day, about 2 to 3 hours apart. For example, their first meal of the day might be a cup of low-fat or nonfat yogurt and a banana. Three hours later they might eat a simple deli sandwich with whole-grain bread and fat-free mayonnaise.  Eat breakfast and don't skip meals. While skipping meals may help you lose weight in the beginning, it fails in the long run. Skipping meals may make you feel too hungry later in the day, causing you to overeat at your next meal.  After about a month of eating a normal breakfast, lunch and a light dinner, your body will adjust.    What is so bad about high-fat foods?  Foods high in fat are usually high in calories, which could lead to unwanted weight gain. Consuming too much saturated and trans fats may increase LDL cholesterol ("bad" cholesterol) level, and increase your risk of heart disease. The USDA suggests that you eat no more than 20 grams of saturated fat, and that you limit the amount of trans fat to as close to 0 grams as possible. Click here for more information on " "reading nutrition labels.  It is important to remember that some fats can be beneficial to your overall health. "Good" fat, such as polyunsaturated and monounsaturated fats are found in fish, nuts, and low- or nonfat dairy products.    What are empty calories?  Some foods are referred to as "empty calories" because they add lots of calories to your diet without providing much nutritional benefit. An example is sugar-sweetened drinks, such as fruit juice, fruit drinks, regular soft drinks, sports drinks, energy drinks, sweetened or flavored milk and sweetened iced tea. These drinks can add lots of sugar and calories to your diet.  But staying hydrated is important for good health. To reduce the calories and sugar in your diet, substitute water, zero-calorie flavored water, non-fat or reduced-fat milk, unsweetened tea or diet soda for sweetened drinks. Talk with your family doctor or a dietitian if you have questions about your diet or healthy eating for your family.    Can I trust nutrition information I get from newspapers and magazines?  Nutrition tips and diet information from different sources often conflict with each other. You should always check with your doctor first. Also, keep in mind the following advice:  There is no "magic bullet" when it comes to nutrition. There isn't one single diet that works for every person. You need to find an eating plan that works for you.   Good nutrition doesn't come in a vitamin supplement. Only take a vitamin with your doctor's recommendation, as your body benefits the most when you eat healthy foods.   Eating all different kinds of foods is best for your body, so learn to try new foods.   Fad diets offer short-term changes, but good health comes from long-term effort and commitment.   Stories from people who have used a diet program or product, especially in commercials and [employer], are advertisements. These people are usually paid to endorse what the ad is selling. " Remember, regained weight or other problems that develop after someone has completed the diet program are never talked about in those ads.     Will diet drugs help?  Although diet drugs may help you lose weight at first, they usually don't help you keep the weight off and may have damaging side effects. Most diet pills have not been tested by the Food and Drug Administration, which means you can't be sure if the drugs are safe. Taking drugs also does not help you learn how to change your eating and exercise habits. Making lasting changes in these habits is the way to lose weight and keep it off.

## 2018-03-05 ENCOUNTER — PATIENT MESSAGE (OUTPATIENT)
Dept: FAMILY MEDICINE | Facility: CLINIC | Age: 73
End: 2018-03-05

## 2018-03-06 RX ORDER — GLIMEPIRIDE 1 MG/1
1 TABLET ORAL
Qty: 90 TABLET | Refills: 3 | Status: SHIPPED | OUTPATIENT
Start: 2018-03-06 | End: 2018-04-17

## 2018-03-08 ENCOUNTER — OFFICE VISIT (OUTPATIENT)
Dept: UROLOGY | Facility: CLINIC | Age: 73
End: 2018-03-08
Payer: COMMERCIAL

## 2018-03-08 ENCOUNTER — HOSPITAL ENCOUNTER (OUTPATIENT)
Dept: RADIOLOGY | Facility: HOSPITAL | Age: 73
Discharge: HOME | End: 2018-03-08
Attending: UROLOGY
Payer: COMMERCIAL

## 2018-03-08 ENCOUNTER — TELEPHONE (OUTPATIENT)
Dept: UROLOGY | Facility: CLINIC | Age: 73
End: 2018-03-08

## 2018-03-08 VITALS
DIASTOLIC BLOOD PRESSURE: 85 MMHG | WEIGHT: 211 LBS | HEIGHT: 70 IN | BODY MASS INDEX: 30.21 KG/M2 | HEART RATE: 78 BPM | SYSTOLIC BLOOD PRESSURE: 125 MMHG

## 2018-03-08 DIAGNOSIS — N20.1 LEFT URETERAL STONE: ICD-10-CM

## 2018-03-08 DIAGNOSIS — N20.0 KIDNEY STONE ON LEFT SIDE: Primary | ICD-10-CM

## 2018-03-08 PROCEDURE — 99214 OFFICE O/P EST MOD 30 MIN: CPT | Mod: 57,S$GLB,, | Performed by: UROLOGY

## 2018-03-08 PROCEDURE — 3074F SYST BP LT 130 MM HG: CPT | Mod: S$GLB,,, | Performed by: UROLOGY

## 2018-03-08 PROCEDURE — 74176 CT ABD & PELVIS W/O CONTRAST: CPT | Mod: TC

## 2018-03-08 PROCEDURE — 74018 RADEX ABDOMEN 1 VIEW: CPT | Mod: 26,,, | Performed by: RADIOLOGY

## 2018-03-08 PROCEDURE — 3079F DIAST BP 80-89 MM HG: CPT | Mod: S$GLB,,, | Performed by: UROLOGY

## 2018-03-08 PROCEDURE — 99999 PR PBB SHADOW E&M-EST. PATIENT-LVL IV: CPT | Mod: PBBFAC,,, | Performed by: UROLOGY

## 2018-03-08 PROCEDURE — 74176 CT ABD & PELVIS W/O CONTRAST: CPT | Mod: 26,,, | Performed by: RADIOLOGY

## 2018-03-08 PROCEDURE — 74018 RADEX ABDOMEN 1 VIEW: CPT | Mod: TC

## 2018-03-08 NOTE — PROGRESS NOTES
CC: left proximal ureteral stone follow up.    Jerardo Powers is a 72 y.o. man who is here for the evaluation of Results and Nephrolithiasis  recently seen by Lily Reynolds for abdominal pain.    CT urogram was done on 2/22/18. It reveal the following findings:  Left-sided nephrolithiasis with 4 mm obstructing stone in the left proximal ureter with associated moderate left-sided hydroureteronephrosis.    Scattered colonic diverticula without evidence of acute diverticulitis.    He is here to discuss these findings and management of left obstructive ureteral stone.  Reports no more gross hematuria.  No fever or chills.  C/o lower abdominal pain.  Denies flank pain, nausea or vomiting.    He reports that he had gross hematuria last week x 3 episodes on the same day, dysuria, frequency, urgency, and nocturia x 3-4. Denies incontinence. No bladder pain or flank pain.  Denies fever, chills, nausea, or vomiting.   Has been on Sue since 2009. He is wondering whether he needs to take Sue for a long term.  hx of BPH with obstruction and prostatitis.    Cyststephany w/ Dr. Murray on 3/2016:  Findings:  Urethra:  Normal urethra.   Sphincter: competent.  Prostate: Estimated Length Prostatic Urethra: 4 cm with bilateral obstruction  Bladder neck: patent with no stricture  Bladder:  Normal bladder.   Normal ureteral orifices bilaterally.   Moderate trabeculation.   Hyperemic area of the bladder mucosa with early glomerulation    Conclusion:  Hematuria.  Bladder condition, enlarged prostate, and kidney stones are all potential causes for hematuria.  He is not interested in OAB meds at this time.  Avoid bladder irritants.  May consider laser TURP to improve his DIAZ.      Past Medical History:   Diagnosis Date    Anxiety     BPH (benign prostatic hypertrophy)     Cataract     CKD (chronic kidney disease) stage 3, GFR 30-59 ml/min 5/5/2014    Colon polyps     Diabetes mellitus type II     Emphysema NEC     mild    Gout      Hyperlipidemia     Hypertension     Hypothyroidism     IBS (irritable bowel syndrome)     Kidney stones     JANEEN (obstructive sleep apnea)     Plantar fasciitis     Reflux      Past Surgical History:   Procedure Laterality Date    CATARACT EXTRACTION  1/28/2013    RIGHT EYE    CATARACT EXTRACTION      left eye    COLONOSCOPY  Sep 2011    Repeat recommended in 5 years    COLONOSCOPY N/A 10/10/2016    Procedure: COLONOSCOPY;  Surgeon: Noah Colunga MD;  Location: [facility] ([address] [place] [address]);  Service: Endoscopy;  Laterality: N/A;  PM Prep    KIDNEY STONE SURGERY       Social History   Substance Use Topics    Smoking status: Former Smoker     Years: 40.00     Quit date: 9/4/2007    Smokeless tobacco: Never Used    Alcohol use 0.0 oz/week      Comment: one drink per week     Family History   Problem Relation Age of Onset    Cancer Brother      non-hodgkins T cell lymphoma    Diabetes Mother     Macular degeneration Brother     Coronary artery disease Neg Hx     Colon cancer Neg Hx     Prostate cancer Neg Hx      Allergy:  Allergies   Allergen Reactions    Morphine Hives     Outpatient Encounter Prescriptions as of 3/8/2018   Medication Sig Dispense Refill    allopurinol (ZYLOPRIM) 100 MG tablet TAKE 1 TABLET (100 MG TOTAL) BY MOUTH ONCE DAILY. 90 tablet 3    amlodipine (NORVASC) 10 MG tablet TAKE 1 TABLET BY MOUTH EVERY DAY 30 tablet 11    aspirin 81 MG Chew Take 81 mg by mouth once daily.      blood sugar diagnostic Strp 1 strip by Misc.(Non-Drug; Combo Route) route 3 (three) times daily. 100 strip 11    citalopram (CELEXA) 20 MG tablet TAKE 1 TABLET (20 MG TOTAL) BY MOUTH ONCE DAILY. 90 tablet 3    dutasteride-tamsulosin 0.5-0.4 mg CM24 TAKE 1 CAPSULE BY MOUTH EVERY EVENING. 90 capsule 3    fish oil-omega-3 fatty acids 300-1,000 mg capsule Take 2 g by mouth once daily.      glimepiride (AMARYL) 1 MG tablet Take 1 tablet (1 mg total) by mouth before breakfast. 90 tablet 3    irbesartan  (AVAPRO) 300 MG tablet TAKE 1 TABLET BY MOUTH EVERY DAY 30 tablet 11    lancets Misc Test sugars once daily.  Dispense 100 lancets (insurance covered brand) 100 each 11    levothyroxine (SYNTHROID) 75 MCG tablet TAKE 1 TABLET (75 MCG TOTAL) BY MOUTH ONCE DAILY. 90 tablet 3    metFORMIN (GLUCOPHAGE-XR) 500 MG 24 hr tablet TAKE 2 TABLETS (1,000 MG TOTAL) BY MOUTH 2 (TWO) TIMES DAILY WITH MEALS. 120 tablet 11    ondansetron (ZOFRAN) 4 MG tablet Take 2 tablets (8 mg total) by mouth every 8 (eight) hours as needed for Nausea. 14 tablet 0    SITagliptan (JANUVIA) 100 MG Tab Take 1 tablet (100 mg total) by mouth once daily. 30 tablet 11     No facility-administered encounter medications on file as of 3/8/2018.      Review of Systems   General ROS: GENERAL:  No weight gain or loss  SKIN:  No rashes or lacerations  HEAD:  No headaches  EYES:  No exophthalmos, jaundice or blindness  EARS:  No dizziness, tinnitus or hearing loss  NOSE:  No changes in smell  MOUTH & THROAT:  No dyskinetic movements or obvious goiter  CHEST:  No shortness of breath, hyperventilation or cough  CARDIOVASCULAR:  No tachycardia or chest pain  ABDOMEN:  No nausea, vomiting, pain, constipation or diarrhea  URINARY:  No frequency, dysuria or sexual dysfunction  ENDOCRINE:  No polydipsia, polyuria  MUSCULOSKELETAL:  No pain or stiffness of the joints  NEUROLOGIC:  No weakness, sensory changes, seizures, confusion, memory loss, tremor or other abnormal movements  Physical Exam     Vitals:    03/08/18 1418   BP: 125/85   Pulse: 78     General Appearance:  Alert, cooperative, no distress, appears stated age   Head:  Normocephalic, without obvious abnormality, atraumatic   Eyes:  PERRL, conjunctiva/corneas clear, EOM's intact, fundi benign, both eyes   Ears:  Normal TM's and external ear canals, both ears   Nose: Nares normal, septum midline, mucosa normal, no drainage or sinus tenderness   Throat: Lips, mucosa, and tongue normal; teeth and gums normal    Neck: Supple, symmetrical, trachea midline, no adenopathy, thyroid: not enlarged, symmetric, no tenderness/mass/nodules, no carotid bruit or JVD   Back:   Symmetric, no curvature, ROM normal, no CVA tenderness   Lungs:   Clear to auscultation bilaterally, respirations unlabored   Chest Wall:  No tenderness or deformity   Heart:  Regular rate and rhythm, S1, S2 normal, no murmur, rub or gallop   Abdomen:   Soft, non-tender, bowel sounds active all four quadrants,  no masses, no organomegaly of liver and spleen  No hernia noted   Genitalia:  Scrotum: no rash or lesion  Normal symmetric epididymis without masses  Normal vas palpated  Normal size, symmetric testicles with no masses   Normal urethral meatus with no discharge  Normal circumcised penis with no lesion   Rectal:  Normal perineum and anus upon inspection.  Normal tone, no masses or tenderness;   Extremities: Extremities normal, atraumatic, no cyanosis or edema   Pulses: 2+ and symmetric   Skin: Skin color, texture, turgor normal, no rashes or lesions   Lymph nodes: Cervical, supraclavicular, and axillary nodes normal   Neurologic: Normal     Prostate 20 grams smooth with tenderness and no nodules      LABS:  Lab Results   Component Value Date    PSA 0.74 07/14/2017    PSA 0.59 07/17/2015    PSA 0.96 08/22/2012    PSA 1.11 04/16/2012    PSA 1.4 02/14/2011    PSA 2.4 05/12/2010    PSA 4.0 02/15/2010    PSA 4.7 (H) 08/20/2009    PSA 4.2 (H) 07/06/2009    PSA 3.6 03/25/2008    PSADIAG 1.3 02/19/2016    PSADIAG 0.62 12/01/2014    PSADIAG 0.97 12/04/2013     Results for orders placed or performed in visit on 02/19/16   Prostate Specific Antigen, Diagnostic   Result Value Ref Range    PSA DIAGNOSTIC 1.3 0.00-4.00 ng/mL   Results for orders placed or performed in visit on 12/01/14   Prostate Specific Antigen, Diagnostic   Result Value Ref Range    PSA DIAGNOSTIC 0.62 0.00-4.00 ng/mL   Results for orders placed or performed in visit on 12/04/13   Prostate Specific  Antigen, Diagnostic   Result Value Ref Range    PSA DIAGNOSTIC 0.97 0.00-4.00 ng/mL     Lab Results   Component Value Date    CREATININE 1.5 (H) 02/23/2018    CREATININE 1.5 (H) 02/21/2018    CREATININE 1.2 07/14/2017     Urine Culture, Routine   Date Value Ref Range Status   02/23/2018 No significant growth  Final     CT RSS 3/8/18  Left-sided nonobstructing nephrolithiasis.  Previously identified stone in the left ureteropelvic junction has apparently migrated into the renal collecting system.    Diverticulosis coli without evidence of diverticulitis.    KUB 3/8/18  There is nephrolithiasis at least on the left. No obstruction, ileus, or perforation seen.    Assessment and Plan:  Jerardo was seen today for results and nephrolithiasis.    Diagnoses and all orders for this visit:    Kidney stone on left side  -     Basic metabolic panel; Future    I reviewed his CT and went over all the findings in detail.  Will repeat BMP to make sure that his creatinine returned to its baseline since there is no more obstruction from the stone.  There are two stones in the left lower pole, the larger one moved back from left UPJ.    The patient is scheduled for left ESWL. The risks and benefits of extracorporeal shock wave lithotripsy were discussed with the patient in detail.  Consent was obtained.  The risks include but are not limited to bleeding in or around the kidney, infection, pain, incomplete fragmentation of the stone requiring a repeat treatment or a different form of treatment, obstruction of the kidney by stone particles possibly requiring a ureteral stent or nephrostomy tube, injury to or loss of the kidney ,injury to the ureter or bladder, need for further procedures, hypertension (transient or permanent), recurrence of stones, and need for open surgery.  Alternative treatments were also discussed with the patient in detail to include ureteroscopy, percutaneous treatment of the stone, open surgery  and observation.  Patient understands these risks and has agreed to proceed with surgery.     I spent 25 minutes with the patient of which more than half was spent in direct consultation with the patient in regards to our treatment and plan.    Follow-up:  Follow-up for left ESWL.

## 2018-03-08 NOTE — PATIENT INSTRUCTIONS
The patient is scheduled for left ESWL. The risks and benefits of extracorporeal shock wave lithotripsy were discussed with the patient in detail.  Consent was obtained.  The risks include but are not limited to bleeding in or around the kidney, infection, pain, incomplete fragmentation of the stone requiring a repeat treatment or a different form of treatment, obstruction of the kidney by stone particles possibly requiring a ureteral stent or nephrostomy tube, injury to or loss of the kidney ,injury to the ureter or bladder, need for further procedures, hypertension (transient or permanent), recurrence of stones, and need for open surgery.  Alternative treatments were also discussed with the patient in detail to include ureteroscopy, percutaneous treatment of the stone, open surgery  and observation. Patient understands these risks and has agreed to proceed with surgery.

## 2018-03-08 NOTE — TELEPHONE ENCOUNTER
Diagnoses and all orders for this visit:    Kidney stone on left side  -     Case Request Operating Room: LITHOTRIPSY-EXTRACORPOREAL SHOCK WAVE

## 2018-03-09 ENCOUNTER — LAB VISIT (OUTPATIENT)
Dept: LAB | Facility: HOSPITAL | Age: 73
End: 2018-03-09
Attending: UROLOGY
Payer: COMMERCIAL

## 2018-03-09 DIAGNOSIS — N20.0 KIDNEY STONE ON LEFT SIDE: ICD-10-CM

## 2018-03-09 LAB
ANION GAP SERPL CALC-SCNC: 10 MMOL/L
BUN SERPL-MCNC: 13 MG/DL
CALCIUM SERPL-MCNC: 9.3 MG/DL
CHLORIDE SERPL-SCNC: 99 MMOL/L
CO2 SERPL-SCNC: 30 MMOL/L
CREAT SERPL-MCNC: 1.6 MG/DL
EST. GFR  (AFRICAN AMERICAN): 49 ML/MIN/1.73 M^2
EST. GFR  (NON AFRICAN AMERICAN): 42.4 ML/MIN/1.73 M^2
GLUCOSE SERPL-MCNC: 239 MG/DL
POTASSIUM SERPL-SCNC: 3.7 MMOL/L
SODIUM SERPL-SCNC: 139 MMOL/L

## 2018-03-09 PROCEDURE — 80048 BASIC METABOLIC PNL TOTAL CA: CPT

## 2018-03-09 PROCEDURE — 36415 COLL VENOUS BLD VENIPUNCTURE: CPT | Mod: PO

## 2018-03-09 RX ORDER — ALLOPURINOL 100 MG/1
100 TABLET ORAL DAILY
Qty: 90 TABLET | Refills: 3 | Status: SHIPPED | OUTPATIENT
Start: 2018-03-09 | End: 2018-11-26 | Stop reason: SDUPTHER

## 2018-03-10 ENCOUNTER — PATIENT MESSAGE (OUTPATIENT)
Dept: FAMILY MEDICINE | Facility: CLINIC | Age: 73
End: 2018-03-10

## 2018-03-10 ENCOUNTER — TELEPHONE (OUTPATIENT)
Dept: UROLOGY | Facility: CLINIC | Age: 73
End: 2018-03-10

## 2018-03-10 DIAGNOSIS — N18.30 CRI (CHRONIC RENAL INSUFFICIENCY), STAGE 3 (MODERATE): Primary | ICD-10-CM

## 2018-03-10 NOTE — TELEPHONE ENCOUNTER
CRI (chronic renal insufficiency), stage 3 (moderate)  -     Ambulatory referral to Nephrology    Shirley, Please refer him to nephrology for rising creatinine.    Jessie, Please add possible cysto and left ureteral stent placement in addition to left ESWL    I called and talked to pt about the findings and additional surgery.

## 2018-03-11 ENCOUNTER — PATIENT MESSAGE (OUTPATIENT)
Dept: FAMILY MEDICINE | Facility: CLINIC | Age: 73
End: 2018-03-11

## 2018-03-12 ENCOUNTER — ANESTHESIA EVENT (OUTPATIENT)
Dept: SURGERY | Facility: HOSPITAL | Age: 73
End: 2018-03-12
Payer: COMMERCIAL

## 2018-03-12 ENCOUNTER — PATIENT MESSAGE (OUTPATIENT)
Dept: UROLOGY | Facility: CLINIC | Age: 73
End: 2018-03-12

## 2018-03-12 NOTE — ANESTHESIA PREPROCEDURE EVALUATION
Pre Admission Screening  Xenia Quiroz RN      []Hide copied text  Anesthesia Assessment: Preoperative EQUATION     Planned Procedure: Procedure(s) (LRB):  LITHOTRIPSY-EXTRACORPOREAL SHOCK WAVE (Left)  CYSTOSCOPY (N/A)  PLACEMENT-STENT (Left)  Requested Anesthesia Type:Monitor Anesthesia Care  Surgeon: Adi Murray MD  Service: Urology  Known or anticipated Date of Surgery:3/21/2018     Surgeon notes: reviewed     Electronic QUestionnaire Assessment completed via nurse interview with patient.         NO AQ     Triage considerations:      The patient has no apparent active cardiac condition (No unstable coronary Syndrome such as severe unstable angina or recent [<1 month] myocardial infarction, decompensated CHF, severe valvular   disease or significant arrhythmia)     Previous anesthesia records:GETA, MAC and No problems-slow to wake x1 after urology procedure   6/5/17 EGD Airway/Jaw/Neck:  Airway Findings: Mouth Opening: Normal Tongue: Normal  General Airway Assessment: Adult  Mallampati: III  Improves to II with phonation.  TM Distance: Normal, at least 6 cm  Jaw/Neck Findings:  Neck ROM: Normal ROM      Dental:  Dental Findings: In tact      Last PCP note: within 1 month   Subspecialty notes: Cardiology: General, Neurology     Other important co-morbidities: HTN/HLP, DM2, JANEEN, CKD stage 3, hypothyroidism     Tests already available:  Available tests,  within 1 month , within [facility] . 2/21/18 CBC, CMP. 2/23/18 BMP. 2/9/18 A1c. 2016 EKG.                            Instructions given. (See in Nurse's note)     Optimization:  Anesthesia Preop Clinic Assessment  Indicated-not required for this procedure                Plan:    Testing:  none                           Patient  has previously scheduled Medical Appointment:3/13 dermatology     Navigation:                            Straight Line to surgery.                          No tests, anesthesia preop clinic visit, or consult required.                                                          Electronically signed by Xenia Quiroz RN at 3/12/2018  2:00 PM        Pre-admit on 3/21/2018            Detailed Report                                                                                                                      03/12/2018  Jerardo Powers is a 72 y.o., male.    Anesthesia Evaluation         Review of Systems  Anesthesia Hx:  History of prior surgery of interest to airway management or planning: Previous anesthesia: MAC 6/2017 EGD with MAC.  Procedure performed at an [facility] Facility. Denies Family Hx of Anesthesia complications.  Personal Hx of Anesthesia complications Slow To Awaken/Delayed Emergence and mild, somewhat sensitive to sedation / narcotics   Social:  Non-Smoker    Hematology/Oncology:  Hematology Normal   Oncology Normal     EENT/Dental:EENT/Dental Normal   Cardiovascular:   Hypertension  Denies Angina. hyperlipidemia Stress test 2015 negative Functional Capacity 4 METS  Hypertension , Well Controlled on Rx    Pulmonary:   Denies Shortness of breath.  Denies Recent URI. Sleep Apnea Pt denies mild emphysema listed in problem list. Obstructive Sleep Apnea (JANEEN), CPAP used.   Renal/:  Renal Symptoms/Infections/Stones:  Left side kidney stone Kidney Function/Disease, Chronic Kidney Disease (CKD) , CKD Stage III (GFR 30-59)    Hepatic/GI:  Hepatic/GI Normal    Musculoskeletal:  Lumbar Spine Disorders, Lumbar Disc Disease Stenosis/sciatica  Neurological:  Neurology Normal    Endocrine:   Diabetes, type 2  Diabetes, Type 2 Diabetes , controlled by oral hypoglycemics. Typical AM glucose range: 130-162 , most recent HgA1c value was 6.4 on 2/9/18.  Thyroid Disease Hypothyroidism  Metabolic Disorders, Obesity / BMI > 30  Psych:   depression          Physical Exam  General:  Well nourished    Airway/Jaw/Neck:  Airway Findings: Mouth Opening: Normal Tongue: Normal  General Airway Assessment: Adult  Improves to II with phonation.  TM Distance: Normal,  at least 6 cm      Dental:  Dental Findings: In tact        Mental Status:  Mental Status Findings:  Cooperative, Alert and Oriented         Anesthesia Plan  Type of Anesthesia, risks & benefits discussed:  Anesthesia Type:  general  Patient's Preference:   Intra-op Monitoring Plan: standard ASA monitors  Intra-op Monitoring Plan Comments:   Post Op Pain Control Plan: per primary service following discharge from PACU and multimodal analgesia  Post Op Pain Control Plan Comments:   Induction:   IV  Beta Blocker:  Patient is not currently on a Beta-Blocker (No further documentation required).       Informed Consent: Patient understands risks and agrees with Anesthesia plan.  Questions answered. Anesthesia consent signed with patient.  ASA Score: 2     Day of Surgery Review of History & Physical:    H&P update referred to the surgeon.         Ready For Surgery From Anesthesia Perspective.

## 2018-03-12 NOTE — PRE ADMISSION SCREENING
Anesthesia Assessment: Preoperative EQUATION    Planned Procedure: Procedure(s) (LRB):  LITHOTRIPSY-EXTRACORPOREAL SHOCK WAVE (Left)  CYSTOSCOPY (N/A)  PLACEMENT-STENT (Left)  Requested Anesthesia Type:Monitor Anesthesia Care  Surgeon: Adi Murray MD  Service: Urology  Known or anticipated Date of Surgery:3/21/2018    Surgeon notes: reviewed    Electronic QUestionnaire Assessment completed via nurse interview with patient.        NO AQ    Triage considerations:     The patient has no apparent active cardiac condition (No unstable coronary Syndrome such as severe unstable angina or recent [<1 month] myocardial infarction, decompensated CHF, severe valvular   disease or significant arrhythmia)    Previous anesthesia records:GETA, MAC and No problems 6/5/17 EGD Airway/Jaw/Neck:  Airway Findings: Mouth Opening: Normal Tongue: Normal  General Airway Assessment: Adult  Mallampati: III  Improves to II with phonation.  TM Distance: Normal, at least 6 cm  Jaw/Neck Findings:  Neck ROM: Normal ROM      Dental:  Dental Findings: In tact     Last PCP note: within 1 month   Subspecialty notes: Cardiology: General, Neurology    Other important co-morbidities: HTN/HLP, DM2, JANEEN, CKD stage 3, hypothyroidism     Tests already available:  Available tests,  within 1 month , within [facility] . 2/21/18 CBC, CMP. 2/23/18 BMP. 2/9/18 A1c. 2016 EKG.            Instructions given. (See in Nurse's note)    Optimization:  Anesthesia Preop Clinic Assessment  Indicated-not required for this procedure        Plan:    Testing:  none     Patient  has previously scheduled Medical Appointment:3/13 dermatology    Navigation:                  Straight Line to surgery.               No tests, anesthesia preop clinic visit, or consult required.

## 2018-03-13 ENCOUNTER — OFFICE VISIT (OUTPATIENT)
Dept: DERMATOLOGY | Facility: CLINIC | Age: 73
End: 2018-03-13
Payer: COMMERCIAL

## 2018-03-13 VITALS — WEIGHT: 211 LBS | BODY MASS INDEX: 30.28 KG/M2

## 2018-03-13 DIAGNOSIS — L82.1 SEBORRHEIC KERATOSES: ICD-10-CM

## 2018-03-13 DIAGNOSIS — L81.0 POST-INFLAMMATORY HYPERPIGMENTATION: Primary | ICD-10-CM

## 2018-03-13 PROCEDURE — 99999 PR PBB SHADOW E&M-EST. PATIENT-LVL III: CPT | Mod: PBBFAC,,, | Performed by: DERMATOLOGY

## 2018-03-13 PROCEDURE — 99212 OFFICE O/P EST SF 10 MIN: CPT | Mod: S$GLB,,, | Performed by: DERMATOLOGY

## 2018-03-13 RX ORDER — HYDROQUINONE 40 MG/G
CREAM TOPICAL
Qty: 28.35 G | Refills: 3 | Status: SHIPPED | OUTPATIENT
Start: 2018-03-13 | End: 2018-07-26

## 2018-03-15 NOTE — PROGRESS NOTES
Subjective:       Patient ID:  Jerardo Powers is a 72 y.o. male who presents for   Chief Complaint   Patient presents with    Spot     back     Developed post inflammatory pigmentation from previous cryo.           Review of Systems   Constitutional: Negative for fever.   Skin: Negative for itching and rash.   Hematologic/Lymphatic: Does not bruise/bleed easily.        Objective:    Physical Exam   Constitutional: He appears well-developed and well-nourished. No distress.   Neurological: He is alert and oriented to person, place, and time. He is not disoriented.   Psychiatric: He has a normal mood and affect.   Skin:   Areas Examined (abnormalities noted in diagram):   Head / Face Inspection Performed  Neck Inspection Performed  Chest / Axilla Inspection Performed  Abdomen Inspection Performed  Back Inspection Performed  RUE Inspected  LUE Inspection Performed                   Diagram Legend     Erythematous scaling macule/papule c/w actinic keratosis       Vascular papule c/w angioma      Pigmented verrucoid papule/plaque c/w seborrheic keratosis      Yellow umbilicated papule c/w sebaceous hyperplasia      Irregularly shaped tan macule c/w lentigo     1-2 mm smooth white papules consistent with Milia      Movable subcutaneous cyst with punctum c/w epidermal inclusion cyst      Subcutaneous movable cyst c/w pilar cyst      Firm pink to brown papule c/w dermatofibroma      Pedunculated fleshy papule(s) c/w skin tag(s)      Evenly pigmented macule c/w junctional nevus     Mildly variegated pigmented, slightly irregular-bordered macule c/w mildly atypical nevus      Flesh colored to evenly pigmented papule c/w intradermal nevus       Pink pearly papule/plaque c/w basal cell carcinoma      Erythematous hyperkeratotic cursted plaque c/w SCC      Surgical scar with no sign of skin cancer recurrence      Open and closed comedones      Inflammatory papules and pustules      Verrucoid papule consistent consistent with  wart     Erythematous eczematous patches and plaques     Dystrophic onycholytic nail with subungual debris c/w onychomycosis     Umbilicated papule    Erythematous-base heme-crusted tan verrucoid plaque consistent with inflamed seborrheic keratosis     Erythematous Silvery Scaling Plaque c/w Psoriasis     See annotation      Assessment / Plan:        Post-inflammatory hyperpigmentation  -     hydroquinone 4 % Crea; Use hs on dark spots  Dispense: 28.35 g; Refill: 3, mix with Differin cream which is otc   Told will improve with time also      Seborrheic keratoses  reassurance  Reinforced benign lesions which tend to recur, genetic             Follow-up if symptoms worsen or fail to improve.

## 2018-03-20 ENCOUNTER — TELEPHONE (OUTPATIENT)
Dept: UROLOGY | Facility: CLINIC | Age: 73
End: 2018-03-20

## 2018-03-20 NOTE — TELEPHONE ENCOUNTER
Called pt to confirm 7:45am arrival time for procedure. Gave pt NPO instructions and gave pt opportunity to ask questions. Pt verbalized understanding.

## 2018-03-21 ENCOUNTER — SURGERY (OUTPATIENT)
Age: 73
End: 2018-03-21

## 2018-03-21 ENCOUNTER — ANESTHESIA (OUTPATIENT)
Dept: SURGERY | Facility: HOSPITAL | Age: 73
End: 2018-03-21
Payer: COMMERCIAL

## 2018-03-21 ENCOUNTER — HOSPITAL ENCOUNTER (OUTPATIENT)
Facility: HOSPITAL | Age: 73
Discharge: HOME | End: 2018-03-21
Attending: UROLOGY | Admitting: UROLOGY
Payer: COMMERCIAL

## 2018-03-21 ENCOUNTER — HOSPITAL ENCOUNTER (EMERGENCY)
Facility: HOSPITAL | Age: 73
Discharge: HOME | End: 2018-03-21
Attending: EMERGENCY MEDICINE
Payer: COMMERCIAL

## 2018-03-21 VITALS
HEART RATE: 71 BPM | HEIGHT: 70 IN | BODY MASS INDEX: 30.21 KG/M2 | OXYGEN SATURATION: 96 % | RESPIRATION RATE: 15 BRPM | TEMPERATURE: 98 F | SYSTOLIC BLOOD PRESSURE: 169 MMHG | DIASTOLIC BLOOD PRESSURE: 100 MMHG | WEIGHT: 211 LBS

## 2018-03-21 DIAGNOSIS — N20.0 NEPHROLITHIASIS: ICD-10-CM

## 2018-03-21 DIAGNOSIS — R14.1 GAS PAIN: Primary | ICD-10-CM

## 2018-03-21 DIAGNOSIS — N20.0 KIDNEY STONE ON LEFT SIDE: Primary | ICD-10-CM

## 2018-03-21 DIAGNOSIS — K59.00 CONSTIPATION, UNSPECIFIED CONSTIPATION TYPE: ICD-10-CM

## 2018-03-21 LAB
ANION GAP SERPL CALC-SCNC: 10 MMOL/L
BILIRUB UR QL STRIP: NEGATIVE
BUN SERPL-MCNC: 9 MG/DL
CALCIUM SERPL-MCNC: 9.6 MG/DL
CHLORIDE SERPL-SCNC: 104 MMOL/L
CLARITY UR: CLEAR
CO2 SERPL-SCNC: 24 MMOL/L
COLOR UR: YELLOW
CREAT SERPL-MCNC: 1.3 MG/DL
EST. GFR  (AFRICAN AMERICAN): >60 ML/MIN/1.73 M^2
EST. GFR  (NON AFRICAN AMERICAN): 55 ML/MIN/1.73 M^2
GLUCOSE SERPL-MCNC: 166 MG/DL
GLUCOSE UR QL STRIP: NEGATIVE
HGB UR QL STRIP: NEGATIVE
KETONES UR QL STRIP: NEGATIVE
LEUKOCYTE ESTERASE UR QL STRIP: NEGATIVE
NITRITE UR QL STRIP: NEGATIVE
PH UR STRIP: 6 [PH] (ref 5–8)
POCT GLUCOSE: 170 MG/DL (ref 70–110)
POCT GLUCOSE: 174 MG/DL (ref 70–110)
POTASSIUM SERPL-SCNC: 3.8 MMOL/L
PROT UR QL STRIP: NEGATIVE
SODIUM SERPL-SCNC: 138 MMOL/L
SP GR UR STRIP: <=1.005 (ref 1–1.03)
URN SPEC COLLECT METH UR: ABNORMAL
UROBILINOGEN UR STRIP-ACNC: NEGATIVE EU/DL

## 2018-03-21 PROCEDURE — 80048 BASIC METABOLIC PNL TOTAL CA: CPT

## 2018-03-21 PROCEDURE — D9220A PRA ANESTHESIA: Mod: ANES,,, | Performed by: ANESTHESIOLOGY

## 2018-03-21 PROCEDURE — 71000015 HC POSTOP RECOV 1ST HR: Performed by: UROLOGY

## 2018-03-21 PROCEDURE — 36000704 HC OR TIME LEV I 1ST 15 MIN: Performed by: UROLOGY

## 2018-03-21 PROCEDURE — 37000009 HC ANESTHESIA EA ADD 15 MINS: Performed by: UROLOGY

## 2018-03-21 PROCEDURE — D9220A PRA ANESTHESIA: Mod: CRNA,,, | Performed by: NURSE ANESTHETIST, CERTIFIED REGISTERED

## 2018-03-21 PROCEDURE — 82962 GLUCOSE BLOOD TEST: CPT | Performed by: UROLOGY

## 2018-03-21 PROCEDURE — 37000008 HC ANESTHESIA 1ST 15 MINUTES: Performed by: UROLOGY

## 2018-03-21 PROCEDURE — 63600175 PHARM REV CODE 636 W HCPCS: Performed by: STUDENT IN AN ORGANIZED HEALTH CARE EDUCATION/TRAINING PROGRAM

## 2018-03-21 PROCEDURE — 71000033 HC RECOVERY, INTIAL HOUR: Performed by: UROLOGY

## 2018-03-21 PROCEDURE — 51798 US URINE CAPACITY MEASURE: CPT

## 2018-03-21 PROCEDURE — 25000003 PHARM REV CODE 250: Performed by: UROLOGY

## 2018-03-21 PROCEDURE — 50590 FRAGMENTING OF KIDNEY STONE: CPT | Mod: LT,,, | Performed by: UROLOGY

## 2018-03-21 PROCEDURE — 99284 EMERGENCY DEPT VISIT MOD MDM: CPT | Mod: 25

## 2018-03-21 PROCEDURE — 25000003 PHARM REV CODE 250: Performed by: NURSE ANESTHETIST, CERTIFIED REGISTERED

## 2018-03-21 PROCEDURE — 25000003 PHARM REV CODE 250: Performed by: EMERGENCY MEDICINE

## 2018-03-21 PROCEDURE — 36000705 HC OR TIME LEV I EA ADD 15 MIN: Performed by: UROLOGY

## 2018-03-21 PROCEDURE — 81003 URINALYSIS AUTO W/O SCOPE: CPT

## 2018-03-21 PROCEDURE — 63600175 PHARM REV CODE 636 W HCPCS: Performed by: NURSE ANESTHETIST, CERTIFIED REGISTERED

## 2018-03-21 RX ORDER — CEFAZOLIN SODIUM 1 G/3ML
2 INJECTION, POWDER, FOR SOLUTION INTRAMUSCULAR; INTRAVENOUS
Status: COMPLETED | OUTPATIENT
Start: 2018-03-21 | End: 2018-03-21

## 2018-03-21 RX ORDER — FENTANYL CITRATE 50 UG/ML
25 INJECTION, SOLUTION INTRAMUSCULAR; INTRAVENOUS EVERY 5 MIN PRN
Status: DISCONTINUED | OUTPATIENT
Start: 2018-03-21 | End: 2018-03-21 | Stop reason: HOSPADM

## 2018-03-21 RX ORDER — SODIUM CHLORIDE 9 MG/ML
INJECTION, SOLUTION INTRAVENOUS CONTINUOUS
Status: DISCONTINUED | OUTPATIENT
Start: 2018-03-21 | End: 2018-03-21 | Stop reason: HOSPADM

## 2018-03-21 RX ORDER — CEPHALEXIN 500 MG/1
500 CAPSULE ORAL EVERY 6 HOURS
Qty: 12 CAPSULE | Refills: 0 | Status: SHIPPED | OUTPATIENT
Start: 2018-03-21 | End: 2018-03-24

## 2018-03-21 RX ORDER — PHENAZOPYRIDINE HYDROCHLORIDE 200 MG/1
200 TABLET, FILM COATED ORAL 3 TIMES DAILY
Qty: 30 TABLET | Refills: 0 | Status: SHIPPED | OUTPATIENT
Start: 2018-03-21 | End: 2018-03-31

## 2018-03-21 RX ORDER — PHENYLEPHRINE HYDROCHLORIDE 10 MG/ML
INJECTION INTRAVENOUS
Status: DISCONTINUED | OUTPATIENT
Start: 2018-03-21 | End: 2018-03-21

## 2018-03-21 RX ORDER — TRAMADOL HYDROCHLORIDE 50 MG/1
50 TABLET ORAL EVERY 4 HOURS PRN
Qty: 21 TABLET | Refills: 0 | Status: SHIPPED | OUTPATIENT
Start: 2018-03-21 | End: 2018-04-17

## 2018-03-21 RX ORDER — POLYETHYLENE GLYCOL 3350 17 G/17G
17 POWDER, FOR SOLUTION ORAL DAILY
Qty: 14 PACKET | Refills: 0 | Status: SHIPPED | OUTPATIENT
Start: 2018-03-21 | End: 2018-04-04

## 2018-03-21 RX ORDER — PROPOFOL 10 MG/ML
VIAL (ML) INTRAVENOUS
Status: DISCONTINUED | OUTPATIENT
Start: 2018-03-21 | End: 2018-03-21

## 2018-03-21 RX ORDER — GLYCOPYRROLATE 0.2 MG/ML
INJECTION INTRAMUSCULAR; INTRAVENOUS
Status: DISCONTINUED | OUTPATIENT
Start: 2018-03-21 | End: 2018-03-21

## 2018-03-21 RX ORDER — LIDOCAINE HYDROCHLORIDE 10 MG/ML
1 INJECTION, SOLUTION EPIDURAL; INFILTRATION; INTRACAUDAL; PERINEURAL ONCE
Status: COMPLETED | OUTPATIENT
Start: 2018-03-21 | End: 2018-03-21

## 2018-03-21 RX ORDER — KETAMINE HCL IN 0.9 % NACL 50 MG/5 ML
SYRINGE (ML) INTRAVENOUS
Status: DISCONTINUED | OUTPATIENT
Start: 2018-03-21 | End: 2018-03-21

## 2018-03-21 RX ORDER — PHENAZOPYRIDINE HYDROCHLORIDE 100 MG/1
200 TABLET, FILM COATED ORAL
Status: COMPLETED | OUTPATIENT
Start: 2018-03-21 | End: 2018-03-21

## 2018-03-21 RX ORDER — DOCUSATE SODIUM 100 MG/1
100 CAPSULE, LIQUID FILLED ORAL 2 TIMES DAILY
Qty: 60 CAPSULE | Refills: 0 | COMMUNITY
Start: 2018-03-21 | End: 2018-04-17

## 2018-03-21 RX ORDER — PROPOFOL 10 MG/ML
VIAL (ML) INTRAVENOUS CONTINUOUS PRN
Status: DISCONTINUED | OUTPATIENT
Start: 2018-03-21 | End: 2018-03-21

## 2018-03-21 RX ORDER — SODIUM CHLORIDE 0.9 % (FLUSH) 0.9 %
3 SYRINGE (ML) INJECTION
Status: DISCONTINUED | OUTPATIENT
Start: 2018-03-21 | End: 2018-03-21 | Stop reason: HOSPADM

## 2018-03-21 RX ORDER — IBUPROFEN 400 MG/1
800 TABLET ORAL
Status: DISCONTINUED | OUTPATIENT
Start: 2018-03-21 | End: 2018-03-21 | Stop reason: HOSPADM

## 2018-03-21 RX ORDER — ACETAMINOPHEN 500 MG
1000 TABLET ORAL
Status: COMPLETED | OUTPATIENT
Start: 2018-03-21 | End: 2018-03-21

## 2018-03-21 RX ADMIN — PROPOFOL 150 MCG/KG/MIN: 10 INJECTION, EMULSION INTRAVENOUS at 09:03

## 2018-03-21 RX ADMIN — PHENYLEPHRINE HYDROCHLORIDE 100 MCG: 10 INJECTION INTRAVENOUS at 10:03

## 2018-03-21 RX ADMIN — Medication 20 MG: at 09:03

## 2018-03-21 RX ADMIN — PROPOFOL 50 MG: 10 INJECTION, EMULSION INTRAVENOUS at 09:03

## 2018-03-21 RX ADMIN — Medication 10 MG: at 10:03

## 2018-03-21 RX ADMIN — PHENAZOPYRIDINE HYDROCHLORIDE 200 MG: 100 TABLET ORAL at 08:03

## 2018-03-21 RX ADMIN — GLYCOPYRROLATE 0.2 MG: 0.2 INJECTION, SOLUTION INTRAMUSCULAR; INTRAVENOUS at 09:03

## 2018-03-21 RX ADMIN — SODIUM CHLORIDE: 0.9 INJECTION, SOLUTION INTRAVENOUS at 08:03

## 2018-03-21 RX ADMIN — ACETAMINOPHEN 1000 MG: 500 TABLET ORAL at 08:03

## 2018-03-21 RX ADMIN — CEFAZOLIN 2 G: 330 INJECTION, POWDER, FOR SOLUTION INTRAMUSCULAR; INTRAVENOUS at 09:03

## 2018-03-21 RX ADMIN — LIDOCAINE HYDROCHLORIDE 0.1 MG: 10 INJECTION, SOLUTION EPIDURAL; INFILTRATION; INTRACAUDAL; PERINEURAL at 08:03

## 2018-03-21 NOTE — OP NOTE
[facility]  Operative Note    Date: 03/21/2018    Pre-Op Diagnosis: Left renal stones  Kidney stone on left side [N20.0]    Post-Op Diagnosis: same    Procedure(s) Performed:   1.  Left ESWL    Specimen(s): none    Staff Surgeon: Adi Murray MD    Assistant Surgeon: Chidi eDgroot MD    Anesthesia: Monitored Local Anesthesia with Sedation    Indications: Jerardo Powers is a 72 y.o. male with a  left renal stones presenting for definitive stone management.  The stone is radio-opaque on KUB.      Findings:   - 5mm and 3mm radiopaque stones in the left kidney    Estimated Blood Loss: none    Drains: none    Procedure in Detail:  After risk, benefits, and possible complications of ESWL were discussed, the patient elected to undergo the procedure and informed consent was obtained. All questions were answered in the pre-operative area and the patient was transferred to the lithotripsy suite and placed on the lithotriptor table. SCDs were applied and working.  Time out was preformed, elodia-procedural antibiotics were administered.    The patient's Left-sided stones were aligned on the X-Y- and Z axis and both relatively close in proximity.  MAC anesthesia was administered.  When the patient was adequately sedated, 3000 thousand shocks were administered at a rate of 1 shock1 per second, beginning at 16 KV of power and advanced to 26 KV. Intermittent fluoroscopy was used to monitor fragmentation of the stone.    At the end of the procedure the stone fragmented but still visible on fluorsocopy.      The patient tolerated the procedure well and was transferred to the PACU in stable condition.      Plan: The patient will follow up with Dr. Murray in 4 weeks with a KUB prior.  The patient was given prescriptions for keflex, tramadol, and he will continue his dutasteride-tamsulosin.  The patient will strain all urine and bring any fragments to the follow up appt for stone analysis.      Chidi Degroot MD

## 2018-03-21 NOTE — PLAN OF CARE
Problem: Patient Care Overview  Goal: Plan of Care Review  Discharge instructions given to pt and pt family per AYLA Marroquin RN. All verbalized understanding. Prescriptions given. Pt meets criteria to be discharged home.

## 2018-03-21 NOTE — ED PROVIDER NOTES
Encounter Date: 3/21/2018       History     Chief Complaint   Patient presents with    Urinary Frequency     pt had a lithotripsy this morning at [facility]. C/o urinary frequency with minimal urine output since the procedure. Called his doctor and was told to come to the ER for a catheter     72-year-old male presents emergency Department with complaints of increasing urgency, to urinate, suprapubic pressure, and decreased evacuation.  He has a history of BPH, as well as kidney stones.  He had a lithotripsy this morning performed by Dr. Irene at [facility].  He went home and was feeling fine, and then since then has been continuing to feel suprapubic pressure, incomplete evacuation, and increased urgency.  He denies blood in his urine or clots.  Denies chest pain shortness of breath nausea or vomiting.     He has previously had a Miguel, he reports he called his doctor, who told him to come to the emergency department for Miguel placement.          Review of patient's allergies indicates:   Allergen Reactions    Morphine Hives     Past Medical History:   Diagnosis Date    Anxiety     BPH (benign prostatic hypertrophy)     Cataract     CKD (chronic kidney disease) stage 3, GFR 30-59 ml/min 5/5/2014    Colon polyps     Diabetes mellitus type II     Emphysema NEC     mild    Gout     Hyperlipidemia     Hypertension     Hypothyroidism     IBS (irritable bowel syndrome)     Kidney stones     JANEEN (obstructive sleep apnea)     Plantar fasciitis     Reflux      Past Surgical History:   Procedure Laterality Date    CATARACT EXTRACTION  1/28/2013    RIGHT EYE    CATARACT EXTRACTION      left eye    COLONOSCOPY  Sep 2011    Repeat recommended in 5 years    COLONOSCOPY N/A 10/10/2016    Procedure: COLONOSCOPY;  Surgeon: Noah Colunga MD;  Location: [address]);  Service: Endoscopy;  Laterality: N/A;  PM Prep    KIDNEY STONE SURGERY       Family History   Problem Relation Age of Onset     Cancer Brother      non-hodgkins T cell lymphoma    Diabetes Mother     Macular degeneration Brother     Coronary artery disease Neg Hx     Colon cancer Neg Hx     Prostate cancer Neg Hx      Social History   Substance Use Topics    Smoking status: Former Smoker     Years: 40.00     Quit date: 9/4/2007    Smokeless tobacco: Never Used    Alcohol use 0.0 oz/week      Comment: one drink per week     Review of Systems   Eyes: Negative.    Endocrine: Negative.    Genitourinary: Negative.    All other systems reviewed and are negative.      Physical Exam     Initial Vitals [03/21/18 1810]   BP Pulse Resp Temp SpO2   (!) 138/93 76 20 97.6 °F (36.4 °C) 95 %      MAP       108         Physical Exam    Nursing note and vitals reviewed.  Constitutional: He appears well-developed and well-nourished.   HENT:   Head: Normocephalic.   Eyes: Pupils are equal, round, and reactive to light.   Cardiovascular: Normal rate.   Pulmonary/Chest: No respiratory distress.   Abdominal: He exhibits distension. There is no rebound and no guarding.   Neurological: He is alert and oriented to person, place, and time.   Skin: Skin is warm.   Psychiatric: He has a normal mood and affect. Thought content normal.         ED Course   Procedures  Labs Reviewed   BASIC METABOLIC PANEL - Abnormal; Notable for the following:        Result Value    Glucose 166 (*)     eGFR if non  55 (*)     All other components within normal limits   URINALYSIS - Abnormal; Notable for the following:     Specific Gravity, UA <=1.005 (*)     All other components within normal limits             Medical Decision Making:   Initial Assessment:   72-year-old male presents emergency department after having had lithotripsy this morning, and feeling like he has a distended abdomen, with urinary urgency and frequency.  He did just take Flomax prior to coming.  Differential Diagnosis:   Infection, urinary retention, pain from resolving stone  ED  Management:  On repeat evaluation: Patient has less than 200 cc of urine in the bladder, I explained the risks associated with having Miguel is placed, and patient agreed to not have a Miguel placed.  His KUB, was notable for constipation, patient agrees he does feel constipated.  He is feeling better, will start on stool regimen, as well as get peridium.                   ED Course as of Mar 22 0150   Wed Mar 21, 2018   2023 XRAY nonobstructive, bladder with less than 200 of urine, patient agrees he is distended and bloated. Recommend miralax and docusate, rx for pyridium  [MG]      ED Course User Index  [MG] Shonna Wong MD     Clinical Impression:   The primary encounter diagnosis was Gas pain. Diagnoses of Nephrolithiasis and Constipation, unspecified constipation type were also pertinent to this visit.                           Shonna Wong MD  03/22/18 0150

## 2018-03-21 NOTE — H&P (VIEW-ONLY)
CC: left proximal ureteral stone follow up.    Jerardo Powers is a 72 y.o. man who is here for the evaluation of Results and Nephrolithiasis  recently seen by Lily Reynolds for abdominal pain.    CT urogram was done on 2/22/18. It reveal the following findings:  Left-sided nephrolithiasis with 4 mm obstructing stone in the left proximal ureter with associated moderate left-sided hydroureteronephrosis.    Scattered colonic diverticula without evidence of acute diverticulitis.    He is here to discuss these findings and management of left obstructive ureteral stone.  Reports no more gross hematuria.  No fever or chills.  C/o lower abdominal pain.  Denies flank pain, nausea or vomiting.    He reports that he had gross hematuria last week x 3 episodes on the same day, dysuria, frequency, urgency, and nocturia x 3-4. Denies incontinence. No bladder pain or flank pain.  Denies fever, chills, nausea, or vomiting.   Has been on Sue since 2009. He is wondering whether he needs to take Sue for a long term.  hx of BPH with obstruction and prostatitis.    Cyststephany w/ Dr. Murray on 3/2016:  Findings:  Urethra:  Normal urethra.   Sphincter: competent.  Prostate: Estimated Length Prostatic Urethra: 4 cm with bilateral obstruction  Bladder neck: patent with no stricture  Bladder:  Normal bladder.   Normal ureteral orifices bilaterally.   Moderate trabeculation.   Hyperemic area of the bladder mucosa with early glomerulation    Conclusion:  Hematuria.  Bladder condition, enlarged prostate, and kidney stones are all potential causes for hematuria.  He is not interested in OAB meds at this time.  Avoid bladder irritants.  May consider laser TURP to improve his DIAZ.      Past Medical History:   Diagnosis Date    Anxiety     BPH (benign prostatic hypertrophy)     Cataract     CKD (chronic kidney disease) stage 3, GFR 30-59 ml/min 5/5/2014    Colon polyps     Diabetes mellitus type II     Emphysema NEC     mild    Gout      Hyperlipidemia     Hypertension     Hypothyroidism     IBS (irritable bowel syndrome)     Kidney stones     JANEEN (obstructive sleep apnea)     Plantar fasciitis     Reflux      Past Surgical History:   Procedure Laterality Date    CATARACT EXTRACTION  1/28/2013    RIGHT EYE    CATARACT EXTRACTION      left eye    COLONOSCOPY  Sep 2011    Repeat recommended in 5 years    COLONOSCOPY N/A 10/10/2016    Procedure: COLONOSCOPY;  Surgeon: Noah Colunga MD;  Location: [facility] ([address] [place] [address]);  Service: Endoscopy;  Laterality: N/A;  PM Prep    KIDNEY STONE SURGERY       Social History   Substance Use Topics    Smoking status: Former Smoker     Years: 40.00     Quit date: 9/4/2007    Smokeless tobacco: Never Used    Alcohol use 0.0 oz/week      Comment: one drink per week     Family History   Problem Relation Age of Onset    Cancer Brother      non-hodgkins T cell lymphoma    Diabetes Mother     Macular degeneration Brother     Coronary artery disease Neg Hx     Colon cancer Neg Hx     Prostate cancer Neg Hx      Allergy:  Allergies   Allergen Reactions    Morphine Hives     Outpatient Encounter Prescriptions as of 3/8/2018   Medication Sig Dispense Refill    allopurinol (ZYLOPRIM) 100 MG tablet TAKE 1 TABLET (100 MG TOTAL) BY MOUTH ONCE DAILY. 90 tablet 3    amlodipine (NORVASC) 10 MG tablet TAKE 1 TABLET BY MOUTH EVERY DAY 30 tablet 11    aspirin 81 MG Chew Take 81 mg by mouth once daily.      blood sugar diagnostic Strp 1 strip by Misc.(Non-Drug; Combo Route) route 3 (three) times daily. 100 strip 11    citalopram (CELEXA) 20 MG tablet TAKE 1 TABLET (20 MG TOTAL) BY MOUTH ONCE DAILY. 90 tablet 3    dutasteride-tamsulosin 0.5-0.4 mg CM24 TAKE 1 CAPSULE BY MOUTH EVERY EVENING. 90 capsule 3    fish oil-omega-3 fatty acids 300-1,000 mg capsule Take 2 g by mouth once daily.      glimepiride (AMARYL) 1 MG tablet Take 1 tablet (1 mg total) by mouth before breakfast. 90 tablet 3    irbesartan  (AVAPRO) 300 MG tablet TAKE 1 TABLET BY MOUTH EVERY DAY 30 tablet 11    lancets Misc Test sugars once daily.  Dispense 100 lancets (insurance covered brand) 100 each 11    levothyroxine (SYNTHROID) 75 MCG tablet TAKE 1 TABLET (75 MCG TOTAL) BY MOUTH ONCE DAILY. 90 tablet 3    metFORMIN (GLUCOPHAGE-XR) 500 MG 24 hr tablet TAKE 2 TABLETS (1,000 MG TOTAL) BY MOUTH 2 (TWO) TIMES DAILY WITH MEALS. 120 tablet 11    ondansetron (ZOFRAN) 4 MG tablet Take 2 tablets (8 mg total) by mouth every 8 (eight) hours as needed for Nausea. 14 tablet 0    SITagliptan (JANUVIA) 100 MG Tab Take 1 tablet (100 mg total) by mouth once daily. 30 tablet 11     No facility-administered encounter medications on file as of 3/8/2018.      Review of Systems   General ROS: GENERAL:  No weight gain or loss  SKIN:  No rashes or lacerations  HEAD:  No headaches  EYES:  No exophthalmos, jaundice or blindness  EARS:  No dizziness, tinnitus or hearing loss  NOSE:  No changes in smell  MOUTH & THROAT:  No dyskinetic movements or obvious goiter  CHEST:  No shortness of breath, hyperventilation or cough  CARDIOVASCULAR:  No tachycardia or chest pain  ABDOMEN:  No nausea, vomiting, pain, constipation or diarrhea  URINARY:  No frequency, dysuria or sexual dysfunction  ENDOCRINE:  No polydipsia, polyuria  MUSCULOSKELETAL:  No pain or stiffness of the joints  NEUROLOGIC:  No weakness, sensory changes, seizures, confusion, memory loss, tremor or other abnormal movements  Physical Exam     Vitals:    03/08/18 1418   BP: 125/85   Pulse: 78     General Appearance:  Alert, cooperative, no distress, appears stated age   Head:  Normocephalic, without obvious abnormality, atraumatic   Eyes:  PERRL, conjunctiva/corneas clear, EOM's intact, fundi benign, both eyes   Ears:  Normal TM's and external ear canals, both ears   Nose: Nares normal, septum midline, mucosa normal, no drainage or sinus tenderness   Throat: Lips, mucosa, and tongue normal; teeth and gums normal    Neck: Supple, symmetrical, trachea midline, no adenopathy, thyroid: not enlarged, symmetric, no tenderness/mass/nodules, no carotid bruit or JVD   Back:   Symmetric, no curvature, ROM normal, no CVA tenderness   Lungs:   Clear to auscultation bilaterally, respirations unlabored   Chest Wall:  No tenderness or deformity   Heart:  Regular rate and rhythm, S1, S2 normal, no murmur, rub or gallop   Abdomen:   Soft, non-tender, bowel sounds active all four quadrants,  no masses, no organomegaly of liver and spleen  No hernia noted   Genitalia:  Scrotum: no rash or lesion  Normal symmetric epididymis without masses  Normal vas palpated  Normal size, symmetric testicles with no masses   Normal urethral meatus with no discharge  Normal circumcised penis with no lesion   Rectal:  Normal perineum and anus upon inspection.  Normal tone, no masses or tenderness;   Extremities: Extremities normal, atraumatic, no cyanosis or edema   Pulses: 2+ and symmetric   Skin: Skin color, texture, turgor normal, no rashes or lesions   Lymph nodes: Cervical, supraclavicular, and axillary nodes normal   Neurologic: Normal     Prostate 20 grams smooth with tenderness and no nodules      LABS:  Lab Results   Component Value Date    PSA 0.74 07/14/2017    PSA 0.59 07/17/2015    PSA 0.96 08/22/2012    PSA 1.11 04/16/2012    PSA 1.4 02/14/2011    PSA 2.4 05/12/2010    PSA 4.0 02/15/2010    PSA 4.7 (H) 08/20/2009    PSA 4.2 (H) 07/06/2009    PSA 3.6 03/25/2008    PSADIAG 1.3 02/19/2016    PSADIAG 0.62 12/01/2014    PSADIAG 0.97 12/04/2013     Results for orders placed or performed in visit on 02/19/16   Prostate Specific Antigen, Diagnostic   Result Value Ref Range    PSA DIAGNOSTIC 1.3 0.00-4.00 ng/mL   Results for orders placed or performed in visit on 12/01/14   Prostate Specific Antigen, Diagnostic   Result Value Ref Range    PSA DIAGNOSTIC 0.62 0.00-4.00 ng/mL   Results for orders placed or performed in visit on 12/04/13   Prostate Specific  Antigen, Diagnostic   Result Value Ref Range    PSA DIAGNOSTIC 0.97 0.00-4.00 ng/mL     Lab Results   Component Value Date    CREATININE 1.5 (H) 02/23/2018    CREATININE 1.5 (H) 02/21/2018    CREATININE 1.2 07/14/2017     Urine Culture, Routine   Date Value Ref Range Status   02/23/2018 No significant growth  Final     CT RSS 3/8/18  Left-sided nonobstructing nephrolithiasis.  Previously identified stone in the left ureteropelvic junction has apparently migrated into the renal collecting system.    Diverticulosis coli without evidence of diverticulitis.    KUB 3/8/18  There is nephrolithiasis at least on the left. No obstruction, ileus, or perforation seen.    Assessment and Plan:  Jerardo was seen today for results and nephrolithiasis.    Diagnoses and all orders for this visit:    Kidney stone on left side  -     Basic metabolic panel; Future    I reviewed his CT and went over all the findings in detail.  Will repeat BMP to make sure that his creatinine returned to its baseline since there is no more obstruction from the stone.  There are two stones in the left lower pole, the larger one moved back from left UPJ.    The patient is scheduled for left ESWL. The risks and benefits of extracorporeal shock wave lithotripsy were discussed with the patient in detail.  Consent was obtained.  The risks include but are not limited to bleeding in or around the kidney, infection, pain, incomplete fragmentation of the stone requiring a repeat treatment or a different form of treatment, obstruction of the kidney by stone particles possibly requiring a ureteral stent or nephrostomy tube, injury to or loss of the kidney ,injury to the ureter or bladder, need for further procedures, hypertension (transient or permanent), recurrence of stones, and need for open surgery.  Alternative treatments were also discussed with the patient in detail to include ureteroscopy, percutaneous treatment of the stone, open surgery  and observation.  Patient understands these risks and has agreed to proceed with surgery.     I spent 25 minutes with the patient of which more than half was spent in direct consultation with the patient in regards to our treatment and plan.    Follow-up:  Follow-up for left ESWL.

## 2018-03-21 NOTE — TRANSFER OF CARE
"Anesthesia Transfer of Care Note    Patient: Jerardo Powers    Procedure(s) Performed: Procedure(s) (LRB):  LITHOTRIPSY-EXTRACORPOREAL SHOCK WAVE (Left)    Patient location: PACU    Anesthesia Type: general    Transport from OR: Transported from OR on 6-10 L/min O2 by face mask with adequate spontaneous ventilation    Post pain: adequate analgesia    Post assessment: no apparent anesthetic complications    Post vital signs: stable    Level of consciousness: sedated and responds to stimulation    Nausea/Vomiting: no nausea/vomiting    Complications: none    Transfer of care protocol was followed      Last vitals:   Visit Vitals  /67   Pulse 69   Temp 37.1 °C (98.7 °F) (Temporal)   Resp 18   Ht 5' 10" (1.778 m)   Wt 95.7 kg (211 lb)   SpO2 99%   BMI 30.28 kg/m²     "

## 2018-03-21 NOTE — ANESTHESIA POSTPROCEDURE EVALUATION
"Anesthesia Post Evaluation    Patient: Jerardo Powers    Procedure(s) Performed: Procedure(s) (LRB):  LITHOTRIPSY-EXTRACORPOREAL SHOCK WAVE (Left)    Final Anesthesia Type: general  Patient location during evaluation: PACU  Patient participation: Yes- Able to Participate  Level of consciousness: awake and alert  Post-procedure vital signs: reviewed and stable  Pain management: adequate  Airway patency: patent  PONV status at discharge: No PONV  Anesthetic complications: no      Cardiovascular status: stable  Respiratory status: unassisted and spontaneous ventilation  Hydration status: euvolemic  Follow-up not needed.        Visit Vitals  BP (!) 142/82 (BP Location: Left arm, Patient Position: Lying)   Pulse 64   Temp 36.5 °C (97.7 °F) (Temporal)   Resp 18   Ht 5' 10" (1.778 m)   Wt 95.7 kg (211 lb)   SpO2 98%   BMI 30.28 kg/m²       Pain/Khushbu Score: Pain Assessment Performed: Yes (3/21/2018 11:46 AM)  Presence of Pain: denies (3/21/2018 11:46 AM)  Khushbu Score: 10 (3/21/2018 11:46 AM)      "

## 2018-03-21 NOTE — DISCHARGE INSTRUCTIONS
Shock Wave Lithotripsy  Passing a kidney stone can be very painful. Shock wave lithotripsy is a treatment that helps by breaking the kidney stone into smaller pieces that are easier to pass. This treatment is also called extracorporeal shock wave lithotripsy (ESWL). Lithotripsy takes about an hour. Its done in a hospital, lithotripsy center, or mobile lithotripsy van. You will likely go home the same day. This treatment is not used for all types of kidney stones. Your healthcare provider will discuss whether this is the right treatment for the type of stone you have.      Energy waves strike the stone, which begins to crack. The stone crumbles into tiny pieces.   During the procedure  · You get medicine to prevent pain and help you relax or sleep during lithotripsy. Once this takes effect, the procedure will start.  · A flexible tube (stent) with holes in it may be placed into your ureter, the tube that connects the kidney and the bladder. This helps keep urine flowing from the kidney.  · Your healthcare provider then uses X-ray or ultrasound to find the exact location of the kidney stone.  · Sound waves are aimed at the stone and sent at high speed. If youre awake, you may feel a tapping as they pass through your body.  After the procedure  · Youll be closely watched in a recovery room for about 1 to 3 hours. Antibiotics and pain medicine may be prescribed before you leave.  · Youll have a follow-up visit in a few weeks. If you received a stent, it will be removed. Your healthcare provider will also check for pieces of stone. If large pieces remain, you may need a second lithotripsy or another procedure.  Possible risks and complications  · Infection  · Bleeding in the kidney  · Bruising of the kidney or skin  · Blockage (obstruction) of the ureter  · Failure to break up the stone (other procedures may be needed)   Passing the stone  It can take a day to several weeks for the pieces of stone to leave your  body. Drink plenty of liquids to help flush your system. During this time:  · Your urine may be cloudy or slightly bloody. You may even see small pieces of stone.  · You may have a slight fever and some pain. Take prescribed or over-the-counter pain medicine as instructed by your healthcare provider.  · You may be asked to strain your urine to collect some stone particles. These will be studied in the lab.  When to call your healthcare provider   Call your healthcare provider right away if you have any of the following:  · Fever of 100.4°F (38°C) or higher, or as directed by your healthcare provider  · Heavy bleeding  · Pain that doesnt go away with medicine  · Upset stomach and vomiting  · Problems urinating   Date Last Reviewed: 1/1/2017  © 3925-0923 [employer]. [address] [place] [address], [place] [address]. All rights reserved. This information is not intended as a substitute for professional medical care. Always follow your healthcare professional's instructions.

## 2018-03-21 NOTE — INTERVAL H&P NOTE
The patient has been examined and the H&P has been reviewed:    I concur with the findings and no changes have occurred since H&P was written.     Urine negative for blood, nitrites, and leukocytes    Anesthesia/Surgery risks, benefits and alternative options discussed and understood by patient/family.          Active Hospital Problems    Diagnosis  POA    Nephrolithiasis [N20.0]  Yes      Resolved Hospital Problems    Diagnosis Date Resolved POA   No resolved problems to display.

## 2018-03-21 NOTE — DISCHARGE SUMMARY
[facility]  Discharge Note  Short Stay    Admit Date: 3/21/2018    Discharge Date and Time: 03/21/2018 10:47 AM      Attending Physician: Adi Murray MD     Discharge Provider: Chidi Degroot    Diagnoses:  Active Hospital Problems    Diagnosis  POA    Nephrolithiasis [N20.0]  Yes    Sciatica [M54.30]  Yes     Chronic    Lower abdominal pain [R10.30]  Yes    Hematuria, microscopic [R31.29]  Yes    JANEEN (obstructive sleep apnea) [G47.33]  Yes    Fatigue [R53.83]  Yes    Anxiety and depression [F41.8]  Yes    Depression [F32.9]  Yes    Dyspepsia [R10.13]  Yes    Diarrhea [R19.7]  Yes    Hypokalemia [E87.6]  Yes    Postural dizziness [R42]  Yes    Diabetes mellitus, type 2 [E11.9]  Yes    Hypothyroidism [E03.9]  Yes    Hypertension [I10]  Yes    Benign prostatic hyperplasia [N40.0]  Yes    Kidney stone on left side [N20.0]  Yes      Resolved Hospital Problems    Diagnosis Date Resolved POA   No resolved problems to display.       Discharged Condition: stable    Hospital Course: Patient was admitted for ESWL and tolerated the procedure well with no complications. The patient was discharged home in good condition on the same day.       Final Diagnoses: Same as principal problem.    Disposition: Home or Self Care    Follow up/Patient Instructions:    Medications:  Reconciled Home Medications:   Current Discharge Medication List      START taking these medications    Details   cephALEXin (KEFLEX) 500 MG capsule Take 1 capsule (500 mg total) by mouth every 6 (six) hours.  Qty: 12 capsule, Refills: 0      traMADol (ULTRAM) 50 mg tablet Take 1 tablet (50 mg total) by mouth every 4 (four) hours as needed for Pain.  Qty: 21 tablet, Refills: 0         CONTINUE these medications which have NOT CHANGED    Details   allopurinol (ZYLOPRIM) 100 MG tablet Take 1 tablet (100 mg total) by mouth once daily.  Qty: 90 tablet, Refills: 3      amlodipine (NORVASC) 10 MG tablet TAKE 1 TABLET BY MOUTH EVERY  DAY  Qty: 30 tablet, Refills: 11      aspirin 81 MG Chew Take 81 mg by mouth once daily.      citalopram (CELEXA) 20 MG tablet TAKE 1 TABLET (20 MG TOTAL) BY MOUTH ONCE DAILY.  Qty: 90 tablet, Refills: 3    Associated Diagnoses: Anxiety      dutasteride-tamsulosin 0.5-0.4 mg CM24 TAKE 1 CAPSULE BY MOUTH EVERY EVENING.  Qty: 90 capsule, Refills: 3    Associated Diagnoses: Benign non-nodular prostatic hyperplasia with lower urinary tract symptoms      fish oil-omega-3 fatty acids 300-1,000 mg capsule Take 2 g by mouth once daily.      glimepiride (AMARYL) 1 MG tablet Take 1 tablet (1 mg total) by mouth before breakfast.  Qty: 90 tablet, Refills: 3      irbesartan (AVAPRO) 300 MG tablet TAKE 1 TABLET BY MOUTH EVERY DAY  Qty: 30 tablet, Refills: 11      levothyroxine (SYNTHROID) 75 MCG tablet TAKE 1 TABLET (75 MCG TOTAL) BY MOUTH ONCE DAILY.  Qty: 90 tablet, Refills: 3      linagliptin (TRADJENTA) 5 mg Tab tablet Take 1 tablet (5 mg total) by mouth once daily.  Qty: 90 tablet, Refills: 3      metFORMIN (GLUCOPHAGE-XR) 500 MG 24 hr tablet TAKE 2 TABLETS (1,000 MG TOTAL) BY MOUTH 2 (TWO) TIMES DAILY WITH MEALS.  Qty: 120 tablet, Refills: 11      blood sugar diagnostic Strp 1 strip by Misc.(Non-Drug; Combo Route) route 3 (three) times daily.  Qty: 100 strip, Refills: 11      hydroquinone 4 % Crea Use hs on dark spots  Qty: 28.35 g, Refills: 3    Associated Diagnoses: Post-inflammatory hyperpigmentation      lancets Misc Test sugars once daily.  Dispense 100 lancets (insurance covered brand)  Qty: 100 each, Refills: 11      ondansetron (ZOFRAN) 4 MG tablet Take 2 tablets (8 mg total) by mouth every 8 (eight) hours as needed for Nausea.  Qty: 14 tablet, Refills: 0             Discharge Procedure Orders  Diet Adult Regular     Activity as tolerated     Call MD for:  temperature >100.4     Call MD for:  persistent nausea and vomiting or diarrhea     Call MD for:  severe uncontrolled pain     Call MD for:  difficulty breathing  or increased cough     Call MD for:  severe persistent headache     Call MD for:  persistent dizziness, light-headedness, or visual disturbances     Call MD for:  increased confusion or weakness     No dressing needed       Follow-up Information     Adi Murray MD In 1 month.    Specialty:  Urology  Why:  postop ESWL, f/u with [facility]  Contact information:  [address]  [facility] [address]  [phone]                   Discharge Procedure Orders (must include Diet, Follow-up, Activity):    Discharge Procedure Orders (must include Diet, Follow-up, Activity)  Diet Adult Regular     Activity as tolerated     Call MD for:  temperature >100.4     Call MD for:  persistent nausea and vomiting or diarrhea     Call MD for:  severe uncontrolled pain     Call MD for:  difficulty breathing or increased cough     Call MD for:  severe persistent headache     Call MD for:  persistent dizziness, light-headedness, or visual disturbances     Call MD for:  increased confusion or weakness     No dressing needed

## 2018-03-22 ENCOUNTER — TELEPHONE (OUTPATIENT)
Dept: UROLOGY | Facility: CLINIC | Age: 73
End: 2018-03-22

## 2018-03-22 VITALS
DIASTOLIC BLOOD PRESSURE: 82 MMHG | WEIGHT: 211 LBS | HEART RATE: 64 BPM | BODY MASS INDEX: 30.21 KG/M2 | OXYGEN SATURATION: 98 % | RESPIRATION RATE: 18 BRPM | HEIGHT: 70 IN | SYSTOLIC BLOOD PRESSURE: 142 MMHG | TEMPERATURE: 98 F

## 2018-03-22 DIAGNOSIS — N20.0 KIDNEY STONES: Primary | ICD-10-CM

## 2018-03-22 NOTE — TELEPHONE ENCOUNTER
----- Message from Magno Patricio sent at 3/22/2018  2:00 PM CDT -----  Contact: Pt:[phone]  Pt called and states he would like to know if it is normal for him to have dark urine after his cysto procedure. Pt states he would like to speak with the nurse he is concerned.

## 2018-03-22 NOTE — TELEPHONE ENCOUNTER
Left detailed message that he just had ESWL, to drink water and monitor. As look as he is voiding it it ok.

## 2018-03-22 NOTE — DISCHARGE INSTRUCTIONS
Please take MiraLAX, for the next day, and he make a bowel movement.  If you're not having improvement with just MiraLAX alone, add on the docusate.  Please take your medications as prescribed by your urologist.  Her kidney function today was 1.3.  Your urinalysis was clean without signs of infection.

## 2018-03-22 NOTE — PROGRESS NOTES
During post- op phone call, patient stated he was concerned that his urine is a dark yellow and would like to speak with Dr. Murray's nurse- phone call transferred to urology clinic.

## 2018-03-22 NOTE — ED NOTES
"Pt ambulatory to bathroom. Once back in room pt states "I feel like I emptied my bladder completely. The urine was clear."   "

## 2018-04-16 ENCOUNTER — PATIENT MESSAGE (OUTPATIENT)
Dept: UROLOGY | Facility: CLINIC | Age: 73
End: 2018-04-16

## 2018-04-17 ENCOUNTER — OFFICE VISIT (OUTPATIENT)
Dept: GASTROENTEROLOGY | Facility: CLINIC | Age: 73
End: 2018-04-17
Payer: COMMERCIAL

## 2018-04-17 VITALS
BODY MASS INDEX: 30.77 KG/M2 | DIASTOLIC BLOOD PRESSURE: 83 MMHG | HEART RATE: 73 BPM | HEIGHT: 70 IN | WEIGHT: 214.94 LBS | SYSTOLIC BLOOD PRESSURE: 137 MMHG

## 2018-04-17 DIAGNOSIS — R14.0 BLOATING: Primary | ICD-10-CM

## 2018-04-17 PROCEDURE — 3075F SYST BP GE 130 - 139MM HG: CPT | Mod: CPTII,S$GLB,, | Performed by: INTERNAL MEDICINE

## 2018-04-17 PROCEDURE — 99999 PR PBB SHADOW E&M-EST. PATIENT-LVL III: CPT | Mod: PBBFAC,,, | Performed by: INTERNAL MEDICINE

## 2018-04-17 PROCEDURE — 3079F DIAST BP 80-89 MM HG: CPT | Mod: CPTII,S$GLB,, | Performed by: INTERNAL MEDICINE

## 2018-04-17 PROCEDURE — 99213 OFFICE O/P EST LOW 20 MIN: CPT | Mod: S$GLB,,, | Performed by: INTERNAL MEDICINE

## 2018-04-17 NOTE — PROGRESS NOTES
REASON FOR VISIT:  Abdominal bloating and loose bowel movements.    HISTORY OF PRESENT ILLNESS:  Mr. Powers has undergone an extensive workup in   the past, which includes an upper endoscopy, colonoscopy without any clear   etiology.  He has had multiple abdominal imagings and in the past has noticed   that when he gets off the metformin, his symptoms are significantly better.  He   denies any weight loss, nausea, vomiting, or change in appetite.  Today, we   discussed about following up with an endocrinologist to adjust his medications   to try to either avoid metformin altogether or to decrease the dose down   significantly to help him with his abdominal bloating and loose bowels.  No new   complaints.    PAST MEDICAL, SURGICAL, SOCIAL AND FAMILY HISTORY:  Reviewed.    MEDICATIONS AND ALLERGIES:  Reviewed.    REVIEW OF SYSTEMS:  CONSTITUTIONAL:  No fever, no chills, no weight loss.  Appetite is normal.  EYES:  No visual changes.  ENT:  No odynophagia or hoarseness of voice.  CARDIOVASCULAR:  No angina or palpitation.  RESPIRATORY:  No shortness of breath or wheezing.  GENITOURINARY:  No dysuria or frequency.  MUSCULOSKELETAL:  No myalgia, no arthralgia.  SKIN:  No pruritus or eczema.  NEUROLOGIC:  No headache, no seizures.  PSYCHIATRIC:  No anxiety or depression.  GASTROINTESTINAL:  See HPI.    PHYSICAL EXAMINATION:  VITAL SIGNS:  See EPIC.  GENERAL:  Awake, alert and oriented x3, in no acute distress.  No physical exam done today.  About 15 minutes spent with the patient, more than   50% in counseling.    IMPRESSION:  Abdominal bloating and loose bowel movements, most likely secondary   to metformin use.    RECOMMENDATION:  Follow up with endocrinologist to address medication change   i.e., either dose reduction of metformin or change of medication altogether.      ACT/HN  dd: 04/17/2018 12:14:45 (CDT)  td: 04/18/2018 11:22:46 (CDT)  Doc ID   #[id number]  Job ID #[id number]    CC:

## 2018-04-18 ENCOUNTER — PATIENT MESSAGE (OUTPATIENT)
Dept: GASTROENTEROLOGY | Facility: CLINIC | Age: 73
End: 2018-04-18

## 2018-04-26 ENCOUNTER — OFFICE VISIT (OUTPATIENT)
Dept: ENDOCRINOLOGY | Facility: CLINIC | Age: 73
End: 2018-04-26
Payer: COMMERCIAL

## 2018-04-26 VITALS
HEIGHT: 69 IN | HEART RATE: 63 BPM | BODY MASS INDEX: 31.9 KG/M2 | SYSTOLIC BLOOD PRESSURE: 118 MMHG | DIASTOLIC BLOOD PRESSURE: 50 MMHG | WEIGHT: 215.38 LBS

## 2018-04-26 DIAGNOSIS — E11.65 TYPE 2 DIABETES MELLITUS WITH HYPERGLYCEMIA, UNSPECIFIED WHETHER LONG TERM INSULIN USE: Primary | ICD-10-CM

## 2018-04-26 DIAGNOSIS — E03.9 HYPOTHYROIDISM, UNSPECIFIED TYPE: ICD-10-CM

## 2018-04-26 PROCEDURE — 3044F HG A1C LEVEL LT 7.0%: CPT | Mod: CPTII,S$GLB,, | Performed by: INTERNAL MEDICINE

## 2018-04-26 PROCEDURE — 3078F DIAST BP <80 MM HG: CPT | Mod: CPTII,S$GLB,, | Performed by: INTERNAL MEDICINE

## 2018-04-26 PROCEDURE — 99204 OFFICE O/P NEW MOD 45 MIN: CPT | Mod: S$GLB,,, | Performed by: INTERNAL MEDICINE

## 2018-04-26 PROCEDURE — 99999 PR PBB SHADOW E&M-EST. PATIENT-LVL III: CPT | Mod: PBBFAC,,, | Performed by: INTERNAL MEDICINE

## 2018-04-26 PROCEDURE — 3074F SYST BP LT 130 MM HG: CPT | Mod: CPTII,S$GLB,, | Performed by: INTERNAL MEDICINE

## 2018-04-26 RX ORDER — GLIPIZIDE 5 MG/1
2.5 TABLET ORAL
Qty: 60 TABLET | Refills: 11
Start: 2018-04-26 | End: 2018-06-04

## 2018-04-26 RX ORDER — GLIPIZIDE 5 MG/1
5 TABLET ORAL
Qty: 60 TABLET | Refills: 11 | Status: SHIPPED | OUTPATIENT
Start: 2018-04-26 | End: 2018-04-26 | Stop reason: SDUPTHER

## 2018-04-26 NOTE — LETTER
April 26, 2018      Noah Colunga MD  [address]  [facility] [address]           [facility]  [address]  [facility] [address]  Phone: [phone]  Fax: [phone]          Patient: Jerardo Powers   MR Number: [id number]   YOB: 1945   Date of Visit: 4/26/2018       Dear Dr. Noah Colunga:    Thank you for referring Jerardo Powers to me for evaluation. Attached you will find relevant portions of my assessment and plan of care.    If you have questions, please do not hesitate to call me. I look forward to following Jerardo Powers along with you.    Sincerely,    Nelly Cooper MD    Enclosure  CC:  No Recipients    If you would like to receive this communication electronically, please contact [email or web address] or [phone] to request more information on [employer] access.    For providers and/or their staff who would like to refer a patient to [facility], please contact us through our one-stop-shop provider referral line, [facility], at [phone].    If you feel you have received this communication in error or would no longer like to receive these types of communications, please e-mail [email or web address]

## 2018-04-26 NOTE — PATIENT INSTRUCTIONS
Stop metformin    Start Glipizide 2.5mg (1/2 tablet of 5mg tablets) twice daily before meals    Continue Tradjenta     Check sugars 2-3 times daily (fasting, before dinner or before bed)    Send me sugar logs in 1 week

## 2018-04-26 NOTE — PROGRESS NOTES
Mr. Powers is a 72 y.o. M with history of DM2, hypothyroidism, HTN, HL, JANEEN, CKD, here for DM reestablishing care.    Dx with T2DM 2008.    Recently seen by GI who felt his bloating was due to metformin, and sent him here for medication adjustment.     Previously A1c 6.4 in Feb 2018 on metformin and Januvia.     Currently on metformin 500 in morning, 1000mg at night and Tradjenta 5mg daily.     No log but generally fasting mid 100s, occasionally checks later in the day also in mid 100s.      Keeps up to date on ophtho visits annually.      Hypothyroidism: Is on levothyroxine 75 mcg as chronic dose, last checked in mid 2017.     FS here 230 - had some bread for lunch.     Assessment and Plan:  Mr. Powers with history of DM2, hypothyroidism, HTN, HL, JANEEN, CKD, here for DM reestablishing care.    DM2: fairly well controlled but desires to get off the metformin due to GI issues  Discussed extensively GLP1 agonists and SGLT2 inhibitor but pt has BPH so didn't want SGLT2i and also does not want injectables  Will stop metformin, trial of glipizide 2.5mg twice daily, pt asked to check FS 2-3 times daily  Continue Tradjenta 5mg daily    Obesity: discussed need to lose weight to prevent progression of DM. Pt does not want GLP1 agonists, which I feel he would be a good candidate.  Encouraged continued diet and exercise.     Hypothyroidism: recheck TSH w next set of labs, continue levothyroxine 75mcg daily    Pt to send to FS logs in 1-2 weeks    RTC in 3 months      Past Medical History:   Diagnosis Date    Anxiety     BPH (benign prostatic hypertrophy)     Cataract     CKD (chronic kidney disease) stage 3, GFR 30-59 ml/min 5/5/2014    Colon polyps     Diabetes mellitus type II     Emphysema NEC     mild    Gout     Hyperlipidemia     Hypertension     Hypothyroidism     IBS (irritable bowel syndrome)     Kidney stones     JANEEN (obstructive sleep apnea)     Plantar fasciitis     Reflux         reports that he  quit smoking about 10 years ago. He quit after 40.00 years of use. He has never used smokeless tobacco. He reports that he drinks alcohol. He reports that he does not use drugs.    Family History   Problem Relation Age of Onset    Cancer Brother      non-hodgkins T cell lymphoma    Diabetes Mother     Macular degeneration Brother     Coronary artery disease Neg Hx     Colon cancer Neg Hx     Prostate cancer Neg Hx     Esophageal cancer Neg Hx        Past Surgical History:   Procedure Laterality Date    CATARACT EXTRACTION  1/28/2013    RIGHT EYE    CATARACT EXTRACTION      left eye    COLONOSCOPY  Sep 2011    Repeat recommended in 5 years    COLONOSCOPY N/A 10/10/2016    Procedure: COLONOSCOPY;  Surgeon: Noah Colunga MD;  Location: [facility] ([address] [place] [address]);  Service: Endoscopy;  Laterality: N/A;  PM Prep    KIDNEY STONE SURGERY         Patient's Medications   New Prescriptions    No medications on file   Previous Medications    ALLOPURINOL (ZYLOPRIM) 100 MG TABLET    Take 1 tablet (100 mg total) by mouth once daily.    AMLODIPINE (NORVASC) 10 MG TABLET    TAKE 1 TABLET BY MOUTH EVERY DAY    ASPIRIN 81 MG CHEW    Take 81 mg by mouth once daily.    BLOOD SUGAR DIAGNOSTIC STRP    1 strip by Misc.(Non-Drug; Combo Route) route 3 (three) times daily.    CITALOPRAM (CELEXA) 20 MG TABLET    TAKE 1 TABLET (20 MG TOTAL) BY MOUTH ONCE DAILY.    FISH OIL-OMEGA-3 FATTY ACIDS 300-1,000 MG CAPSULE    Take 2 g by mouth once daily.    HYDROQUINONE 4 % CREA    Use hs on dark spots    IRBESARTAN (AVAPRO) 300 MG TABLET    TAKE 1 TABLET BY MOUTH EVERY DAY    LANCETS MISC    Test sugars once daily.  Dispense 100 lancets (insurance covered brand)    LEVOTHYROXINE (SYNTHROID) 75 MCG TABLET    TAKE 1 TABLET (75 MCG TOTAL) BY MOUTH ONCE DAILY.    LINAGLIPTIN (TRADJENTA) 5 MG TAB TABLET    Take 1 tablet (5 mg total) by mouth once daily.    METFORMIN (GLUCOPHAGE-XR) 500 MG 24 HR TABLET    TAKE 2 TABLETS (1,000 MG TOTAL) BY MOUTH 2  "(TWO) TIMES DAILY WITH MEALS.   Modified Medications    No medications on file   Discontinued Medications    No medications on file         Review of Systems:  General: no fevers  Eyes: no vision changes  ENT: no sore throat  Lung: no sob  CV: no palpitations  GI: no nausea   : no dysuria   Endo: no heat intolerance  Heme: no easy bruising   MSK: no joint swelling        BP (!) 118/50   Pulse 63   Ht 5' 9" (1.753 m)   Wt 97.7 kg (215 lb 6.2 oz)   BMI 31.81 kg/m²   Physical Exam:  General: obese body habitus, not in acute distress   Eyes: anicteric, no proptosis   ENT: no facial lesions, no oral lesions   Neck: no masses, no LAD   Lung; ctabl, no wheezing or stridor   GI: normal bowel sounds, nondistended   CV: RRR, no rubs or murmurs   Skin: exposed skin without bruising or bleeding   Ext: no peripheral edema or erythema  Neuro: AOx3, moving all extremities, normal gait     Labs:    Chemistry        Component Value Date/Time     03/21/2018 1931    K 3.8 03/21/2018 1931     03/21/2018 1931    CO2 24 03/21/2018 1931    BUN 9 03/21/2018 1931    CREATININE 1.3 03/21/2018 1931     (H) 03/21/2018 1931        Component Value Date/Time    CALCIUM 9.6 03/21/2018 1931    ALKPHOS 107 02/21/2018 2337    AST 19 02/21/2018 2337    ALT 27 02/21/2018 2337    BILITOT 0.6 02/21/2018 2337    ESTGFRAFRICA >60 03/21/2018 1931    EGFRNONAA 55 (A) 03/21/2018 1931          Lab Results   Component Value Date    WBC 12.31 02/21/2018    HGB 16.0 02/21/2018    HCT 46.7 02/21/2018    MCV 86 02/21/2018     02/21/2018        Lab Results   Component Value Date    HDL 59 07/14/2017    HDL 49 01/24/2017    HDL 53 04/15/2016     Lab Results   Component Value Date    LDLCALC 85.0 07/14/2017    LDLCALC 81.4 01/24/2017    LDLCALC 85.0 04/15/2016     Lab Results   Component Value Date    TRIG 165 (H) 07/14/2017    TRIG 198 (H) 01/24/2017    TRIG 220 (H) 04/15/2016     Lab Results   Component Value Date    CHOLHDL 33.3 " 07/14/2017    CHOLHDL 28.8 01/24/2017    CHOLHDL 29.1 04/15/2016       Hemoglobin A1C   Date Value Ref Range Status   02/09/2018 6.4 (H) 4.0 - 5.6 % Final     Comment:     According to ADA guidelines, hemoglobin A1c <7.0% represents  optimal control in non-pregnant diabetic patients. Different  metrics may apply to specific patient populations.   Standards of Medical Care in Diabetes-2016.  For the purpose of screening for the presence of diabetes:  <5.7%     Consistent with the absence of diabetes  5.7-6.4%  Consistent with increasing risk for diabetes   (prediabetes)  >or=6.5%  Consistent with diabetes  Currently, no consensus exists for use of hemoglobin A1c  for diagnosis of diabetes for children.  This Hemoglobin A1c assay has significant interference with fetal   hemoglobin   (HbF). The results are invalid for patients with abnormal amounts of   HbF,   including those with known Hereditary Persistence   of Fetal Hemoglobin. Heterozygous hemoglobin variants (HbAS, HbAC,   HbAD, HbAE, HbA2) do not significantly interfere with this assay;   however, presence of multiple variants in a sample may impact the %   interference.     07/14/2017 6.7 (H) 4.0 - 5.6 % Final     Comment:     According to ADA guidelines, hemoglobin A1c <7.0% represents  optimal control in non-pregnant diabetic patients. Different  metrics may apply to specific patient populations.   Standards of Medical Care in Diabetes-2016.  For the purpose of screening for the presence of diabetes:  <5.7%     Consistent with the absence of diabetes  5.7-6.4%  Consistent with increasing risk for diabetes   (prediabetes)  >or=6.5%  Consistent with diabetes  Currently, no consensus exists for use of hemoglobin A1c  for diagnosis of diabetes for children.  This Hemoglobin A1c assay has significant interference with fetal   hemoglobin   (HbF). The results are invalid for patients with abnormal amounts of   HbF,   including those with known Hereditary  Persistence   of Fetal Hemoglobin. Heterozygous hemoglobin variants (HbAS, HbAC,   HbAD, HbAE, HbA2) do not significantly interfere with this assay;   however, presence of multiple variants in a sample may impact the %   interference.     01/24/2017 7.4 (H) 4.5 - 6.2 % Final     Comment:     According to ADA guidelines, hemoglobin A1C <7.0% represents  optimal control in non-pregnant diabetic patients.  Different  metrics may apply to specific populations.   Standards of Medical Care in Diabetes - 2016.  For the purpose of screening for the presence of diabetes:  <5.7%     Consistent with the absence of diabetes  5.7-6.4%  Consistent with increasing risk for diabetes   (prediabetes)  >or=6.5%  Consistent with diabetes  Currently no consensus exists for use of hemoglobin A1C  for diagnosis of diabetes for children.         Lab Results   Component Value Date    TSH 1.706 07/14/2017

## 2018-04-28 ENCOUNTER — PATIENT MESSAGE (OUTPATIENT)
Dept: ENDOCRINOLOGY | Facility: CLINIC | Age: 73
End: 2018-04-28

## 2018-05-20 ENCOUNTER — PATIENT MESSAGE (OUTPATIENT)
Dept: FAMILY MEDICINE | Facility: CLINIC | Age: 73
End: 2018-05-20

## 2018-05-28 ENCOUNTER — LAB VISIT (OUTPATIENT)
Dept: LAB | Facility: HOSPITAL | Age: 73
End: 2018-05-28
Attending: FAMILY MEDICINE
Payer: COMMERCIAL

## 2018-05-28 DIAGNOSIS — N18.30 TYPE 2 DIABETES MELLITUS WITH STAGE 3 CHRONIC KIDNEY DISEASE, WITHOUT LONG-TERM CURRENT USE OF INSULIN: ICD-10-CM

## 2018-05-28 DIAGNOSIS — E11.22 TYPE 2 DIABETES MELLITUS WITH STAGE 3 CHRONIC KIDNEY DISEASE, WITHOUT LONG-TERM CURRENT USE OF INSULIN: ICD-10-CM

## 2018-05-28 DIAGNOSIS — R10.30 LOWER ABDOMINAL PAIN: ICD-10-CM

## 2018-05-28 DIAGNOSIS — M10.9 GOUT, UNSPECIFIED CAUSE, UNSPECIFIED CHRONICITY, UNSPECIFIED SITE: ICD-10-CM

## 2018-05-28 DIAGNOSIS — G47.33 OSA (OBSTRUCTIVE SLEEP APNEA): ICD-10-CM

## 2018-05-28 DIAGNOSIS — R19.7 DIARRHEA, UNSPECIFIED TYPE: ICD-10-CM

## 2018-05-28 DIAGNOSIS — I10 ESSENTIAL HYPERTENSION: ICD-10-CM

## 2018-05-28 DIAGNOSIS — N20.1 URETEROLITHIASIS: ICD-10-CM

## 2018-05-28 DIAGNOSIS — R53.83 FATIGUE, UNSPECIFIED TYPE: ICD-10-CM

## 2018-05-28 LAB
25(OH)D3+25(OH)D2 SERPL-MCNC: 18 NG/ML
ALBUMIN SERPL BCP-MCNC: 3.8 G/DL
ALP SERPL-CCNC: 94 U/L
ALT SERPL W/O P-5'-P-CCNC: 23 U/L
ANION GAP SERPL CALC-SCNC: 9 MMOL/L
AST SERPL-CCNC: 17 U/L
BILIRUB SERPL-MCNC: 0.7 MG/DL
BUN SERPL-MCNC: 13 MG/DL
CALCIUM SERPL-MCNC: 9.3 MG/DL
CHLORIDE SERPL-SCNC: 104 MMOL/L
CHOLEST SERPL-MCNC: 178 MG/DL
CHOLEST/HDLC SERPL: 4.1 {RATIO}
CO2 SERPL-SCNC: 25 MMOL/L
CREAT SERPL-MCNC: 1.4 MG/DL
EST. GFR  (AFRICAN AMERICAN): 58 ML/MIN/1.73 M^2
EST. GFR  (NON AFRICAN AMERICAN): 50 ML/MIN/1.73 M^2
ESTIMATED AVG GLUCOSE: 137 MG/DL
GLUCOSE SERPL-MCNC: 136 MG/DL
HBA1C MFR BLD HPLC: 6.4 %
HDLC SERPL-MCNC: 43 MG/DL
HDLC SERPL: 24.2 %
LDLC SERPL CALC-MCNC: 70.2 MG/DL
NONHDLC SERPL-MCNC: 135 MG/DL
POTASSIUM SERPL-SCNC: 3.8 MMOL/L
PROT SERPL-MCNC: 6.9 G/DL
SODIUM SERPL-SCNC: 138 MMOL/L
TRIGL SERPL-MCNC: 324 MG/DL
TSH SERPL DL<=0.005 MIU/L-ACNC: 3.11 UIU/ML

## 2018-05-28 PROCEDURE — 80053 COMPREHEN METABOLIC PANEL: CPT

## 2018-05-28 PROCEDURE — 80061 LIPID PANEL: CPT

## 2018-05-28 PROCEDURE — 84443 ASSAY THYROID STIM HORMONE: CPT

## 2018-05-28 PROCEDURE — 83036 HEMOGLOBIN GLYCOSYLATED A1C: CPT

## 2018-05-28 PROCEDURE — 82306 VITAMIN D 25 HYDROXY: CPT

## 2018-05-28 PROCEDURE — 36415 COLL VENOUS BLD VENIPUNCTURE: CPT

## 2018-05-31 DIAGNOSIS — F41.9 ANXIETY: ICD-10-CM

## 2018-05-31 RX ORDER — CITALOPRAM 20 MG/1
TABLET, FILM COATED ORAL
Qty: 90 TABLET | Refills: 3 | Status: SHIPPED | OUTPATIENT
Start: 2018-05-31 | End: 2018-12-05 | Stop reason: SDUPTHER

## 2018-06-04 ENCOUNTER — OFFICE VISIT (OUTPATIENT)
Dept: FAMILY MEDICINE | Facility: CLINIC | Age: 73
End: 2018-06-04
Payer: COMMERCIAL

## 2018-06-04 VITALS
WEIGHT: 215.19 LBS | BODY MASS INDEX: 31.87 KG/M2 | HEART RATE: 79 BPM | OXYGEN SATURATION: 95 % | TEMPERATURE: 98 F | DIASTOLIC BLOOD PRESSURE: 79 MMHG | SYSTOLIC BLOOD PRESSURE: 121 MMHG | HEIGHT: 69 IN

## 2018-06-04 DIAGNOSIS — L98.9 SKIN LESION OF NECK: ICD-10-CM

## 2018-06-04 DIAGNOSIS — E03.9 HYPOTHYROIDISM, UNSPECIFIED TYPE: ICD-10-CM

## 2018-06-04 DIAGNOSIS — E55.9 VITAMIN D DEFICIENCY: ICD-10-CM

## 2018-06-04 DIAGNOSIS — N20.1 URETEROLITHIASIS: ICD-10-CM

## 2018-06-04 DIAGNOSIS — I10 HYPERTENSION, UNSPECIFIED TYPE: ICD-10-CM

## 2018-06-04 DIAGNOSIS — N18.30 TYPE 2 DIABETES MELLITUS WITH STAGE 3 CHRONIC KIDNEY DISEASE, WITHOUT LONG-TERM CURRENT USE OF INSULIN: Primary | ICD-10-CM

## 2018-06-04 DIAGNOSIS — H91.90 HEARING LOSS, UNSPECIFIED HEARING LOSS TYPE, UNSPECIFIED LATERALITY: ICD-10-CM

## 2018-06-04 DIAGNOSIS — G57.11 MERALGIA PARESTHETICA, RIGHT: ICD-10-CM

## 2018-06-04 DIAGNOSIS — R19.7 DIARRHEA, UNSPECIFIED TYPE: ICD-10-CM

## 2018-06-04 DIAGNOSIS — R10.9 ABDOMINAL PAIN, UNSPECIFIED ABDOMINAL LOCATION: ICD-10-CM

## 2018-06-04 DIAGNOSIS — E11.22 TYPE 2 DIABETES MELLITUS WITH STAGE 3 CHRONIC KIDNEY DISEASE, WITHOUT LONG-TERM CURRENT USE OF INSULIN: Primary | ICD-10-CM

## 2018-06-04 DIAGNOSIS — R41.3 MEMORY PROBLEM: ICD-10-CM

## 2018-06-04 PROCEDURE — 3044F HG A1C LEVEL LT 7.0%: CPT | Mod: CPTII,S$GLB,, | Performed by: FAMILY MEDICINE

## 2018-06-04 PROCEDURE — 3078F DIAST BP <80 MM HG: CPT | Mod: CPTII,S$GLB,, | Performed by: FAMILY MEDICINE

## 2018-06-04 PROCEDURE — 99215 OFFICE O/P EST HI 40 MIN: CPT | Mod: S$GLB,,, | Performed by: FAMILY MEDICINE

## 2018-06-04 PROCEDURE — 99999 PR PBB SHADOW E&M-EST. PATIENT-LVL V: CPT | Mod: PBBFAC,,, | Performed by: FAMILY MEDICINE

## 2018-06-04 PROCEDURE — 3074F SYST BP LT 130 MM HG: CPT | Mod: CPTII,S$GLB,, | Performed by: FAMILY MEDICINE

## 2018-06-04 RX ORDER — ERGOCALCIFEROL 1.25 MG/1
50000 CAPSULE ORAL
Qty: 12 CAPSULE | Refills: 0 | Status: SHIPPED | OUTPATIENT
Start: 2018-06-04 | End: 2018-09-02 | Stop reason: SDUPTHER

## 2018-06-04 RX ORDER — METFORMIN HYDROCHLORIDE 500 MG/1
1500 TABLET ORAL
COMMUNITY
End: 2018-06-04

## 2018-06-04 RX ORDER — IRBESARTAN 150 MG/1
150 TABLET ORAL DAILY
Refills: 3 | COMMUNITY
Start: 2018-03-14 | End: 2019-03-14

## 2018-06-04 RX ORDER — LANOLIN ALCOHOL/MO/W.PET/CERES
1 CREAM (GRAM) TOPICAL DAILY
Refills: 3 | COMMUNITY
Start: 2018-04-18 | End: 2019-03-14

## 2018-06-04 RX ORDER — POTASSIUM CITRATE 15 MEQ/1
TABLET, EXTENDED RELEASE ORAL DAILY
COMMUNITY
Start: 2018-04-18 | End: 2018-11-29

## 2018-06-04 RX ORDER — PEN NEEDLE, DIABETIC 29 G X1/2"
NEEDLE, DISPOSABLE MISCELLANEOUS
Qty: 30 EACH | Refills: 11 | Status: SHIPPED | OUTPATIENT
Start: 2018-06-04 | End: 2018-10-26

## 2018-06-04 RX ORDER — GLUCOSAMINE/CHONDR SU A SOD 167-133 MG
CAPSULE ORAL
Refills: 3 | COMMUNITY
Start: 2018-04-18 | End: 2018-10-02 | Stop reason: CLARIF

## 2018-06-04 NOTE — PROGRESS NOTES
(Portions of this note were dictated using voice recognition software and may contain dictation related errors in spelling/grammar/syntax not found on text review)    CC:   Chief Complaint   Patient presents with    Follow-up     3 month follow up       HPI: 72 y.o. male Last seen in February 2018 for annual exam.  Has multiple concerns today.     1. Hypertension:  On amlodipine 10 mg daily.  His Avapro was reduced from 300 down to 150 by outside doctor.  Blood pressure is controlled.    2. Diabetes with chronic kidney disease stage III:  As of last visit with me was On metformin 1000 mg extended release  2 pills in the morning and one pill in the evening.  Also on Januvia 100 mg daily.  Last A1c as below is controlled.  He has had issues with abdominal bloating and diarrhea.  Had addressed consideration of elimination of metformin from his regimen although had continued on this medication as of our last appointment.  Most recently had visit with GI, note reviewed.  Also suggested trial of changing off of metformin.  Followed subsequently with endocrinology, note has been reviewed.  Took off metformin and started glipizide 2.5 b.i.d. before meals.  He continues on Januvia.   Endocrinologist had offered other treatment such as SGLT2 inhibitor therapy, G LP 1 agonist therapy (patient had declined injectable treatments), eventually settled on glipizide above.  However patient today states that he did not stick with the glipizide secondary to high blood sugars.  He is currently back on metformin 500 mg extended release, 1 pill in the morning and 2 pills in the evening along with his Januvia 100 mg daily.  Reports good blood sugar control but return of abdominal discomfort, diarrhea, bloating     3.  Concerns about hearing loss bilaterally.  Would like referral to ENT    4.  Small skin lesion left neck region,  would like guidance on what to do about this.  Not painful or bothersome at this time     5.  Kidney stones,  had lithotripsy recently.  Followed by Urology.  Had seen a different urologist at an outside facility for another opinion on his stones.  Had had some labsdone including 24 hr urine.  Was given several recommendations including taking magnesium, potassium citrate, vitamin B6.  Would like opinion from me on this although I do not have any of those documents available on file.    6.  Does get some tingling sensation right outer thigh, not past the knee.  Does have some lumbar degenerative disease.  Had an MRI done by another physician recently which did not show any acute pathology    7.  Does get knee pains and arthritis pains in his hands.  Was wondering when he can do about this.    8.  Concerned about memory loss.  He sometimes forgets people's names.  He also does report sometimes he will eat dinner and then later for got that he actually ate dinner.  He was wondering if there is a blood test for Alzheimer's disease. He did have significant vitamin D deficiency on recent testing, not on any vitamin-D supplementation.     Chronically in addition:    Hypothyroidism on Synthroid 75 µg daily     BPH, followed by urology.  On kavita     Anxiety on citalopram 20 mg daily    Gout on allopurinol 100 mg daily     JANEEN, followed by sleep medicine, on CPAP     Not on statin, have addressed at multiple visits but patient has declined despite counseling of benefit       Past Medical History:   Diagnosis Date    Anxiety     BPH (benign prostatic hypertrophy)     Cataract     CKD (chronic kidney disease) stage 3, GFR 30-59 ml/min 5/5/2014    Colon polyps     Diabetes mellitus type II     Emphysema NEC     mild    Gout     Hyperlipidemia     Hypertension     Hypothyroidism     IBS (irritable bowel syndrome)     Kidney stones     JANEEN (obstructive sleep apnea)     Plantar fasciitis     Reflux        Past Surgical History:   Procedure Laterality Date    CATARACT EXTRACTION  1/28/2013    RIGHT EYE    CATARACT  EXTRACTION      left eye    COLONOSCOPY  Sep 2011    Repeat recommended in 5 years    COLONOSCOPY N/A 10/10/2016    Procedure: COLONOSCOPY;  Surgeon: Noah Colunga MD;  Location: [facility] ([address] [place] [address]);  Service: Endoscopy;  Laterality: N/A;  PM Prep    KIDNEY STONE SURGERY         Family History   Problem Relation Age of Onset    Cancer Brother         non-hodgkins T cell lymphoma    Diabetes Mother     Macular degeneration Brother     Coronary artery disease Neg Hx     Colon cancer Neg Hx     Prostate cancer Neg Hx     Esophageal cancer Neg Hx        Social History     Social History    Marital status:      Spouse name: N/A    Number of children: N/A    Years of education: N/A     Occupational History    Not on file.     Social History Main Topics    Smoking status: Former Smoker     Years: 40.00     Quit date: 9/4/2007    Smokeless tobacco: Never Used    Alcohol use 0.0 oz/week      Comment: one drink per week    Drug use: No    Sexual activity: No     Other Topics Concern    Not on file     Social History Narrative    No narrative on file     Lab Results   Component Value Date    WBC 12.31 02/21/2018    HGB 16.0 02/21/2018    HCT 46.7 02/21/2018     02/21/2018    CHOL 178 05/28/2018    TRIG 324 (H) 05/28/2018    HDL 43 05/28/2018    ALT 23 05/28/2018    AST 17 05/28/2018     05/28/2018    K 3.8 05/28/2018     05/28/2018    CREATININE 1.4 05/28/2018    CALCIUM 9.3 05/28/2018    BUN 13 05/28/2018    CO2 25 05/28/2018    TSH 3.113 05/28/2018    PSA 0.74 07/14/2017    INR 1.0 01/18/2014    HGBA1C 6.4 (H) 05/28/2018    LDLCALC 70.2 05/28/2018     (H) 05/28/2018           ROS:  GENERAL: No fever, chills, fatigability or weight loss.  SKIN:  Above  HEAD: No headaches.  EYES: No visual changes  EARS: No ear pain or changes in hearing.  NOSE: No congestion or rhinorrhea.  MOUTH & THROAT: No hoarseness, change in voice, or sore throat.  NODES: Denies swollen  glands.  CHEST: Denies MENON, cyanosis, wheezing, cough and sputum production.  CARDIOVASCULAR: Denies chest pain, PND, orthopnea.  ABDOMEN:  Above  URINARY: No flank pain, dysuria or hematuria.  PERIPHERAL VASCULAR: No claudication or cyanosis.  MUSCULOSKELETAL:  Above  NEUROLOGIC:  Above    Vital signs reviewed  PE:   APPEARANCE: Well nourished, well developed, in no acute distress.    HEAD: Normocephalic, atraumatic.  EYES:   Conjunctivae noninjected.  NECK: Supple with no cervical lymphadenopathy.  No carotid bruits, no thyromegaly  CHEST: Good inspiratory effort. Lungs clear to auscultation with no wheezes or crackles.  CARDIOVASCULAR: Normal S1, S2. No rubs, murmurs, or gallops.  ABDOMEN: Bowel sounds normal. Not distended. Soft.  Does have some left lower quadrant tenderness to palpation.  No rebound or guarding.  EXTREMITIES: No edema  SKIN.  Small roughly 2-3 mm raised soft flesh colored lesion left side of neck with central pin point?  Area of hypopigmentation ?Intradermal nevus, vs small eic    IMPRESSION  1. Type 2 diabetes mellitus with stage 3 chronic kidney disease, without long-term current use of insulin    2. Vitamin D deficiency    3. Hypertension, unspecified type    4. Hypothyroidism, unspecified type    5. Memory problem    6. Hearing loss, unspecified hearing loss type, unspecified laterality    7. Abdominal pain, unspecified abdominal location    8. Diarrhea, unspecified type    9. Meralgia paresthetica, right    10. Skin lesion of neck    11. Ureterolithiasis            PLAN  Diabetes health maintenance:  -- advise regular and consistent glucose monitoring and medication compliance.  -- advise daily foot checks  -- advise yearly ophthalmologic exams  -- advise adequate dietary and exercise modification  -- advise regular dental visits  -- advise daily low-dose aspirin use if patient is not on other anticoagulants and there are no other contraindications.  -- Medication management:  Long  discussion about his options.  He is having intolerance to metformin because of GI distress. However, he has come back to metformin after multiple discussions in the past because of the benefit on his blood sugar.  I think it is reasonable to try a GI LP 1 agonist as this will replace Januvia, could likely replace metformin as well especially if it helps with weight loss.  After much discussion, he agrees to try Trulicity 0.75 mg weekly.  I will refer him for injection training to get more comfortable with this prospect.    Diarrhea and GI symptoms:  As above    Hearing loss:  ENT referral for formal testing    Meralgia paresthetica:  Reassurance, weight loss advise    Hypertension:  Controlled with Avapro 150+ amlodipine 10 mg daily    Kidney stones:  Advise providing reports from other physician along with lab results, in the meantime I would not really be able to provide much more information unless I have that information to look through.    Memory loss:  Discussed different factors that could contribute.  Not enough time to do full evaluation into this today given multiple other factors discussed.  He is significantly vitamin-D deficient however, and in the meantime I can treat him with high-dose vitamin recall several 96177 units weekly for 12 weeks.  Can reassess in 3 months.  Will also check B12 level and thyroid functions in the meantime    Knee pains and arthralgias:  Weight loss advised.  Getting no physical activity.  Counseling on importance of regular exercise to help with weight loss.  Hopefully if he is tolerant of the Trulicity as above, this may aid with weight loss.    Skin lesion of neck:  Differential including small EAC versus intradermal nevus.  Continue to monitor, can remove if worsening, more symptomatic    Orders Placed This Encounter   Procedures    CBC auto differential    Comprehensive metabolic panel    Hemoglobin A1c    Lipid panel    TSH    Vitamin B12    Vitamin D     Ambulatory consult to Diabetic Education    Ambulatory referral to ENT     Labs above in 3 months with visit to follow.    45 min visit greater than 50% counseling.      SCREENINGS  Colonoscopy: 2016.  Normal colonoscopy except for medium size internal hemorrhoids visualized.  Prostate : [facility], Dr. Murray.     Immunizations  pvx 2013  Pcv: utd through outside pharmacy  Tetanus 7/20/15  flu: declines     Stress echo August 2015, normal  EKGs  2016: nsr  48 hour Holter monitor April 2014. 3 episodes of shortness of breath reported, corresponding rhythm is sinus rhythm. One syncopal episode reported and corresponding rhythm was sinus rhythm at 79 bpm. One episode of lightheadedness reported, corresponding rhythm was sinus bradycardia 59 bpm. No high-grade AV block. Rare PVCs. No ventricular tachycardia. Very rare PACs

## 2018-06-05 ENCOUNTER — PATIENT MESSAGE (OUTPATIENT)
Dept: UROLOGY | Facility: CLINIC | Age: 73
End: 2018-06-05

## 2018-06-05 ENCOUNTER — OFFICE VISIT (OUTPATIENT)
Dept: OPTOMETRY | Facility: CLINIC | Age: 73
End: 2018-06-05
Payer: COMMERCIAL

## 2018-06-05 DIAGNOSIS — E11.9 TYPE 2 DIABETES MELLITUS WITHOUT OPHTHALMIC MANIFESTATIONS: Primary | ICD-10-CM

## 2018-06-05 DIAGNOSIS — H52.4 PRESBYOPIA: ICD-10-CM

## 2018-06-05 DIAGNOSIS — H35.413 LATTICE DEGENERATION OF BOTH RETINAS: ICD-10-CM

## 2018-06-05 PROCEDURE — 92014 COMPRE OPH EXAM EST PT 1/>: CPT | Mod: S$GLB,,, | Performed by: OPTOMETRIST

## 2018-06-05 PROCEDURE — 99999 PR PBB SHADOW E&M-EST. PATIENT-LVL II: CPT | Mod: PBBFAC,,, | Performed by: OPTOMETRIST

## 2018-06-05 NOTE — PROGRESS NOTES
HPI     Patient's last dilated exam was: 7/13/2017 W/ Dr. Rivera    Pt here for diabetic eye exam. Would like to see if he needs to update his   glasses. Blood sugar was 136 last week, does not regularly check sugar at   home. Patient denies flashes, pain and double vision. C/O burning, worse   at night after he has been watching tv a while. Also c/o blurred vision in   the morning for the first hour after he wakes up. Sleeps with a CPAP   machine at night.  Floaters OU longstanding and stable       Hemoglobin A1C       Date                     Value               Ref Range             Status                05/28/2018               6.4 (H)             4.0 - 5.6 %           Final                 02/09/2018               6.4 (H)             4.0 - 5.6 %           Final                 07/14/2017               6.7 (H)             4.0 - 5.6 %           Final                  Last edited by Deedee Morel, PCT on 6/5/2018  1:39 PM.   (History)            Assessment /Plan     For exam results, see Encounter Report.    Type 2 diabetes mellitus without ophthalmic manifestations    Lattice degeneration of both retinas    Presbyopia          1.  No retinopathy--monitor yearly.  BS control.    2.  Laser treatment OU--no new holes, tears, or breaks.  3.  Continue w/ current rx.  Artificial tears as needed OU.          RTC 1 year for dm exam.

## 2018-06-06 ENCOUNTER — CLINICAL SUPPORT (OUTPATIENT)
Dept: DIABETES | Facility: CLINIC | Age: 73
End: 2018-06-06
Payer: COMMERCIAL

## 2018-06-06 ENCOUNTER — TELEPHONE (OUTPATIENT)
Dept: UROLOGY | Facility: CLINIC | Age: 73
End: 2018-06-06

## 2018-06-06 DIAGNOSIS — E11.22 TYPE 2 DIABETES MELLITUS WITH STAGE 3 CHRONIC KIDNEY DISEASE, WITHOUT LONG-TERM CURRENT USE OF INSULIN: ICD-10-CM

## 2018-06-06 DIAGNOSIS — N40.1 BENIGN PROSTATIC HYPERPLASIA WITH LOWER URINARY TRACT SYMPTOMS, SYMPTOM DETAILS UNSPECIFIED: Primary | ICD-10-CM

## 2018-06-06 DIAGNOSIS — N18.30 TYPE 2 DIABETES MELLITUS WITH STAGE 3 CHRONIC KIDNEY DISEASE, WITHOUT LONG-TERM CURRENT USE OF INSULIN: ICD-10-CM

## 2018-06-06 PROCEDURE — G0108 DIAB MANAGE TRN  PER INDIV: HCPCS | Mod: S$GLB,,, | Performed by: INTERNAL MEDICINE

## 2018-06-06 PROCEDURE — 99999 PR PBB SHADOW E&M-EST. PATIENT-LVL I: CPT | Mod: PBBFAC,,,

## 2018-06-06 RX ORDER — DUTASTERIDE AND TAMSULOSIN HYDROCHLORIDE CAPSULES .5; .4 MG/1; MG/1
1 CAPSULE ORAL NIGHTLY
Qty: 30 CAPSULE | Refills: 12 | Status: SHIPPED | OUTPATIENT
Start: 2018-06-06 | End: 2018-11-29 | Stop reason: SDUPTHER

## 2018-06-06 NOTE — PROGRESS NOTES
Benign prostatic hyperplasia with lower urinary tract symptoms, symptom details unspecified  -     dutasteride-tamsulosin (VIVIENNE) 0.5-0.4 mg CM24; Take 1 capsule by mouth every evening.  Dispense: 30 capsule; Refill: 12    eRxed to the pharmacy.  Please call and advise the patient to take the medication as given.

## 2018-06-07 VITALS — WEIGHT: 215 LBS | BODY MASS INDEX: 31.75 KG/M2

## 2018-06-07 NOTE — PROGRESS NOTES
Diabetes Education  Author: Bobo Buchanan RN  Date: 6/7/2018    Diabetes Education Visit  Diabetes Education Record Assessment/Progress: Initial  Diabetes Type  Diabetes Type : Type II  Diabetes History  Diabetes Diagnosis: >10 years  Nutrition  Meal Planning: skipping meals, water, eats out often, snacks between meal  What type of beverages do you drink?: juice, water  Meal Plan 24 Hour Recall - Breakfast: nothing  Meal Plan 24 Hour Recall - Lunch: noodles  Meal Plan 24 Hour Recall - Dinner: nothing  Meal Plan 24 Hour Recall - Snack: peanuts  Monitoring   Self Monitoring : pt has a meter and checks occasionally. instructed pt to check his bs every day fasting in the am. instructed pt on the normal bs ranges. instructed pt to keep a record of his bs's and to bring the record to his md visits.  Blood Glucose Logs: No  Do you use a personal glucose monitor?: No  In the last month, how often have you had a low blood sugar reaction?: never  Can you tell when your blood sugar is too high?: no  Exercise   Exercise Type: none  Current Diabetes Treatment   Current Treatment: Diet, Injectable, Exercise  Social History  Preferred Learning Method: Face to Face, Demonstration, Reading Materials  Primary Support: Spouse  Educational Level: College Graduate  Occupation:   Smoking Status: Never a Smoker  Alcohol Use: Monthly  DDS-2 Score  ( > 3 = SIGNIFICANT DISTRESS): 2   Barriers to Change  Barriers to Change: None  Learning Challenges : None  Readiness to Learn   Readiness to Learn : Acceptance  Cultural Influences  Cultural Influences: None  Diabetes Education Assessment/Progress  Diabetes Disease Process (diabetes disease process and treatment options): Discussion, Instructed, Individual Session, Demonstrates Understanding/Competency(verbalizes/demonstrates), Written Materials Provided  Nutrition (Incorporating nutritional management into one's lifestyle): Discussion, Demonstration, Instructed, Individual Session,  Demonstrates Understanding/Competency (verbalizes/demonstrates), Written Materials Provided  Physical Activity (incorporating physical activity into one's lifestyle): Discussion, Instructed, Individual Session, Demonstrates Understanding/Competency (verbalizes/demonstrates), Written Materials Provided  Medications (states correct name, dose, onset, peak, duration, side effects & timing of meds): Discussion, Instructed, Individual Session, Demonstrates Understanding/Competency(verbalizes/demonstrates), Written Materials Provided, Demonstration, Return Demonstration  Monitoring (monitoring blood glucose/other parameters & using results): Discussion, Instructed, Individual Session, Demonstrates Understanding/Competency (verbalizes/demonstrates), Written Materials Provided  Acute Complications (preventing, detecting, and treating acute complications): Discussion, Instructed, Individual Session, Demonstrates Understanding/Competency (verbalizes/demonstrates), Written Materials Provided  Chronic Complications (preventing, detecting, and treating chronic complications): Discussion, Instructed, Individual Session, Demonstrates Understanding/Competency (verbalizes/demonstrates), Written Materials Provided  Clinical (diabetes, other pertinent medical history, and relevant comorbidities reviewed during visit): Discussion, Instructed, Individual Session, Demonstrates Understanding/Competency (verbalizes/demonstrates)  Cognitive (knowledge of self-management skills, functional health literacy): Discussion, Instructed, Individual Session, Demonstrates Understanding/Competency (verbalizes/demonstrates)  Psychosocial (emotional response to diabetes): Discussion, Instructed, Individual Session, Demonstrates Understanding/Competency (verbalizes/demonstrates)  Diabetes Distress and Support Systems: Discussion, Instructed, Individual Session, Demonstrates Understanding/Competency (verbalizes/demonstrates)  Behavioral (readiness for  change, lifestyle practices, self-care behaviors): Discussion, Instructed, Individual Session, Demonstrates Understanding/Competency (verbalizes/demonstrates)  Goals  Patient has selected/evaluated goals during today's session: Yes, selected  Healthy Eating: Set  Start Date: 06/06/18  Target Date: 09/07/18  Diabetes Care Plan/Intervention  Education Plan/Intervention: Foot Exam  Diabetes Meal Plan  Restrictions: Restricted Carbohydrate  Calories: 1800  Carbohydrate Per Meal: 45-60g  Carbohydrate Per Snack : 15-20g  Education Units of Time   Time Spent: 60 min  Instructed pt on the food groups, how to read labels and count carbs. Pt eats very little food and states that he does not like meat. Pt states that he is not hungry and does not require much food. Discussed with pt the importance of eating 3 balanced and portioned meals per day. Discussed with pt foods that would be easy to obtain to make his meals. Pt had good understanding of meal plan but compliance is questionable because pt seemed reluctant to change his present habits. Instructed pt on the use of the Trulicity pen.  Health Maintenance was reviewed today with patient. Discussed with patient importance of routine eye exams, foot exams/foot care, blood work (i.e.: A1c, microalbumin, and lipid), dental visits, yearly flu vaccine, and pneumonia vaccine as indicated by PCP. Patient verbalized understanding. Pt will call to set up group class and follow up appts.    Health Maintenance Topics with due status: Not Due       Topic Last Completion Date    Influenza Vaccine 10/08/2013    TETANUS VACCINE 07/20/2015    Colonoscopy 10/10/2016    Low Dose Statin 02/26/2018    Lipid Panel 05/28/2018    Hemoglobin A1c 05/28/2018    Eye Exam 06/05/2018     Health Maintenance Due   Topic Date Due    Zoster Vaccine  12/13/2005    Pneumococcal (65+) (1 of 2 - PCV13) 12/13/2010    Foot Exam  07/11/2018

## 2018-06-19 ENCOUNTER — PATIENT MESSAGE (OUTPATIENT)
Dept: FAMILY MEDICINE | Facility: CLINIC | Age: 73
End: 2018-06-19

## 2018-06-20 ENCOUNTER — PATIENT MESSAGE (OUTPATIENT)
Dept: FAMILY MEDICINE | Facility: CLINIC | Age: 73
End: 2018-06-20

## 2018-07-02 RX ORDER — LEVOTHYROXINE SODIUM 75 UG/1
75 TABLET ORAL DAILY
Qty: 90 TABLET | Refills: 3 | Status: SHIPPED | OUTPATIENT
Start: 2018-07-02 | End: 2019-08-22 | Stop reason: SDUPTHER

## 2018-07-11 ENCOUNTER — OFFICE VISIT (OUTPATIENT)
Dept: OTOLARYNGOLOGY | Facility: CLINIC | Age: 73
End: 2018-07-11
Payer: COMMERCIAL

## 2018-07-11 ENCOUNTER — CLINICAL SUPPORT (OUTPATIENT)
Dept: OTOLARYNGOLOGY | Facility: CLINIC | Age: 73
End: 2018-07-11
Payer: COMMERCIAL

## 2018-07-11 VITALS
DIASTOLIC BLOOD PRESSURE: 77 MMHG | HEIGHT: 69 IN | HEART RATE: 82 BPM | SYSTOLIC BLOOD PRESSURE: 126 MMHG | WEIGHT: 216.38 LBS | BODY MASS INDEX: 32.05 KG/M2

## 2018-07-11 DIAGNOSIS — H69.93 ETD (EUSTACHIAN TUBE DYSFUNCTION), BILATERAL: ICD-10-CM

## 2018-07-11 DIAGNOSIS — H90.3 ASYMMETRIC SNHL (SENSORINEURAL HEARING LOSS): Primary | ICD-10-CM

## 2018-07-11 PROCEDURE — 99999 PR PBB SHADOW E&M-EST. PATIENT-LVL III: CPT | Mod: PBBFAC,,, | Performed by: OTOLARYNGOLOGY

## 2018-07-11 PROCEDURE — 92557 COMPREHENSIVE HEARING TEST: CPT | Mod: S$GLB,,, | Performed by: AUDIOLOGIST-HEARING AID FITTER

## 2018-07-11 PROCEDURE — 99243 OFF/OP CNSLTJ NEW/EST LOW 30: CPT | Mod: S$GLB,,, | Performed by: OTOLARYNGOLOGY

## 2018-07-11 PROCEDURE — 92567 TYMPANOMETRY: CPT | Mod: S$GLB,,, | Performed by: AUDIOLOGIST-HEARING AID FITTER

## 2018-07-11 PROCEDURE — 99999 PR PBB SHADOW E&M-EST. PATIENT-LVL I: CPT | Mod: PBBFAC,,, | Performed by: AUDIOLOGIST-HEARING AID FITTER

## 2018-07-11 NOTE — PROGRESS NOTES
Sheri Oleary, [facility]  [facility] [address]  [address]  [place] [address]      Patient: Jerardo Powers   MRN: [id number]  : 1945  MENON: 2018       AUDIOLOGICAL EVALUATION    CASE HISTORY:    Jerardo Powers, 72 y.o., was seen on the above date for an audiological evaluation. Patient reported difficulty hearing. No further history significant to hearing loss was reported.    TEST RESULTS:    Otoscopy was unremarkable for both ears. Imittance testing revealed normal middle ear compliance (Type A) in his right ear and excessive negative middle ear pressure in his left ear (Type C). Speech reception thresholds were obtained at 20 dB HL and 45 dB HL, in the right and left ears, respectively, which was consistent with pure tone results. Word recognitions scores of 84% were obtained in both ears using a presentation level of 60 dB HL in the right ear and 80 dB HL in the left ear.    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    RECOMMENDATIONS:   It is recommended that he:  Continue to receive audiological monitoring annually.  Receive binaural hearing aids to improve speech understanding.  Follow up medically with a physician to assess retrocochlear functioning due to unexplained asymmetrical hearing loss, tinnitus, and/or vertigo.    If you should have any questions or concerns regarding the above information, please do not hesitate to contact me at [phone].      _______________________________  Wesley Oleary, CCC-A  Clinical Audiologist    enclosure: audiogram

## 2018-07-11 NOTE — PROGRESS NOTES
Chief Complaint   Patient presents with    Hearing Loss     bilateral   .     HPI:  Jerardo Powers is a very pleasant 72 y.o. male here to see me today for the first time for evaluation of hearing loss.  He reports hearing loss that has been gradually progressing over the last 2 years.  He has not noted any difference in hearing between the ears, with both ears being the worse hearing ear.  He has not noted any tinnitus in either ear.  He has not had any recent issues with ear pain or ear drainage.  He denies a family history of hearing loss, and has not had any previous otologic surgery.  He denies any history of significant loud noise exposure.He denies issues with dizziness.        Past Medical History:   Diagnosis Date    Anxiety     BPH (benign prostatic hypertrophy)     Cataract     CKD (chronic kidney disease) stage 3, GFR 30-59 ml/min 5/5/2014    Colon polyps     Diabetes mellitus type II     Emphysema NEC     mild    Gout     Hyperlipidemia     Hypertension     Hypothyroidism     IBS (irritable bowel syndrome)     Kidney stones     JANEEN (obstructive sleep apnea)     Plantar fasciitis     Reflux      Social History     Social History    Marital status:      Spouse name: N/A    Number of children: N/A    Years of education: N/A     Occupational History    Not on file.     Social History Main Topics    Smoking status: Former Smoker     Years: 40.00     Quit date: 9/4/2007    Smokeless tobacco: Never Used    Alcohol use 0.0 oz/week      Comment: one drink per week    Drug use: No    Sexual activity: No     Other Topics Concern    Not on file     Social History Narrative    No narrative on file     Past Surgical History:   Procedure Laterality Date    CATARACT EXTRACTION  1/28/2013    RIGHT EYE    CATARACT EXTRACTION      left eye    COLONOSCOPY  Sep 2011    Repeat recommended in 5 years    COLONOSCOPY N/A 10/10/2016    Procedure: COLONOSCOPY;  Surgeon: Noah Colunga,  MD;  Location: [facility] ([address] [place] [address]);  Service: Endoscopy;  Laterality: N/A;  PM Prep    KIDNEY STONE SURGERY       Family History   Problem Relation Age of Onset    Cancer Brother         non-hodgkins T cell lymphoma    Diabetes Mother     Macular degeneration Brother     Coronary artery disease Neg Hx     Colon cancer Neg Hx     Prostate cancer Neg Hx     Esophageal cancer Neg Hx          Review of Systems  General: negative for chills, fever or weight loss  Psychological: negative for mood changes or depression  Ophthalmic: negative for blurry vision, photophobia or eye pain  ENT: see HPI  Respiratory: no cough, shortness of breath, or wheezing  Cardiovascular: no chest pain or dyspnea on exertion  Gastrointestinal: no abdominal pain, change in bowel habits, or black/ bloody stools  Musculoskeletal: negative for gait disturbance or muscular weakness  Neurological: no syncope or seizures; no ataxia  Dermatological: negative for puritis,  rash and jaundice  Hematologic/lymphatic: no easy bruising, no new lumps or bumps      Physical Exam:    Vitals:    07/11/18 1411   BP: 126/77   Pulse: 82       Constitutional: Well appearing / communicating without difficutly.  NAD.  Eyes: EOM I Bilaterally  Head/Face: Normocephalic.  Negative paranasal sinus pressure/tenderness.  Salivary glands WNL.  House Brackmann I Bilaterally.    Right Ear: Auricle normal appearance. External Auditory Canal within normal limits no lesions or masses,TM w/o masses/lesions/perforations. TM mobility noted.   Left Ear: Auricle normal appearance. External Auditory Canal within normal limits no lesions or masses,TM w/o masses/lesions/perforations. TM mobility noted.  Rinne Air conduction >bone conduction bilaterally, Call midline.   Nose: No gross nasal septal deviation. Inferior Turbinates 3+ bilaterally. No septal perforation. No masses/lesions. External nasal skin appears normal without masses/lesions.  Oral Cavity: Gingiva/lips within  normal limits.  Dentition/gingiva healthy appearing. Mucus membranes moist. Floor of mouth soft, no masses palpated. Oral Tongue mobile. Hard Palate appears normal.    Oropharynx: Base of tongue appears normal. No masses/lesions noted. Tonsillar fossa/pharyngeal wall without lesions. Posterior oropharynx WNL.  Soft palate without masses. Midline uvula.   Neck/Lymphatic: No LAD I-VI bilaterally.  No thyromegaly.  No masses noted on exam.    Mirror laryngoscopy/nasopharyngoscopy: Active gag reflex.  Unable to perform.    Neuro/Psychiatric: AOx3.  Normal mood and affect.   Cardiovascular: Normal carotid pulses bilaterally, no increasing jugular venous distention noted at cervical region bilaterally.    Respiratory: Normal respiratory effort, no stridor, no retractions noted.      Audiogram reviewed personally by myself and in detail with the patient today.       Assessment:    ICD-10-CM ICD-9-CM    1. Asymmetric SNHL (sensorineural hearing loss) H90.5 389.16 MRI Brain W WO Contrast      Creatinine, serum   2. ETD (Eustachian tube dysfunction), bilateral H69.83 381.81      The primary encounter diagnosis was Asymmetric SNHL (sensorineural hearing loss). A diagnosis of ETD (Eustachian tube dysfunction), bilateral was also pertinent to this visit.      Plan:  Orders Placed This Encounter   Procedures    MRI Brain W WO Contrast    Creatinine, serum      We reviewed the recent audiogram, and the fact that there is a distinct asymmetry of at least 10 dB across all  frequencies.  We discussed that as humans we are not entirely symmetric, and that many people have one ear that hears better than the other.  However, considering the degree of asymmetry, I would recommend and MRI of the IAC with contrast to evaluate for any retrocochlear lesions.  If that is normal, the patient will continue to follow with annual audiograms and he would be able to pursue amplification with audiology if motivated.     We had a long discussion  regarding the anatomy and function of the eustachian tube.  We discussed that the eustachian tube acts as a pump to keep the appropriate amount of pressure behind the ear drum.  I discussed autoinsufflation exercises with him.     Thank you kindly for allowing me to participate in the patient's care.     This visit was 40 minutes in duration, with over 50% of the time spent in direct face-to-face counseling and coordination of care regarding the issues outlined above.      Radha Vogt MD

## 2018-07-11 NOTE — LETTER
July 11, 2018      Adams Moore MD  [address] [facility]  [address]  [facility] [address]           [place] [facility]hin[facility]  [address] [facility], [address]  [place] [address]  Phone: [phone]  Fax: [phone]          Patient: Jerardo Powers   MR Number: [id number]   YOB: 1945   Date of Visit: 7/11/2018       Dear Dr. Adams Moore:    Thank you for referring Jerardo Powers to me for evaluation. Attached you will find relevant portions of my assessment and plan of care.    If you have questions, please do not hesitate to call me. I look forward to following Jerardo Powers along with you.    Sincerely,    Radha Vogt MD    Enclosure  CC:  No Recipients    If you would like to receive this communication electronically, please contact [email or web address] or [phone] to request more information on [employer] access.    For providers and/or their staff who would like to refer a patient to [facility], please contact us through our one-stop-shop provider referral line, [facility], at [phone].    If you feel you have received this communication in error or would no longer like to receive these types of communications, please e-mail [email or web address]

## 2018-07-19 ENCOUNTER — LAB VISIT (OUTPATIENT)
Dept: LAB | Facility: HOSPITAL | Age: 73
End: 2018-07-19
Attending: INTERNAL MEDICINE
Payer: COMMERCIAL

## 2018-07-19 DIAGNOSIS — E11.65 TYPE 2 DIABETES MELLITUS WITH HYPERGLYCEMIA, UNSPECIFIED WHETHER LONG TERM INSULIN USE: ICD-10-CM

## 2018-07-19 LAB
ALBUMIN SERPL BCP-MCNC: 3.7 G/DL
ALP SERPL-CCNC: 101 U/L
ALT SERPL W/O P-5'-P-CCNC: 32 U/L
ANION GAP SERPL CALC-SCNC: 11 MMOL/L
AST SERPL-CCNC: 24 U/L
BILIRUB SERPL-MCNC: 0.9 MG/DL
BUN SERPL-MCNC: 11 MG/DL
CALCIUM SERPL-MCNC: 9.3 MG/DL
CHLORIDE SERPL-SCNC: 104 MMOL/L
CHOLEST SERPL-MCNC: 171 MG/DL
CHOLEST/HDLC SERPL: 3.6 {RATIO}
CO2 SERPL-SCNC: 25 MMOL/L
CREAT SERPL-MCNC: 1.4 MG/DL
EST. GFR  (AFRICAN AMERICAN): 57.6 ML/MIN/1.73 M^2
EST. GFR  (NON AFRICAN AMERICAN): 49.8 ML/MIN/1.73 M^2
ESTIMATED AVG GLUCOSE: 146 MG/DL
GLUCOSE SERPL-MCNC: 145 MG/DL
HBA1C MFR BLD HPLC: 6.7 %
HDLC SERPL-MCNC: 47 MG/DL
HDLC SERPL: 27.5 %
LDLC SERPL CALC-MCNC: 78.8 MG/DL
NONHDLC SERPL-MCNC: 124 MG/DL
POTASSIUM SERPL-SCNC: 3.7 MMOL/L
PROT SERPL-MCNC: 6.9 G/DL
SODIUM SERPL-SCNC: 140 MMOL/L
TRIGL SERPL-MCNC: 226 MG/DL
TSH SERPL DL<=0.005 MIU/L-ACNC: 2.08 UIU/ML

## 2018-07-19 PROCEDURE — 84443 ASSAY THYROID STIM HORMONE: CPT

## 2018-07-19 PROCEDURE — 80053 COMPREHEN METABOLIC PANEL: CPT

## 2018-07-19 PROCEDURE — 83036 HEMOGLOBIN GLYCOSYLATED A1C: CPT

## 2018-07-19 PROCEDURE — 80061 LIPID PANEL: CPT

## 2018-07-19 PROCEDURE — 36415 COLL VENOUS BLD VENIPUNCTURE: CPT | Mod: PO

## 2018-07-24 ENCOUNTER — HOSPITAL ENCOUNTER (OUTPATIENT)
Dept: RADIOLOGY | Facility: HOSPITAL | Age: 73
Discharge: HOME | End: 2018-07-24
Attending: OTOLARYNGOLOGY
Payer: COMMERCIAL

## 2018-07-24 DIAGNOSIS — H90.3 ASYMMETRIC SNHL (SENSORINEURAL HEARING LOSS): ICD-10-CM

## 2018-07-24 PROCEDURE — 70553 MRI BRAIN STEM W/O & W/DYE: CPT | Mod: TC

## 2018-07-24 PROCEDURE — 70553 MRI BRAIN STEM W/O & W/DYE: CPT | Mod: 26,,, | Performed by: RADIOLOGY

## 2018-07-24 PROCEDURE — A9585 GADOBUTROL INJECTION: HCPCS | Performed by: OTOLARYNGOLOGY

## 2018-07-24 PROCEDURE — 25500020 PHARM REV CODE 255: Performed by: OTOLARYNGOLOGY

## 2018-07-24 RX ORDER — GADOBUTROL 604.72 MG/ML
10 INJECTION INTRAVENOUS
Status: COMPLETED | OUTPATIENT
Start: 2018-07-24 | End: 2018-07-24

## 2018-07-24 RX ADMIN — GADOBUTROL 10 ML: 604.72 INJECTION INTRAVENOUS at 03:07

## 2018-07-25 ENCOUNTER — TELEPHONE (OUTPATIENT)
Dept: OTOLARYNGOLOGY | Facility: CLINIC | Age: 73
End: 2018-07-25

## 2018-07-25 NOTE — TELEPHONE ENCOUNTER
Spoke with patient he was notified MRI showed no growth on hearing nerve MRI showed signs of sinusitis scheduled a follow up appointment 7/26/18 at 3:20.

## 2018-07-25 NOTE — TELEPHONE ENCOUNTER
----- Message from Radha Vogt MD sent at 7/24/2018  4:05 PM CDT -----  Please call the patient regarding his abnormal result. His shows no growths or problems with the hearing and balance nerve. There is sign of sinusitis and will want to see him back in clinic for this.

## 2018-07-26 ENCOUNTER — OFFICE VISIT (OUTPATIENT)
Dept: ENDOCRINOLOGY | Facility: CLINIC | Age: 73
End: 2018-07-26
Payer: COMMERCIAL

## 2018-07-26 ENCOUNTER — OFFICE VISIT (OUTPATIENT)
Dept: OTOLARYNGOLOGY | Facility: CLINIC | Age: 73
End: 2018-07-26
Payer: COMMERCIAL

## 2018-07-26 VITALS
BODY MASS INDEX: 32.13 KG/M2 | WEIGHT: 216.94 LBS | DIASTOLIC BLOOD PRESSURE: 73 MMHG | HEIGHT: 69 IN | HEART RATE: 69 BPM | SYSTOLIC BLOOD PRESSURE: 118 MMHG

## 2018-07-26 VITALS
DIASTOLIC BLOOD PRESSURE: 84 MMHG | BODY MASS INDEX: 31.94 KG/M2 | HEART RATE: 74 BPM | HEIGHT: 69 IN | WEIGHT: 215.63 LBS | SYSTOLIC BLOOD PRESSURE: 128 MMHG

## 2018-07-26 DIAGNOSIS — Z79.4 CONTROLLED TYPE 2 DIABETES MELLITUS WITH COMPLICATION, WITH LONG-TERM CURRENT USE OF INSULIN: Primary | ICD-10-CM

## 2018-07-26 DIAGNOSIS — B96.89 ACUTE BACTERIAL SINUSITIS: ICD-10-CM

## 2018-07-26 DIAGNOSIS — J01.90 ACUTE BACTERIAL SINUSITIS: ICD-10-CM

## 2018-07-26 DIAGNOSIS — H69.93 ETD (EUSTACHIAN TUBE DYSFUNCTION), BILATERAL: ICD-10-CM

## 2018-07-26 DIAGNOSIS — E03.9 HYPOTHYROIDISM, UNSPECIFIED TYPE: ICD-10-CM

## 2018-07-26 DIAGNOSIS — E11.8 CONTROLLED TYPE 2 DIABETES MELLITUS WITH COMPLICATION, WITH LONG-TERM CURRENT USE OF INSULIN: Primary | ICD-10-CM

## 2018-07-26 DIAGNOSIS — H90.3 ASYMMETRIC SNHL (SENSORINEURAL HEARING LOSS): Primary | ICD-10-CM

## 2018-07-26 PROCEDURE — 3078F DIAST BP <80 MM HG: CPT | Mod: CPTII,S$GLB,, | Performed by: OTOLARYNGOLOGY

## 2018-07-26 PROCEDURE — 99999 PR PBB SHADOW E&M-EST. PATIENT-LVL III: CPT | Mod: PBBFAC,,, | Performed by: INTERNAL MEDICINE

## 2018-07-26 PROCEDURE — 3079F DIAST BP 80-89 MM HG: CPT | Mod: CPTII,S$GLB,, | Performed by: INTERNAL MEDICINE

## 2018-07-26 PROCEDURE — 99999 PR PBB SHADOW E&M-EST. PATIENT-LVL III: CPT | Mod: PBBFAC,,, | Performed by: OTOLARYNGOLOGY

## 2018-07-26 PROCEDURE — 99214 OFFICE O/P EST MOD 30 MIN: CPT | Mod: S$GLB,,, | Performed by: INTERNAL MEDICINE

## 2018-07-26 PROCEDURE — 3044F HG A1C LEVEL LT 7.0%: CPT | Mod: CPTII,S$GLB,, | Performed by: INTERNAL MEDICINE

## 2018-07-26 PROCEDURE — 3074F SYST BP LT 130 MM HG: CPT | Mod: CPTII,S$GLB,, | Performed by: OTOLARYNGOLOGY

## 2018-07-26 PROCEDURE — 3074F SYST BP LT 130 MM HG: CPT | Mod: CPTII,S$GLB,, | Performed by: INTERNAL MEDICINE

## 2018-07-26 PROCEDURE — 99214 OFFICE O/P EST MOD 30 MIN: CPT | Mod: S$GLB,,, | Performed by: OTOLARYNGOLOGY

## 2018-07-26 RX ORDER — AMOXICILLIN AND CLAVULANATE POTASSIUM 875; 125 MG/1; MG/1
1 TABLET, FILM COATED ORAL 2 TIMES DAILY
Qty: 14 TABLET | Refills: 0 | Status: SHIPPED | OUTPATIENT
Start: 2018-07-26 | End: 2018-08-09

## 2018-07-26 NOTE — PROGRESS NOTES
Mr. Powers is a 72 y.o. M with history of DM2, hypothyroidism, HTN, HL, JANEEN, CKD, here for followup.    Dx with T2DM 2008.    Started Trulicity - stopped Tradjenta - now upped to 1.5mg weekly 3 weeks ago.   Off metformin in the past due to diarrhea.      No logs - fasting FS mid 100s - 132.  Later in the day in low 100s. No hypoglycemia or episodes of diaphoresis or tremulousness.     Keeps up to date on ophtho visits annually.      Hypothyroidism: Is on levothyroxine 75 mcg as chronic dose, last checked in mid 2017.       Assessment and Plan:  Mr. Powers with history of DM2, hypothyroidism, HTN, HL, JANEEN, CKD, here for DM reestablishing care.    DM2: fairly well controlled, continue Trulicity 1.5mg weekly  Pt concerned that fasting FS in mid and occasionally high 100s, I offered to do 1mg glimepiride at dinner however I do not feel this is necessarily as long as A1c <7, pt is agreeable to continue just Trulicity     Hypothyroidism: recheck TSH w next set of labs, continue levothyroxine 75mcg daily    RTC 6 months    Component      Latest Ref Rng & Units 7/24/2018 7/19/2018 5/28/2018   eGFR if African American      >60 mL/min/1.73 m:2 49 (A) 57.6 (A)    eGFR if non African American      >60 mL/min/1.73 m:2 42 (A) 49.8 (A)    Cholesterol      120 - 199 mg/dL  171    Triglycerides      30 - 150 mg/dL  226 (H)    HDL      40 - 75 mg/dL  47    LDL Cholesterol      63.0 - 159.0 mg/dL  78.8    HDL/Chol Ratio      20.0 - 50.0 %  27.5    Total Cholesterol/HDL Ratio      2.0 - 5.0  3.6    Non-HDL Cholesterol      mg/dL  124    Microalbum.,U,Random      ug/mL  9.0    Creatinine, Random Ur      23.0 - 375.0 mg/dL  75.0    Microalb Creat Ratio      0.0 - 30.0 ug/mg  12.0    Hemoglobin A1C      4.0 - 5.6 %  6.7 (H) 6.4 (H)   Estimated Avg Glucose      68 - 131 mg/dL  146 (H) 137 (H)   TSH      0.400 - 4.000 uIU/mL  2.083        Past Medical History:   Diagnosis Date    Anxiety     BPH (benign prostatic hypertrophy)      Cataract     CKD (chronic kidney disease) stage 3, GFR 30-59 ml/min 5/5/2014    Colon polyps     Diabetes mellitus type II     Emphysema NEC     mild    Gout     Hyperlipidemia     Hypertension     Hypothyroidism     IBS (irritable bowel syndrome)     Kidney stones     JANEEN (obstructive sleep apnea)     Plantar fasciitis     Reflux         reports that he quit smoking about 10 years ago. He quit after 40.00 years of use. He has never used smokeless tobacco. He reports that he drinks alcohol. He reports that he does not use drugs.    Family History   Problem Relation Age of Onset    Cancer Brother         non-hodgkins T cell lymphoma    Diabetes Mother     Macular degeneration Brother     Coronary artery disease Neg Hx     Colon cancer Neg Hx     Prostate cancer Neg Hx     Esophageal cancer Neg Hx        Past Surgical History:   Procedure Laterality Date    CATARACT EXTRACTION  1/28/2013    RIGHT EYE    CATARACT EXTRACTION      left eye    COLONOSCOPY  Sep 2011    Repeat recommended in 5 years    COLONOSCOPY N/A 10/10/2016    Procedure: COLONOSCOPY;  Surgeon: Noah Colunga MD;  Location: [facility] ([address] [place] [address]);  Service: Endoscopy;  Laterality: N/A;  PM Prep    KIDNEY STONE SURGERY         Patient's Medications   New Prescriptions    No medications on file   Previous Medications    ALLOPURINOL (ZYLOPRIM) 100 MG TABLET    Take 1 tablet (100 mg total) by mouth once daily.    AMLODIPINE (NORVASC) 10 MG TABLET    TAKE 1 TABLET BY MOUTH EVERY DAY    BLOOD SUGAR DIAGNOSTIC STRP    1 strip by Misc.(Non-Drug; Combo Route) route 3 (three) times daily.    CITALOPRAM (CELEXA) 20 MG TABLET    TAKE 1 TABLET (20 MG TOTAL) BY MOUTH ONCE DAILY.    DULAGLUTIDE (TRULICITY) 1.5 MG/0.5 ML PNIJ    Inject 1.5 mg into the skin once a week.    DUTASTERIDE-TAMSULOSIN (VIVIENNE) 0.5-0.4 MG CM24    Take 1 capsule by mouth every evening.    ERGOCALCIFEROL (ERGOCALCIFEROL) 50,000 UNIT CAP    Take 1 capsule (50,000 Units  "total) by mouth every 7 days.    FISH OIL-OMEGA-3 FATTY ACIDS 300-1,000 MG CAPSULE    Take 2 g by mouth once daily.    IRBESARTAN (AVAPRO) 150 MG TABLET    Take 150 mg by mouth once daily.    LANCETS MISC    Test sugars once daily.  Dispense 100 lancets (insurance covered brand)    LEVOTHYROXINE (SYNTHROID) 75 MCG TABLET    TAKE 1 TABLET (75 MCG TOTAL) BY MOUTH ONCE DAILY.    MAGNESIUM OXIDE (MAG-OX) 400 MG TABLET    Take 1 tablet by mouth once daily.    PEN NEEDLE, DIABETIC 31 GAUGE X 1/4" NDLE    Use weekly with trulicity pen    POTASSIUM CITRATE 15 MEQ TBSR    once daily.     VITAMIN B-6 100 MG TABLET    1 TABLET ONCE A DAY   Modified Medications    No medications on file   Discontinued Medications    ASPIRIN 81 MG CHEW    Take 81 mg by mouth once daily.    HYDROQUINONE 4 % CREA    Use hs on dark spots         Review of Systems:  General: no fevers  Eyes: no vision changes  ENT: no sore throat  Lung: no sob  CV: no palpitations  GI: no nausea   : no dysuria   Endo: no heat intolerance  Heme: no easy bruising   MSK: no joint swelling        /84 (BP Location: Left arm, Patient Position: Sitting, BP Method: Medium (Manual))   Pulse 74   Ht 5' 9" (1.753 m)   Wt 97.8 kg (215 lb 9.8 oz)   BMI 31.84 kg/m²   Physical Exam:  General: obese body habitus, not in acute distress   Eyes: anicteric, no proptosis   ENT: no facial lesions, no oral lesions   Neck: no masses, no LAD   Ext: no peripheral edema or erythema  Neuro: AOx3, moving all extremities, normal gait     Labs:    Chemistry        Component Value Date/Time     07/19/2018 0858    K 3.7 07/19/2018 0858     07/19/2018 0858    CO2 25 07/19/2018 0858    BUN 11 07/19/2018 0858    CREATININE 1.6 (H) 07/24/2018 1330     (H) 07/19/2018 0858        Component Value Date/Time    CALCIUM 9.3 07/19/2018 0858    ALKPHOS 101 07/19/2018 0858    AST 24 07/19/2018 0858    ALT 32 07/19/2018 0858    BILITOT 0.9 07/19/2018 0858    ESTGFRAFRICA 49 (A) " 07/24/2018 1330    EGFRNONAA 42 (A) 07/24/2018 1330          Lab Results   Component Value Date    WBC 12.31 02/21/2018    HGB 16.0 02/21/2018    HCT 46.7 02/21/2018    MCV 86 02/21/2018     02/21/2018        Lab Results   Component Value Date    HDL 47 07/19/2018    HDL 43 05/28/2018    HDL 59 07/14/2017     Lab Results   Component Value Date    LDLCALC 78.8 07/19/2018    LDLCALC 70.2 05/28/2018    LDLCALC 85.0 07/14/2017     Lab Results   Component Value Date    TRIG 226 (H) 07/19/2018    TRIG 324 (H) 05/28/2018    TRIG 165 (H) 07/14/2017     Lab Results   Component Value Date    CHOLHDL 27.5 07/19/2018    CHOLHDL 24.2 05/28/2018    CHOLHDL 33.3 07/14/2017       Hemoglobin A1C   Date Value Ref Range Status   07/19/2018 6.7 (H) 4.0 - 5.6 % Final     Comment:     ADA Screening Guidelines:  5.7-6.4%  Consistent with prediabetes  >or=6.5%  Consistent with diabetes  High levels of fetal hemoglobin interfere with the HbA1C  assay. Heterozygous hemoglobin variants (HbS, HgC, etc)do  not significantly interfere with this assay.   However, presence of multiple variants may affect accuracy.     05/28/2018 6.4 (H) 4.0 - 5.6 % Final     Comment:     According to ADA guidelines, hemoglobin A1c <7.0% represents  optimal control in non-pregnant diabetic patients. Different  metrics may apply to specific patient populations.   Standards of Medical Care in Diabetes-2016.  For the purpose of screening for the presence of diabetes:  <5.7%     Consistent with the absence of diabetes  5.7-6.4%  Consistent with increasing risk for diabetes   (prediabetes)  >or=6.5%  Consistent with diabetes  Currently, no consensus exists for use of hemoglobin A1c  for diagnosis of diabetes for children.  This Hemoglobin A1c assay has significant interference with fetal   hemoglobin   (HbF). The results are invalid for patients with abnormal amounts of   HbF,   including those with known Hereditary Persistence   of Fetal Hemoglobin. Heterozygous  hemoglobin variants (HbAS, HbAC,   HbAD, HbAE, HbA2) do not significantly interfere with this assay;   however, presence of multiple variants in a sample may impact the %   interference.     02/09/2018 6.4 (H) 4.0 - 5.6 % Final     Comment:     According to ADA guidelines, hemoglobin A1c <7.0% represents  optimal control in non-pregnant diabetic patients. Different  metrics may apply to specific patient populations.   Standards of Medical Care in Diabetes-2016.  For the purpose of screening for the presence of diabetes:  <5.7%     Consistent with the absence of diabetes  5.7-6.4%  Consistent with increasing risk for diabetes   (prediabetes)  >or=6.5%  Consistent with diabetes  Currently, no consensus exists for use of hemoglobin A1c  for diagnosis of diabetes for children.  This Hemoglobin A1c assay has significant interference with fetal   hemoglobin   (HbF). The results are invalid for patients with abnormal amounts of   HbF,   including those with known Hereditary Persistence   of Fetal Hemoglobin. Heterozygous hemoglobin variants (HbAS, HbAC,   HbAD, HbAE, HbA2) do not significantly interfere with this assay;   however, presence of multiple variants in a sample may impact the %   interference.         Lab Results   Component Value Date    TSH 2.083 07/19/2018

## 2018-07-26 NOTE — PROGRESS NOTES
Chief Complaint   Patient presents with    Follow-up     discuss MRI results   .     HPI:  Jerardo Powers is a very pleasant 72 y.o. male here to see me today for the first time for evaluation of hearing loss.  He reports hearing loss that has been gradually progressing over the last 2 years.  He has not noted any difference in hearing between the ears, with both ears being the worse hearing ear.  He has not noted any tinnitus in either ear.  He has not had any recent issues with ear pain or ear drainage.  He denies a family history of hearing loss, and has not had any previous otologic surgery.  He denies any history of significant loud noise exposure.He denies issues with dizziness.    Interval HPI 7/26/2018:   F/u asymmetric SNHL/ETD.  He reports that his hearing is the same and that he has continued muffled and decreased hearing left greater than right.  He denies purulent rhinorrhea or post-nasal drip.  He has had some mild facial pain/pressure.  He denies headaches. He notes no alleviating or exacerbating factors.       Past Medical History:   Diagnosis Date    Anxiety     BPH (benign prostatic hypertrophy)     Cataract     CKD (chronic kidney disease) stage 3, GFR 30-59 ml/min 5/5/2014    Colon polyps     Diabetes mellitus type II     Emphysema NEC     mild    Gout     Hyperlipidemia     Hypertension     Hypothyroidism     IBS (irritable bowel syndrome)     Kidney stones     JANEEN (obstructive sleep apnea)     Plantar fasciitis     Reflux      Social History     Social History    Marital status:      Spouse name: N/A    Number of children: N/A    Years of education: N/A     Occupational History    Not on file.     Social History Main Topics    Smoking status: Former Smoker     Years: 40.00     Quit date: 9/4/2007    Smokeless tobacco: Never Used    Alcohol use 0.0 oz/week      Comment: occasionally    Drug use: No    Sexual activity: No     Other Topics Concern    Not on file      Social History Narrative    No narrative on file     Past Surgical History:   Procedure Laterality Date    CATARACT EXTRACTION  1/28/2013    RIGHT EYE    CATARACT EXTRACTION      left eye    COLONOSCOPY  Sep 2011    Repeat recommended in 5 years    COLONOSCOPY N/A 10/10/2016    Procedure: COLONOSCOPY;  Surgeon: Noah Colunga MD;  Location: [facility] ([address] [place] [address]);  Service: Endoscopy;  Laterality: N/A;  PM Prep    KIDNEY STONE SURGERY       Family History   Problem Relation Age of Onset    Cancer Brother         non-hodgkins T cell lymphoma    Diabetes Mother     Macular degeneration Brother     Coronary artery disease Neg Hx     Colon cancer Neg Hx     Prostate cancer Neg Hx     Esophageal cancer Neg Hx          Review of Systems  General: negative for chills, fever or weight loss  Psychological: negative for mood changes or depression  Ophthalmic: negative for blurry vision, photophobia or eye pain  ENT: see HPI  Respiratory: no cough, shortness of breath, or wheezing  Cardiovascular: no chest pain or dyspnea on exertion  Gastrointestinal: no abdominal pain, change in bowel habits, or black/ bloody stools  Musculoskeletal: negative for gait disturbance or muscular weakness  Neurological: no syncope or seizures; no ataxia  Dermatological: negative for puritis,  rash and jaundice  Hematologic/lymphatic: no easy bruising, no new lumps or bumps      Physical Exam:    Vitals:    07/26/18 1522   BP: 118/73   Pulse: 69       Constitutional: Well appearing / communicating without difficutly.  NAD.  Eyes: EOM I Bilaterally  Head/Face: Normocephalic.  Negative paranasal sinus pressure/tenderness.  Salivary glands WNL.  House Brackmann I Bilaterally.    Right Ear: Auricle normal appearance. External Auditory Canal within normal limits no lesions or masses,TM w/o masses/lesions/perforations. TM mobility noted.   Left Ear: Auricle normal appearance. External Auditory Canal within normal limits no lesions or  masses,TM w/o masses/lesions/perforations. TM mobility noted.  Rinne Air conduction >bone conduction bilaterally, Call midline.   Nose: No gross nasal septal deviation. Inferior Turbinates 3+ bilaterally. No septal perforation. No masses/lesions. External nasal skin appears normal without masses/lesions.  Oral Cavity: Gingiva/lips within normal limits.  Dentition/gingiva healthy appearing. Mucus membranes moist. Floor of mouth soft, no masses palpated. Oral Tongue mobile. Hard Palate appears normal.    Oropharynx: Base of tongue appears normal. No masses/lesions noted. Tonsillar fossa/pharyngeal wall without lesions. Posterior oropharynx WNL.  Soft palate without masses. Midline uvula.   Neck/Lymphatic: No LAD I-VI bilaterally.  No thyromegaly.  No masses noted on exam.    Mirror laryngoscopy/nasopharyngoscopy: Active gag reflex.  Unable to perform.    Neuro/Psychiatric: AOx3.  Normal mood and affect.   Cardiovascular: Normal carotid pulses bilaterally, no increasing jugular venous distention noted at cervical region bilaterally.    Respiratory: Normal respiratory effort, no stridor, no retractions noted.    Diagnostic testing reviewed:   MRI brain 7/24/2018: No evidence of CPA lesions. There is mucoperiosteal thickening and air-fluid levels in the bilateral maxillary sinuses.          Assessment:    ICD-10-CM ICD-9-CM    1. Asymmetric SNHL (sensorineural hearing loss) H90.5 389.16    2. ETD (Eustachian tube dysfunction), bilateral H69.83 381.81    3. Acute bacterial sinusitis J01.90 461.9     B96.89       The primary encounter diagnosis was Asymmetric SNHL (sensorineural hearing loss). Diagnoses of ETD (Eustachian tube dysfunction), bilateral and Acute bacterial sinusitis were also pertinent to this visit.      Plan:  No orders of the defined types were placed in this encounter.     MRI of IAC clear of CPA lesion. Patient is medically cleared for hearing aids. Consultation with audiologist was arranged to discuss  hearing aid options.     Bilateral maxillary sinusitis: Augmentin prescribed.     We had a long discussion regarding the anatomy and function of the eustachian tube.  We discussed that the eustachian tube acts as a pump to keep the appropriate amount of pressure behind the ear drum.  I discussed autoinsufflation exercises with him.     Thank you kindly for allowing me to participate in the patient's care.     This visit was 25 minutes in duration, with over 50% of the time spent in direct face-to-face counseling and coordination of care regarding the issues outlined above.      Radha Vogt MD

## 2018-07-27 ENCOUNTER — HOSPITAL ENCOUNTER (OUTPATIENT)
Facility: HOSPITAL | Age: 73
Discharge: HOME | End: 2018-07-28
Attending: EMERGENCY MEDICINE | Admitting: INTERNAL MEDICINE
Payer: COMMERCIAL

## 2018-07-27 DIAGNOSIS — R42 DIZZINESS: ICD-10-CM

## 2018-07-27 DIAGNOSIS — R27.0 ATAXIA: ICD-10-CM

## 2018-07-27 DIAGNOSIS — H81.392 PERIPHERAL VERTIGO INVOLVING LEFT EAR: ICD-10-CM

## 2018-07-27 DIAGNOSIS — R42 VERTIGO: Primary | ICD-10-CM

## 2018-07-27 LAB
ALBUMIN SERPL BCP-MCNC: 3.8 G/DL
ALP SERPL-CCNC: 98 U/L
ALT SERPL W/O P-5'-P-CCNC: 33 U/L
ANION GAP SERPL CALC-SCNC: 4 MMOL/L
AST SERPL-CCNC: 22 U/L
BILIRUB SERPL-MCNC: 0.9 MG/DL
BILIRUB UR QL STRIP: NEGATIVE
BUN SERPL-MCNC: 13 MG/DL
CALCIUM SERPL-MCNC: 8.9 MG/DL
CHLORIDE SERPL-SCNC: 103 MMOL/L
CHOLEST SERPL-MCNC: 180 MG/DL
CHOLEST/HDLC SERPL: 4.1 {RATIO}
CLARITY UR: CLEAR
CO2 SERPL-SCNC: 30 MMOL/L
COLOR UR: YELLOW
CREAT SERPL-MCNC: 1.3 MG/DL
ERYTHROCYTE [DISTWIDTH] IN BLOOD BY AUTOMATED COUNT: 12.7 %
EST. GFR  (AFRICAN AMERICAN): >60 ML/MIN/1.73 M^2
EST. GFR  (NON AFRICAN AMERICAN): 55 ML/MIN/1.73 M^2
GLUCOSE SERPL-MCNC: 156 MG/DL (ref 70–110)
GLUCOSE SERPL-MCNC: 171 MG/DL
GLUCOSE UR QL STRIP: NEGATIVE
HCT VFR BLD AUTO: 45.2 %
HDLC SERPL-MCNC: 44 MG/DL
HDLC SERPL: 24.4 %
HGB BLD-MCNC: 14.6 G/DL
HGB UR QL STRIP: NEGATIVE
INR PPP: 1
KETONES UR QL STRIP: NEGATIVE
LDLC SERPL CALC-MCNC: 83.4 MG/DL
LEUKOCYTE ESTERASE UR QL STRIP: NEGATIVE
MCH RBC QN AUTO: 28 PG
MCHC RBC AUTO-ENTMCNC: 32.3 G/DL
MCV RBC AUTO: 87 FL
NITRITE UR QL STRIP: NEGATIVE
NONHDLC SERPL-MCNC: 136 MG/DL
PH UR STRIP: 8 [PH] (ref 5–8)
PLATELET # BLD AUTO: 200 K/UL
PMV BLD AUTO: 11.4 FL
POCT GLUCOSE: 156 MG/DL (ref 70–110)
POCT GLUCOSE: 199 MG/DL (ref 70–110)
POCT GLUCOSE: 266 MG/DL (ref 70–110)
POTASSIUM SERPL-SCNC: 3.6 MMOL/L
PROT SERPL-MCNC: 7 G/DL
PROT UR QL STRIP: NEGATIVE
PROTHROMBIN TIME: 10.6 SEC
RBC # BLD AUTO: 5.21 M/UL
SODIUM SERPL-SCNC: 137 MMOL/L
SP GR UR STRIP: 1.01 (ref 1–1.03)
TRIGL SERPL-MCNC: 263 MG/DL
TSH SERPL DL<=0.005 MIU/L-ACNC: 2 UIU/ML
URN SPEC COLLECT METH UR: NORMAL
UROBILINOGEN UR STRIP-ACNC: NEGATIVE EU/DL
WBC # BLD AUTO: 7.96 K/UL

## 2018-07-27 PROCEDURE — 93005 ELECTROCARDIOGRAM TRACING: CPT

## 2018-07-27 PROCEDURE — G0378 HOSPITAL OBSERVATION PER HR: HCPCS

## 2018-07-27 PROCEDURE — 85027 COMPLETE CBC AUTOMATED: CPT

## 2018-07-27 PROCEDURE — 99285 EMERGENCY DEPT VISIT HI MDM: CPT | Mod: 25

## 2018-07-27 PROCEDURE — 63600175 PHARM REV CODE 636 W HCPCS: Performed by: STUDENT IN AN ORGANIZED HEALTH CARE EDUCATION/TRAINING PROGRAM

## 2018-07-27 PROCEDURE — 85610 PROTHROMBIN TIME: CPT

## 2018-07-27 PROCEDURE — 99283 EMERGENCY DEPT VISIT LOW MDM: CPT | Mod: ,,, | Performed by: OTOLARYNGOLOGY

## 2018-07-27 PROCEDURE — 25000003 PHARM REV CODE 250: Performed by: EMERGENCY MEDICINE

## 2018-07-27 PROCEDURE — 96374 THER/PROPH/DIAG INJ IV PUSH: CPT

## 2018-07-27 PROCEDURE — 80061 LIPID PANEL: CPT

## 2018-07-27 PROCEDURE — 96375 TX/PRO/DX INJ NEW DRUG ADDON: CPT

## 2018-07-27 PROCEDURE — 25000003 PHARM REV CODE 250: Performed by: INTERNAL MEDICINE

## 2018-07-27 PROCEDURE — 63600175 PHARM REV CODE 636 W HCPCS: Performed by: INTERNAL MEDICINE

## 2018-07-27 PROCEDURE — 82962 GLUCOSE BLOOD TEST: CPT

## 2018-07-27 PROCEDURE — 81003 URINALYSIS AUTO W/O SCOPE: CPT

## 2018-07-27 PROCEDURE — G0427 INPT/ED TELECONSULT70: HCPCS | Mod: GT,,, | Performed by: PSYCHIATRY & NEUROLOGY

## 2018-07-27 PROCEDURE — 96361 HYDRATE IV INFUSION ADD-ON: CPT

## 2018-07-27 PROCEDURE — 84443 ASSAY THYROID STIM HORMONE: CPT

## 2018-07-27 PROCEDURE — 80053 COMPREHEN METABOLIC PANEL: CPT

## 2018-07-27 PROCEDURE — 63600175 PHARM REV CODE 636 W HCPCS: Performed by: EMERGENCY MEDICINE

## 2018-07-27 RX ORDER — HEPARIN SODIUM 5000 [USP'U]/ML
5000 INJECTION, SOLUTION INTRAVENOUS; SUBCUTANEOUS EVERY 8 HOURS
Status: DISCONTINUED | OUTPATIENT
Start: 2018-07-27 | End: 2018-07-28 | Stop reason: HOSPADM

## 2018-07-27 RX ORDER — INSULIN ASPART 100 [IU]/ML
1-10 INJECTION, SOLUTION INTRAVENOUS; SUBCUTANEOUS
Status: DISCONTINUED | OUTPATIENT
Start: 2018-07-27 | End: 2018-07-28 | Stop reason: HOSPADM

## 2018-07-27 RX ORDER — SODIUM CHLORIDE, SODIUM LACTATE, POTASSIUM CHLORIDE, CALCIUM CHLORIDE 600; 310; 30; 20 MG/100ML; MG/100ML; MG/100ML; MG/100ML
INJECTION, SOLUTION INTRAVENOUS CONTINUOUS
Status: DISCONTINUED | OUTPATIENT
Start: 2018-07-27 | End: 2018-07-28 | Stop reason: HOSPADM

## 2018-07-27 RX ORDER — MECLIZINE HYDROCHLORIDE 25 MG/1
25 TABLET ORAL EVERY 6 HOURS
Status: DISCONTINUED | OUTPATIENT
Start: 2018-07-27 | End: 2018-07-28 | Stop reason: HOSPADM

## 2018-07-27 RX ORDER — IBUPROFEN 200 MG
24 TABLET ORAL
Status: DISCONTINUED | OUTPATIENT
Start: 2018-07-27 | End: 2018-07-28 | Stop reason: HOSPADM

## 2018-07-27 RX ORDER — GLUCAGON 1 MG
1 KIT INJECTION
Status: DISCONTINUED | OUTPATIENT
Start: 2018-07-27 | End: 2018-07-28 | Stop reason: HOSPADM

## 2018-07-27 RX ORDER — MECLIZINE HYDROCHLORIDE 25 MG/1
25 TABLET ORAL
Status: COMPLETED | OUTPATIENT
Start: 2018-07-27 | End: 2018-07-27

## 2018-07-27 RX ORDER — DEXAMETHASONE SODIUM PHOSPHATE 4 MG/ML
12 INJECTION, SOLUTION INTRA-ARTICULAR; INTRALESIONAL; INTRAMUSCULAR; INTRAVENOUS; SOFT TISSUE
Status: COMPLETED | OUTPATIENT
Start: 2018-07-27 | End: 2018-07-27

## 2018-07-27 RX ORDER — SODIUM CHLORIDE 9 MG/ML
500 INJECTION, SOLUTION INTRAVENOUS
Status: COMPLETED | OUTPATIENT
Start: 2018-07-27 | End: 2018-07-27

## 2018-07-27 RX ORDER — DIPHENHYDRAMINE HCL 25 MG
25 CAPSULE ORAL
Status: ACTIVE | OUTPATIENT
Start: 2018-07-27 | End: 2018-07-28

## 2018-07-27 RX ORDER — IBUPROFEN 200 MG
16 TABLET ORAL
Status: DISCONTINUED | OUTPATIENT
Start: 2018-07-27 | End: 2018-07-28 | Stop reason: HOSPADM

## 2018-07-27 RX ORDER — PROCHLORPERAZINE EDISYLATE 5 MG/ML
5 INJECTION INTRAMUSCULAR; INTRAVENOUS ONCE
Status: COMPLETED | OUTPATIENT
Start: 2018-07-27 | End: 2018-07-27

## 2018-07-27 RX ORDER — DIAZEPAM 5 MG/1
5 TABLET ORAL
Status: COMPLETED | OUTPATIENT
Start: 2018-07-27 | End: 2018-07-27

## 2018-07-27 RX ADMIN — MECLIZINE HYDROCHLORIDE 25 MG: 25 TABLET ORAL at 11:07

## 2018-07-27 RX ADMIN — MECLIZINE HYDROCHLORIDE 25 MG: 25 TABLET ORAL at 06:07

## 2018-07-27 RX ADMIN — SODIUM CHLORIDE, SODIUM LACTATE, POTASSIUM CHLORIDE, AND CALCIUM CHLORIDE: .6; .31; .03; .02 INJECTION, SOLUTION INTRAVENOUS at 08:07

## 2018-07-27 RX ADMIN — HEPARIN SODIUM 5000 UNITS: 5000 INJECTION, SOLUTION INTRAVENOUS; SUBCUTANEOUS at 09:07

## 2018-07-27 RX ADMIN — DIAZEPAM 5 MG: 5 TABLET ORAL at 02:07

## 2018-07-27 RX ADMIN — INSULIN ASPART 3 UNITS: 100 INJECTION, SOLUTION INTRAVENOUS; SUBCUTANEOUS at 09:07

## 2018-07-27 RX ADMIN — PROCHLORPERAZINE EDISYLATE 5 MG: 5 INJECTION INTRAMUSCULAR; INTRAVENOUS at 04:07

## 2018-07-27 RX ADMIN — SODIUM CHLORIDE 500 ML: 0.9 INJECTION, SOLUTION INTRAVENOUS at 04:07

## 2018-07-27 RX ADMIN — DEXAMETHASONE SODIUM PHOSPHATE 12 MG: 4 INJECTION, SOLUTION INTRAMUSCULAR; INTRAVENOUS at 05:07

## 2018-07-27 RX ADMIN — MECLIZINE HYDROCHLORIDE 25 MG: 25 TABLET ORAL at 10:07

## 2018-07-27 NOTE — CONSULTS
"      [facility]  Vascular Neurology  Comprehensive Stroke Center  Tele-Consultation Note      Consults    Consulting Provider:    Current Providers  No providers found      Emergency Department  Spoke hospital nurse at bedside with patient assisting consultant.     Patient information was obtained from patient.       Assessment/Plan:     STROKE DOCUMENTATION     Acute Stroke Times:   Acute Stroke Times   Last Known Normal Date: 07/26/18  Last Known Normal Time: 2300  Stroke Team Arrival Date: 07/27/18  Stroke Team Arrival Time: 1140  CT Interpretation Time: 1145  Decision to Treat Time for Alteplase: 1145    NIH Scale:  Interval: baseline (upon arrival/admit)  1a. Level Of Consciousness: 0-->Alert: keenly responsive  1b. LOC Questions: 0-->Answers both questions correctly  1c. LOC Commands: 0-->Performs both tasks correctly  2. Best Gaze: 0-->Normal  3. Visual: 0-->No visual loss  4. Facial Palsy: 0-->Normal symmetrical movements  5a. Motor Arm, Left: 0-->No drift: limb holds 90 (or 45) degrees for full 10 secs  5b. Motor Arm, Right: 0-->No drift: limb holds 90 (or 45) degrees for full 10 secs  6a. Motor Leg, Left: 0-->No drift: leg holds 30 degree position for full 5 secs  6b. Motor Leg, Right: 0-->No drift: leg holds 30 degree position for full 5 secs  7. Limb Ataxia: 0-->Absent  8. Sensory: 0-->Normal: no sensory loss  9. Best Language: 0-->No aphasia: normal  10. Dysarthria: 0-->Normal  11. Extinction and Inattention (formerly Neglect): 0-->No abnormality  Total (NIH Stroke Scale): 0     Modified Willy    [place] Coma Scale:    ABCD2 Score:    GIYR2AR2-PYX Score:   HAS -BLED Score:   ICH Score:   [place] & [place] Classification:       Diagnoses:   Vertigo    Low NIHSS  Recommend MRI brain   Consider non stroke causes of vertigo.  Admit to inpatient for work up.             Blood pressure 130/78, pulse 70, resp. rate 18, height 5' 9" (1.753 m), weight 98.4 kg (216 lb 14.9 oz), SpO2 96 " %.  Alteplase Eligible?: No  Alteplase Recommendation: Alteplase not recommended due to Outside of treatment window   Possible Interventional Revascularization Candidate? No; No large vessel occlusion    Disposition Recommendation: admit to inpatient      Subjective:     History of Present Illness:  73 yo  Male presents with dizziness on awakening at 645 am. Last known normal 11pm last night. Associated headache (pressure, generalized), unsteadiness with ambulation.     PMHx  HTN  DM        Woke up with symptoms?: yes  Last known normal: Last Known Normal Date: 07/26/18 Last Known Normal Time: 2300    Recent bleeding noted: no  Does the patient take any Blood Thinners? no  Medications: No relevant medications      Past Medical History: hypertension and diabetes    Past Surgical History: no relevant surgical history    Family History: no relevant history    Social History: no smoking, no drinking, no drugs and former smoker    Allergies: Morphine morphine    Review of Systems   Review of Symptoms:   Constitutional: Denies fevers, weight loss, chills, or weakness.   Eyes: Denies changes in vision.   ENT: Denies dysphagia, nasal discharge, ear pain or discharge.   Cardiovascular: Denies chest pain, palpitations, orthopnea, or claudication.   Respiratory: Denies shortness of breath, cough, hemoptysis, or wheezing.   GI: Denies nausea/vomitting, hematochezia, melena, abd pain, or changes in appetite.   : Denies dysuria, incontinence, or hematuria.   Musculoskeletal: Denies joint pain or myalgias.   Skin/breast: Denies rashes, lumps, lesions, or discharge.   Neurologic: Denies headache, dizziness, vertigo, or paresthesias.   Psychiatric: Denies changes in mood or hallucinations.   Endocrine: Denies polyuria, polydipsia, heat/cold intolerance.   Hematologic/Lymph: Denies lymphadenopathy, easy bruising or easy bleeding.   Allergic/Immunologic: Denies rash, rhinitis    Objective:   Vitals: Blood pressure 130/78, pulse 70,  "resp. rate 18, height 5' 9" (1.753 m), weight 98.4 kg (216 lb 14.9 oz), SpO2 96 %.     CT READ: Yes  No hemmorhage. No mass effect. No early infarct signs.     Physical Exam   Constitutional: He is oriented to person, place, and time. He appears well-developed and well-nourished.   HENT:   Head: Normocephalic and atraumatic.   Eyes: EOM are normal.   Pulmonary/Chest: Effort normal.   Neurological: He is alert and oriented to person, place, and time.             Recommended the emergency room physician to have a brief discussion with the patient and/or family if available regarding the risks and benefits of treatment, and to briefly document the occurrence of that discussion in his clinical encounter note.     The attending portion of this evaluation, treatment, and documentation was performed per Flash Lewis MD via audiovisual.    Billing code:  (ID [facility] INPT CONSULT 35MIN)    Consult End Time: 11:52 AM     Flash Lewis MD  Comprehensive Stroke Center  Vascular Neurology   [facility]  "

## 2018-07-27 NOTE — ASSESSMENT & PLAN NOTE
Neurology consult to rule out intracranial process/TIA/CVA.  Exam points toward BPPV As as the cause of the vertigo.  I have performed an Epley maneuver at bedside.   He may follow up with me after discharge.

## 2018-07-27 NOTE — ED NOTES
APPEARANCE: Alert, oriented and in no acute distress.  CARDIAC: Normal rate and rhythm, no murmur heard.   PERIPHERAL VASCULAR: peripheral pulses present. Normal cap refill. No edema. Warm to touch.    RESPIRATORY:Normal rate and effort, breath sounds clear bilaterally throughout chest. Respirations are equal and unlabored no obvious signs of distress.  GASTRO: soft, bowel sounds normal, no tenderness, no abdominal distention.  MUSC: Full ROM. No bony tenderness or soft tissue tenderness. No obvious deformity.  SKIN: Skin is warm and dry, normal skin turgor, mucous membranes moist.  NEURO:  Sensory intact to light touch bilaterally. Sandra coma scale: 15  No neurological abnormalities. Dizziness   MENTAL STATUS: awake, alert and aware of environment.  EYE: PERRL, both eyes: pupils brisk and reactive to light. Normal size.  ENT: EARS: no obvious drainage. NOSE: no active bleeding.

## 2018-07-27 NOTE — ED PROVIDER NOTES
Encounter Date: 7/27/2018    SCRIBE #1 NOTE: I, Armida Rivera , am scribing for, and in the presence of, Dr. Cherry .       History     Chief Complaint   Patient presents with    Dizziness     dizziness since this am. pt has a hx of        Time seen by provider: 10:24 AM on 07/27/2018    Jerardo Powers is a 72 y.o. male with a PMHx of CKD, anxiety, and hyperlipidemia who presents to the ED via EMS with complaints of sudden onset dizziness this AM. The dizziness is exacerbated with any movement. He endorses some nausea and a posterior headache, but denies vomiting. The patient has never experienced dizziness like this prior to today. He denies any blurry or double vision. He denies any focal weakness or numbness. He is not on any blood thinners. He denies any CP/SOB and has no history of heart attack or stroke. The patient relays that he did have an MRI done 3 days ago, due to chronic hearing loss.        The history is provided by the patient.     Review of patient's allergies indicates:   Allergen Reactions    Morphine Hives     Past Medical History:   Diagnosis Date    Anxiety     BPH (benign prostatic hypertrophy)     Cataract     CKD (chronic kidney disease) stage 3, GFR 30-59 ml/min 5/5/2014    Colon polyps     Diabetes mellitus type II     Emphysema NEC     mild    Gout     Hyperlipidemia     Hypertension     Hypothyroidism     IBS (irritable bowel syndrome)     Kidney stones     JANEEN (obstructive sleep apnea)     Plantar fasciitis     Reflux      Past Surgical History:   Procedure Laterality Date    CATARACT EXTRACTION  1/28/2013    RIGHT EYE    CATARACT EXTRACTION      left eye    COLONOSCOPY  Sep 2011    Repeat recommended in 5 years    COLONOSCOPY N/A 10/10/2016    Procedure: COLONOSCOPY;  Surgeon: Noah Colunga MD;  Location: [facility] ([address] [place] [address]);  Service: Endoscopy;  Laterality: N/A;  PM Prep    KIDNEY STONE SURGERY       Family History   Problem Relation Age of Onset     Cancer Brother         non-hodgkins T cell lymphoma    Diabetes Mother     Macular degeneration Brother     Coronary artery disease Neg Hx     Colon cancer Neg Hx     Prostate cancer Neg Hx     Esophageal cancer Neg Hx      Social History   Substance Use Topics    Smoking status: Former Smoker     Years: 40.00     Quit date: 9/4/2007    Smokeless tobacco: Never Used    Alcohol use 0.0 oz/week      Comment: occasionally     Review of Systems   Constitutional: Negative for chills and fever.   HENT: Positive for hearing loss (at baseline). Negative for sore throat.    Eyes: Negative for photophobia, pain and visual disturbance.   Respiratory: Negative for chest tightness and shortness of breath.    Cardiovascular: Negative for chest pain, palpitations and leg swelling.   Gastrointestinal: Positive for nausea. Negative for abdominal pain, constipation, diarrhea and vomiting.   Genitourinary: Negative for dysuria, frequency and urgency.   Musculoskeletal: Negative for back pain.   Skin: Negative for rash and wound.   Neurological: Positive for dizziness and headaches. Negative for tremors, syncope, speech difficulty, weakness and numbness.   Hematological: Does not bruise/bleed easily.   Psychiatric/Behavioral: Negative for agitation, behavioral problems and confusion.       Physical Exam     Initial Vitals [07/27/18 1015]   BP Pulse Resp Temp SpO2   130/78 70 18 -- 96 %      MAP       --         Physical Exam    Nursing note and vitals reviewed.  Constitutional: He appears well-developed and well-nourished. He is not diaphoretic. No distress.   HENT:   Head: Normocephalic and atraumatic.   Mouth/Throat: Oropharynx is clear and moist.   Eyes: EOM are normal. Pupils are equal, round, and reactive to light.   Neck: No tracheal deviation present.   Cardiovascular: Normal rate, regular rhythm, normal heart sounds and intact distal pulses.   Pulmonary/Chest: Breath sounds normal. No stridor. No respiratory distress.    Abdominal: Soft. He exhibits no distension and no mass. There is no tenderness.   Musculoskeletal: Normal range of motion. He exhibits no edema.   Neurological: He is alert and oriented to person, place, and time. He has normal strength. No cranial nerve deficit or sensory deficit. He exhibits normal muscle tone. GCS eye subscore is 4. GCS verbal subscore is 5. GCS motor subscore is 6.   Normal FNF.  Gait deferred.   Skin: Skin is warm and dry. Capillary refill takes less than 2 seconds. No rash noted.   Psychiatric: He has a normal mood and affect. His behavior is normal. Thought content normal.         ED Course   Procedures  Labs Reviewed   COMPREHENSIVE METABOLIC PANEL - Abnormal; Notable for the following:        Result Value    CO2 30 (*)     Glucose 171 (*)     Anion Gap 4 (*)     eGFR if non  55 (*)     All other components within normal limits   LIPID PANEL - Abnormal; Notable for the following:     Triglycerides 263 (*)     All other components within normal limits   POCT GLUCOSE - Abnormal; Notable for the following:     POC Glucose 156 (*)     All other components within normal limits   POCT GLUCOSE - Abnormal; Notable for the following:     POCT Glucose 199 (*)     All other components within normal limits   POCT GLUCOSE - Abnormal; Notable for the following:     POCT Glucose 156 (*)     All other components within normal limits   CBC WITHOUT DIFFERENTIAL   URINALYSIS   LIPID PANEL   PROTIME-INR   TSH   TSH   PROTIME-INR     EKG Readings: (Independently Interpreted)   Previous EKG Date: 11/2016. Rhythm: Normal Sinus Rhythm. Heart Rate: 63. Ectopy: No Ectopy. Conduction: Normal. ST Segments: Normal ST Segments. T Waves: Normal. Clinical Impression: Normal Sinus Rhythm   No ischemia   Normal intervals   Compared with most recent EKG. Grossly stable. No significant changes.          Imaging Results          MRI Brain (Stroke Protocol) Without Contrast (Final result)  Result time 07/27/18  13:54:27    Final result by Danny Zambrano MD (07/27/18 13:54:27)                 Impression:      No acute intracranial abnormality.    Paranasal sinus disease and trace mastoid fluid.      Electronically signed by: Danny Zambrano MD  Date:    07/27/2018  Time:    13:54             Narrative:    EXAMINATION:  MRI BRAIN (STROKE PROTOCOL) WITHOUT CONTRAST    CLINICAL HISTORY:  acute onset dizziness;    TECHNIQUE:  Multiplanar, multisequence MRI of the brain without contrast material.    COMPARISON:  07/24/2018    FINDINGS:  There is no midline shift, hydrocephalus or mass effect.  There is no restricted diffusion to suggest acute infarction.  There is no evidence of recent intracranial hemorrhage.  There is no evidence of a space-occupying mass.  No abnormal extra-axial fluid collections.  There are moderate chronic microvascular ischemic changes.  There is patchy paranasal sinus disease and trace mastoid effusions.  Sellar region and craniocervical junction demonstrate no significant abnormality.  Major flow voids appear patent.  Orbits demonstrate no significant abnormality.                               X-Ray Chest AP Portable (Final result)  Result time 07/27/18 12:20:46    Final result by Antonio Butcher MD (07/27/18 12:20:46)                 Impression:      1. Coarse interstitial attenuation, similar to the previous exam, no large focal consolidation.      Electronically signed by: Antonio Butcher MD  Date:    07/27/2018  Time:    12:20             Narrative:    EXAMINATION:  XR CHEST AP PORTABLE    CLINICAL HISTORY:  Stroke;    TECHNIQUE:  Single frontal view of the chest was performed.    COMPARISON:  05/08/2017    FINDINGS:  The cardiomediastinal silhouette is not enlarged.  There is no pleural effusion.  The trachea is midline.  The lungs are symmetrically expanded bilaterally with coarse interstitial attenuation and mild bilateral basilar subsegmental atelectasis..  No large focal consolidation seen.   There is no pneumothorax.  The osseous structures are remarkable for degenerative change..                               CT Head Without Contrast (Final result)  Result time 07/27/18 11:08:49    Final result by Danny Zambrano MD (07/27/18 11:08:49)                 Impression:      No acute intracranial abnormality.    Patchy paranasal sinus disease.      Electronically signed by: Danny Zambrano MD  Date:    07/27/2018  Time:    11:08             Narrative:    EXAMINATION:  CT HEAD WITHOUT CONTRAST    CLINICAL HISTORY:  acute onset dizziness;    TECHNIQUE:  Low dose axial images were obtained through the head.  Coronal and sagittal reformations were also performed. Contrast was not administered.    COMPARISON:  07/24/2018    FINDINGS:  There is no midline shift, hydrocephalus or mass effect.  There is no evidence of acute intracranial hemorrhage or acute major vascular territory infarct.  Mild scattered chronic microvascular ischemic changes.  There is no evidence of a space-occupying mass.  There is no abnormal extra-axial fluid collection.  There is mucosal membrane thickening and aerated secretions in the left maxillary sinus.  There is mucosal membrane thickening in the ethmoid sinuses.  The calvarium is intact.                                 Medical Decision Making:   History:   Old Medical Records: I decided to obtain old medical records.  Initial Assessment:   Patient is a 72 y.o. male presenting to ED with a PMHx of chonic hearing loss and sinus infection. He has complaints of sudden onset dizziness this morning.    On arrival with no focal deficits.  Plan to obtain  basic labs, imaging including head CT. Will discuss with vascular neurology.   Will continue to monitor closely and reassess.    Differential Diagnosis:   Differential Diagnosis includes, but is not limited to:  Peripheral vertigo (labyrinthitis, vestibular neuritis, BPPV, Meniere's disease), cerebellar stroke/CVA, TIA, SAH, carotid artery  dissection, intracranial mass, medication reaction/noncompliance, substance abuse, depression, electrolyte abnormality, anemia, hemorrhage, renal failure, hepatic failure, sepsis/infection, UTI, viral illness, arrhythmia, CHF, thyroid disease, dehydration, depression, chronic disease.    Clinical Tests:   Lab Tests: Ordered and Reviewed  Radiological Study: Ordered and Reviewed  Medical Tests: Ordered and Reviewed  ED Management:  CT head without acute process.  Discussed with vascular neurology, who evaluated at bedside, and recommend stat Mri to rule out posterior circulation stroke.  MRI negative.    On reassessment, patient still reporting significant dizziness.  After PO meclizine, Valium, and IV Compazine, as well as multiple attempts with Epley maneuver, patient still persistently dizzy and unable to stand.    ENT consulted and recommend scheduled meclizine/Valium for symptomatic treatment.    Requested observation placement for further management of severe peripheral vertigo.            Scribe Attestation:   Scribe #1: I performed the above scribed service and the documentation accurately describes the services I performed. I attest to the accuracy of the note.               Clinical Impression:   The primary encounter diagnosis was Vertigo. Diagnoses of Dizziness, Ataxia, and Peripheral vertigo involving left ear were also pertinent to this visit.              I, Dr. William Cherry, personally performed the services described in this documentation. All medical record entries made by the scribe were at my direction and in my presence.  I have reviewed the chart and agree that the record reflects my personal performance and is accurate and complete.     William Cherry MD.               William Cherry MD  08/01/18 [id number]

## 2018-07-27 NOTE — HPI
72 year old male known to me for history of left SNHL, ETD presented to the ED with acute onset vertigo upon waking up this morning. I saw the patient yesterday in clinic for routine follow up.  No complaints of vertigo at that visit.  He was given Augmentin for maxillary sinusitis noted on MRI brain 7/11/2018 which was ordered for asymmetric SNHL.  There were no evidence of CPA lesions on this study.   He states that he rolled over to get out of bed and noted that he felt off balance and could not stand up or walk. The dizziness is exacerbated with any movement. He endorses some nausea and a headache, but denies vomiting. The patient has never experienced dizziness like this prior to today. He denies blurry or double vision. He is not on any blood thinners. He has no history of heart attack or stroke. . He denies any tinnitus, aural fullness, or hearing changes currently.  No pertinent SHx noted.

## 2018-07-27 NOTE — SUBJECTIVE & OBJECTIVE
"Medications:  Continuous Infusions:  Scheduled Meds:  PRN Meds:     No current facility-administered medications on file prior to encounter.      Current Outpatient Prescriptions on File Prior to Encounter   Medication Sig    allopurinol (ZYLOPRIM) 100 MG tablet Take 1 tablet (100 mg total) by mouth once daily.    amlodipine (NORVASC) 10 MG tablet TAKE 1 TABLET BY MOUTH EVERY DAY    amoxicillin-clavulanate 875-125mg (AUGMENTIN) 875-125 mg per tablet Take 1 tablet by mouth 2 (two) times daily. for 14 days    blood sugar diagnostic Strp 1 strip by Misc.(Non-Drug; Combo Route) route 3 (three) times daily.    citalopram (CELEXA) 20 MG tablet TAKE 1 TABLET (20 MG TOTAL) BY MOUTH ONCE DAILY.    dulaglutide (TRULICITY) 1.5 mg/0.5 mL PnIj Inject 1.5 mg into the skin once a week.    dutasteride-tamsulosin (VIVIENNE) 0.5-0.4 mg CM24 Take 1 capsule by mouth every evening.    ergocalciferol (ERGOCALCIFEROL) 50,000 unit Cap Take 1 capsule (50,000 Units total) by mouth every 7 days.    fish oil-omega-3 fatty acids 300-1,000 mg capsule Take 2 g by mouth once daily.    irbesartan (AVAPRO) 150 MG tablet Take 150 mg by mouth once daily.    lancets Misc Test sugars once daily.  Dispense 100 lancets (insurance covered brand)    levothyroxine (SYNTHROID) 75 MCG tablet TAKE 1 TABLET (75 MCG TOTAL) BY MOUTH ONCE DAILY.    magnesium oxide (MAG-OX) 400 mg tablet Take 1 tablet by mouth once daily.    pen needle, diabetic 31 gauge x 1/4" Ndle Use weekly with trulicity pen    potassium citrate 15 mEq TbSR once daily.     VITAMIN B-6 100 MG tablet 1 TABLET ONCE A DAY       Review of patient's allergies indicates:   Allergen Reactions    Morphine Hives       Past Medical History:   Diagnosis Date    Anxiety     BPH (benign prostatic hypertrophy)     Cataract     CKD (chronic kidney disease) stage 3, GFR 30-59 ml/min 5/5/2014    Colon polyps     Diabetes mellitus type II     Emphysema NEC     mild    Gout     Hyperlipidemia "     Hypertension     Hypothyroidism     IBS (irritable bowel syndrome)     Kidney stones     JANEEN (obstructive sleep apnea)     Plantar fasciitis     Reflux      Past Surgical History:   Procedure Laterality Date    CATARACT EXTRACTION  1/28/2013    RIGHT EYE    CATARACT EXTRACTION      left eye    COLONOSCOPY  Sep 2011    Repeat recommended in 5 years    COLONOSCOPY N/A 10/10/2016    Procedure: COLONOSCOPY;  Surgeon: Noah Colunga MD;  Location: [facility] ([address] [place] [address]);  Service: Endoscopy;  Laterality: N/A;  PM Prep    KIDNEY STONE SURGERY       Family History     Problem Relation (Age of Onset)    Cancer Brother    Diabetes Mother    Macular degeneration Brother        Social History Main Topics    Smoking status: Former Smoker     Years: 40.00     Quit date: 9/4/2007    Smokeless tobacco: Never Used    Alcohol use 0.0 oz/week      Comment: occasionally    Drug use: No    Sexual activity: No     Review of Systems   Constitutional: Negative for activity change and unexpected weight change.   HENT: Positive for congestion and hearing loss.    Eyes: Negative for pain and visual disturbance.   Respiratory: Negative for shortness of breath and stridor.    Cardiovascular: Negative for chest pain and leg swelling.   Gastrointestinal: Negative for abdominal pain and nausea.   Endocrine: Negative for cold intolerance and heat intolerance.   Musculoskeletal: Negative for gait problem and joint swelling.   Skin: Negative for color change and wound.   Allergic/Immunologic: Negative for immunocompromised state.   Neurological: Positive for dizziness. Negative for seizures and weakness.   Hematological: Negative for adenopathy. Does not bruise/bleed easily.   Psychiatric/Behavioral: Negative for agitation and confusion.     Objective:     Vital Signs (Most Recent):  Pulse: 79 (07/27/18 1301)  Resp: 18 (07/27/18 1015)  BP: 139/81 (07/27/18 1301)  SpO2: 98 % (07/27/18 1301) Vital Signs (24h Range):  Pulse:   [62-79] 79  Resp:  [18] 18  SpO2:  [95 %-99 %] 98 %  BP: (118-139)/(73-81) 139/81     Weight: 98.4 kg (216 lb 14.9 oz)  Body mass index is 32.04 kg/m².       Physical Exam     Constitutional: Well appearing / communicating.  NAD.  Eyes: EOM I Bilaterally  Head/Face: Normocephalic.  Negative paranasal sinus pressure/tenderness.  Salivary glands WNL.  House Brackmann I Bilaterally.    Right Ear: External Auditory Canal WNL,TM w/o masses/lesions/perforations.  Auricle WNL.  Left Ear: External Auditory Canal WNL,TM w/o masses/lesions/perforations. Auricle WNL.  No spontaneous nystagmus; negative head thrust; positive Dixx-Hallpike to the left.   Nose: No gross nasal septal deviation. Inferior Turbinates 3+ bilaterally. No septal perforation. No masses/lesions. External nasal skin without masses/lesions.  Oral Cavity: Gingiva/lips WNL.  FOM Soft, no masses palpated. Oral Tongue mobile. Hard Palate WNL.   Oropharynx: BOT WNL. No masses/lesions noted. Tonsillar fossa/pharyngeal wall without lesions. Posterior oropharynx WNL.  Soft palate without masses. Midline uvula.   Neck/Lymphatic: No LAD I-VI bilaterally.  No thyromegaly.  No masses noted on exam.    Mirror laryngoscopy/nasopharyngoscopy: Active gag reflex.  Unable to perform.    Neuro/Psychiatric: AOx3.  Normal mood and affect.   Cardiovascular: Normal carotid pulses bilaterally, no increasing jugular venous distention noted at cervical region bilaterally.    Respiratory: Normal respiratory effort, no stridor, no retractions noted.    Significant Labs:  CBC:   Recent Labs  Lab 07/27/18  1033   WBC 7.96   RBC 5.21   HGB 14.6   HCT 45.2      MCV 87   MCH 28.0   MCHC 32.3     CMP:   Recent Labs  Lab 07/27/18  1033   *   CALCIUM 8.9   ALBUMIN 3.8   PROT 7.0      K 3.6   CO2 30*      BUN 13   CREATININE 1.3   ALKPHOS 98   ALT 33   AST 22   BILITOT 0.9       Significant Diagnostics:  CT head 7/27/2018: No acute intracranial abnormality. Patchy  paranasal sinus disease.  MRI brain 7/27/2018: No acute intracranial abnormality. Paranasal sinus disease and trace mastoid fluid.

## 2018-07-27 NOTE — ASSESSMENT & PLAN NOTE
Low NIHSS  Recommend MRI brain   Consider non stroke causes of vertigo.  Admit to inpatient for work up.

## 2018-07-27 NOTE — H&P
[facility] Medicine H&P Note     Admitting Team: [facility] Hospitalist Team B  Attending Physician: William Cherry MD  Resident: Eddie Almendarez MD  Intern: Karen Cramer DO    Date of Admit: 7/27/2018    Chief Complaint     Seizures (possible seizure pta.) x 1 day       Subjective:      History of Present Illness:  Kitty León is a 62 y.o.  male who  has a past medical history of High cholesterol and Hypertension.. The patient presented to [facility] on 7/27/2018 with a primary complaint of Seizures (possible seizure pta.)    Patient was in their usual state of health until this morning.  He woke up at 6:45am and felt dizzy if he moved from side to side, but was able to stand up, and get out of bed to sit and drink his tea.  He does state that while he was standing he felt unsteady. Because he felt that way, he went back to his bed to lay down.  The sitting to lying down position change made him dizzy again.  There are no focal areas of weakness and patient does not note slurred speech or difficulty speaking.  Patient denies any swallowing difficulty.      Patient has a PMH of diabetes on trulicity, never used insulin and HTN on norvasc and avapro. He is unsure if he takes HCTZ.  Any time he tries to move he gets dizzy.  He has had the Epley maneuver performed on him twice; the first time he says that it helped, the second time it made him nauseous and he vomited.  Currently the patient states that the dizziness is not as severe.  While lying down he does not feel dizzy but if he gets up dizziness resumes.     Patient also has a headache that feels like a pressure on the top of his head, the onset correlates with the dizziness.  Its mild.  It feels similar to his tension headache that hes had in the past.     MRA was performed on the 7/24 for his hearing loss L>R that has been steadily declining for the last 2 years.  He was prescribed hearing aids yesterday.      Patient denies fever, no chest  pain, notes that he has been gaining some weight, no SOB, no cough no sick contact.  No nausea at home, no diarrhea, no constpiation or melena hematochezia.  Notes some gas for the past couple days.  No urinary complaints.  Takes combo finasteride and flomax.     Patient notes recent travel to [place].  Coincidentally, wife also currently has vertigo and had an MRI recently.    Patient's surgical history includes cataract surgery. He is a previous smoker, quit 11 years ago.  Occasional drinker. No drug use.  Not sexually active, no hx of sti.     Past Medical History:  Past Medical History:   Diagnosis Date    High cholesterol     Hypertension        Past Surgical History:  Past Surgical History:   Procedure Laterality Date    SKIN GRAFT         Allergies:  Review of patient's allergies indicates:  No Known Allergies    Home Medications:  Prior to Admission medications    Medication Sig Start Date End Date Taking? Authorizing Provider   amLODIPine (NORVASC) 5 MG tablet Take 5 mg by mouth once daily.     Historical Provider, MD   atorvastatin (LIPITOR) 40 MG tablet Take 1 tablet (40 mg total) by mouth once daily. Substitute for simvastatin. 12/22/17 12/22/18  Jay Mcclellan MD   chlorzoxazone (PARAFON FORTE DSC) 500 mg Tab Take 500 mg by mouth 2 (two) times daily as needed (muscle spasms).    Historical Provider, MD   cyanocobalamin, vitamin B-12, (VITAMIN B-12) 50 mcg tablet Take 50 mcg by mouth once daily.    Historical Provider, MD   ergocalciferol (ERGOCALCIFEROL) 50,000 unit Cap Take 1 capsule (50,000 Units total) by mouth every 7 days. Take on Tuesdays. 12/26/17   Jay Mcclellan MD   hydrochlorothiazide (HYDRODIURIL) 25 MG tablet Take 25 mg by mouth once daily.    Historical Provider, MD   loratadine (CLARITIN) 10 mg tablet Take 1 tablet by mouth daily as needed for Allergies.     Historical Provider, MD   moxifloxacin (AVELOX) 400 mg tablet Take 1 tablet (400 mg total) by mouth once daily. 12/22/17    "Jay Mcclellan MD   oxyCODONE (ROXICODONE) 10 mg Tab immediate release tablet Take 1 tablet (10 mg total) by mouth every 4 (four) hours as needed. 17   Jay Mcclellan MD   potassium chloride (K-TAB) 20 mEq Take 20 mEq by mouth once daily.    Historical Provider, MD   ranitidine (ZANTAC) 150 MG capsule Take 150 mg by mouth daily as needed for Heartburn.     Historical Provider, MD       Family History:  History reviewed. No pertinent family history.    Social History:  Social History   Substance Use Topics    Smoking status: Never Smoker    Smokeless tobacco: Never Used    Alcohol use 2.4 oz/week     4 Cans of beer per week      Comment: 6 pack or vodka daily       Review of Systems:  Pertinent items are noted in HPI. All other systems are reviewed and are negative.    Health Maintaince :   Primary Care Physician:     Immunizations:   TDap up to date  Flu not up todate  Pna prevnar 13    Cancer Screening:  Colonoscopy: up to date     Objective:   Last 24 Hour Vital Signs:  BP  Min: 120/53  Max: 139/70  Temp  Av.4 °F (36.9 °C)  Min: 98.3 °F (36.8 °C)  Max: 98.4 °F (36.9 °C)  Pulse  Av.6  Min: 71  Max: 79  Resp  Av  Min: 18  Max: 18  SpO2  Av %  Min: 96 %  Max: 98 %  Height  Av' (182.9 cm)  Min: 6' (182.9 cm)  Max: 6' (182.9 cm)  Weight  Av.8 kg (187 lb)  Min: 84.8 kg (187 lb)  Max: 84.8 kg (187 lb)  Body mass index is 25.36 kg/m².  No intake/output data recorded.    Physical Examination:  BP (!) 150/68   Pulse 69   Resp 18   Ht 5' 9" (1.753 m)   Wt 98.4 kg (216 lb 14.9 oz)   SpO2 99%   BMI 32.04 kg/m²     General Appearance:    Alert, cooperative, no distress, appears stated age   Head:    Normocephalic, without obvious abnormality, atraumatic   Eyes:    PERRL, conjunctiva/corneas clear, EOM's intact, fundi     benign, both eyes        Ears:    Normal TM's and external ear canals, both ears   Nose:   Nares normal, septum midline, mucosa normal, no drainage    or sinus " tenderness   Throat:   Lips, mucosa, and tongue normal; teeth and gums normal   Neck:   Supple, symmetrical, trachea midline, no adenopathy;        thyroid:  No enlargement/tenderness/nodules; no carotid    bruit or JVD   Back:     Symmetric, no curvature, ROM normal, no CVA tenderness   Lungs:     Clear to auscultation bilaterally, respirations unlabored   Chest wall:    No tenderness or deformity   Heart:    Regular rate and rhythm, S1 and S2 normal, no murmur, rub   or gallop   Abdomen:     Soft, non-tender, bowel sounds active all four quadrants,     no masses, no organomegaly   Extremities:   Extremities normal, atraumatic, no cyanosis or edema   Pulses:   2+ and symmetric all extremities   Skin:   Skin color, texture, turgor normal, no rashes or lesions   Lymph nodes:   Cervical, supraclavicular, and axillary nodes normal   Neurologic:   CNII-XII intact. Normal strength, sensation and reflexes       throughout         Laboratory:  Most Recent Data:  CBC: Lab Results   Component Value Date    WBC 8.11 07/27/2018    HGB 13.9 (L) 07/27/2018    HCT 37.7 (L) 07/27/2018     07/27/2018    MCV 93 07/27/2018    RDW 15.8 (H) 07/27/2018       BMP: Lab Results   Component Value Date     07/27/2018    K 4.3 07/27/2018     07/27/2018    CO2 26 07/27/2018    BUN 12 07/27/2018    CREATININE 1.2 07/27/2018     (H) 07/27/2018    CALCIUM 9.7 07/27/2018    MG 2.1 12/22/2017    PHOS 2.8 12/22/2017     LFTs: Lab Results   Component Value Date    PROT 7.7 07/27/2018    ALBUMIN 4.1 07/27/2018    BILITOT 1.8 (H) 07/27/2018    AST 28 07/27/2018    ALKPHOS 64 07/27/2018    ALT 17 07/27/2018     Coags:   Lab Results   Component Value Date    INR 1.0 07/27/2018     FLP: No results found for: CHOL, HDL, LDLCALC, TRIG, CHOLHDL  DM: Lab Results   Component Value Date    CREATININE 1.2 07/27/2018     Thyroid: Lab Results   Component Value Date    TSH 1.9 08/20/2005     Anemia: No results found for: IRON, TIBC,  FERRITIN, JIEVBVLB26, FOLATE  Cardiac: Lab Results   Component Value Date    TROPONINI <0.006 07/27/2018    BNP 42 07/27/2018     Urinalysis: Lab Results   Component Value Date    LABURIN No significant growth 12/19/2017    COLORU Yellow 07/27/2018    SPECGRAV 1.020 07/27/2018    NITRITE Negative 07/27/2018    KETONESU Negative 07/27/2018    UROBILINOGEN 1.0 07/27/2018    WBCUA 1 12/17/2017       Trended Lab Data:    Recent Labs  Lab 07/27/18  1408   WBC 8.11   HGB 13.9*   HCT 37.7*      MCV 93   RDW 15.8*      K 4.3      CO2 26   BUN 12   CREATININE 1.2   *   PROT 7.7   ALBUMIN 4.1   BILITOT 1.8*   AST 28   ALKPHOS 64   ALT 17       Trended Cardiac Data:    Recent Labs  Lab 07/27/18  1408   TROPONINI <0.006   BNP 42       Microbiology Data:    Other Results:  EKG (my interpretation): normal EKG, normal sinus rhythm, unchanged from previous tracings.    Radiology:  Imaging Results          CT Head Without Contrast (Final result)  Result time 07/27/18 14:38:12    Final result by Saúl Bacon MD (07/27/18 14:38:12)                 Impression:      No acute intracranial processes.      Electronically signed by: Saúl Bacon MD  Date:    07/27/2018  Time:    14:38             Narrative:    EXAMINATION:  CT HEAD WITHOUT CONTRAST    CLINICAL HISTORY:  Confusion/delirium, altered LOC, unexplained;    TECHNIQUE:  Low dose axial images were obtained through the head.  Coronal and sagittal reformations were also performed. Contrast was not administered.    COMPARISON:  None.    FINDINGS:  There are symmetric lateral ventricles without hydrocephalus.  No acute intracranial hemorrhages or abnormal extra-axial fluid collections or midline shift or herniations.  Within the parenchyma there is no hemorrhages or signs for acute major vessel territory infarctions or neoplasms.  The gray/white matter differentiation is preserved throughout the cerebral hemispheres.  Basal ganglia are symmetric and within  normal limits.    In the posterior fossa no hemorrhages or definite signs for acute infarctions noted.    There is a partially empty sella.  No suprasellar masses or pineal region masses.  The visualized paranasal air sinuses are clear.  There is normal pneumatization of the mastoids.                               X-Ray Chest AP Portable (SOB) (Final result)  Result time 07/27/18 13:38:34    Final result by Saúl Bacon MD (07/27/18 13:38:34)                 Impression:      1. Mild pulmonary vascular congestion.  2. The near scar-like changes in the left costophrenic angle.      Electronically signed by: Saúl Bacon MD  Date:    07/27/2018  Time:    13:38             Narrative:    EXAMINATION:  XR CHEST AP PORTABLE    CLINICAL HISTORY:  Altered mental status;    TECHNIQUE:  Single frontal view of the chest was performed.    COMPARISON:  Chest 12/18/2017    FINDINGS:  Again in the left costophrenic angle there is a scar-like density noted, unchanged from previous study.    Heart size at the upper limits of normal.  There is tortuosity of the descending thoracic aorta.  Since the previous study there is mild central vascular congestion.  No lobar consolidations or pleural effusions.  There are cardiac monitoring leads over the chest.    There is DJD of the shoulder joints bilaterally.                                 Assessment:     Kitty León is a 62 y.o. male with:  Patient Active Problem List    Diagnosis Date Noted    Seizures 07/27/2018    Fall 01/24/2018    Lumbar disc herniation 12/19/2017    Acute midline thoracic back pain 12/19/2017    Splenic infarction 12/19/2017    Avascular necrosis of bones of both hips 12/19/2017    Acute respiratory failure with hypoxia 12/19/2017    Sepsis with organ dysfunction 12/19/2017    Pneumonia due to Streptococcus pneumoniae 12/19/2017    Closed compression fracture of thoracic vertebra 12/17/2017    Essential hypertension 12/17/2017    HLD (hyperlipidemia)  12/17/2017    JANEEN on CPAP 12/17/2017    Alcohol abuse 12/17/2017    Acute abdomen 12/17/2017    Hyperbilirubinemia 12/17/2017        Plan:     Dizziness  -Neurology consulted to rule out intracranial process/TIA/CVA, NIHSS low  -suspected BPPV, Epley performed by ENT, will follow up outpatient after discharge  -TSH 1.997  -Head and Neck MRA if condition does not improve tomorrow  -ordered carotid US  -ordered meclizine and steroids    HTN  -home meds include norvasc and avapro  -will hold for now    Diabetes  -home meds include trulicity  -A1C and accuchecks    Triglyceridemia  - Today 263, previous varies from 165-324      Code Status:     Full    Karen Cramer, DO  [facility] HO-1    [facility] Hospitalist Pager numbers:   [facility] Hospitalist Medicine Team A (Lam/Kelsey): [phone]  [facility] Hospitalist Medicine Team B (Cris/David):  [phone]

## 2018-07-27 NOTE — CONSULTS
[facility]  Otorhinolaryngology-Head & Neck Surgery  Consult Note    Patient Name: Jerardo Powers  MRN: [id number]  Code Status: No Order  Admission Date: 7/27/2018  Hospital Length of Stay: 0 days  Attending Physician: William Cherry MD  Primary Care Provider: Adams Moore MD    Patient information was obtained from patient, relative(s) and ER records.     Consults  Subjective:     Chief Complaint: Dizziness    History of Present Illness: 72 year old male known to me for history of left SNHL, ETD presented to the ED with acute onset vertigo upon waking up this morning. I saw the patient yesterday in clinic for routine follow up.  No complaints of vertigo at that visit.  He was given Augmentin for maxillary sinusitis noted on MRI brain 7/11/2018 which was ordered for asymmetric SNHL.  There were no evidence of CPA lesions on this study.   He states that he rolled over to get out of bed and noted that he felt off balance and could not stand up or walk. The dizziness is exacerbated with any movement. He endorses some nausea and a headache, but denies vomiting. The patient has never experienced dizziness like this prior to today. He denies blurry or double vision. He is not on any blood thinners. He has no history of heart attack or stroke. . He denies any tinnitus, aural fullness, or hearing changes currently.  No pertinent SHx noted.    Medications:  Continuous Infusions:  Scheduled Meds:  PRN Meds:     No current facility-administered medications on file prior to encounter.      Current Outpatient Prescriptions on File Prior to Encounter   Medication Sig    allopurinol (ZYLOPRIM) 100 MG tablet Take 1 tablet (100 mg total) by mouth once daily.    amlodipine (NORVASC) 10 MG tablet TAKE 1 TABLET BY MOUTH EVERY DAY    amoxicillin-clavulanate 875-125mg (AUGMENTIN) 875-125 mg per tablet Take 1 tablet by mouth 2 (two) times daily. for 14 days    blood sugar diagnostic Strp 1 strip by Misc.(Non-Drug;  "Combo Route) route 3 (three) times daily.    citalopram (CELEXA) 20 MG tablet TAKE 1 TABLET (20 MG TOTAL) BY MOUTH ONCE DAILY.    dulaglutide (TRULICITY) 1.5 mg/0.5 mL PnIj Inject 1.5 mg into the skin once a week.    dutasteride-tamsulosin (VIVIENNE) 0.5-0.4 mg CM24 Take 1 capsule by mouth every evening.    ergocalciferol (ERGOCALCIFEROL) 50,000 unit Cap Take 1 capsule (50,000 Units total) by mouth every 7 days.    fish oil-omega-3 fatty acids 300-1,000 mg capsule Take 2 g by mouth once daily.    irbesartan (AVAPRO) 150 MG tablet Take 150 mg by mouth once daily.    lancets Misc Test sugars once daily.  Dispense 100 lancets (insurance covered brand)    levothyroxine (SYNTHROID) 75 MCG tablet TAKE 1 TABLET (75 MCG TOTAL) BY MOUTH ONCE DAILY.    magnesium oxide (MAG-OX) 400 mg tablet Take 1 tablet by mouth once daily.    pen needle, diabetic 31 gauge x 1/4" Ndle Use weekly with trulicity pen    potassium citrate 15 mEq TbSR once daily.     VITAMIN B-6 100 MG tablet 1 TABLET ONCE A DAY       Review of patient's allergies indicates:   Allergen Reactions    Morphine Hives       Past Medical History:   Diagnosis Date    Anxiety     BPH (benign prostatic hypertrophy)     Cataract     CKD (chronic kidney disease) stage 3, GFR 30-59 ml/min 5/5/2014    Colon polyps     Diabetes mellitus type II     Emphysema NEC     mild    Gout     Hyperlipidemia     Hypertension     Hypothyroidism     IBS (irritable bowel syndrome)     Kidney stones     JANEEN (obstructive sleep apnea)     Plantar fasciitis     Reflux      Past Surgical History:   Procedure Laterality Date    CATARACT EXTRACTION  1/28/2013    RIGHT EYE    CATARACT EXTRACTION      left eye    COLONOSCOPY  Sep 2011    Repeat recommended in 5 years    COLONOSCOPY N/A 10/10/2016    Procedure: COLONOSCOPY;  Surgeon: Noah Colunga MD;  Location: [facility] ([address] [place] [address]);  Service: Endoscopy;  Laterality: N/A;  PM Prep    KIDNEY STONE SURGERY       Family " History     Problem Relation (Age of Onset)    Cancer Brother    Diabetes Mother    Macular degeneration Brother        Social History Main Topics    Smoking status: Former Smoker     Years: 40.00     Quit date: 9/4/2007    Smokeless tobacco: Never Used    Alcohol use 0.0 oz/week      Comment: occasionally    Drug use: No    Sexual activity: No     Review of Systems   Constitutional: Negative for activity change and unexpected weight change.   HENT: Positive for congestion and hearing loss.    Eyes: Negative for pain and visual disturbance.   Respiratory: Negative for shortness of breath and stridor.    Cardiovascular: Negative for chest pain and leg swelling.   Gastrointestinal: Negative for abdominal pain and nausea.   Endocrine: Negative for cold intolerance and heat intolerance.   Musculoskeletal: Negative for gait problem and joint swelling.   Skin: Negative for color change and wound.   Allergic/Immunologic: Negative for immunocompromised state.   Neurological: Positive for dizziness. Negative for seizures and weakness.   Hematological: Negative for adenopathy. Does not bruise/bleed easily.   Psychiatric/Behavioral: Negative for agitation and confusion.     Objective:     Vital Signs (Most Recent):  Pulse: 79 (07/27/18 1301)  Resp: 18 (07/27/18 1015)  BP: 139/81 (07/27/18 1301)  SpO2: 98 % (07/27/18 1301) Vital Signs (24h Range):  Pulse:  [62-79] 79  Resp:  [18] 18  SpO2:  [95 %-99 %] 98 %  BP: (118-139)/(73-81) 139/81     Weight: 98.4 kg (216 lb 14.9 oz)  Body mass index is 32.04 kg/m².       Physical Exam     Constitutional: Well appearing / communicating.  NAD.  Eyes: EOM I Bilaterally  Head/Face: Normocephalic.  Negative paranasal sinus pressure/tenderness.  Salivary glands WNL.  House Brackmann I Bilaterally.    Right Ear: External Auditory Canal WNL,TM w/o masses/lesions/perforations.  Auricle WNL.  Left Ear: External Auditory Canal WNL,TM w/o masses/lesions/perforations. Auricle WNL.  No  spontaneous nystagmus; negative head thrust; positive Dixx-Hallpike to the left.   Nose: No gross nasal septal deviation. Inferior Turbinates 3+ bilaterally. No septal perforation. No masses/lesions. External nasal skin without masses/lesions.  Oral Cavity: Gingiva/lips WNL.  FOM Soft, no masses palpated. Oral Tongue mobile. Hard Palate WNL.   Oropharynx: BOT WNL. No masses/lesions noted. Tonsillar fossa/pharyngeal wall without lesions. Posterior oropharynx WNL.  Soft palate without masses. Midline uvula.   Neck/Lymphatic: No LAD I-VI bilaterally.  No thyromegaly.  No masses noted on exam.    Mirror laryngoscopy/nasopharyngoscopy: Active gag reflex.  Unable to perform.    Neuro/Psychiatric: AOx3.  Normal mood and affect.   Cardiovascular: Normal carotid pulses bilaterally, no increasing jugular venous distention noted at cervical region bilaterally.    Respiratory: Normal respiratory effort, no stridor, no retractions noted.    Significant Labs:  CBC:   Recent Labs  Lab 07/27/18  1033   WBC 7.96   RBC 5.21   HGB 14.6   HCT 45.2      MCV 87   MCH 28.0   MCHC 32.3     CMP:   Recent Labs  Lab 07/27/18  1033   *   CALCIUM 8.9   ALBUMIN 3.8   PROT 7.0      K 3.6   CO2 30*      BUN 13   CREATININE 1.3   ALKPHOS 98   ALT 33   AST 22   BILITOT 0.9       Significant Diagnostics:  CT head 7/27/2018: No acute intracranial abnormality. Patchy paranasal sinus disease.  MRI brain 7/27/2018: No acute intracranial abnormality. Paranasal sinus disease and trace mastoid fluid.    Assessment/Plan:     Vertigo    Neurology consult to rule out intracranial process/TIA/CVA.  Exam points toward BPPV As as the cause of the vertigo.  I have performed an Epley maneuver at bedside.   He may follow up with me after discharge.           VTE Risk Mitigation     None          Thank you for your consult.     Radha Vogt MD  Otorhinolaryngology-Head & Neck Surgery  [facility]

## 2018-07-27 NOTE — SUBJECTIVE & OBJECTIVE
"  Woke up with symptoms?: yes  Last known normal: Last Known Normal Date: 07/26/18 Last Known Normal Time: 2300    Recent bleeding noted: no  Does the patient take any Blood Thinners? no  Medications: No relevant medications      Past Medical History: hypertension and diabetes    Past Surgical History: no relevant surgical history    Family History: no relevant history    Social History: no smoking, no drinking, no drugs and former smoker    Allergies: Morphine morphine    Review of Systems   Review of Symptoms:   Constitutional: Denies fevers, weight loss, chills, or weakness.   Eyes: Denies changes in vision.   ENT: Denies dysphagia, nasal discharge, ear pain or discharge.   Cardiovascular: Denies chest pain, palpitations, orthopnea, or claudication.   Respiratory: Denies shortness of breath, cough, hemoptysis, or wheezing.   GI: Denies nausea/vomitting, hematochezia, melena, abd pain, or changes in appetite.   : Denies dysuria, incontinence, or hematuria.   Musculoskeletal: Denies joint pain or myalgias.   Skin/breast: Denies rashes, lumps, lesions, or discharge.   Neurologic: Denies headache, dizziness, vertigo, or paresthesias.   Psychiatric: Denies changes in mood or hallucinations.   Endocrine: Denies polyuria, polydipsia, heat/cold intolerance.   Hematologic/Lymph: Denies lymphadenopathy, easy bruising or easy bleeding.   Allergic/Immunologic: Denies rash, rhinitis    Objective:   Vitals: Blood pressure 130/78, pulse 70, resp. rate 18, height 5' 9" (1.753 m), weight 98.4 kg (216 lb 14.9 oz), SpO2 96 %.     CT READ: Yes  No hemmorhage. No mass effect. No early infarct signs.     Physical Exam   Constitutional: He is oriented to person, place, and time. He appears well-developed and well-nourished.   HENT:   Head: Normocephalic and atraumatic.   Eyes: EOM are normal.   Pulmonary/Chest: Effort normal.   Neurological: He is alert and oriented to person, place, and time.         "

## 2018-07-27 NOTE — HPI
73 yo  Male presents with dizziness on awakening at 645 am. Last known normal 11pm last night. Associated headache (pressure, generalized), unsteadiness with ambulation.     PMHx  HTN  DM

## 2018-07-28 VITALS
HEART RATE: 82 BPM | OXYGEN SATURATION: 98 % | BODY MASS INDEX: 31.7 KG/M2 | TEMPERATURE: 98 F | DIASTOLIC BLOOD PRESSURE: 60 MMHG | RESPIRATION RATE: 18 BRPM | WEIGHT: 214 LBS | SYSTOLIC BLOOD PRESSURE: 114 MMHG | HEIGHT: 69 IN

## 2018-07-28 LAB
ALBUMIN SERPL BCP-MCNC: 3.6 G/DL
ALP SERPL-CCNC: 92 U/L
ALT SERPL W/O P-5'-P-CCNC: 32 U/L
ANION GAP SERPL CALC-SCNC: 11 MMOL/L
AST SERPL-CCNC: 19 U/L
BASOPHILS # BLD AUTO: 0.01 K/UL
BASOPHILS NFR BLD: 0.1 %
BILIRUB SERPL-MCNC: 0.7 MG/DL
BUN SERPL-MCNC: 12 MG/DL
CALCIUM SERPL-MCNC: 9.5 MG/DL
CHLORIDE SERPL-SCNC: 101 MMOL/L
CO2 SERPL-SCNC: 24 MMOL/L
CREAT SERPL-MCNC: 1.3 MG/DL
DIFFERENTIAL METHOD: ABNORMAL
EOSINOPHIL # BLD AUTO: 0 K/UL
EOSINOPHIL NFR BLD: 0.1 %
ERYTHROCYTE [DISTWIDTH] IN BLOOD BY AUTOMATED COUNT: 12.7 %
EST. GFR  (AFRICAN AMERICAN): >60 ML/MIN/1.73 M^2
EST. GFR  (NON AFRICAN AMERICAN): 55 ML/MIN/1.73 M^2
GLUCOSE SERPL-MCNC: 220 MG/DL
HCT VFR BLD AUTO: 48.5 %
HGB BLD-MCNC: 16.6 G/DL
LYMPHOCYTES # BLD AUTO: 1.3 K/UL
LYMPHOCYTES NFR BLD: 10.4 %
MCH RBC QN AUTO: 29.3 PG
MCHC RBC AUTO-ENTMCNC: 34.2 G/DL
MCV RBC AUTO: 86 FL
MONOCYTES # BLD AUTO: 0.1 K/UL
MONOCYTES NFR BLD: 0.6 %
NEUTROPHILS # BLD AUTO: 10.8 K/UL
NEUTROPHILS NFR BLD: 88.2 %
PLATELET # BLD AUTO: 237 K/UL
PMV BLD AUTO: 11.7 FL
POCT GLUCOSE: 202 MG/DL (ref 70–110)
POCT GLUCOSE: 227 MG/DL (ref 70–110)
POTASSIUM SERPL-SCNC: 4.4 MMOL/L
PROT SERPL-MCNC: 7 G/DL
RBC # BLD AUTO: 5.66 M/UL
SODIUM SERPL-SCNC: 136 MMOL/L
WBC # BLD AUTO: 12.22 K/UL

## 2018-07-28 PROCEDURE — 25000003 PHARM REV CODE 250: Performed by: INTERNAL MEDICINE

## 2018-07-28 PROCEDURE — 36415 COLL VENOUS BLD VENIPUNCTURE: CPT

## 2018-07-28 PROCEDURE — 63600175 PHARM REV CODE 636 W HCPCS: Performed by: INTERNAL MEDICINE

## 2018-07-28 PROCEDURE — 85025 COMPLETE CBC W/AUTO DIFF WBC: CPT

## 2018-07-28 PROCEDURE — G0378 HOSPITAL OBSERVATION PER HR: HCPCS

## 2018-07-28 PROCEDURE — 80053 COMPREHEN METABOLIC PANEL: CPT

## 2018-07-28 PROCEDURE — 94761 N-INVAS EAR/PLS OXIMETRY MLT: CPT

## 2018-07-28 RX ORDER — MECLIZINE HYDROCHLORIDE 25 MG/1
25 TABLET ORAL EVERY 6 HOURS
Qty: 30 TABLET | Refills: 0 | Status: SHIPPED | OUTPATIENT
Start: 2018-07-28 | End: 2018-11-28 | Stop reason: SDUPTHER

## 2018-07-28 RX ORDER — SCOLOPAMINE TRANSDERMAL SYSTEM 1 MG/1
1 PATCH, EXTENDED RELEASE TRANSDERMAL
Qty: 4 PATCH | Refills: 0 | Status: SHIPPED | OUTPATIENT
Start: 2018-07-28 | End: 2018-08-09

## 2018-07-28 RX ADMIN — MECLIZINE HYDROCHLORIDE 25 MG: 25 TABLET ORAL at 11:07

## 2018-07-28 RX ADMIN — HEPARIN SODIUM 5000 UNITS: 5000 INJECTION, SOLUTION INTRAVENOUS; SUBCUTANEOUS at 05:07

## 2018-07-28 RX ADMIN — MECLIZINE HYDROCHLORIDE 25 MG: 25 TABLET ORAL at 05:07

## 2018-07-28 RX ADMIN — INSULIN ASPART 4 UNITS: 100 INJECTION, SOLUTION INTRAVENOUS; SUBCUTANEOUS at 11:07

## 2018-07-28 RX ADMIN — INSULIN ASPART 4 UNITS: 100 INJECTION, SOLUTION INTRAVENOUS; SUBCUTANEOUS at 08:07

## 2018-07-28 NOTE — PROGRESS NOTES
[facility] Medicine H&P Note     Admitting Team: [facility] Hospitalist Team B  Attending Physician: William Cherry MD  Resident: Eddie Almendarez MD  Intern: Karen Cramer DO    Date of Admit: 7/27/2018     Subjective:      History of Present Illness:  Kitty León is a 62 y.o.  male who  has a past medical history of High cholesterol and Hypertension.. The patient presented to [facility] on 7/27/2018 with a primary complaint of Seizures (possible seizure pta.)    Today the patient states that the dizziness has improved.  He does not feel dizzy lying in bed and is able to close his eyes without feeling dizzy.  Patient reported they were able to sleep well.  Patient denies any new episodes of nausea or vomiting.  Patient states that they were able to sit up in bed and walk to the bathroom.  Patient denies headache, fever, chills, abdominal pain.      Past Medical History:  Past Medical History:   Diagnosis Date    High cholesterol     Hypertension        Past Surgical History:  Past Surgical History:   Procedure Laterality Date    SKIN GRAFT         Allergies:  Review of patient's allergies indicates:  No Known Allergies    Home Medications:  Prior to Admission medications    Medication Sig Start Date End Date Taking? Authorizing Provider   amLODIPine (NORVASC) 5 MG tablet Take 5 mg by mouth once daily.     Historical Provider, MD   atorvastatin (LIPITOR) 40 MG tablet Take 1 tablet (40 mg total) by mouth once daily. Substitute for simvastatin. 12/22/17 12/22/18  Jay Mcclellan MD   chlorzoxazone (PARAFON FORTE DSC) 500 mg Tab Take 500 mg by mouth 2 (two) times daily as needed (muscle spasms).    Historical Provider, MD   cyanocobalamin, vitamin B-12, (VITAMIN B-12) 50 mcg tablet Take 50 mcg by mouth once daily.    Historical Provider, MD   ergocalciferol (ERGOCALCIFEROL) 50,000 unit Cap Take 1 capsule (50,000 Units total) by mouth every 7 days. Take on Tuesdays. 12/26/17   Jay Mcclellan MD  "  hydrochlorothiazide (HYDRODIURIL) 25 MG tablet Take 25 mg by mouth once daily.    Historical Provider, MD   loratadine (CLARITIN) 10 mg tablet Take 1 tablet by mouth daily as needed for Allergies.     Historical Provider, MD   moxifloxacin (AVELOX) 400 mg tablet Take 1 tablet (400 mg total) by mouth once daily. 17   Jay Mcclellan MD   oxyCODONE (ROXICODONE) 10 mg Tab immediate release tablet Take 1 tablet (10 mg total) by mouth every 4 (four) hours as needed. 17   Jay Mcclellan MD   potassium chloride (K-TAB) 20 mEq Take 20 mEq by mouth once daily.    Historical Provider, MD   ranitidine (ZANTAC) 150 MG capsule Take 150 mg by mouth daily as needed for Heartburn.     Historical Provider, MD       Family History:  History reviewed. No pertinent family history.    Social History:  Social History   Substance Use Topics    Smoking status: Never Smoker    Smokeless tobacco: Never Used    Alcohol use 2.4 oz/week     4 Cans of beer per week      Comment: 6 pack or vodka daily       Review of Systems:  Pertinent items are noted in HPI. All other systems are reviewed and are negative.    Health Maintaince :   Primary Care Physician:     Immunizations:   TDap up to date  Flu not up todate  Pna prevnar 13    Cancer Screening:  Colonoscopy: up to date     Objective:   Last 24 Hour Vital Signs:  BP  Min: 120/53  Max: 139/70  Temp  Av.4 °F (36.9 °C)  Min: 98.3 °F (36.8 °C)  Max: 98.4 °F (36.9 °C)  Pulse  Av.6  Min: 71  Max: 79  Resp  Av  Min: 18  Max: 18  SpO2  Av %  Min: 96 %  Max: 98 %  Height  Av' (182.9 cm)  Min: 6' (182.9 cm)  Max: 6' (182.9 cm)  Weight  Av.8 kg (187 lb)  Min: 84.8 kg (187 lb)  Max: 84.8 kg (187 lb)  Body mass index is 25.36 kg/m².  No intake/output data recorded.    Physical Examination:  /79 (BP Location: Left arm, Patient Position: Lying)   Pulse 71   Temp 97.9 °F (36.6 °C) (Oral)   Resp 18   Ht 5' 9" (1.753 m)   Wt 97.1 kg (214 lb)   SpO2 97%   " BMI 31.60 kg/m²     General Appearance:    Alert, cooperative, no distress, appears stated age   Head:    Normocephalic, without obvious abnormality, atraumatic   Eyes:    PERRL, conjunctiva/corneas clear, EOM's intact, fundi     benign, both eyes        Ears:    Normal TM's and external ear canals, both ears   Nose:   Nares normal, septum midline, mucosa normal, no drainage    or sinus tenderness   Throat:   Lips, mucosa, and tongue normal; teeth and gums normal   Neck:   Supple, symmetrical, trachea midline, no adenopathy;        thyroid:  No enlargement/tenderness/nodules; no carotid    bruit or JVD   Back:     Symmetric, no curvature, ROM normal, no CVA tenderness   Lungs:     Clear to auscultation bilaterally, respirations unlabored   Chest wall:    No tenderness or deformity   Heart:    Regular rate and rhythm, S1 and S2 normal, no murmur, rub   or gallop   Abdomen:     Soft, non-tender, bowel sounds active all four quadrants,     no masses, no organomegaly   Extremities:   Extremities normal, atraumatic, no cyanosis or edema   Pulses:   2+ and symmetric all extremities   Skin:   Skin color, texture, turgor normal, no rashes or lesions   Lymph nodes:   Cervical, supraclavicular, and axillary nodes normal   Neurologic:   CNII-XII intact. Normal strength, sensation and reflexes       throughout         Laboratory:  Most Recent Data:  CBC: Lab Results   Component Value Date    WBC 8.11 07/27/2018    HGB 13.9 (L) 07/27/2018    HCT 37.7 (L) 07/27/2018     07/27/2018    MCV 93 07/27/2018    RDW 15.8 (H) 07/27/2018       BMP: Lab Results   Component Value Date     07/27/2018    K 4.3 07/27/2018     07/27/2018    CO2 26 07/27/2018    BUN 12 07/27/2018    CREATININE 1.2 07/27/2018     (H) 07/27/2018    CALCIUM 9.7 07/27/2018    MG 2.1 12/22/2017    PHOS 2.8 12/22/2017     LFTs: Lab Results   Component Value Date    PROT 7.7 07/27/2018    ALBUMIN 4.1 07/27/2018    BILITOT 1.8 (H) 07/27/2018     AST 28 07/27/2018    ALKPHOS 64 07/27/2018    ALT 17 07/27/2018     Coags:   Lab Results   Component Value Date    INR 1.0 07/27/2018     FLP: No results found for: CHOL, HDL, LDLCALC, TRIG, CHOLHDL  DM: Lab Results   Component Value Date    CREATININE 1.2 07/27/2018     Thyroid: Lab Results   Component Value Date    TSH 1.9 08/20/2005     Anemia: No results found for: IRON, TIBC, FERRITIN, [id number], FOLATE  Cardiac: Lab Results   Component Value Date    TROPONINI <0.006 07/27/2018    BNP 42 07/27/2018     Urinalysis: Lab Results   Component Value Date    LABURIN No significant growth 12/19/2017    COLORU Yellow 07/27/2018    SPECGRAV 1.020 07/27/2018    NITRITE Negative 07/27/2018    KETONESU Negative 07/27/2018    UROBILINOGEN 1.0 07/27/2018    WBCUA 1 12/17/2017       Trended Lab Data:    Recent Labs  Lab 07/27/18  1408   WBC 8.11   HGB 13.9*   HCT 37.7*      MCV 93   RDW 15.8*      K 4.3      CO2 26   BUN 12   CREATININE 1.2   *   PROT 7.7   ALBUMIN 4.1   BILITOT 1.8*   AST 28   ALKPHOS 64   ALT 17       Trended Cardiac Data:    Recent Labs  Lab 07/27/18  1408   TROPONINI <0.006   BNP 42       Microbiology Data:    Other Results:  EKG (my interpretation): normal EKG, normal sinus rhythm, unchanged from previous tracings.    Radiology:  Imaging Results          CT Head Without Contrast (Final result)  Result time 07/27/18 14:38:12    Final result by Saúl Bacon MD (07/27/18 14:38:12)                 Impression:      No acute intracranial processes.      Electronically signed by: Saúl Bacon MD  Date:    07/27/2018  Time:    14:38             Narrative:    EXAMINATION:  CT HEAD WITHOUT CONTRAST    CLINICAL HISTORY:  Confusion/delirium, altered LOC, unexplained;    TECHNIQUE:  Low dose axial images were obtained through the head.  Coronal and sagittal reformations were also performed. Contrast was not administered.    COMPARISON:  None.    FINDINGS:  There are symmetric lateral  ventricles without hydrocephalus.  No acute intracranial hemorrhages or abnormal extra-axial fluid collections or midline shift or herniations.  Within the parenchyma there is no hemorrhages or signs for acute major vessel territory infarctions or neoplasms.  The gray/white matter differentiation is preserved throughout the cerebral hemispheres.  Basal ganglia are symmetric and within normal limits.    In the posterior fossa no hemorrhages or definite signs for acute infarctions noted.    There is a partially empty sella.  No suprasellar masses or pineal region masses.  The visualized paranasal air sinuses are clear.  There is normal pneumatization of the mastoids.                               X-Ray Chest AP Portable (SOB) (Final result)  Result time 07/27/18 13:38:34    Final result by Saúl Bacon MD (07/27/18 13:38:34)                 Impression:      1. Mild pulmonary vascular congestion.  2. The near scar-like changes in the left costophrenic angle.      Electronically signed by: Saúl Bacon MD  Date:    07/27/2018  Time:    13:38             Narrative:    EXAMINATION:  XR CHEST AP PORTABLE    CLINICAL HISTORY:  Altered mental status;    TECHNIQUE:  Single frontal view of the chest was performed.    COMPARISON:  Chest 12/18/2017    FINDINGS:  Again in the left costophrenic angle there is a scar-like density noted, unchanged from previous study.    Heart size at the upper limits of normal.  There is tortuosity of the descending thoracic aorta.  Since the previous study there is mild central vascular congestion.  No lobar consolidations or pleural effusions.  There are cardiac monitoring leads over the chest.    There is DJD of the shoulder joints bilaterally.                                 Assessment:     Kitty León is a 62 y.o. male with:  Patient Active Problem List    Diagnosis Date Noted    Seizures 07/27/2018    Fall 01/24/2018    Lumbar disc herniation 12/19/2017    Acute midline thoracic back  pain 12/19/2017    Splenic infarction 12/19/2017    Avascular necrosis of bones of both hips 12/19/2017    Acute respiratory failure with hypoxia 12/19/2017    Sepsis with organ dysfunction 12/19/2017    Pneumonia due to Streptococcus pneumoniae 12/19/2017    Closed compression fracture of thoracic vertebra 12/17/2017    Essential hypertension 12/17/2017    HLD (hyperlipidemia) 12/17/2017    JANEEN on CPAP 12/17/2017    Alcohol abuse 12/17/2017    Acute abdomen 12/17/2017    Hyperbilirubinemia 12/17/2017        Plan:     Dizziness  -Neurology consulted to rule out intracranial process/TIA/CVA, NIHSS low  -Epley performed by ENT, will follow up outpatient after discharge  -TSH 1.997  -condition has improved, will hold of on Head and Neck MRA and carotid US  -patient has improved on meclizine, likely BPPV    HTN  -home meds include norvasc and avapro  -will hold for now    Diabetes  -home meds include trulicity  -A1C 7/19 6.7   -SSI and accuchecks    Triglyceridemia  - Today 263, previous varies from 165-324      Code Status:     Full    Karen Cramer, DO  [facility] HO-1    [facility] Hospitalist Pager numbers:   [facility] Hospitalist Medicine Team A (Lam/Kelsey): [phone]  [facility] Hospitalist Medicine Team B (Cris/David):  [phone]

## 2018-07-28 NOTE — PLAN OF CARE
Discharge orders noted, no HH or HME ordered.    Future Appointments  Date Time Provider Department Center   8/6/2018 2:00 PM DAVID Tan [facility] PRO [facility] [place]   8/28/2018 7:00 AM LAB, SEE [facility] [place]   9/4/2018 10:00 AM Adams Moore MD [facility] MED [place]       Pt's nurse will go over medications/signs and symptoms prior to discharge       07/28/18 1236   Final Note   Assessment Type Final Discharge Note   Discharge Disposition Home   What phone number can be called within the next 1-3 days to see how you are doing after discharge? [phone]   Hospital Follow Up  Appt(s) scheduled? No  (Offices closed for weekend. Patient to schedule own follow up appointments.)   Right Care Referral Info   Post Acute Recommendation No Care     Lolita Camacho, RN Transitional Navigator  [phone]

## 2018-07-28 NOTE — PLAN OF CARE
Problem: Patient Care Overview  Goal: Plan of Care Review  Outcome: Ongoing (interventions implemented as appropriate)  Patient resting in bed, AAOx4. IVF infusing as ordered. Medications administered as ordered. No complaints of pain or discomfort. Telemetry on, NSR. Patient asleep through the night. Encouraged to call with needs or concerns. Will continue to monitor.

## 2018-07-28 NOTE — NURSING
Pt and family given explanation on discharge instructions and AVS packet. New medications with name, dose, purpose, frequency, and side effects explained. Teach back method used. Home medications reviewed. All questions answered, with no further questions at this time.

## 2018-07-28 NOTE — ED NOTES
Report called to NICOLE Sotomayor on 5th floor. Pt awake and alert, skin warm and dry,resp with ease, family at bedside

## 2018-08-02 ENCOUNTER — OFFICE VISIT (OUTPATIENT)
Dept: OTOLARYNGOLOGY | Facility: CLINIC | Age: 73
End: 2018-08-02
Payer: COMMERCIAL

## 2018-08-02 VITALS
HEART RATE: 96 BPM | BODY MASS INDEX: 31.54 KG/M2 | SYSTOLIC BLOOD PRESSURE: 126 MMHG | DIASTOLIC BLOOD PRESSURE: 78 MMHG | HEIGHT: 69 IN | WEIGHT: 212.94 LBS | TEMPERATURE: 98 F

## 2018-08-02 DIAGNOSIS — H90.3 ASYMMETRIC SNHL (SENSORINEURAL HEARING LOSS): ICD-10-CM

## 2018-08-02 DIAGNOSIS — H81.11 BENIGN PAROXYSMAL POSITIONAL VERTIGO OF RIGHT EAR: Primary | ICD-10-CM

## 2018-08-02 PROCEDURE — 99999 PR PBB SHADOW E&M-EST. PATIENT-LVL IV: CPT | Mod: PBBFAC,,, | Performed by: OTOLARYNGOLOGY

## 2018-08-02 PROCEDURE — 95992 CANALITH REPOSITIONING PROC: CPT | Mod: S$GLB,,, | Performed by: OTOLARYNGOLOGY

## 2018-08-02 PROCEDURE — 3078F DIAST BP <80 MM HG: CPT | Mod: CPTII,S$GLB,, | Performed by: OTOLARYNGOLOGY

## 2018-08-02 PROCEDURE — 99214 OFFICE O/P EST MOD 30 MIN: CPT | Mod: 25,S$GLB,, | Performed by: OTOLARYNGOLOGY

## 2018-08-02 PROCEDURE — 3074F SYST BP LT 130 MM HG: CPT | Mod: CPTII,S$GLB,, | Performed by: OTOLARYNGOLOGY

## 2018-08-02 NOTE — PATIENT INSTRUCTIONS
"  Patient Instruction After Office Treatments (Epley or Semont maneuvers)    1. After the maneuver is performed wait 10 minutes before going home to avoid quick spins. It takes time for the debris to settle in the correct location (think of a snow globe). Avoid driving yourself home.        2. Sleep semi-recumbent for the next few nights.  · Sleep at a 45 degree angle (eg. Chair).  · Stay vertical during the day.  · Do not got to the dentist or hairdresser.       · No exercise for a week.    3. For one week, avoid any head movements that could provoke your dizziness.  · Use a couple of pillows when you sleep.  · Avoid sleeping on the "bad" side.  · Avoid quick head movements. Act as if you're wearing an  invisible neck brace.    4. At one week post-treatment, put yourself in the position that usually makes you dizzy under conditions in which you can't fall or hurt yourself. Let your doctor know how you did.              "

## 2018-08-02 NOTE — PROGRESS NOTES
Chief Complaint   Patient presents with    Dizziness     ER follow up, pt reports dizziness and improved slightly since ER visit   .     HPI:  Jerardo Powers is a very pleasant 72 y.o. male here to see me today for the first time for evaluation of hearing loss.  He reports hearing loss that has been gradually progressing over the last 2 years.  He has not noted any difference in hearing between the ears, with both ears being the worse hearing ear.  He has not noted any tinnitus in either ear.  He has not had any recent issues with ear pain or ear drainage.  He denies a family history of hearing loss, and has not had any previous otologic surgery.  He denies any history of significant loud noise exposure.He denies issues with dizziness.    Interval HPI 8/2/2018:   72 year old male known to me for history of left SNHL, ETD presented to the ED with acute onset vertigo last week and I saw and evaluated him in the emergency room.  He was admitted to the hospital for observation.  Since discharge he states that he is doing somewhat better. He states that he is having continued vertigo episodes with quick head movements toward the right. He reports that they last for seconds at a time and are improved if he lies stationary for a few a minutes. He denies any tinnitus, aural fullness, or hearing changes currently.        Past Medical History:   Diagnosis Date    Anxiety     BPH (benign prostatic hypertrophy)     Cataract     CKD (chronic kidney disease) stage 3, GFR 30-59 ml/min 5/5/2014    Colon polyps     Diabetes mellitus type II     Emphysema NEC     mild    Gout     Hyperlipidemia     Hypertension     Hypothyroidism     IBS (irritable bowel syndrome)     Kidney stones     JANEEN (obstructive sleep apnea)     Plantar fasciitis     Reflux      Social History     Social History    Marital status:      Spouse name: N/A    Number of children: N/A    Years of education: N/A     Occupational History     Not on file.     Social History Main Topics    Smoking status: Former Smoker     Years: 40.00     Quit date: 9/4/2007    Smokeless tobacco: Never Used    Alcohol use 0.0 oz/week      Comment: occasionally    Drug use: No    Sexual activity: No     Other Topics Concern    Not on file     Social History Narrative    No narrative on file     Past Surgical History:   Procedure Laterality Date    CATARACT EXTRACTION  1/28/2013    RIGHT EYE    CATARACT EXTRACTION      left eye    COLONOSCOPY  Sep 2011    Repeat recommended in 5 years    COLONOSCOPY N/A 10/10/2016    Procedure: COLONOSCOPY;  Surgeon: Noah Colunga MD;  Location: [address];  Service: Endoscopy;  Laterality: N/A;  PM Prep    KIDNEY STONE SURGERY       Family History   Problem Relation Age of Onset    Cancer Brother         non-hodgkins T cell lymphoma    Diabetes Mother     Macular degeneration Brother     Coronary artery disease Neg Hx     Colon cancer Neg Hx     Prostate cancer Neg Hx     Esophageal cancer Neg Hx          Review of Systems  General: negative for chills, fever or weight loss  Psychological: negative for mood changes or depression  Ophthalmic: negative for blurry vision, photophobia or eye pain  ENT: see HPI  Respiratory: no cough, shortness of breath, or wheezing  Cardiovascular: no chest pain or dyspnea on exertion  Gastrointestinal: no abdominal pain, change in bowel habits, or black/ bloody stools  Musculoskeletal: negative for gait disturbance or muscular weakness  Neurological: no syncope or seizures; no ataxia  Dermatological: negative for puritis,  rash and jaundice  Hematologic/lymphatic: no easy bruising, no new lumps or bumps      Physical Exam:    Vitals:    08/02/18 1111   BP: 126/78   Pulse: 96   Temp: 97.7 °F (36.5 °C)       Constitutional: Well appearing / communicating without difficutly.  NAD.  Eyes: EOM I Bilaterally  Head/Face: Normocephalic.  Negative paranasal sinus  pressure/tenderness.  Salivary glands WNL.  House Brackmann I Bilaterally.    Right Ear: Auricle normal appearance. External Auditory Canal within normal limits no lesions or masses,TM w/o masses/lesions/perforations. TM mobility noted.   Left Ear: Auricle normal appearance. External Auditory Canal within normal limits no lesions or masses,TM w/o masses/lesions/perforations. TM mobility noted.  Rinne Air conduction >bone conduction bilaterally, Call midline.   Negative Romberg; no nystagmus, negative Fakuda step test, negative head thrust; positive Dixx Hallpike to the right; negative Dixx-Hallpike to the right.   Nose: No gross nasal septal deviation. Inferior Turbinates 3+ bilaterally. No septal perforation. No masses/lesions. External nasal skin appears normal without masses/lesions.  Oral Cavity: Gingiva/lips within normal limits.  Dentition/gingiva healthy appearing. Mucus membranes moist. Floor of mouth soft, no masses palpated. Oral Tongue mobile. Hard Palate appears normal.    Oropharynx: Base of tongue appears normal. No masses/lesions noted. Tonsillar fossa/pharyngeal wall without lesions. Posterior oropharynx WNL.  Soft palate without masses. Midline uvula.   Neck/Lymphatic: No LAD I-VI bilaterally.  No thyromegaly.  No masses noted on exam.    Mirror laryngoscopy/nasopharyngoscopy: Active gag reflex.  Unable to perform.    Neuro/Psychiatric: AOx3.  Normal mood and affect.   Cardiovascular: Normal carotid pulses bilaterally, no increasing jugular venous distention noted at cervical region bilaterally.    Respiratory: Normal respiratory effort, no stridor, no retractions noted.    Diagnostic testing reviewed:   MRI brain 7/24/2018: No evidence of CPA lesions. There is mucoperiosteal thickening and air-fluid levels in the bilateral maxillary sinuses.          Assessment:  No diagnosis found.  There were no encounter diagnoses.      Plan:  No orders of the defined types were placed in this encounter.      MRI of IAC clear of CPA lesion. Patient is medically cleared for hearing aids. Consultation with audiologist was arranged to discuss hearing aid options.     We had a long discussion regarding the relevant anatomy and pathology relevant to BPPV.  We discussed that in the ear there are three semicircular canals that detect rotational movement.  BPPV occurs as a result of otoconia, tiny crystals of calcium carbonate that are a normal part of the inner ears anatomy, detaching from their normal anatomic position and collecting in one of the semicircular canals.  When the head moves, the otoconia shift. This stimulates the cupula to send false signals to the brain, producing vertigo and triggering nystagmus (involuntary eye movements).  I have performed CRM of the right ear and gave the patient necessary post-procedure instructions at that time.    Thank you kindly for allowing me to participate in the patient's care.     This visit was 25 minutes in duration, with over 50% of the time spent in direct face-to-face counseling and coordination of care regarding the issues outlined above.      Radha Vogt MD

## 2018-08-03 NOTE — PROCEDURES
Procedures       Jerardo Powers was seen 08/02/2018 for Epley maneuver for BPPV in the right ear.      A 5-position Epley maneuver was performed for the right.  Patient tolerated the maneuver well and was asymptomatic upon discharge.  No nystagmus seen on post-procedure examination.  Post-Epley home instructions were reviewed and given to the patient.  Understanding was voiced.

## 2018-08-05 RX ORDER — BLOOD SUGAR DIAGNOSTIC
STRIP MISCELLANEOUS
Qty: 100 STRIP | Refills: 11 | Status: SHIPPED | OUTPATIENT
Start: 2018-08-05 | End: 2023-03-21 | Stop reason: SDUPTHER

## 2018-08-06 ENCOUNTER — TELEPHONE (OUTPATIENT)
Dept: OTOLARYNGOLOGY | Facility: CLINIC | Age: 73
End: 2018-08-06

## 2018-08-06 NOTE — TELEPHONE ENCOUNTER
Patient called to state that he was feeling dizzy and that is why he cancelled his hearing aid appointment and that he will call back at a later date to reschedule.

## 2018-08-06 NOTE — TELEPHONE ENCOUNTER
----- Message from Megan Bynum sent at 8/6/2018  1:01 PM CDT -----  Contact: [phone]/ self   Pt called stating his vertigo came back and its really bad . Please advise

## 2018-08-14 NOTE — DISCHARGE SUMMARY
[facility] Discharge Summary    Primary Team: [facility] Hospitalist Team B  Attending Physician: David  Resident: Erickson  Intern: Shoaib    Date of Admit: 7/27/2018  Date of Discharge: 7/28/2018    Discharge to: home  Condition: stable    Discharge Diagnoses     Patient Active Problem List   Diagnosis    Benign prostatic hyperplasia    Reflux    IBS (irritable bowel syndrome)    Emphysema NEC    Kidney stone on left side    Diabetes mellitus, type 2    Hypothyroidism    Hypertension    Postural dizziness    Hypokalemia    Gout    Diarrhea    Dyspepsia    Depression    Anxiety and depression    Fatigue    JANEEN (obstructive sleep apnea)    Hematuria, microscopic    History of colon polyps    Lower abdominal pain    Sciatica    Nephrolithiasis    Vertigo    Ataxia       Consultants and Procedures     Consultants:  none    Procedures:   none    Brief History of Present Illness      Kitty León is a 62 y.o.  male who  has a past medical history of High cholesterol and Hypertension.. The patient presented to [facility] on 7/27/2018 with a primary complaint of Seizures (possible seizure pta.)     Patient was in their usual state of health until this morning.  He woke up at 6:45am and felt dizzy if he moved from side to side, but was able to stand up, and get out of bed to sit and drink his tea.  He does state that while he was standing he felt unsteady. Because he felt that way, he went back to his bed to lay down.  The sitting to lying down position change made him dizzy again.  There are no focal areas of weakness and patient does not note slurred speech or difficulty speaking.  Patient denies any swallowing difficulty.       Patient has a PMH of diabetes on trulicity, never used insulin and HTN on norvasc and avapro. He is unsure if he takes HCTZ.  Any time he tries to move he gets dizzy.  He has had the Epley maneuver performed on him twice; the  first time he says that it helped, the second time it made him nauseous and he vomited.  Currently the patient states that the dizziness is not as severe.  While lying down he does not feel dizzy but if he gets up dizziness resumes.      Patient also has a headache that feels like a pressure on the top of his head, the onset correlates with the dizziness.  Its mild.  It feels similar to his tension headache that hes had in the past.      MRA was performed on the 7/24 for his hearing loss L>R that has been steadily declining for the last 2 years.  He was prescribed hearing aids yesterday.       Patient denies fever, no chest pain, notes that he has been gaining some weight, no SOB, no cough no sick contact.  No nausea at home, no diarrhea, no constpiation or melena hematochezia.  Notes some gas for the past couple days.  No urinary complaints.  Takes combo finasteride and flomax.      Patient notes recent travel to [place].  Coincidentally, wife also currently has vertigo and had an MRI recently.     Patient's surgical history includes cataract surgery. He is a previous smoker, quit 11 years ago.  Occasional drinker. No drug use.  Not sexually active, no hx of sti.         For the full HPI please refer to the History & Physical from this admission.    Hospital Course By Problem with Pertinent Findings     Vertigo  -Vascular neurology consulted in ED to rule out intracranial process/TIA/CVA  -MRI 3 days prior with sinusitis but without evidence of infarction  -current symptoms likely 2/2 sinusitis  -will provide augmentin for sinusitis, meclizine for symtpoms (can try scopolamine patch for symptoms refractory to meclizine)  -followed by ENT and will have close follow up     HTN  -home meds include norvasc and avapro  -held initially for concerning symptoms and acceptable BP  will restart on discharge     DM2  -home meds include trulicity  -A1C 7/19 6.7   -SSI and accuchecks during stay without acute  "issues     Triglyceridemia  - Today 263, previous varies from 165-324  - documented for PCP    Hypothyroidism  -cont synthroid        Discharge Medications        Medication List      START taking these medications    meclizine 25 mg tablet  Commonly known as:  ANTIVERT  Take 1 tablet (25 mg total) by mouth every 6 (six) hours.        CONTINUE taking these medications    allopurinol 100 MG tablet  Commonly known as:  ZYLOPRIM  Take 1 tablet (100 mg total) by mouth once daily.     amLODIPine 10 MG tablet  Commonly known as:  NORVASC  TAKE 1 TABLET BY MOUTH EVERY DAY     citalopram 20 MG tablet  Commonly known as:  CELEXA  TAKE 1 TABLET (20 MG TOTAL) BY MOUTH ONCE DAILY.     dulaglutide 1.5 mg/0.5 mL Pnij  Commonly known as:  TRULICITY  Inject 1.5 mg into the skin once a week.     dutasteride-tamsulosin 0.5-0.4 mg Cm24  Commonly known as:  VIVIENNE  Take 1 capsule by mouth every evening.     ergocalciferol 50,000 unit Cap  Commonly known as:  ERGOCALCIFEROL  Take 1 capsule (50,000 Units total) by mouth every 7 days.     fish oil-omega-3 fatty acids 300-1,000 mg capsule     irbesartan 150 MG tablet  Commonly known as:  AVAPRO     lancets Misc  Test sugars once daily.  Dispense 100 lancets (insurance covered brand)     levothyroxine 75 MCG tablet  Commonly known as:  SYNTHROID  TAKE 1 TABLET (75 MCG TOTAL) BY MOUTH ONCE DAILY.     magnesium oxide 400 mg tablet  Commonly known as:  MAG-OX     pen needle, diabetic 31 gauge x 1/4" Ndle  Use weekly with trulicity pen     potassium citrate 15 mEq Tbsr     VITAMIN B-6 100 MG Tab  Generic drug:  pyridoxine (vitamin B6)        ASK your doctor about these medications    amoxicillin-clavulanate 875-125mg 875-125 mg per tablet  Commonly known as:  AUGMENTIN  Take 1 tablet by mouth 2 (two) times daily. for 14 days  Ask about: Should I take this medication?     scopolamine 1.3-1.5 mg (1 mg over 3 days)  Commonly known as:  TRANSDERM-SCOP  Place 1 patch onto the skin every 72 hours. for " 12 days  Ask about: Should I take this medication?           Where to Get Your Medications      These medications were sent to [id number] - [place] - [address] AT [facility]  [address] [address]    Phone:  [phone]   · meclizine 25 mg tablet  · scopolamine 1.3-1.5 mg (1 mg over 3 days)         Discharge Information:   Diet:  DM    Physical Activity:  As tolerated    Instructions:  1. Take all medications as prescribed  2. Keep all follow-up appointments  3. Return to the hospital or call your primary care physicians if any worsening symptoms such as HA, vision changes, n/v, abd pain, syncope, cp, sob, fever/chills/sweats occur.    Follow-Up Appointments:  Future Appointments   Date Time Provider Department Center   8/17/2018 11:20 AM Radha Vogt MD [facility] PRO [facility]   8/28/2018  7:00 AM LAB, [facility] [place]   9/4/2018 10:00 AM Adams Moore MD [facility] MED [facility]         Eddie Almendarez  hospitals Internal Medicine, [facility]

## 2018-08-17 ENCOUNTER — OFFICE VISIT (OUTPATIENT)
Dept: OTOLARYNGOLOGY | Facility: CLINIC | Age: 73
End: 2018-08-17
Payer: COMMERCIAL

## 2018-08-17 VITALS
TEMPERATURE: 97 F | BODY MASS INDEX: 32 KG/M2 | SYSTOLIC BLOOD PRESSURE: 92 MMHG | WEIGHT: 216.06 LBS | HEIGHT: 69 IN | DIASTOLIC BLOOD PRESSURE: 57 MMHG | HEART RATE: 90 BPM

## 2018-08-17 DIAGNOSIS — H69.93 ETD (EUSTACHIAN TUBE DYSFUNCTION), BILATERAL: ICD-10-CM

## 2018-08-17 DIAGNOSIS — B96.89 ACUTE BACTERIAL SINUSITIS: ICD-10-CM

## 2018-08-17 DIAGNOSIS — J01.90 ACUTE BACTERIAL SINUSITIS: ICD-10-CM

## 2018-08-17 DIAGNOSIS — H90.3 ASYMMETRIC SNHL (SENSORINEURAL HEARING LOSS): Primary | ICD-10-CM

## 2018-08-17 PROCEDURE — 3074F SYST BP LT 130 MM HG: CPT | Mod: CPTII,S$GLB,, | Performed by: OTOLARYNGOLOGY

## 2018-08-17 PROCEDURE — 99213 OFFICE O/P EST LOW 20 MIN: CPT | Mod: S$GLB,,, | Performed by: OTOLARYNGOLOGY

## 2018-08-17 PROCEDURE — 3078F DIAST BP <80 MM HG: CPT | Mod: CPTII,S$GLB,, | Performed by: OTOLARYNGOLOGY

## 2018-08-17 PROCEDURE — 99999 PR PBB SHADOW E&M-EST. PATIENT-LVL IV: CPT | Mod: PBBFAC,,, | Performed by: OTOLARYNGOLOGY

## 2018-08-17 RX ORDER — LEVOCETIRIZINE DIHYDROCHLORIDE 5 MG/1
5 TABLET, FILM COATED ORAL NIGHTLY
Qty: 30 TABLET | Refills: 11 | Status: SHIPPED | OUTPATIENT
Start: 2018-08-17 | End: 2019-03-14

## 2018-08-17 NOTE — PROGRESS NOTES
"  Chief Complaint   Patient presents with    Follow-up    Dizziness     feels lightheaded in the morning   .     HPI:  Jerardo Powers is a very pleasant 72 y.o. male here to see me today for the first time for evaluation of hearing loss.  He reports hearing loss that has been gradually progressing over the last 2 years.  He has not noted any difference in hearing between the ears, with both ears being the worse hearing ear.  He has not noted any tinnitus in either ear.  He has not had any recent issues with ear pain or ear drainage.  He denies a family history of hearing loss, and has not had any previous otologic surgery.  He denies any history of significant loud noise exposure.He denies issues with dizziness.    Interval HPI 8/17/2018:   72 year old male who follows up for left SNHL, ETD, BPPV.  He states that he is doing much better since last visit.  He notes that the vertigo is much improved. He notes only a "lightheaded" sensation when he wakes in the morning. He feels that the CRM performed at last visit helped. He denies any tinnitus, aural fullness, or hearing changes currently.        Past Medical History:   Diagnosis Date    Anxiety     BPH (benign prostatic hypertrophy)     Cataract     CKD (chronic kidney disease) stage 3, GFR 30-59 ml/min 5/5/2014    Colon polyps     Diabetes mellitus type II     Emphysema NEC     mild    Gout     Hyperlipidemia     Hypertension     Hypothyroidism     IBS (irritable bowel syndrome)     Kidney stones     JANEEN (obstructive sleep apnea)     Plantar fasciitis     Reflux      Social History     Socioeconomic History    Marital status:      Spouse name: Not on file    Number of children: Not on file    Years of education: Not on file    Highest education level: Not on file   Social Needs    Financial resource strain: Not on file    Food insecurity - worry: Not on file    Food insecurity - inability: Not on file    Transportation needs - " medical: Not on file    Transportation needs - non-medical: Not on file   Occupational History    Not on file   Tobacco Use    Smoking status: Former Smoker     Years: 40.00     Last attempt to quit: 9/4/2007     Years since quitting: 10.9    Smokeless tobacco: Never Used   Substance and Sexual Activity    Alcohol use: Yes     Alcohol/week: 0.0 oz     Comment: occasionally    Drug use: No    Sexual activity: No   Other Topics Concern    Not on file   Social History Narrative    Not on file     Past Surgical History:   Procedure Laterality Date    CATARACT EXTRACTION  1/28/2013    RIGHT EYE    CATARACT EXTRACTION      left eye    COLONOSCOPY  Sep 2011    Repeat recommended in 5 years    KIDNEY STONE SURGERY       Family History   Problem Relation Age of Onset    Cancer Brother         non-hodgkins T cell lymphoma    Diabetes Mother     Macular degeneration Brother     Coronary artery disease Neg Hx     Colon cancer Neg Hx     Prostate cancer Neg Hx     Esophageal cancer Neg Hx          Review of Systems  General: negative for chills, fever or weight loss  Psychological: negative for mood changes or depression  Ophthalmic: negative for blurry vision, photophobia or eye pain  ENT: see HPI  Respiratory: no cough, shortness of breath, or wheezing  Cardiovascular: no chest pain or dyspnea on exertion  Gastrointestinal: no abdominal pain, change in bowel habits, or black/ bloody stools  Musculoskeletal: negative for gait disturbance or muscular weakness  Neurological: no syncope or seizures; no ataxia  Dermatological: negative for puritis,  rash and jaundice  Hematologic/lymphatic: no easy bruising, no new lumps or bumps      Physical Exam:    Vitals:    08/17/18 1400   BP: (!) 92/57   Pulse: 90   Temp: 97.3 °F (36.3 °C)       Constitutional: Well appearing / communicating without difficutly.  NAD.  Eyes: EOM I Bilaterally  Head/Face: Normocephalic.  Negative paranasal sinus pressure/tenderness.   Salivary glands WNL.  House Brackmann I Bilaterally.    Right Ear: Auricle normal appearance. External Auditory Canal within normal limits no lesions or masses,TM w/o masses/lesions/perforations. TM mobility noted.   Left Ear: Auricle normal appearance. External Auditory Canal within normal limits no lesions or masses,TM w/o masses/lesions/perforations. TM mobility noted.  Rinne Air conduction >bone conduction bilaterally, Call midline.   Negative Romberg; no nystagmus, negative Fakuda step test, negative head thrust; negative Dixx Hallpike to the right; negative Dixx-Hallpike to the left.   Nose: No gross nasal septal deviation. Inferior Turbinates 3+ bilaterally. No septal perforation. No masses/lesions. External nasal skin appears normal without masses/lesions.  Oral Cavity: Gingiva/lips within normal limits.  Dentition/gingiva healthy appearing. Mucus membranes moist. Floor of mouth soft, no masses palpated. Oral Tongue mobile. Hard Palate appears normal.    Oropharynx: Base of tongue appears normal. No masses/lesions noted. Tonsillar fossa/pharyngeal wall without lesions. Posterior oropharynx WNL.  Soft palate without masses. Midline uvula.   Neck/Lymphatic: No LAD I-VI bilaterally.  No thyromegaly.  No masses noted on exam.    Mirror laryngoscopy/nasopharyngoscopy: Active gag reflex.  Unable to perform.    Neuro/Psychiatric: AOx3.  Normal mood and affect.   Cardiovascular: Normal carotid pulses bilaterally, no increasing jugular venous distention noted at cervical region bilaterally.    Respiratory: Normal respiratory effort, no stridor, no retractions noted.    Diagnostic testing reviewed:   MRI brain 7/24/2018: No evidence of CPA lesions. There is mucoperiosteal thickening and air-fluid levels in the bilateral maxillary sinuses.        Assessment:    ICD-10-CM ICD-9-CM    1. Asymmetric SNHL (sensorineural hearing loss) H90.5 389.16    2. ETD (Eustachian tube dysfunction), bilateral H69.83 381.81    3. Acute  bacterial sinusitis J01.90 461.9     B96.89     4. Asymmetrical hearing loss of both ears H91.8X3 389.8      The primary encounter diagnosis was Asymmetric SNHL (sensorineural hearing loss). Diagnoses of ETD (Eustachian tube dysfunction), bilateral, Acute bacterial sinusitis, and Asymmetrical hearing loss of both ears were also pertinent to this visit.      Plan:  No orders of the defined types were placed in this encounter.     MRI of IAC clear of CPA lesion. Patient is medically cleared for hearing aids. Consultation with audiologist was arranged to discuss hearing aid options.     Right BPPV- Improved.  Solorio-Daroff exercises were given if symptoms should recur.  He knows to follow up here for evaluation if vertigo resumes.     AR/CRS: continue Flonase and Levocetirizine.     Radha Vogt MD

## 2018-08-28 ENCOUNTER — LAB VISIT (OUTPATIENT)
Dept: LAB | Facility: HOSPITAL | Age: 73
End: 2018-08-28
Attending: FAMILY MEDICINE
Payer: COMMERCIAL

## 2018-08-28 DIAGNOSIS — E11.22 TYPE 2 DIABETES MELLITUS WITH STAGE 3 CHRONIC KIDNEY DISEASE, WITHOUT LONG-TERM CURRENT USE OF INSULIN: ICD-10-CM

## 2018-08-28 DIAGNOSIS — R41.3 MEMORY PROBLEM: ICD-10-CM

## 2018-08-28 DIAGNOSIS — N18.30 TYPE 2 DIABETES MELLITUS WITH STAGE 3 CHRONIC KIDNEY DISEASE, WITHOUT LONG-TERM CURRENT USE OF INSULIN: ICD-10-CM

## 2018-08-28 DIAGNOSIS — I10 HYPERTENSION, UNSPECIFIED TYPE: ICD-10-CM

## 2018-08-28 DIAGNOSIS — E55.9 VITAMIN D DEFICIENCY: ICD-10-CM

## 2018-08-28 DIAGNOSIS — E03.9 HYPOTHYROIDISM, UNSPECIFIED TYPE: ICD-10-CM

## 2018-08-28 LAB
25(OH)D3+25(OH)D2 SERPL-MCNC: 19 NG/ML
ALBUMIN SERPL BCP-MCNC: 3.8 G/DL
ALP SERPL-CCNC: 94 U/L
ALT SERPL W/O P-5'-P-CCNC: 35 U/L
ANION GAP SERPL CALC-SCNC: 11 MMOL/L
AST SERPL-CCNC: 24 U/L
BASOPHILS # BLD AUTO: 0.05 K/UL
BASOPHILS NFR BLD: 0.5 %
BILIRUB SERPL-MCNC: 1.1 MG/DL
BUN SERPL-MCNC: 12 MG/DL
CALCIUM SERPL-MCNC: 8.9 MG/DL
CHLORIDE SERPL-SCNC: 102 MMOL/L
CHOLEST SERPL-MCNC: 189 MG/DL
CHOLEST/HDLC SERPL: 4.4 {RATIO}
CO2 SERPL-SCNC: 24 MMOL/L
CREAT SERPL-MCNC: 1.5 MG/DL
DIFFERENTIAL METHOD: ABNORMAL
EOSINOPHIL # BLD AUTO: 0.5 K/UL
EOSINOPHIL NFR BLD: 4.8 %
ERYTHROCYTE [DISTWIDTH] IN BLOOD BY AUTOMATED COUNT: 12.8 %
EST. GFR  (AFRICAN AMERICAN): 53 ML/MIN/1.73 M^2
EST. GFR  (NON AFRICAN AMERICAN): 45.9 ML/MIN/1.73 M^2
ESTIMATED AVG GLUCOSE: 160 MG/DL
GLUCOSE SERPL-MCNC: 168 MG/DL
HBA1C MFR BLD HPLC: 7.2 %
HCT VFR BLD AUTO: 46.3 %
HDLC SERPL-MCNC: 43 MG/DL
HDLC SERPL: 22.8 %
HGB BLD-MCNC: 15.7 G/DL
IMM GRANULOCYTES # BLD AUTO: 0.1 K/UL
IMM GRANULOCYTES NFR BLD AUTO: 1 %
LDLC SERPL CALC-MCNC: 85.6 MG/DL
LYMPHOCYTES # BLD AUTO: 2.4 K/UL
LYMPHOCYTES NFR BLD: 23.5 %
MCH RBC QN AUTO: 29.6 PG
MCHC RBC AUTO-ENTMCNC: 33.9 G/DL
MCV RBC AUTO: 87 FL
MONOCYTES # BLD AUTO: 0.8 K/UL
MONOCYTES NFR BLD: 7.8 %
NEUTROPHILS # BLD AUTO: 6.4 K/UL
NEUTROPHILS NFR BLD: 62.4 %
NONHDLC SERPL-MCNC: 146 MG/DL
NRBC BLD-RTO: 0 /100 WBC
PLATELET # BLD AUTO: 229 K/UL
PMV BLD AUTO: 12 FL
POTASSIUM SERPL-SCNC: 3.6 MMOL/L
PROT SERPL-MCNC: 6.9 G/DL
RBC # BLD AUTO: 5.31 M/UL
SODIUM SERPL-SCNC: 137 MMOL/L
TRIGL SERPL-MCNC: 302 MG/DL
TSH SERPL DL<=0.005 MIU/L-ACNC: 2.34 UIU/ML
VIT B12 SERPL-MCNC: 180 PG/ML
WBC # BLD AUTO: 10.28 K/UL

## 2018-08-28 PROCEDURE — 80061 LIPID PANEL: CPT

## 2018-08-28 PROCEDURE — 82607 VITAMIN B-12: CPT

## 2018-08-28 PROCEDURE — 82306 VITAMIN D 25 HYDROXY: CPT

## 2018-08-28 PROCEDURE — 80053 COMPREHEN METABOLIC PANEL: CPT

## 2018-08-28 PROCEDURE — 36415 COLL VENOUS BLD VENIPUNCTURE: CPT | Mod: PO

## 2018-08-28 PROCEDURE — 84443 ASSAY THYROID STIM HORMONE: CPT

## 2018-08-28 PROCEDURE — 83036 HEMOGLOBIN GLYCOSYLATED A1C: CPT

## 2018-08-28 PROCEDURE — 85025 COMPLETE CBC W/AUTO DIFF WBC: CPT

## 2018-09-03 RX ORDER — ERGOCALCIFEROL 1.25 MG/1
CAPSULE ORAL
Qty: 12 CAPSULE | Refills: 0 | Status: SHIPPED | OUTPATIENT
Start: 2018-09-03 | End: 2018-09-04

## 2018-09-04 ENCOUNTER — OFFICE VISIT (OUTPATIENT)
Dept: FAMILY MEDICINE | Facility: CLINIC | Age: 73
End: 2018-09-04
Payer: COMMERCIAL

## 2018-09-04 VITALS
WEIGHT: 216.06 LBS | HEIGHT: 69 IN | BODY MASS INDEX: 32 KG/M2 | SYSTOLIC BLOOD PRESSURE: 150 MMHG | OXYGEN SATURATION: 95 % | HEART RATE: 61 BPM | TEMPERATURE: 98 F | DIASTOLIC BLOOD PRESSURE: 90 MMHG

## 2018-09-04 DIAGNOSIS — E53.8 B12 DEFICIENCY: ICD-10-CM

## 2018-09-04 DIAGNOSIS — G47.33 OSA (OBSTRUCTIVE SLEEP APNEA): ICD-10-CM

## 2018-09-04 DIAGNOSIS — I10 HYPERTENSION, UNSPECIFIED TYPE: ICD-10-CM

## 2018-09-04 DIAGNOSIS — E03.9 HYPOTHYROIDISM, UNSPECIFIED TYPE: ICD-10-CM

## 2018-09-04 DIAGNOSIS — M10.9 GOUT, UNSPECIFIED CAUSE, UNSPECIFIED CHRONICITY, UNSPECIFIED SITE: ICD-10-CM

## 2018-09-04 DIAGNOSIS — E11.22 TYPE 2 DIABETES MELLITUS WITH STAGE 3 CHRONIC KIDNEY DISEASE, WITHOUT LONG-TERM CURRENT USE OF INSULIN: Primary | ICD-10-CM

## 2018-09-04 DIAGNOSIS — N18.30 TYPE 2 DIABETES MELLITUS WITH STAGE 3 CHRONIC KIDNEY DISEASE, WITHOUT LONG-TERM CURRENT USE OF INSULIN: Primary | ICD-10-CM

## 2018-09-04 DIAGNOSIS — E55.9 VITAMIN D DEFICIENCY: ICD-10-CM

## 2018-09-04 PROCEDURE — 3077F SYST BP >= 140 MM HG: CPT | Mod: CPTII,S$GLB,, | Performed by: FAMILY MEDICINE

## 2018-09-04 PROCEDURE — 3080F DIAST BP >= 90 MM HG: CPT | Mod: CPTII,S$GLB,, | Performed by: FAMILY MEDICINE

## 2018-09-04 PROCEDURE — 99215 OFFICE O/P EST HI 40 MIN: CPT | Mod: S$GLB,,, | Performed by: FAMILY MEDICINE

## 2018-09-04 PROCEDURE — 99999 PR PBB SHADOW E&M-EST. PATIENT-LVL IV: CPT | Mod: PBBFAC,,, | Performed by: FAMILY MEDICINE

## 2018-09-04 PROCEDURE — 3045F PR MOST RECENT HEMOGLOBIN A1C LEVEL 7.0-9.0%: CPT | Mod: CPTII,S$GLB,, | Performed by: FAMILY MEDICINE

## 2018-09-04 PROCEDURE — 1101F PT FALLS ASSESS-DOCD LE1/YR: CPT | Mod: CPTII,S$GLB,, | Performed by: FAMILY MEDICINE

## 2018-09-04 RX ORDER — METFORMIN HYDROCHLORIDE 500 MG/1
500 TABLET, EXTENDED RELEASE ORAL DAILY
Qty: 90 TABLET | Refills: 11 | Status: SHIPPED | OUTPATIENT
Start: 2018-09-04 | End: 2019-04-08 | Stop reason: SDUPTHER

## 2018-09-04 RX ORDER — CHOLECALCIFEROL (VITAMIN D3) 125 MCG
5000 CAPSULE ORAL DAILY
COMMUNITY
Start: 2018-09-04 | End: 2018-10-24 | Stop reason: SDUPTHER

## 2018-09-04 RX ORDER — LANOLIN ALCOHOL/MO/W.PET/CERES
1000 CREAM (GRAM) TOPICAL DAILY
Qty: 90 TABLET | Refills: 3 | Status: SHIPPED | OUTPATIENT
Start: 2018-09-04 | End: 2019-09-21 | Stop reason: SDUPTHER

## 2018-09-04 NOTE — PATIENT INSTRUCTIONS
Modified Epley maneuver for self-treatment of benign positional vertigo (right)    This maneuver should be carried out three times a day. Repeat this daily until you are free from positional vertigo for 24 hours.  Reproduced from http://www.[facility].de/ch/neuro/englishL.htm  Graphic 86014 Version 2.0          Modified Epley maneuver for self-treatment of benign positional vertigo (left)    This maneuver should be carried out three times a day. Repeat this daily until you are free from positional vertigo for 24 hours.  Reproduced from Http://www.[facility].de/ch/neuro/englishL.htm  Graphic 63644 Version 2.0           MY FITNESS PAL: FREE CALORIE COUNTING JOSEPH ON IPHONE/ANDROID DEVICES.       Nutrition: How to Make Healthier Food Choices     Nutrition: How to Make Healthier Food Choices     Why is healthy eating important?  When combined with exercise, a healthy diet can help you lose weight, lower your cholesterol level and improve the way your body functions on a daily basis.  The U.S. Department of Agricultures (USDA) Food Guide Pyramid divides food into 6 basic food groups, consisting of 1) grains, 2) fruits, 3) vegetables, 4) meats and beans, 5) dairy and 6) fats.  The USDA recommends an adult daily diet include the following:  3 ounces of whole grains, and 6 ounces of grains total   2 cups of fruit   2 1/2 cups of vegetables   3 cups fat-free or low-fat dairy   The following are some ways to make healthier food choices and to get the recommended amounts of whole grains, fruits, vegetables and dairy.    Grains  Whole-grain breads are low in fat; they're also high in fiber and complex carbohydrates, which help you feel atwood longer and prevent overeating. Choose breads whose first ingredient says whole in front of the grain, for example, whole wheat flour or whole white flour; enriched or other types of flour have the important fiber and nutrients removed. Choose whole grain breads for sandwiches and as  additions to meals.  Avoid rich bakery foods such as donuts, sweet rolls and muffins. These foods can contain more than 50% fat calories. Snacks such as margarito food cake and gingersnap cookies can satisfy your sweet tooth without adding fat to your diet.  Hot and cold cereals are usually low in fat. But instant cereals with cream may contain high-fat oils or butterfat. Granola cereals may also contain high-fat oils and extra sugars. Look for low-sugar options for both instant and granola cereals.  Avoid fried snacks such as potato chips and tortilla chips. Try the low-fat or baked versions instead.  Instead of this: Try this:   Croissants, biscuits, white breads and rolls Low-fat whole grain breads and rolls (wheat, rye and pumpernickel)   Doughnuts, pastries and scones English muffins and small whole grain bagels   Fried tortillas Soft tortillas (corn or whole wheat)   Sugar cereals and regular granola Oatmeal, low-fat granola and whole-grain cereal   Snack crackers Crackers (animal, татьяна, rye, soda, saltine, oyster)   Potato or corn chips and buttered popcorn Pretzels (unsalted) and popcorn (unbuttered)   White pasta Whole-wheat pasta   White rice Brown rice   Fried rice, or pasta and rice mixes that contain high-fat sauces Rice or pasta (without egg yolk) with vegetable sauces   All-purpose white flour 100% whole-wheat flour     Fruits and Vegetables  Fruits and vegetables are naturally low in fat. They add flavor and variety to your diet. They also contain fiber, vitamins and minerals.  Margarine, butter, mayonnaise and sour cream add fat to vegetables and fruits. Try using nonfat or low-fat versions of these foods. You can also use nonfat or low-fat yogurt or herbs as seasonings instead.  Instead of this: Try this:   Fried vegetables or vegetables served with cream, cheese or butter sauces All vegetables raw, steamed, broiled, baked or tossed with a very small amount of olive oil and salt and pepper   Coconut  "Fruit (fresh)   French fries, hash browns and potato   chips Baked white or sweet potatoes     Meat, Poultry and Fish  Beef, Pork, Veal and Lamb  Baking, broiling and roasting are the healthiest ways to prepare meat. Lean cuts can be pan-broiled or stir-fried. Use either a nonstick pan or nonstick spray coating instead of butter or margarine.  Trim outside fat before cooking. Trim any inside, separable fat before eating. Select low-fat, lean cuts of meat. Lean beef and veal cuts have the word "loin" or "round" in their names. Lean pork cuts have the word "loin" or "leg" in their names.  Use herbs, spices, fresh vegetables and nonfat marinades to season meat. Avoid high-fat sauces and gravies.  Poultry  Baking, broiling and roasting are the healthiest ways to prepare poultry. Skinless poultry can be pan-broiled or stir-fried. Use either a nonstick pan or nonstick spray coating instead of butter or margarine.  Remove skin and visible fat before cooking. Chicken breasts are a good choice because they are low in fat and high in protein. Use domestic goose and duck only once in a while because both are high in fat.  Fish  Poaching, steaming, baking and broiling are the healthiest ways to prepare fish. Fresh fish should have a clear color, a moist look, a clean smell and firm, springy flesh. If good-quality fresh fish isn't available, buy frozen fish.  Most seafood is high in healthy polyunsaturated fat. Omega-3 fatty acids are also found in some fatty fish, such as salmon and cold-water trout. They may help lower the risk of heart disease in some people.  Cross-over Foods  Dry beans, peas and lentils offer protein and fiber without the cholesterol and fat of meats. Once in a while, try substituting beans for meat in a favorite recipe, such as lasagna or chili.  TVP, or textured vegetable protein, is widely available in many foods. Vegetarian "hot dogs," "hamburger" and "chicken nuggets" are low-fat, cholesterol-free " alternatives to meat.  Instead of this: Try this:   Regular or breaded fish sticks or cakes, fish canned in oil, seafood prepared with butter or served in high-fat sauce Fish (fresh, frozen, canned in water), low-fat fish sticks or cakes and shellfish (such as shrimp)   Prime and marbled cuts Select-grade lean beef (round, sirloin and loin)   Pork spare ribs and chaney Lean pork (tenderloin and loin chop) and turkey chaney   Regular ground beef Lean or extra-lean ground beef, ground chicken and turkey breast   Lunch meats such as pepperoni, salami, bologna and liverwurst Lean lunch meats such as turkey, chicken and ham   Regular hot dogs or sausage Fat-free hot dogs and turkey dogs     Dairy  Choose skim milk or low-fat buttermilk. Substitute evaporated skim milk for cream in recipes for soups, sauces and coffee.  Try low-fat cheeses. Skim ricotta can replace cream cheese on a bagel or in a vegetable dip. Use part-skim cheeses in recipes. Use 1% cottage cheese for salads and cooking. String cheese is a low-fat, high-calcium snack option.  Plain nonfat yogurt can replace sour cream in many recipes. (To maintain texture, stir 1 tablespoon of cornstarch into each cup of yogurt that you use in cooking.) Try mixing frozen nonfat or low-fat yogurt with fruit for dessert.  Skim sherbet is an alternative to ice cream. Soft-serve and regular ice creams are also lower in fat than premium styles.  Instead of this: Try this:   Whole or 2% milk Non-fat or 1% milk   Evaporated milk Evaporated non-fat milk   Regular buttermilk Buttermilk made from non-fat (or 1%) milk   Yogurt made with whole milk Nonfat or low-fat yogurt   Regular cheese (examples: American, blue, Brie, cheddar, Farshad and Parmesan) Low-fat cheese with less than 3 grams of fat per serving (example: natural cheese, processed cheese and nondairy cheese such as soy cheese)   Regular cottage cheese Low-fat, nonfat, and dry-curd cottage cheese with less than 2% fat    Regular cream cheese Low-fat cream cheese (no more than 3 grams of fat per ounce)   Regular ice cream Sorbet, sherbet and nonfat or low-fat ice cream (no more than 3 grams of fat per 1/2 cup serving)     Fats, Oils and Sweets  Eating too many high-fat foods not only adds excess calories (which can lead to obesity and weight gain), but can increase your risk factor for several diseases. Heart disease, diabetes, certain types of cancer and osteoarthritis have all been linked to diets too high in fat. If you consume too much saturated and trans fats, you are more likely to develop high cholesterol and coronary artery disease.  Sugar-sweetened drinks, such as fruit juice, fruit drinks, regular soft drinks, sports drinks, energy drinks, sweetened or flavored milk and sweetened iced tea can add lots of sugar and calories to your diet. But staying hydrated is important for good health. Substitute water, zero-calorie flavored water, non-fat or reduced-fat milk, unsweetened tea or diet soda for sweetened drinks. Talk with your family doctor or a dietitian if you have questions about your diet or healthy eating for your family.  Instead of this: Try this:   Cookies Fig bars, gingersnaps and molasses cookies   Shortening, butter or margarine Olive, soybean and canola oils   Regular mayonnaise Nonfat or light mayonnaise   Regular salad dressing Nonfat or light salad dressing   Using fat (including butter) to grease pan Nonstick cooking spray

## 2018-09-04 NOTE — PROGRESS NOTES
(Portions of this note were dictated using voice recognition software and may contain dictation related errors in spelling/grammar/syntax not found on text review)    CC:   Chief Complaint   Patient presents with    Follow-up     3 month        HPI: 72 y.o. male Last visit June of 2018    Diabetes, with chronic kidney disease stage 3, had been intolerant to metformin secondary to diarrhea.  Was on Januvia as well with uncontrolled sugars.  Was switched out both in  favor of Trulicity which has been titrated up to 1.5 mg daily.  Since my last visit saw Endocrinology on 07/26/2018, note appreciated.  Recent A1c was mildly suboptimal at 7.2.  Creatinine slightly up at 1.5.  Does note sugars in the 160-170 range    Was hospitalized for vertigo in  July 2018.  Cranial workup was negative except for evidence of paranasal sinus disease. He was treated symptomatically for the vertigo and given Augmentin for his sinusitis.  He is following up with ENT, last visit 08/17/2018.  Does have hearing loss as well, was   Prescribed hearing aids.  Was concerned about what he could do at home for prevention       Hypertension:  On amlodipine 10 mg daily, Avapro 150 mg daily.  Blood pressure is elevated today, slightly improved on recheck to 136/88.  Patient states that at home his blood pressures are very well controlled, usually  Around 117/80    Had complained of some memory difficulties last visit.  Had recent labs done showing significant vitamin-D deficiency.  Had been treated with 05732 units of vitamin D2 for 3 months after last visit although vitamin-D level has not significantly improved.  Also workup showed vitamin B12 deficiency with a level of 180, was 306 back in 2012.  Of note he had been on metformin long-term but did discontinue after last visit in June.     Hypothyroidism on synthroid 75 mcg. Last tsh below    BPH, followed by urology.  On kavita     Anxiety on citalopram 20 mg daily     Gout on allopurinol 100 mg  daily     JANEEN, followed by sleep medicine, on CPAP     Not on statin, have addressed at multiple visits but patient has declined despite counseling of benefit      asks about ways to lose weight.  Unfortunately not exercising however.  Has not noticed any weight loss with Trulicity    Past Medical History:   Diagnosis Date    Anxiety     BPH (benign prostatic hypertrophy)     Cataract     CKD (chronic kidney disease) stage 3, GFR 30-59 ml/min 2014    Colon polyps     Diabetes mellitus type II     Emphysema NEC     mild    Gout     Hyperlipidemia     Hypertension     Hypothyroidism     IBS (irritable bowel syndrome)     Kidney stones     JANEEN (obstructive sleep apnea)     Plantar fasciitis     Reflux        Past Surgical History:   Procedure Laterality Date    CATARACT EXTRACTION  2013    RIGHT EYE    CATARACT EXTRACTION      left eye    COLONOSCOPY  Sep 2011    Repeat recommended in 5 years    KIDNEY STONE SURGERY         Family History   Problem Relation Age of Onset    Cancer Brother         non-hodgkins T cell lymphoma    Diabetes Mother     Macular degeneration Brother     Coronary artery disease Neg Hx     Colon cancer Neg Hx     Prostate cancer Neg Hx     Esophageal cancer Neg Hx        Social History     Socioeconomic History    Marital status:      Spouse name: Not on file    Number of children: Not on file    Years of education: Not on file    Highest education level: Not on file   Social Needs    Financial resource strain: Not on file    Food insecurity - worry: Not on file    Food insecurity - inability: Not on file    Transportation needs - medical: Not on file    Transportation needs - non-medical: Not on file   Occupational History    Not on file   Tobacco Use    Smoking status: Former Smoker     Years: 40.00     Last attempt to quit: 2007     Years since quittin.0    Smokeless tobacco: Never Used   Substance and Sexual Activity    Alcohol  use: Yes     Alcohol/week: 0.0 oz     Comment: occasionally    Drug use: No    Sexual activity: No   Other Topics Concern    Not on file   Social History Narrative    Not on file     Lab Results   Component Value Date    WBC 10.28 08/28/2018    HGB 15.7 08/28/2018    HCT 46.3 08/28/2018     08/28/2018    CHOL 189 08/28/2018    TRIG 302 (H) 08/28/2018    HDL 43 08/28/2018    ALT 35 08/28/2018    AST 24 08/28/2018     08/28/2018    K 3.6 08/28/2018     08/28/2018    CREATININE 1.5 (H) 08/28/2018    CALCIUM 8.9 08/28/2018    BUN 12 08/28/2018    CO2 24 08/28/2018    TSH 2.342 08/28/2018    PSA 0.74 07/14/2017    INR 1.0 07/27/2018    HGBA1C 7.2 (H) 08/28/2018    LDLCALC 85.6 08/28/2018     (H) 08/28/2018       Last uric acid level was 6.9 in February 2018        ROS:  GENERAL:   Fatigue.  SKIN: No rashes, no itching.  HEAD: No headaches.  EYES: No visual changes  EARS: No ear pain or changes in hearing.  NOSE: No congestion or rhinorrhea.  MOUTH & THROAT: No hoarseness, change in voice, or sore throat.  NODES: Denies swollen glands.  CHEST: Denies MENON, cyanosis, wheezing, cough and sputum production.  CARDIOVASCULAR: Denies chest pain, PND, orthopnea.  ABDOMEN: No nausea, vomiting, or changes in bowel function.  URINARY: No flank pain, dysuria or hematuria.  PERIPHERAL VASCULAR:  Occasional foot swelling bilaterally.  MUSCULOSKELETAL: No joint stiffness or swelling. Denies back pain.  NEUROLOGIC: Occasional right thigh numbness in itching    Vital signs reviewed  PE:   APPEARANCE: Well nourished, well developed, in no acute distress.    HEAD: Normocephalic, atraumatic.  EYES: PERRL. EOMI.   Conjunctivae noninjected.  EARS: TM's intact. Light reflex normal. No retraction or perforation  NOSE: Mucosa pink. Airway clear.  MOUTH & THROAT: No tonsillar enlargement. No pharyngeal erythema or exudate.   NECK: Supple with no cervical lymphadenopathy.   No carotid bruits.  No thyromegaly  CHEST:  Good inspiratory effort. Lungs clear to auscultation with no wheezes or crackles.  CARDIOVASCULAR: Normal S1, S2. No rubs, murmurs, or gallops.  ABDOMEN: Bowel sounds normal. Not distended. Soft. No tenderness or masses. No organomegaly.  EXTREMITIES: No edema, cyanosis, or clubbing.  DIABETIC FOOT EXAM: Protective Sensation (w/ 10 gram monofilament):  Right: Intact  Left: Intact    Visual Inspection:  Normal -  Bilateral    Pedal Pulses:   Right: Present  Left: Present    Posterior tibialis:   Right:Present  Left: Present          IMPRESSION  1. Type 2 diabetes mellitus with stage 3 chronic kidney disease, without long-term current use of insulin    2. Vitamin D deficiency    3. Hypothyroidism, unspecified type    4. Hypertension, unspecified type    5. JANEEN (obstructive sleep apnea)    6. Gout, unspecified cause, unspecified chronicity, unspecified site    7. B12 deficiency            PLAN  Reviewed prior visit documentation along with endocrinology documentation hospitalization summary, labs, imaging as described and summarized above        Diabetes health maintenance  -advise proper dietary and exercise modification  -advise medication compliance  -advise daily aspirin if there are no other contraindications  -advise consistent blood sugar monitoring  -advise regular dentist and ophthalmology visits  -advise regular foot checks  -Medication management: continue Trulicity 1.5 mg daily.  Discussed options like adding low-dose sulfonylurea as discussed from his endocrinologist versus adding back low-dose metformin (since his GI intolerance was more on a higher dose).  He is willing to retry metformin at a low dose 500 mg extended release daily, and he will let me know if he continues to get diarrhea issues.  Could switch over to glimepiride low dose in addition to the Trulicity if metformin is not tolerated again    Have discussed statin rationale given his diabetes but he has declined on prior visits despite  significant counseling about Benefit    HTN: improved on recheck but would advise continued ambulatory blood pressure monitoring, counseling on healthy diet, exercise, and weight loss    Hypothyroid: Continue Synthroid    Vit d def:  Can try 5000 units of vitamin D3 over-the-counter daily in place of the vitamin D2 therapy    Vitamin B12 deficiency:  Start B12 orally 1000 mcg daily.  Recheck in 3 months.  If no significant improvement will switch over to injections     weight management:  Discussed unfortunately no easy fix.  Not getting much exercise at all.  Would recommend trying to start exercising along with calorie counting to evaluate food intake.  Offered referral to medical fitness program but patient declines     Vertigo:  Home Epley exercises provided       Recheck 3 months      SCREENINGS  Colonoscopy: 2016.  Normal colonoscopy except for medium size internal hemorrhoids visualized.  Prostate : [facility], Dr. Murray.     Immunizations  pvx 2013  Pcv: utd through outside pharmacy patient states roughly  Around 2016, post 65 years of age  Tetanus 7/20/15  flu: declines     Stress echo August 2015, normal  EKGs  2016: nsr  48 hour Holter monitor April 2014. 3 episodes of shortness of breath reported, corresponding rhythm is sinus rhythm. One syncopal episode reported and corresponding rhythm was sinus rhythm at 79 bpm. One episode of lightheadedness reported, corresponding rhythm was sinus bradycardia 59 bpm. No high-grade AV block. Rare PVCs. No ventricular tachycardia. Very rare PACs          Electronically signed by Adams Moore MD at 6/4/2018 10:54 AM

## 2018-09-16 RX ORDER — AMLODIPINE BESYLATE 10 MG/1
TABLET ORAL
Qty: 30 TABLET | Refills: 11 | Status: SHIPPED | OUTPATIENT
Start: 2018-09-16 | End: 2019-09-21 | Stop reason: SDUPTHER

## 2018-09-17 ENCOUNTER — PATIENT MESSAGE (OUTPATIENT)
Dept: UROLOGY | Facility: CLINIC | Age: 73
End: 2018-09-17

## 2018-09-18 ENCOUNTER — OFFICE VISIT (OUTPATIENT)
Dept: UROLOGY | Facility: CLINIC | Age: 73
End: 2018-09-18
Payer: COMMERCIAL

## 2018-09-18 VITALS
HEART RATE: 85 BPM | BODY MASS INDEX: 32.13 KG/M2 | DIASTOLIC BLOOD PRESSURE: 87 MMHG | SYSTOLIC BLOOD PRESSURE: 141 MMHG | HEIGHT: 69 IN | WEIGHT: 216.94 LBS

## 2018-09-18 DIAGNOSIS — R82.71 BACTERIA IN URINE: ICD-10-CM

## 2018-09-18 DIAGNOSIS — R31.0 GROSS HEMATURIA: Primary | ICD-10-CM

## 2018-09-18 LAB
BILIRUB SERPL-MCNC: NORMAL MG/DL
BLOOD URINE, POC: 250
COLOR, POC UA: YELLOW
GLUCOSE UR QL STRIP: NORMAL
KETONES UR QL STRIP: NORMAL
LEUKOCYTE ESTERASE URINE, POC: NORMAL
NITRITE, POC UA: NORMAL
PH, POC UA: 6
POC RESIDUAL URINE VOLUME: 41 ML (ref 0–100)
PROTEIN, POC: NORMAL
SPECIFIC GRAVITY, POC UA: 1
UROBILINOGEN, POC UA: NORMAL

## 2018-09-18 PROCEDURE — 51798 US URINE CAPACITY MEASURE: CPT | Mod: S$GLB,,, | Performed by: NURSE PRACTITIONER

## 2018-09-18 PROCEDURE — 1101F PT FALLS ASSESS-DOCD LE1/YR: CPT | Mod: CPTII,S$GLB,, | Performed by: NURSE PRACTITIONER

## 2018-09-18 PROCEDURE — 3079F DIAST BP 80-89 MM HG: CPT | Mod: CPTII,S$GLB,, | Performed by: NURSE PRACTITIONER

## 2018-09-18 PROCEDURE — 3077F SYST BP >= 140 MM HG: CPT | Mod: CPTII,S$GLB,, | Performed by: NURSE PRACTITIONER

## 2018-09-18 PROCEDURE — 99214 OFFICE O/P EST MOD 30 MIN: CPT | Mod: 25,S$GLB,, | Performed by: NURSE PRACTITIONER

## 2018-09-18 PROCEDURE — 81002 URINALYSIS NONAUTO W/O SCOPE: CPT | Mod: S$GLB,,, | Performed by: NURSE PRACTITIONER

## 2018-09-18 PROCEDURE — 99999 PR PBB SHADOW E&M-EST. PATIENT-LVL V: CPT | Mod: PBBFAC,,, | Performed by: NURSE PRACTITIONER

## 2018-09-18 PROCEDURE — 87086 URINE CULTURE/COLONY COUNT: CPT

## 2018-09-18 RX ORDER — CIPROFLOXACIN 500 MG/1
500 TABLET ORAL 2 TIMES DAILY
Qty: 10 TABLET | Refills: 0 | Status: SHIPPED | OUTPATIENT
Start: 2018-09-18 | End: 2018-09-23

## 2018-09-18 NOTE — LETTER
September 18, 2018      Adams Moore MD  [address]  [address]  [facility] [address]           [facility] 4th Floor  [address]  [place] [address]  Phone: [phone]          Patient: Jerardo Powers   MR Number: [id number]   YOB: 1945   Date of Visit: 9/18/2018       Dear Dr. Adams Moore:    Thank you for referring Jerardo Powers to me for evaluation. Attached you will find relevant portions of my assessment and plan of care.    If you have questions, please do not hesitate to call me. I look forward to following Jerardo Powers along with you.    Sincerely,    Veronika Reynolds, LYNDSAY    Enclosure  CC:  No Recipients    If you would like to receive this communication electronically, please contact [email or web address] or [phone] to request more information on [facility] [employer] access.    For providers and/or their staff who would like to refer a patient to [facility], please contact us through our one-stop-shop provider referral line, [facility], at [phone].    If you feel you have received this communication in error or would no longer like to receive these types of communications, please e-mail [email or web address]

## 2018-09-18 NOTE — PROGRESS NOTES
CC: hematuria    Jerardo Powers is a 72 y.o. man who is here for the evaluation of hematuria  Last seen with Dr. Murray on 3/8/18 for kidney stones.    hx of BPH with obstruction, prostatitis, and kidney stones.     He reports that he had gross hematuria last week that resolved. Yesterday he saw the blood again. He reports panhematuria that is bright red in color. He also reports intermittent lower abdominal pain that started yesterday. He rates it a 7/10. He is unsure what worsens or improves the pain.  Denies dysuria, frequency, urgency, or increased nocturia. Denies incontinence. Denies flank and rectal pain.  Reports a good FOS and complete bladder emptying. Denies fever, chills, nausea, or vomiting.     Has been on Sue since 2009.     3/21/18 Procedure(s) Performed w/ Dr. Murray:   1.  Left ESWL    Cysto w/ Dr. Murray on 3/2016:  Prostate: Estimated Length Prostatic Urethra: 4 cm with bilateral obstruction  Bladder:  Normal bladder.   Normal ureteral orifices bilaterally.   Moderate trabeculation.   Hyperemic area of the bladder mucosa with early glomerulation  Conclusion:  Bladder condition, enlarged prostate, and kidney stones are all potential causes for hematuria.  May consider laser TURP to improve his DIAZ.      Past Medical History:   Diagnosis Date    Anxiety     BPH (benign prostatic hypertrophy)     Cataract     CKD (chronic kidney disease) stage 3, GFR 30-59 ml/min 5/5/2014    Colon polyps     Diabetes mellitus type II     Emphysema NEC     mild    Gout     Hyperlipidemia     Hypertension     Hypothyroidism     IBS (irritable bowel syndrome)     Kidney stones     JANEEN (obstructive sleep apnea)     Plantar fasciitis     Reflux      Past Surgical History:   Procedure Laterality Date    CATARACT EXTRACTION  1/28/2013    RIGHT EYE    CATARACT EXTRACTION      left eye    COLONOSCOPY  Sep 2011    Repeat recommended in 5 years    COLONOSCOPY N/A 10/10/2016    Procedure: COLONOSCOPY;  Surgeon:  Noah Colunga MD;  Location: [facility] (4TH FLR);  Service: Endoscopy;  Laterality: N/A;  PM Prep    COLONOSCOPY N/A 10/10/2016    Performed by Noah Colunga MD at [facility] ENDO (4TH FLR)    ESOPHAGOGASTRODUODENOSCOPY (EGD) N/A 2017    Performed by Noah Colunga MD at [facility] ENDO (4TH FLR)    ESOPHAGOGASTRODUODENOSCOPY (EGD) N/A 2014    Performed by Noah Colunga MD at [facility] (4TH FLR)    KIDNEY STONE SURGERY      LITHOTRIPSY-EXTRACORPOREAL SHOCK WAVE Left 3/21/2018    Performed by Adi Murray MD at [facility] OR 1ST FLR     Social History     Tobacco Use    Smoking status: Former Smoker     Years: 40.00     Last attempt to quit: 2007     Years since quittin.0    Smokeless tobacco: Never Used   Substance Use Topics    Alcohol use: Yes     Alcohol/week: 0.0 oz     Comment: occasionally    Drug use: No     Family History   Problem Relation Age of Onset    Cancer Brother         non-hodgkins T cell lymphoma    Diabetes Mother     Macular degeneration Brother     Coronary artery disease Neg Hx     Colon cancer Neg Hx     Prostate cancer Neg Hx     Esophageal cancer Neg Hx      Allergy:  Allergies   Allergen Reactions    Morphine Hives     Outpatient Encounter Medications as of 2018   Medication Sig Dispense Refill    allopurinol (ZYLOPRIM) 100 MG tablet Take 1 tablet (100 mg total) by mouth once daily. 90 tablet 3    amLODIPine (NORVASC) 10 MG tablet TAKE 1 TABLET BY MOUTH EVERY DAY 30 tablet 11    cholecalciferol, vitamin D3, 5,000 unit capsule Take 1 capsule (5,000 Units total) by mouth once daily.      citalopram (CELEXA) 20 MG tablet TAKE 1 TABLET (20 MG TOTAL) BY MOUTH ONCE DAILY. 90 tablet 3    CONTOUR TEST STRIPS Strp TEST 3 TIMES A  strip 11    cyanocobalamin (VITAMIN B-12) 1000 MCG tablet Take 1 tablet (1,000 mcg total) by mouth once daily. 90 tablet 3    dulaglutide (TRULICITY) 1.5 mg/0.5 mL PnIj Inject 1.5 mg into the skin once a week. 1 Syringe  "11    dutasteride-tamsulosin (VIVIENNE) 0.5-0.4 mg CM24 Take 1 capsule by mouth every evening. 30 capsule 12    fish oil-omega-3 fatty acids 300-1,000 mg capsule Take 2 g by mouth once daily.      irbesartan (AVAPRO) 150 MG tablet Take 150 mg by mouth once daily.  3    lancets Misc Test sugars once daily.  Dispense 100 lancets (insurance covered brand) 100 each 11    levocetirizine (XYZAL) 5 MG tablet Take 1 tablet (5 mg total) by mouth every evening. 30 tablet 11    levothyroxine (SYNTHROID) 75 MCG tablet TAKE 1 TABLET (75 MCG TOTAL) BY MOUTH ONCE DAILY. 90 tablet 3    magnesium oxide (MAG-OX) 400 mg tablet Take 1 tablet by mouth once daily.  3    meclizine (ANTIVERT) 25 mg tablet Take 1 tablet (25 mg total) by mouth every 6 (six) hours. 30 tablet 0    metFORMIN (GLUCOPHAGE-XR) 500 MG 24 hr tablet Take 1 tablet (500 mg total) by mouth once daily. 90 tablet 11    pen needle, diabetic 31 gauge x 1/4" Ndle Use weekly with trulicity pen 30 each 11    potassium citrate 15 mEq TbSR once daily.       VITAMIN B-6 100 MG tablet 1 TABLET ONCE A DAY  3    ciprofloxacin HCl (CIPRO) 500 MG tablet Take 1 tablet (500 mg total) by mouth 2 (two) times daily. for 5 days 10 tablet 0     No facility-administered encounter medications on file as of 9/18/2018.      Review of Systems   General ROS: GENERAL:  No weight gain or loss  SKIN:  No rashes or lacerations  HEAD:  No headaches  EYES:  No exophthalmos, jaundice or blindness  EARS:  No dizziness, tinnitus or hearing loss  NOSE:  No changes in smell  MOUTH & THROAT:  No dyskinetic movements or obvious goiter  CHEST:  No shortness of breath, hyperventilation or cough  CARDIOVASCULAR:  No tachycardia or chest pain  ABDOMEN:  No nausea, vomiting, pain, constipation or diarrhea  URINARY:  No frequency, dysuria or sexual dysfunction  ENDOCRINE:  No polydipsia, polyuria  MUSCULOSKELETAL:  No pain or stiffness of the joints  NEUROLOGIC:  No weakness, sensory changes, seizures, " confusion, memory loss, tremor or other abnormal movements  Physical Exam     Vitals:    09/18/18 1057   BP: (!) 141/87   Pulse: 85     General Appearance:  Alert, cooperative, no distress, appears stated age   Head:  Normocephalic, without obvious abnormality, atraumatic   Eyes:  PERRL, conjunctiva/corneas clear, EOM's intact, fundi benign, both eyes   Ears:  Normal TM's and external ear canals, both ears   Nose: Nares normal, septum midline, mucosa normal, no drainage or sinus tenderness   Throat: Lips, mucosa, and tongue normal; teeth and gums normal   Neck: Supple, symmetrical, trachea midline, no adenopathy, thyroid: not enlarged, symmetric, no tenderness/mass/nodules, no carotid bruit or JVD   Back:   Symmetric, no curvature, ROM normal, no CVA tenderness   Lungs:   Clear to auscultation bilaterally, respirations unlabored   Chest Wall:  No tenderness or deformity   Heart:  Regular rate and rhythm, S1, S2 normal, no murmur, rub or gallop   Abdomen:   Soft, non-tender, bowel sounds active all four quadrants,  no masses, no organomegaly of liver and spleen  No hernia noted   Genitalia:  Scrotum: no rash or lesion  Normal symmetric epididymis without masses  Normal vas palpated  Normal size, symmetric testicles with no masses   Normal urethral meatus with no discharge  Normal circumcised penis with no lesion   Rectal:  Normal perineum and anus upon inspection.  Normal tone, no masses or tenderness;   Extremities: Extremities normal, atraumatic, no cyanosis or edema   Pulses: 2+ and symmetric   Skin: Skin color, texture, turgor normal, no rashes or lesions   Lymph nodes: Cervical, supraclavicular, and axillary nodes normal   Neurologic: Normal     Prostate 30 grams smooth with no tenderness and no nodules      LABS:  UA today showed +leuk and +blood. Spec grav 1.005 and ph 6.   PVR with bladder scanner was 41 cc.   Lab Results   Component Value Date    PSA 0.74 07/14/2017    PSA 0.59 07/17/2015    PSA 0.96  08/22/2012    PSA 1.11 04/16/2012    PSA 1.4 02/14/2011    PSA 2.4 05/12/2010    PSA 4.0 02/15/2010    PSA 4.7 (H) 08/20/2009    PSA 4.2 (H) 07/06/2009    PSA 3.6 03/25/2008    PSADIAG 1.3 02/19/2016    PSADIAG 0.62 12/01/2014    PSADIAG 0.97 12/04/2013     Results for orders placed or performed in visit on 02/19/16   Prostate Specific Antigen, Diagnostic   Result Value Ref Range    PSA DIAGNOSTIC 1.3 0.00-4.00 ng/mL   Results for orders placed or performed in visit on 12/01/14   Prostate Specific Antigen, Diagnostic   Result Value Ref Range    PSA DIAGNOSTIC 0.62 0.00-4.00 ng/mL   Results for orders placed or performed in visit on 12/04/13   Prostate Specific Antigen, Diagnostic   Result Value Ref Range    PSA DIAGNOSTIC 0.97 0.00-4.00 ng/mL     Lab Results   Component Value Date    CREATININE 1.5 (H) 08/28/2018    CREATININE 1.3 07/28/2018    CREATININE 1.3 07/27/2018     Urine Culture, Routine   Date Value Ref Range Status   02/23/2018 No significant growth  Final     CT RSS 3/8/18  Left-sided nonobstructing nephrolithiasis.  Previously identified stone in the left ureteropelvic junction has apparently migrated into the renal collecting system.    Diverticulosis coli without evidence of diverticulitis.    KUB 3/8/18  There is nephrolithiasis at least on the left. No obstruction, ileus, or perforation seen.    KUB 3/21/18  Status post lithotripsy with resolution of previous stone in the lower pole of the left kidney.  No new stones.    Assessment and Plan:  Diagnoses and all orders for this visit:    Gross hematuria  -     POCT urine dipstick without microscope  -     POCT Bladder Scan  -     Urine culture  -     Cytology, urine  -     ciprofloxacin HCl (CIPRO) 500 MG tablet; Take 1 tablet (500 mg total) by mouth 2 (two) times daily. for 5 days    Bacteria in urine  -     Urine culture  -     Cytology, urine  -     ciprofloxacin HCl (CIPRO) 500 MG tablet; Take 1 tablet (500 mg total) by mouth 2 (two) times daily.  for 5 days       Discussed plan of care with the pt,   Repeat KUB,  UC and urine cytology sent to the lab. Will notify with results, Discussed side effects, indications, and MOA for cipro. Discussed black box warning for tendon rupture. Rx sent empirically. Prescription sent to the pharmacy. Pt verbalized understanding.  He wants to continue to keep his appt with Dr. Murray this week and will cancel if he changes his mind.   RTC based on results    Follow-up:  No Follow-up on file.

## 2018-09-19 ENCOUNTER — CLINICAL SUPPORT (OUTPATIENT)
Dept: AUDIOLOGY | Facility: CLINIC | Age: 73
End: 2018-09-19

## 2018-09-19 ENCOUNTER — HOSPITAL ENCOUNTER (OUTPATIENT)
Dept: RADIOLOGY | Facility: HOSPITAL | Age: 73
Discharge: HOME | End: 2018-09-19
Attending: UROLOGY
Payer: COMMERCIAL

## 2018-09-19 DIAGNOSIS — N20.0 KIDNEY STONES: ICD-10-CM

## 2018-09-19 DIAGNOSIS — H90.3 ASYMMETRICAL SENSORINEURAL HEARING LOSS: Primary | ICD-10-CM

## 2018-09-19 LAB — BACTERIA UR CULT: NO GROWTH

## 2018-09-19 PROCEDURE — 99499 UNLISTED E&M SERVICE: CPT | Mod: S$GLB,,, | Performed by: OTOLARYNGOLOGY

## 2018-09-19 PROCEDURE — 74018 RADEX ABDOMEN 1 VIEW: CPT | Mod: TC,FY

## 2018-09-19 PROCEDURE — 74018 RADEX ABDOMEN 1 VIEW: CPT | Mod: 26,,, | Performed by: RADIOLOGY

## 2018-09-19 NOTE — PROGRESS NOTES
Jerardo Powers was seen in the clinic today for a hearing aid consult.    Pricing and styles were discussed. Mr. Powers reported no difficulty hearing from his right ear. A hearing aid for the left ear was recommended. He was interested in rechargeable technology. Mr. Powers decided to order a [employer] Solutionseo B90-R hearing aid in P3 with a #2 P recevier and a medium closed dome. We discussed adding a right hearing aid if he felt we has still noticing difficulty hearing. The contract was signed and Mr. Powers was given a copy.     An appointment was scheduled for Mr. Powers to return to the clinic to  the hearing aid.

## 2018-09-20 ENCOUNTER — OFFICE VISIT (OUTPATIENT)
Dept: UROLOGY | Facility: CLINIC | Age: 73
End: 2018-09-20
Payer: COMMERCIAL

## 2018-09-20 VITALS
WEIGHT: 215.19 LBS | SYSTOLIC BLOOD PRESSURE: 129 MMHG | HEIGHT: 69 IN | DIASTOLIC BLOOD PRESSURE: 82 MMHG | BODY MASS INDEX: 31.87 KG/M2 | HEART RATE: 82 BPM

## 2018-09-20 DIAGNOSIS — R31.0 GROSS HEMATURIA: ICD-10-CM

## 2018-09-20 DIAGNOSIS — N40.1 BENIGN PROSTATIC HYPERPLASIA WITH LOWER URINARY TRACT SYMPTOMS, SYMPTOM DETAILS UNSPECIFIED: Primary | ICD-10-CM

## 2018-09-20 DIAGNOSIS — R31.0 HEMATURIA, GROSS: ICD-10-CM

## 2018-09-20 PROCEDURE — 88112 CYTOPATH CELL ENHANCE TECH: CPT | Performed by: PATHOLOGY

## 2018-09-20 PROCEDURE — 3079F DIAST BP 80-89 MM HG: CPT | Mod: CPTII,S$GLB,, | Performed by: UROLOGY

## 2018-09-20 PROCEDURE — 99999 PR PBB SHADOW E&M-EST. PATIENT-LVL V: CPT | Mod: PBBFAC,,, | Performed by: UROLOGY

## 2018-09-20 PROCEDURE — 1101F PT FALLS ASSESS-DOCD LE1/YR: CPT | Mod: CPTII,S$GLB,, | Performed by: UROLOGY

## 2018-09-20 PROCEDURE — 3074F SYST BP LT 130 MM HG: CPT | Mod: CPTII,S$GLB,, | Performed by: UROLOGY

## 2018-09-20 PROCEDURE — 99215 OFFICE O/P EST HI 40 MIN: CPT | Mod: S$GLB,,, | Performed by: UROLOGY

## 2018-09-20 RX ORDER — CIPROFLOXACIN 250 MG/1
500 TABLET, FILM COATED ORAL ONCE
Status: CANCELLED | OUTPATIENT
Start: 2018-09-20 | End: 2018-09-20

## 2018-09-20 RX ORDER — LIDOCAINE HYDROCHLORIDE 20 MG/ML
JELLY TOPICAL ONCE
Status: CANCELLED | OUTPATIENT
Start: 2018-09-20 | End: 2018-09-20

## 2018-09-20 NOTE — PROGRESS NOTES
CC: hematuria    Jerardo Powers is a 72 y.o. man who is here for the evaluation of hematuria  Last seen with Dr. Murray on 3/8/18 for kidney stones.  Had left ESWL on 3/21/18 and noted that he achieved stone free status on a post op x-ray study.  Recently he saw Lily Reynolds on 9/18/18 for gross hematuria.  No associated urinary symptoms, but c/o lower abdominal pain and pain going down to bilateral testicle.  He was treated with cipro and he feels that he is doing better.  Gross hematuria finally stopped yesterday.    hx of BPH with obstruction, prostatitis, and kidney stones.  Reports a good FOS and complete bladder emptying. Denies fever, chills, nausea, or vomiting.     Has been on Sue since 2009.     3/21/18 Procedure(s) Performed w/ Dr. Murray:   1.  Left ESWL    Cysto w/ Dr. Murray on 3/2016:  Prostate: Estimated Length Prostatic Urethra: 4 cm with bilateral obstruction  Bladder:  Normal bladder.   Normal ureteral orifices bilaterally.   Moderate trabeculation.   Hyperemic area of the bladder mucosa with early glomerulation  Conclusion:  Bladder condition, enlarged prostate, and kidney stones are all potential causes for hematuria.  May consider laser TURP to improve his DIAZ.      Past Medical History:   Diagnosis Date    Anxiety     BPH (benign prostatic hypertrophy)     Cataract     CKD (chronic kidney disease) stage 3, GFR 30-59 ml/min 5/5/2014    Colon polyps     Diabetes mellitus type II     Emphysema NEC     mild    Gout     Hyperlipidemia     Hypertension     Hypothyroidism     IBS (irritable bowel syndrome)     Kidney stones     JANEEN (obstructive sleep apnea)     Plantar fasciitis     Reflux      Past Surgical History:   Procedure Laterality Date    CATARACT EXTRACTION  1/28/2013    RIGHT EYE    CATARACT EXTRACTION      left eye    COLONOSCOPY  Sep 2011    Repeat recommended in 5 years    COLONOSCOPY N/A 10/10/2016    Procedure: COLONOSCOPY;  Surgeon: Noah Colunga MD;  Location: [facility]  ENDO (4TH FLR);  Service: Endoscopy;  Laterality: N/A;  PM Prep    COLONOSCOPY N/A 10/10/2016    Performed by [facility] MD at [facility] ENDO (4TH FLR)    ESOPHAGOGASTRODUODENOSCOPY (EGD) N/A 2017    Performed by Noah [facility]a MD at [facility] ENDO (4TH FLR)    ESOPHAGOGASTRODUODENOSCOPY (EGD) N/A 2014    Performed by Noah [facility]a MD at [facility] ENDO (4TH FLR)    KIDNEY STONE SURGERY      LITHOTRIPSY-EXTRACORPOREAL SHOCK WAVE Left 3/21/2018    Performed by Adi Murray MD at [facility] OR 1ST FLR     Social History     Tobacco Use    Smoking status: Former Smoker     Years: 40.00     Last attempt to quit: 2007     Years since quittin.0    Smokeless tobacco: Never Used   Substance Use Topics    Alcohol use: Yes     Alcohol/week: 0.0 oz     Comment: occasionally    Drug use: No     Family History   Problem Relation Age of Onset    Cancer Brother         non-hodgkins T cell lymphoma    Diabetes Mother     Macular degeneration Brother     Coronary artery disease Neg Hx     Colon cancer Neg Hx     Prostate cancer Neg Hx     Esophageal cancer Neg Hx      Allergy:  Allergies   Allergen Reactions    Morphine Hives     Outpatient Encounter Medications as of 2018   Medication Sig Dispense Refill    allopurinol (ZYLOPRIM) 100 MG tablet Take 1 tablet (100 mg total) by mouth once daily. 90 tablet 3    amLODIPine (NORVASC) 10 MG tablet TAKE 1 TABLET BY MOUTH EVERY DAY 30 tablet 11    cholecalciferol, vitamin D3, 5,000 unit capsule Take 1 capsule (5,000 Units total) by mouth once daily.      ciprofloxacin HCl (CIPRO) 500 MG tablet Take 1 tablet (500 mg total) by mouth 2 (two) times daily. for 5 days 10 tablet 0    citalopram (CELEXA) 20 MG tablet TAKE 1 TABLET (20 MG TOTAL) BY MOUTH ONCE DAILY. 90 tablet 3    CONTOUR TEST STRIPS Strp TEST 3 TIMES A  strip 11    cyanocobalamin (VITAMIN B-12) 1000 MCG tablet Take 1 tablet (1,000 mcg total) by mouth once daily. 90 tablet 3     "dulaglutide (TRULICITY) 1.5 mg/0.5 mL PnIj Inject 1.5 mg into the skin once a week. 1 Syringe 11    dutasteride-tamsulosin (VIVIENNE) 0.5-0.4 mg CM24 Take 1 capsule by mouth every evening. 30 capsule 12    fish oil-omega-3 fatty acids 300-1,000 mg capsule Take 2 g by mouth once daily.      irbesartan (AVAPRO) 150 MG tablet Take 150 mg by mouth once daily.  3    lancets Misc Test sugars once daily.  Dispense 100 lancets (insurance covered brand) 100 each 11    levocetirizine (XYZAL) 5 MG tablet Take 1 tablet (5 mg total) by mouth every evening. 30 tablet 11    levothyroxine (SYNTHROID) 75 MCG tablet TAKE 1 TABLET (75 MCG TOTAL) BY MOUTH ONCE DAILY. 90 tablet 3    magnesium oxide (MAG-OX) 400 mg tablet Take 1 tablet by mouth once daily.  3    meclizine (ANTIVERT) 25 mg tablet Take 1 tablet (25 mg total) by mouth every 6 (six) hours. 30 tablet 0    metFORMIN (GLUCOPHAGE-XR) 500 MG 24 hr tablet Take 1 tablet (500 mg total) by mouth once daily. 90 tablet 11    pen needle, diabetic 31 gauge x 1/4" Ndle Use weekly with trulicity pen 30 each 11    potassium citrate 15 mEq TbSR once daily.       VITAMIN B-6 100 MG tablet 1 TABLET ONCE A DAY  3     No facility-administered encounter medications on file as of 9/20/2018.      Review of Systems   General ROS: GENERAL:  No weight gain or loss  SKIN:  No rashes or lacerations  HEAD:  No headaches  EYES:  No exophthalmos, jaundice or blindness  EARS:  No dizziness, tinnitus or hearing loss  NOSE:  No changes in smell  MOUTH & THROAT:  No dyskinetic movements or obvious goiter  CHEST:  No shortness of breath, hyperventilation or cough  CARDIOVASCULAR:  No tachycardia or chest pain  ABDOMEN:  No nausea, vomiting, pain, constipation or diarrhea  URINARY:  No frequency, dysuria or sexual dysfunction  ENDOCRINE:  No polydipsia, polyuria  MUSCULOSKELETAL:  No pain or stiffness of the joints  NEUROLOGIC:  No weakness, sensory changes, seizures, confusion, memory loss, tremor or " other abnormal movements  Physical Exam     Vitals:    09/20/18 1427   BP: 129/82   Pulse: 82     General Appearance:  Alert, cooperative, no distress, appears stated age   Head:  Normocephalic, without obvious abnormality, atraumatic   Eyes:  PERRL, conjunctiva/corneas clear, EOM's intact, fundi benign, both eyes   Ears:  Normal TM's and external ear canals, both ears   Nose: Nares normal, septum midline, mucosa normal, no drainage or sinus tenderness   Throat: Lips, mucosa, and tongue normal; teeth and gums normal   Neck: Supple, symmetrical, trachea midline, no adenopathy, thyroid: not enlarged, symmetric, no tenderness/mass/nodules, no carotid bruit or JVD   Back:   Symmetric, no curvature, ROM normal, no CVA tenderness   Lungs:   Clear to auscultation bilaterally, respirations unlabored   Chest Wall:  No tenderness or deformity   Heart:  Regular rate and rhythm, S1, S2 normal, no murmur, rub or gallop   Abdomen:   Soft, non-tender, bowel sounds active all four quadrants,  no masses, no organomegaly of liver and spleen  No hernia noted   Genitalia:  Scrotum: no rash or lesion  Normal symmetric epididymis without masses  Normal vas palpated  Normal size, symmetric testicles with no masses   Normal urethral meatus with no discharge  Normal circumcised penis with no lesion   Rectal:  Normal perineum and anus upon inspection.  Normal tone, no masses or tenderness;   Extremities: Extremities normal, atraumatic, no cyanosis or edema   Pulses: 2+ and symmetric   Skin: Skin color, texture, turgor normal, no rashes or lesions   Lymph nodes: Cervical, supraclavicular, and axillary nodes normal   Neurologic: Normal     Prostate 30 grams smooth with no tenderness and no nodules      LABS:  UA today showed +leuk and +blood. Spec grav 1.005 and ph 6.   PVR with bladder scanner was 41 cc.   Lab Results   Component Value Date    PSA 0.74 07/14/2017    PSA 0.59 07/17/2015    PSA 0.96 08/22/2012    PSA 1.11 04/16/2012    PSA 1.4  02/14/2011    PSA 2.4 05/12/2010    PSA 4.0 02/15/2010    PSA 4.7 (H) 08/20/2009    PSA 4.2 (H) 07/06/2009    PSA 3.6 03/25/2008    PSADIAG 1.3 02/19/2016    PSADIAG 0.62 12/01/2014    PSADIAG 0.97 12/04/2013     Results for orders placed or performed in visit on 02/19/16   Prostate Specific Antigen, Diagnostic   Result Value Ref Range    PSA DIAGNOSTIC 1.3 0.00-4.00 ng/mL   Results for orders placed or performed in visit on 12/01/14   Prostate Specific Antigen, Diagnostic   Result Value Ref Range    PSA DIAGNOSTIC 0.62 0.00-4.00 ng/mL   Results for orders placed or performed in visit on 12/04/13   Prostate Specific Antigen, Diagnostic   Result Value Ref Range    PSA DIAGNOSTIC 0.97 0.00-4.00 ng/mL     Lab Results   Component Value Date    CREATININE 1.5 (H) 08/28/2018    CREATININE 1.3 07/28/2018    CREATININE 1.3 07/27/2018     Urine Culture, Routine   Date Value Ref Range Status   09/18/2018 No growth  Final     CT RSS 3/8/18  Left-sided nonobstructing nephrolithiasis.  Previously identified stone in the left ureteropelvic junction has apparently migrated into the renal collecting system.    Diverticulosis coli without evidence of diverticulitis.    KUB 9/18/18  No definite signs for renal calculi seen     Assessment and Plan:  Diagnoses and all orders for this visit:    Benign prostatic hyperplasia with lower urinary tract symptoms, symptom details unspecified    Hematuria, gross  -     Cystoscopy; Future    Gross hematuria  -     CT Urogram Abd Pelvis W WO; Future  -     POCT urine dipstick without microscope  -     Urine culture  -     Basic metabolic panel; Future  -     Cytology, urine    Other orders  -     lidocaine HCl 2% urojet; Place into the urethra once.  -     ciprofloxacin HCl tablet 500 mg; Take 2 tablets (500 mg total) by mouth once.       Will do a full work up for hematuria.  Suspect possible bleeding from prostate.  He is already on Sue.  May consider Rezum Therapy.  He watched its video and  I referred him to its website for more information.    Regarding kidney stone, he has seen by Dr. Zari Aguirre and she started him on Urocit K, Mag oxide and Vit B6.  I explained all questions regarding his kidney stone disease.  He can drink home made lemon juice water.    I spent 40 minutes with the patient of which more than half was spent in direct consultation with the patient in regards to our treatment and plan.    Follow-up:  Follow-up for cysto.

## 2018-09-26 ENCOUNTER — ANESTHESIA (OUTPATIENT)
Dept: SURGERY | Facility: HOSPITAL | Age: 73
End: 2018-09-26
Payer: COMMERCIAL

## 2018-09-26 ENCOUNTER — HOSPITAL ENCOUNTER (EMERGENCY)
Facility: HOSPITAL | Age: 73
Discharge: HOME | End: 2018-09-26
Attending: EMERGENCY MEDICINE
Payer: COMMERCIAL

## 2018-09-26 ENCOUNTER — TELEPHONE (OUTPATIENT)
Dept: UROLOGY | Facility: CLINIC | Age: 73
End: 2018-09-26

## 2018-09-26 ENCOUNTER — ANESTHESIA EVENT (OUTPATIENT)
Dept: SURGERY | Facility: HOSPITAL | Age: 73
End: 2018-09-26
Payer: COMMERCIAL

## 2018-09-26 ENCOUNTER — PATIENT MESSAGE (OUTPATIENT)
Dept: UROLOGY | Facility: CLINIC | Age: 73
End: 2018-09-26

## 2018-09-26 DIAGNOSIS — N13.30 HYDRONEPHROSIS, UNSPECIFIED HYDRONEPHROSIS TYPE: ICD-10-CM

## 2018-09-26 DIAGNOSIS — R31.0 HEMATURIA, GROSS: Primary | ICD-10-CM

## 2018-09-26 DIAGNOSIS — N20.0 NEPHROLITHIASIS: ICD-10-CM

## 2018-09-26 LAB
ANION GAP SERPL CALC-SCNC: 8 MMOL/L
BACTERIA #/AREA URNS AUTO: ABNORMAL /HPF
BASOPHILS # BLD AUTO: 0.03 K/UL
BASOPHILS NFR BLD: 0.3 %
BILIRUB UR QL STRIP: NEGATIVE
BUN SERPL-MCNC: 13 MG/DL
CALCIUM SERPL-MCNC: 9.3 MG/DL
CHLORIDE SERPL-SCNC: 105 MMOL/L
CLARITY UR REFRACT.AUTO: ABNORMAL
CO2 SERPL-SCNC: 26 MMOL/L
COLOR UR AUTO: YELLOW
CREAT SERPL-MCNC: 1.4 MG/DL
DIFFERENTIAL METHOD: ABNORMAL
EOSINOPHIL # BLD AUTO: 0.4 K/UL
EOSINOPHIL NFR BLD: 3.5 %
ERYTHROCYTE [DISTWIDTH] IN BLOOD BY AUTOMATED COUNT: 12.9 %
EST. GFR  (AFRICAN AMERICAN): 57.6 ML/MIN/1.73 M^2
EST. GFR  (NON AFRICAN AMERICAN): 49.8 ML/MIN/1.73 M^2
GLUCOSE SERPL-MCNC: 133 MG/DL
GLUCOSE UR QL STRIP: NEGATIVE
HCT VFR BLD AUTO: 45.6 %
HGB BLD-MCNC: 15.9 G/DL
HGB UR QL STRIP: ABNORMAL
HYALINE CASTS UR QL AUTO: 0 /LPF
IMM GRANULOCYTES # BLD AUTO: 0.06 K/UL
IMM GRANULOCYTES NFR BLD AUTO: 0.6 %
INR PPP: 1
KETONES UR QL STRIP: NEGATIVE
LEUKOCYTE ESTERASE UR QL STRIP: ABNORMAL
LYMPHOCYTES # BLD AUTO: 1.9 K/UL
LYMPHOCYTES NFR BLD: 18.7 %
MCH RBC QN AUTO: 29.7 PG
MCHC RBC AUTO-ENTMCNC: 34.9 G/DL
MCV RBC AUTO: 85 FL
MICROSCOPIC COMMENT: ABNORMAL
MONOCYTES # BLD AUTO: 0.8 K/UL
MONOCYTES NFR BLD: 7.8 %
NEUTROPHILS # BLD AUTO: 6.8 K/UL
NEUTROPHILS NFR BLD: 69.1 %
NITRITE UR QL STRIP: NEGATIVE
NRBC BLD-RTO: 0 /100 WBC
PH UR STRIP: 6 [PH] (ref 5–8)
PLATELET # BLD AUTO: 241 K/UL
PMV BLD AUTO: 11.1 FL
POCT GLUCOSE: 120 MG/DL (ref 70–110)
POTASSIUM SERPL-SCNC: 3.6 MMOL/L
PROT UR QL STRIP: ABNORMAL
PROTHROMBIN TIME: 10.5 SEC
RBC # BLD AUTO: 5.36 M/UL
RBC #/AREA URNS AUTO: >100 /HPF (ref 0–4)
SODIUM SERPL-SCNC: 139 MMOL/L
SP GR UR STRIP: 1.01 (ref 1–1.03)
SQUAMOUS #/AREA URNS AUTO: 1 /HPF
URN SPEC COLLECT METH UR: ABNORMAL
UROBILINOGEN UR STRIP-ACNC: NEGATIVE EU/DL
WBC # BLD AUTO: 9.88 K/UL
WBC #/AREA URNS AUTO: 10 /HPF (ref 0–5)

## 2018-09-26 PROCEDURE — 71000015 HC POSTOP RECOV 1ST HR: Performed by: UROLOGY

## 2018-09-26 PROCEDURE — 71000045 HC DOSC ROUTINE RECOVERY EA ADD'L HR: Performed by: UROLOGY

## 2018-09-26 PROCEDURE — 37000009 HC ANESTHESIA EA ADD 15 MINS: Performed by: UROLOGY

## 2018-09-26 PROCEDURE — C2617 STENT, NON-COR, TEM W/O DEL: HCPCS | Performed by: UROLOGY

## 2018-09-26 PROCEDURE — 82962 GLUCOSE BLOOD TEST: CPT | Performed by: UROLOGY

## 2018-09-26 PROCEDURE — 63600175 PHARM REV CODE 636 W HCPCS: Performed by: NURSE ANESTHETIST, CERTIFIED REGISTERED

## 2018-09-26 PROCEDURE — C1769 GUIDE WIRE: HCPCS | Performed by: UROLOGY

## 2018-09-26 PROCEDURE — 76000 FLUOROSCOPY <1 HR PHYS/QHP: CPT | Mod: 26,59,, | Performed by: UROLOGY

## 2018-09-26 PROCEDURE — D9220A PRA ANESTHESIA: Mod: CRNA,,, | Performed by: NURSE ANESTHETIST, CERTIFIED REGISTERED

## 2018-09-26 PROCEDURE — 99285 EMERGENCY DEPT VISIT HI MDM: CPT | Mod: 25

## 2018-09-26 PROCEDURE — 36000706: Performed by: UROLOGY

## 2018-09-26 PROCEDURE — 71000044 HC DOSC ROUTINE RECOVERY FIRST HOUR: Performed by: UROLOGY

## 2018-09-26 PROCEDURE — 36000707: Performed by: UROLOGY

## 2018-09-26 PROCEDURE — 80048 BASIC METABOLIC PNL TOTAL CA: CPT

## 2018-09-26 PROCEDURE — D9220A PRA ANESTHESIA: Mod: ANES,,, | Performed by: ANESTHESIOLOGY

## 2018-09-26 PROCEDURE — 25000003 PHARM REV CODE 250: Performed by: UROLOGY

## 2018-09-26 PROCEDURE — 52332 CYSTOSCOPY AND TREATMENT: CPT | Mod: LT,,, | Performed by: UROLOGY

## 2018-09-26 PROCEDURE — 85025 COMPLETE CBC W/AUTO DIFF WBC: CPT

## 2018-09-26 PROCEDURE — 85610 PROTHROMBIN TIME: CPT

## 2018-09-26 PROCEDURE — 87086 URINE CULTURE/COLONY COUNT: CPT

## 2018-09-26 PROCEDURE — 99285 EMERGENCY DEPT VISIT HI MDM: CPT | Mod: ,,, | Performed by: PHYSICIAN ASSISTANT

## 2018-09-26 PROCEDURE — 25000003 PHARM REV CODE 250: Performed by: NURSE ANESTHETIST, CERTIFIED REGISTERED

## 2018-09-26 PROCEDURE — 81001 URINALYSIS AUTO W/SCOPE: CPT

## 2018-09-26 PROCEDURE — 37000008 HC ANESTHESIA 1ST 15 MINUTES: Performed by: UROLOGY

## 2018-09-26 PROCEDURE — 63600175 PHARM REV CODE 636 W HCPCS: Performed by: ANESTHESIOLOGY

## 2018-09-26 PROCEDURE — 25500020 PHARM REV CODE 255: Performed by: EMERGENCY MEDICINE

## 2018-09-26 PROCEDURE — 63600175 PHARM REV CODE 636 W HCPCS

## 2018-09-26 PROCEDURE — C1758 CATHETER, URETERAL: HCPCS | Performed by: UROLOGY

## 2018-09-26 DEVICE — STENT URETERAL UNIV 6FR 26CM: Type: IMPLANTABLE DEVICE | Site: URETER | Status: FUNCTIONAL

## 2018-09-26 RX ORDER — LIDOCAINE HCL/PF 100 MG/5ML
SYRINGE (ML) INTRAVENOUS
Status: DISCONTINUED | OUTPATIENT
Start: 2018-09-26 | End: 2018-09-26

## 2018-09-26 RX ORDER — PROPOFOL 10 MG/ML
VIAL (ML) INTRAVENOUS
Status: DISCONTINUED | OUTPATIENT
Start: 2018-09-26 | End: 2018-09-26

## 2018-09-26 RX ORDER — CEFAZOLIN SODIUM 1 G/3ML
INJECTION, POWDER, FOR SOLUTION INTRAMUSCULAR; INTRAVENOUS
Status: DISCONTINUED | OUTPATIENT
Start: 2018-09-26 | End: 2018-09-26

## 2018-09-26 RX ORDER — HYDROMORPHONE HYDROCHLORIDE 1 MG/ML
INJECTION, SOLUTION INTRAMUSCULAR; INTRAVENOUS; SUBCUTANEOUS
Status: COMPLETED
Start: 2018-09-26 | End: 2018-09-26

## 2018-09-26 RX ORDER — HYDROCODONE BITARTRATE AND ACETAMINOPHEN 5; 325 MG/1; MG/1
1 TABLET ORAL ONCE AS NEEDED
Status: COMPLETED | OUTPATIENT
Start: 2018-09-26 | End: 2018-09-26

## 2018-09-26 RX ORDER — HYDROMORPHONE HYDROCHLORIDE 1 MG/ML
0.2 INJECTION, SOLUTION INTRAMUSCULAR; INTRAVENOUS; SUBCUTANEOUS EVERY 5 MIN PRN
Status: DISCONTINUED | OUTPATIENT
Start: 2018-09-26 | End: 2018-09-26 | Stop reason: HOSPADM

## 2018-09-26 RX ORDER — LIDOCAINE HYDROCHLORIDE 20 MG/ML
JELLY TOPICAL
Status: DISCONTINUED | OUTPATIENT
Start: 2018-09-26 | End: 2018-09-26 | Stop reason: HOSPADM

## 2018-09-26 RX ORDER — TAMSULOSIN HYDROCHLORIDE 0.4 MG/1
0.4 CAPSULE ORAL DAILY
Qty: 30 CAPSULE | Refills: 0 | Status: SHIPPED | OUTPATIENT
Start: 2018-09-26 | End: 2018-10-02 | Stop reason: CLARIF

## 2018-09-26 RX ORDER — HYDROCODONE BITARTRATE AND ACETAMINOPHEN 5; 325 MG/1; MG/1
TABLET ORAL
Status: DISCONTINUED
Start: 2018-09-26 | End: 2018-09-26 | Stop reason: HOSPADM

## 2018-09-26 RX ORDER — PROPOFOL 10 MG/ML
VIAL (ML) INTRAVENOUS CONTINUOUS PRN
Status: DISCONTINUED | OUTPATIENT
Start: 2018-09-26 | End: 2018-09-26

## 2018-09-26 RX ORDER — HYDROCODONE BITARTRATE AND ACETAMINOPHEN 5; 325 MG/1; MG/1
1 TABLET ORAL EVERY 6 HOURS PRN
Qty: 20 TABLET | Refills: 0 | Status: ON HOLD | OUTPATIENT
Start: 2018-09-26 | End: 2018-10-03 | Stop reason: HOSPADM

## 2018-09-26 RX ORDER — SODIUM CHLORIDE 0.9 % (FLUSH) 0.9 %
3 SYRINGE (ML) INJECTION
Status: DISCONTINUED | OUTPATIENT
Start: 2018-09-26 | End: 2018-09-26 | Stop reason: HOSPADM

## 2018-09-26 RX ORDER — MIDAZOLAM HYDROCHLORIDE 1 MG/ML
INJECTION INTRAMUSCULAR; INTRAVENOUS
Status: DISCONTINUED | OUTPATIENT
Start: 2018-09-26 | End: 2018-09-26

## 2018-09-26 RX ADMIN — HYDROMORPHONE HYDROCHLORIDE 0.2 MG: 1 INJECTION, SOLUTION INTRAMUSCULAR; INTRAVENOUS; SUBCUTANEOUS at 04:09

## 2018-09-26 RX ADMIN — SODIUM CHLORIDE, SODIUM GLUCONATE, SODIUM ACETATE, POTASSIUM CHLORIDE, MAGNESIUM CHLORIDE, SODIUM PHOSPHATE, DIBASIC, AND POTASSIUM PHOSPHATE: .53; .5; .37; .037; .03; .012; .00082 INJECTION, SOLUTION INTRAVENOUS at 03:09

## 2018-09-26 RX ADMIN — LIDOCAINE HYDROCHLORIDE 100 MG: 20 INJECTION, SOLUTION INTRAVENOUS at 03:09

## 2018-09-26 RX ADMIN — PROPOFOL 200 MCG/KG/MIN: 10 INJECTION, EMULSION INTRAVENOUS at 03:09

## 2018-09-26 RX ADMIN — MIDAZOLAM HYDROCHLORIDE 2 MG: 1 INJECTION, SOLUTION INTRAMUSCULAR; INTRAVENOUS at 03:09

## 2018-09-26 RX ADMIN — PROPOFOL 70 MG: 10 INJECTION, EMULSION INTRAVENOUS at 03:09

## 2018-09-26 RX ADMIN — HYDROMORPHONE HYDROCHLORIDE 0.2 MG: 1 INJECTION, SOLUTION INTRAMUSCULAR; INTRAVENOUS; SUBCUTANEOUS at 05:09

## 2018-09-26 RX ADMIN — HYDROCODONE BITARTRATE AND ACETAMINOPHEN 1 TABLET: 5; 325 TABLET ORAL at 04:09

## 2018-09-26 RX ADMIN — CEFAZOLIN 2 G: 330 INJECTION, POWDER, FOR SOLUTION INTRAMUSCULAR; INTRAVENOUS at 03:09

## 2018-09-26 RX ADMIN — IOHEXOL 125 ML: 350 INJECTION, SOLUTION INTRAVENOUS at 01:09

## 2018-09-26 NOTE — H&P (VIEW-ONLY)
[facility]  Urology  Consult Note    Patient Name: Jerardo Powers  MRN: [id number]  Admission Date: 9/26/2018  Hospital Length of Stay: 0   Code Status: Prior   Attending Provider: Mg Peguero MD   Consulting Provider: Sander Hickman MD  Primary Care Physician: Adams Moore MD  Principal Problem:<principal problem not specified>    Inpatient consult to Urology  Consult performed by: Sander Hickman MD  Consult ordered by: Mg Peguero MD          Subjective:     HPI:  71 yo M w/ pmh HTN, JANEEN, DM2, CKD3, HLD, BPH, and kidney stones. He had an ESWL in March 2018 for left sided renal stones, on follow up KUB these had resolved. He presents today with gross hematuria for the past 2 weeks.   He reports that his gross hematuria worsened overnight and he has been passing clots and tissue since last night. He reports intermittent suprapubic pain that is somewhat relieved with urination. Denies any dysuria, flank pain, or penile pain. He denies any fevers or chills. He is not taking any blood thinners    He was seen by Lily Reynolds on 9/18 and was treated with a course of cipro he just completed. His UC resulted negative.    Had cystoscopy in 2016 for gross hematuria: this revealed a normal appearing bladder and prostate was 4 cm with bilateral   obstruction    He is able to urinate, PVR today in the ED was 160ml.     CT Urogram today revealed: left sided stone with suspected clot in bladder.         Past Medical History:   Diagnosis Date    Anxiety     BPH (benign prostatic hyperplasia)     BPH (benign prostatic hypertrophy)     Cataract     CKD (chronic kidney disease) stage 3, GFR 30-59 ml/min 5/5/2014    Colon polyps     Diabetes mellitus type II     Emphysema NEC     mild    Gout     Hematuria     Hyperlipidemia     Hypertension     Hypothyroidism     IBS (irritable bowel syndrome)     Kidney stones     JANEEN (obstructive sleep apnea)     Plantar fasciitis      Reflux        Past Surgical History:   Procedure Laterality Date    CATARACT EXTRACTION  1/28/2013    RIGHT EYE    CATARACT EXTRACTION      left eye    COLONOSCOPY  Sep 2011    Repeat recommended in 5 years    COLONOSCOPY N/A 10/10/2016    Procedure: COLONOSCOPY;  Surgeon: Noah Colunga MD;  Location: [facility] (4TH FLR);  Service: Endoscopy;  Laterality: N/A;  PM Prep    COLONOSCOPY N/A 10/10/2016    Performed by Noah Colunga MD at [facility] (4TH FLR)    ESOPHAGOGASTRODUODENOSCOPY (EGD) N/A 6/5/2017    Performed by [facility]a MD at [facility] ENDO (4TH FLR)    ESOPHAGOGASTRODUODENOSCOPY (EGD) N/A 12/23/2014    Performed by Noah Colunga MD at [facility] (4TH FLR)    KIDNEY STONE SURGERY      LITHOTRIPSY-EXTRACORPOREAL SHOCK WAVE Left 3/21/2018    Performed by Adi Murray MD at [facility] OR [facility]       Review of patient's allergies indicates:   Allergen Reactions    Morphine Hives       Family History     Problem Relation (Age of Onset)    Cancer Brother    Diabetes Mother    Macular degeneration Brother          Tobacco Use    Smoking status: Never Smoker    Smokeless tobacco: Never Used   Substance and Sexual Activity    Alcohol use: No     Alcohol/week: 0.0 oz     Frequency: Never     Comment: occasionally    Drug use: No    Sexual activity: No       Review of Systems   Constitutional: Negative.    HENT: Negative.    Eyes: Negative.    Respiratory: Negative.    Cardiovascular: Negative.    Gastrointestinal: Negative.    Genitourinary: Positive for difficulty urinating, dysuria and hematuria. Negative for flank pain.   Skin: Negative.        Objective:     Temp:  [98.2 °F (36.8 °C)-98.3 °F (36.8 °C)] 98.2 °F (36.8 °C)  Pulse:  [67-95] 67  Resp:  [16] 16  SpO2:  [95 %-96 %] 96 %  BP: (123-134)/(68-82) 134/82     Body mass index is 31.9 kg/m².            Drains          None          Physical Exam   Constitutional: He is oriented to person, place, and time. He appears well-developed  and well-nourished.   HENT:   Head: Normocephalic.   Eyes: Pupils are equal, round, and reactive to light.   Neck: Normal range of motion.   Cardiovascular: Normal rate.    Pulmonary/Chest: Effort normal.   Abdominal: Soft. There is tenderness.   Genitourinary: Penis normal.   Musculoskeletal: Normal range of motion.   Neurological: He is alert and oriented to person, place, and time.       Significant Labs:    BMP:  Recent Labs   Lab  09/20/18   1550  09/26/18   1120   NA  137  139   K  3.6  3.6   CL  104  105   CO2  24  26   BUN  15  13   CREATININE  1.4  1.4   CALCIUM  9.3  9.3       CBC:  Recent Labs   Lab  09/26/18   1120   WBC  9.88   HGB  15.9   HCT  45.6   PLT  241       All pertinent labs results from the past 24 hours have been reviewed.    Significant Imaging:  All pertinent imaging results/findings from the past 24 hours have been reviewed.                    Assessment and Plan:     Hematuria, gross      - Patient to OR today for clot evacuation, TURBT, and left stent and other indicated procedures.   - consented      Sander Hickman MD PGY-5  Urology  [phone]              VTE Risk Mitigation (From admission, onward)    None          Thank you for your consult. I will sign off. Please contact us if you have any additional questions.    Sander Hickman MD  Urology  [facility]

## 2018-09-26 NOTE — TRANSFER OF CARE
"Anesthesia Transfer of Care Note    Patient: Jerardo Powers    Procedure(s) Performed: Procedure(s) (LRB):  CYSTOSCOPY, WITH URETERAL STENT INSERTION; clot evacuation; possible TURBT (Left)    Patient location: PACU    Anesthesia Type: general    Transport from OR: Transported from OR on 6-10 L/min O2 by face mask with adequate spontaneous ventilation    Post pain: adequate analgesia    Post assessment: no apparent anesthetic complications    Post vital signs: stable    Level of consciousness: sedated    Nausea/Vomiting: no nausea/vomiting    Complications: none    Transfer of care protocol was followed      Last vitals:   Visit Vitals  /82 (BP Location: Left arm, Patient Position: Sitting)   Pulse 67   Temp 36.8 °C (98.2 °F) (Oral)   Resp 16   Ht 5' 9" (1.753 m)   Wt 98 kg (216 lb)   SpO2 96%   BMI 31.90 kg/m²     "

## 2018-09-26 NOTE — SUBJECTIVE & OBJECTIVE
Past Medical History:   Diagnosis Date    Anxiety     BPH (benign prostatic hyperplasia)     BPH (benign prostatic hypertrophy)     Cataract     CKD (chronic kidney disease) stage 3, GFR 30-59 ml/min 5/5/2014    Colon polyps     Diabetes mellitus type II     Emphysema NEC     mild    Gout     Hematuria     Hyperlipidemia     Hypertension     Hypothyroidism     IBS (irritable bowel syndrome)     Kidney stones     JANEEN (obstructive sleep apnea)     Plantar fasciitis     Reflux        Past Surgical History:   Procedure Laterality Date    CATARACT EXTRACTION  1/28/2013    RIGHT EYE    CATARACT EXTRACTION      left eye    COLONOSCOPY  Sep 2011    Repeat recommended in 5 years    COLONOSCOPY N/A 10/10/2016    Procedure: COLONOSCOPY;  Surgeon: Noah Colunga MD;  Location: [facility] (4TH FLR);  Service: Endoscopy;  Laterality: N/A;  PM Prep    COLONOSCOPY N/A 10/10/2016    Performed by Noah Colunga MD at [facility] (4TH FLR)    ESOPHAGOGASTRODUODENOSCOPY (EGD) N/A 6/5/2017    Performed by Noah Colunga MD at [facility] (4TH FLR)    ESOPHAGOGASTRODUODENOSCOPY (EGD) N/A 12/23/2014    Performed by Noah Colunga MD at [facility] (4TH FLR)    KIDNEY STONE SURGERY      LITHOTRIPSY-EXTRACORPOREAL SHOCK WAVE Left 3/21/2018    Performed by Adi Murray MD at [email or web address]       Review of patient's allergies indicates:   Allergen Reactions    Morphine Hives       Family History     Problem Relation (Age of Onset)    Cancer Brother    Diabetes Mother    Macular degeneration Brother          Tobacco Use    Smoking status: Never Smoker    Smokeless tobacco: Never Used   Substance and Sexual Activity    Alcohol use: No     Alcohol/week: 0.0 oz     Frequency: Never     Comment: occasionally    Drug use: No    Sexual activity: No       Review of Systems   Constitutional: Negative.    HENT: Negative.    Eyes: Negative.    Respiratory: Negative.    Cardiovascular: Negative.     Gastrointestinal: Negative.    Genitourinary: Positive for difficulty urinating, dysuria and hematuria. Negative for flank pain.   Skin: Negative.        Objective:     Temp:  [98.2 °F (36.8 °C)-98.3 °F (36.8 °C)] 98.2 °F (36.8 °C)  Pulse:  [67-95] 67  Resp:  [16] 16  SpO2:  [95 %-96 %] 96 %  BP: (123-134)/(68-82) 134/82     Body mass index is 31.9 kg/m².            Drains          None          Physical Exam   Constitutional: He is oriented to person, place, and time. He appears well-developed and well-nourished.   HENT:   Head: Normocephalic.   Eyes: Pupils are equal, round, and reactive to light.   Neck: Normal range of motion.   Cardiovascular: Normal rate.    Pulmonary/Chest: Effort normal.   Abdominal: Soft. There is tenderness.   Genitourinary: Penis normal.   Musculoskeletal: Normal range of motion.   Neurological: He is alert and oriented to person, place, and time.       Significant Labs:    BMP:  Recent Labs   Lab  09/20/18   1550  09/26/18   1120   NA  137  139   K  3.6  3.6   CL  104  105   CO2  24  26   BUN  15  13   CREATININE  1.4  1.4   CALCIUM  9.3  9.3       CBC:  Recent Labs   Lab  09/26/18   1120   WBC  9.88   HGB  15.9   HCT  45.6   PLT  241       All pertinent labs results from the past 24 hours have been reviewed.    Significant Imaging:  All pertinent imaging results/findings from the past 24 hours have been reviewed.

## 2018-09-26 NOTE — HPI
73 yo M w/ pmh HTN, JANEEN, DM2, CKD3, HLD, BPH, and kidney stones. He had an ESWL in March 2018 for left sided renal stones, on follow up KUB these had resolved. He presents today with gross hematuria for the past 2 weeks.  He reports that his gross hematuria worsened overnight and he has been passing clots and tissue since last night. He reports intermittent suprapubic pain that is somewhat relieved with urination. Denies any dysuria, flank pain, or penile pain. He denies any fevers or chills. He is not taking any blood thinners    He was seen by Lily Reynolds on 9/18 and was treated with a course of cipro he just completed. His UC resulted negative.    Had cystoscopy in 2016 for gross hematuria: this revealed a normal appearing bladder and prostate was 4 cm with bilateral   obstruction    He is able to urinate, PVR today in the ED was 160ml.     CT Urogram today revealed: left sided stone with suspected clot in bladder.

## 2018-09-26 NOTE — DISCHARGE INSTRUCTIONS
Ureteral Stents  A ureteral stent is a soft plastic tube with holes in it. Its temporarily inserted into a ureter to help drain urine into the bladder. One end goes in the kidney. The other end goes in the bladder. A coil on each end holds the stent in place. The stent cant be seen from outside the body. It shouldnt interfere with your normal routine. Your stent will be put in by a doctor trained in treating the urinary tract (a urologist) or another specialist. The procedure is done in a hospital or surgery center. Youll likely go home the same day.  When is a ureteral stent used?  A ureteral stent may be used:  · To bypass a blockage in a kidney or ureter.  · During kidney stone removal.  · To let a ureter heal after surgery.    Before the Procedure  Your healthcare provider will give you instructions to prepare for the procedure. X-rays or other imaging tests of your kidneys and ureters may be done beforehand.  During the procedure  · You receive medicine to prevent pain and help you relax or sleep during the procedure. Once this takes effect, the procedure starts.  · The doctor inserts a cystoscope (lighted instrument) through the urethra and into the bladder. This shows the opening to the ureter.  · A thin wire is carefully threaded through the cystoscope, up the ureter, and into the kidney. The stent is inserted over the wire.  · A fluoroscope (special X-ray machine) is used to help position the stent. When the stent is in place, the wire and cystoscope are removed.  While you have a stent  · Some discomfort is normal. Certain movements may trigger pain or a feeling that you need to urinate. You may also feel mild soreness or pressure before or during urination. These symptoms will go away a few days after the stent is removed.  · Medicine to control pain or bladder spasms or to prevent infection may be prescribed. Take this as directed.  · Drink plenty of fluids to help flush out your urinary  tract.  · Your urine may be slightly pink or red. This is due to bleeding caused by minor irritation from the stent. This may happen on and off while you have the stent.  · As with any synthetic device placed in the body, there is a risk of infection. The stent may have to be removed if this happens.   How long will you need a stent?  The stent is often taken out after the blockage in the ureter is treated or the ureter has healed. This may take 1 week to 2 weeks, or longer. If a stent is needed for a long time, it may need to be changed every few months.  When to call your healthcare provider  Contact your healthcare provider right away if:  · Your urine contains blood clots or you see a large amount of blood-tinged urine  · You have symptoms similar to those you had before the stent was placed  · You constantly leak urine  · You have a fever over 100.4°F (38°C), chills, nausea, or vomiting  · Your pain is not relieved with medicine  · The end of the stent comes out of the urethra   Date Last Reviewed: 1/1/2017  © 0140-7227 [employer]. [address] [place] [address]. All rights reserved. This information is not intended as a substitute for professional medical care. Always follow your healthcare professional's instructions.      Discharge Instructions: After Your Surgery  Youve just had surgery. During surgery, you were given medicine called anesthesia to keep you relaxed and free of pain. After surgery, you may have some pain or nausea. This is common. Here are some tips for feeling better and getting well after surgery.     Stay on schedule with your medicine.   Going home  Your healthcare provider will show you how to take care of yourself when you go home. He or she will also answer your questions. Have an adult family member or friend drive you home. For the first 24 hours after your surgery:  · Do not drive or use heavy equipment.  · Do not make important decisions or sign legal  papers.  · Do not drink alcohol.  · Have someone stay with you, if needed. He or she can watch for problems and help keep you safe.  Be sure to go to all follow-up visits with your healthcare provider. And rest after your surgery for as long as your healthcare provider tells you to.  Coping with pain  If you have pain after surgery, pain medicine will help you feel better. Take it as told, before pain becomes severe. Also, ask your healthcare provider or pharmacist about other ways to control pain. This might be with heat, ice, or relaxation. And follow any other instructions your surgeon or nurse gives you.  Tips for taking pain medicine  To get the best relief possible, remember these points:  · Pain medicines can upset your stomach. Taking them with a little food may help.  · Most pain relievers taken by mouth need at least 20 to 30 minutes to start to work.  · Taking medicine on a schedule can help you remember to take it. Try to time your medicine so that you can take it before starting an activity. This might be before you get dressed, go for a walk, or sit down for dinner.  · Constipation is a common side effect of pain medicines. Call your healthcare provider before taking any medicines such as laxatives or stool softeners to help ease constipation. Also ask if you should skip any foods. Drinking lots of fluids and eating foods such as fruits and vegetables that are high in fiber can also help. Remember, do not take laxatives unless your surgeon has prescribed them.  · Drinking alcohol and taking pain medicine can cause dizziness and slow your breathing. It can even be deadly. Do not drink alcohol while taking pain medicine.  · Pain medicine can make you react more slowly to things. Do not drive or run machinery while taking pain medicine.  Your healthcare provider may tell you to take acetaminophen to help ease your pain. Ask him or her how much you are supposed to take each day. Acetaminophen or other pain  relievers may interact with your prescription medicines or other over-the-counter (OTC) medicines. Some prescription medicines have acetaminophen and other ingredients. Using both prescription and OTC acetaminophen for pain can cause you to overdose. Read the labels on your OTC medicines with care. This will help you to clearly know the list of ingredients, how much to take, and any warnings. It may also help you not take too much acetaminophen. If you have questions or do not understand the information, ask your pharmacist or healthcare provider to explain it to you before you take the OTC medicine.  Managing nausea  Some people have an upset stomach after surgery. This is often because of anesthesia, pain, or pain medicine, or the stress of surgery. These tips will help you handle nausea and eat healthy foods as you get better. If you were on a special food plan before surgery, ask your healthcare provider if you should follow it while you get better. These tips may help:  · Do not push yourself to eat. Your body will tell you when to eat and how much.  · Start off with clear liquids and soup. They are easier to digest.  · Next try semi-solid foods, such as mashed potatoes, applesauce, and gelatin, as you feel ready.  · Slowly move to solid foods. Dont eat fatty, rich, or spicy foods at first.  · Do not force yourself to have 3 large meals a day. Instead eat smaller amounts more often.  · Take pain medicines with a small amount of solid food, such as crackers or toast, to avoid nausea.     Call your surgeon if  · You still have pain an hour after taking medicine. The medicine may not be strong enough.  · You feel too sleepy, dizzy, or groggy. The medicine may be too strong.  · You have side effects like nausea, vomiting, or skin changes, such as rash, itching, or hives.       If you have obstructive sleep apnea  You were given anesthesia medicine during surgery to keep you comfortable and free of pain. After  surgery, you may have more apnea spells because of this medicine and other medicines you were given. The spells may last longer than usual.   At home:  · Keep using the continuous positive airway pressure (CPAP) device when you sleep. Unless your healthcare provider tells you not to, use it when you sleep, day or night. CPAP is a common device used to treat obstructive sleep apnea.  · Talk with your provider before taking any pain medicine, muscle relaxants, or sedatives. Your provider will tell you about the possible dangers of taking these medicines.  Date Last Reviewed: 12/1/2016  © 4110-0294 [employer]. [address] [place] [address], [place] [address]. All rights reserved. This information is not intended as a substitute for professional medical care. Always follow your healthcare professional's instructions.

## 2018-09-26 NOTE — ASSESSMENT & PLAN NOTE
- Patient to OR today for clot evacuation, TURBT, and left stent and other indicated procedures.   - consented      Sander Hickman MD PGY-5  Urology  [phone]

## 2018-09-26 NOTE — ED NOTES
Patient identifiers verified and correct for Mr Powers  C/C: Blood in urine  APPEARANCE: awake and alert in NAD.  SKIN: warm, dry and intact. No breakdown or bruising.  MUSCULOSKELETAL: Patient moving all extremities spontaneously, no obvious swelling or deformities noted. Ambulates independently.  RESPIRATORY: Denies shortness of breath.Respirations unlabored.   CARDIAC: Denies CP, 2+ distal pulses; no peripheral edema  ABDOMEN: S/ND/NT, Denies nausea  : voids spontaneously, pain with urination, states blood in urine  Neurologic: AAO x 4; follows commands equal strength in all extremities; denies numbness/tingling. Denies dizziness Denies weakness

## 2018-09-26 NOTE — PLAN OF CARE
"Discharge instructions reviewed with pt, spouse and friend, handouts and prescription given,  verbalized understanding with no further questions at this time. Pt will come back on October 4, 2018 for second cystoscopy procedure per AVS sheet with MD telephone number provided. VSS on RA, pain medication administered per MAR, states "6/10" and is better and does not want any more pain medication, no nausea noted, tolerating po fluids without difficulty, voided 3x clear red urine. Fall precautions reviewed, consents in chart, PIV removed.   "

## 2018-09-26 NOTE — ED TRIAGE NOTES
Patient states onset blood in urine last night, called urologist,. Today urine with min visible blood but concerned about pain . States 2 weeks history of same with Urology visits. Requests CT scan with contrast.

## 2018-09-26 NOTE — ANESTHESIA PREPROCEDURE EVALUATION
09/26/2018  Jerardo Powers is a 72 y.o., male.  Pre-operative evaluation for Procedure(s) (LRB):  CYSTOSCOPY, WITH URETERAL STENT INSERTION (Left)    Jerardo Powers is a 72 y.o. male     LDA:     Prev airway:     Drips:     Patient Active Problem List   Diagnosis    Benign prostatic hyperplasia    Reflux    IBS (irritable bowel syndrome)    Emphysema NEC    Kidney stone on left side    Diabetes mellitus, type 2    Hypothyroidism    Hypertension    Postural dizziness    Hypokalemia    Gout    Diarrhea    Dyspepsia    Depression    Anxiety and depression    Fatigue    JANEEN (obstructive sleep apnea)    History of colon polyps    Lower abdominal pain    Sciatica    Nephrolithiasis    Vertigo    Ataxia    Hematuria, gross       Review of patient's allergies indicates:   Allergen Reactions    Morphine Hives        No current facility-administered medications on file prior to encounter.      Current Outpatient Medications on File Prior to Encounter   Medication Sig Dispense Refill    allopurinol (ZYLOPRIM) 100 MG tablet Take 1 tablet (100 mg total) by mouth once daily. 90 tablet 3    amLODIPine (NORVASC) 10 MG tablet TAKE 1 TABLET BY MOUTH EVERY DAY 30 tablet 11    citalopram (CELEXA) 20 MG tablet TAKE 1 TABLET (20 MG TOTAL) BY MOUTH ONCE DAILY. 90 tablet 3    dulaglutide (TRULICITY) 1.5 mg/0.5 mL PnIj Inject 1.5 mg into the skin once a week. 1 Syringe 11    dutasteride-tamsulosin (VIVIENNE) 0.5-0.4 mg CM24 Take 1 capsule by mouth every evening. 30 capsule 12    irbesartan (AVAPRO) 150 MG tablet Take 150 mg by mouth once daily.  3    levothyroxine (SYNTHROID) 75 MCG tablet TAKE 1 TABLET (75 MCG TOTAL) BY MOUTH ONCE DAILY. 90 tablet 3    magnesium oxide (MAG-OX) 400 mg tablet Take 1 tablet by mouth once daily.  3    potassium citrate 15 mEq TbSR once daily.       cholecalciferol,  "vitamin D3, 5,000 unit capsule Take 1 capsule (5,000 Units total) by mouth once daily.      CONTOUR TEST STRIPS Strp TEST 3 TIMES A  strip 11    cyanocobalamin (VITAMIN B-12) 1000 MCG tablet Take 1 tablet (1,000 mcg total) by mouth once daily. 90 tablet 3    fish oil-omega-3 fatty acids 300-1,000 mg capsule Take 2 g by mouth once daily.      lancets Misc Test sugars once daily.  Dispense 100 lancets (insurance covered brand) 100 each 11    levocetirizine (XYZAL) 5 MG tablet Take 1 tablet (5 mg total) by mouth every evening. 30 tablet 11    meclizine (ANTIVERT) 25 mg tablet Take 1 tablet (25 mg total) by mouth every 6 (six) hours. 30 tablet 0    metFORMIN (GLUCOPHAGE-XR) 500 MG 24 hr tablet Take 1 tablet (500 mg total) by mouth once daily. 90 tablet 11    pen needle, diabetic 31 gauge x 1/4" Ndle Use weekly with trulicity pen 30 each 11    VITAMIN B-6 100 MG tablet 1 TABLET ONCE A DAY  3       Past Surgical History:   Procedure Laterality Date    CATARACT EXTRACTION  1/28/2013    RIGHT EYE    CATARACT EXTRACTION      left eye    COLONOSCOPY  Sep 2011    Repeat recommended in 5 years    COLONOSCOPY N/A 10/10/2016    Procedure: COLONOSCOPY;  Surgeon: Noah Colunga MD;  Location: [facility] ([address] [place] [address]);  Service: Endoscopy;  Laterality: N/A;  PM Prep    COLONOSCOPY N/A 10/10/2016    Performed by Noah Colunga MD at [facility] (4TH FLR)    ESOPHAGOGASTRODUODENOSCOPY (EGD) N/A 6/5/2017    Performed by Noah [facility]unga MD at [facility] (4TH FLR)    ESOPHAGOGASTRODUODENOSCOPY (EGD) N/A 12/23/2014    Performed by Noah Colunga MD at [facility] (4TH FLR)    KIDNEY STONE SURGERY      LITHOTRIPSY-EXTRACORPOREAL SHOCK WAVE Left 3/21/2018    Performed by Adi Murray MD at [facility] OR [address] [place] [address]       Social History     Socioeconomic History    Marital status:      Spouse name: Not on file    Number of children: Not on file    Years of education: Not on file    Highest education level: Not on " file   Social Needs    Financial resource strain: Not on file    Food insecurity - worry: Not on file    Food insecurity - inability: Not on file    Transportation needs - medical: Not on file    Transportation needs - non-medical: Not on file   Occupational History    Not on file   Tobacco Use    Smoking status: Never Smoker    Smokeless tobacco: Never Used   Substance and Sexual Activity    Alcohol use: No     Alcohol/week: 0.0 oz     Frequency: Never     Comment: occasionally    Drug use: No    Sexual activity: No   Other Topics Concern    Not on file   Social History Narrative    Not on file         Vital Signs Range (Last 24H):  Temp:  [36.7 °C (98.1 °F)-36.8 °C (98.3 °F)]   Pulse:  [67-95]   Resp:  [16-18]   BP: ()/(56-82)   SpO2:  [95 %-96 %]       CBC:   Recent Labs      18   1120   WBC  9.88   RBC  5.36   HGB  15.9   HCT  45.6   PLT  241   MCV  85   MCH  29.7   MCHC  34.9       CMP:   Recent Labs      18   1120   NA  139   K  3.6   CL  105   CO2  26   BUN  13   CREATININE  1.4   GLU  133*   CALCIUM  9.3       INR  Recent Labs      18   1120   INR  1.0           Diagnostic Studies:      EKD Echo:        Anesthesia Evaluation    I have reviewed the Patient Summary Reports.    I have reviewed the Nursing Notes.   I have reviewed the Medications.     Review of Systems  Anesthesia Hx:  No problems with previous Anesthesia    Social:  Non-Smoker    Hematology/Oncology:  Hematology Normal   Oncology Normal     EENT/Dental:EENT/Dental Normal   Cardiovascular:   Hypertension, well controlled    Pulmonary:   COPD, mild Sleep Apnea    Renal/:   Chronic Renal Disease renal calculi    Hepatic/GI:  Hepatic/GI Normal    Musculoskeletal:  Musculoskeletal Normal    Neurological:  Neurology Normal    Endocrine:   Diabetes, well controlled Hypothyroidism    Dermatological:  Skin Normal        Physical Exam  General:  Well nourished, Obesity    Airway/Jaw/Neck:  Airway Findings:  Mouth Opening: Normal Tongue: Normal  General Airway Assessment: Pediatric  Mallampati: II  Improves to I with phonation.  TM Distance: Normal, at least 6 cm      Dental:  Dental Findings: In tact   Chest/Lungs:  Chest/Lungs Findings: Clear to auscultation     Heart/Vascular:  Heart Findings: Rate: Normal  Rhythm: Regular Rhythm  Sounds: Normal        Mental Status:  Mental Status Findings:  Cooperative         Anesthesia Plan  Type of Anesthesia, risks & benefits discussed:  Anesthesia Type:  general, MAC  Patient's Preference:   Intra-op Monitoring Plan:   Intra-op Monitoring Plan Comments:   Post Op Pain Control Plan:   Post Op Pain Control Plan Comments:   Induction:   IV  Beta Blocker:         Informed Consent: Patient understands risks and agrees with Anesthesia plan.  Questions answered. Anesthesia consent signed with patient.  ASA Score: 3     Day of Surgery Review of History & Physical:            Ready For Surgery From Anesthesia Perspective.

## 2018-09-26 NOTE — OP NOTE
[facility] Note    Date: 09/26/2018    Pre-Op Diagnosis: Left ureteral stones and hydronephrosis    Op Diagnosis: same    Procedure(s) Performed:    1. Cystoscopy with left ureteral JJ stent placement  2. Fluoroscopy < 1 h    Specimen(s): none    Staff Surgeon: Adi Murray MD    Assistant Surgeon: Sander Hickman MD    Anesthesia: Monitored Local Anesthesia with Sedation    Indications: Jerardo Powers is a 72 y.o. male with left ureteral stone here today for stent placement.    Findings:  1. Left 6 x 26 cm JJ ureteral stent placed without strings.     Estimated Blood Loss: min    Drains:  6 Macedonian x 26 cm left JJ ureteral stent without strings    Procedure in Detail:  After risks, benefits and possible complications of the procedure were explained, the patient elected to undergo the procedure and informed consent was obtained. All questions were answered in the elodia-operative area. The patient was transferred to the cystoscopy suite and placed on the fluoroscopy table in the supine position.  SCDs were applied and working. Time out was performed, elodia-procedural antibiotics were given. Anesthesia was administered.  After adequate anesthesia the patient was placed in dorsal lithotomy position and prepped and draped in the usual sterile fashion.     A rigid cystoscope in a 22 Fr sheath was introduced into the patients bladder per urethra. This passed easily.  The entire urethra was visualized and revealed no strictures or masses.  Cystoscopy was performed which showed the right and left ureteral orifices in the normal anatomic position.  There were no bladder tumors, no  trabeculations, and no stones.      Our attention was turned to the patient's left ureteral orifice.  A motion wire was advanced up the left ureteral orifice to the level of the expected renal pelvis.  This was confirmed using fluoroscopy.     We then passed a 6 Fr x 26 cm JJ ureteral stent without strings over the wire to the  level of the renal pelvis under direct vision as well as flouroscopy. The guide wire was removed.  A 180 degree coil was observed in the renal pelvis as well as the bladder using fluoroscopy.  A 180 degree coil was also seen using direct visualization in the bladder.     The patient tolerated the procedure well and was transferred to the recovery room in stable condition.      Disposition: The patient will be discharged home with outpatient follow up.       Sander Hickman MD

## 2018-09-26 NOTE — ED PROVIDER NOTES
Encounter Date: 9/26/2018       History     Chief Complaint   Patient presents with    Hematuria     Pt c/o abdominal pain & hematuria.  Onset couple weeks ago-worsened last night.      71 y/o M with history of HTN, BPH, hyperlipidemia, nephrolithiasis, hypothyroidism presents to the ED c/o gross hematuria x 2 week that worsened last night. He is followed by urology, Dr Murray and he was instructed to present to the ED today for urology evaluation and CT scan. He states last night his hematuria got worse and he has had multiple episodes of gross hematuria with passage of clots and tissue. He reports intermittent suprapubic pain that is somewhat relieved with urination. Denies any dysuria, penile pain. He completed a 5 day course of ciprofloxacin yesterday.  He does report a history of lithotripsy in April, denies any history of ureteral stents. He is not on blood thinners. He endorses associated urinary frequency, nocturia, urinary urgency. Denies fever, chills, chest pain, SOB, flank pain, nausea.  Denies tobacco use.      The history is provided by the patient.     Review of patient's allergies indicates:   Allergen Reactions    Morphine Hives     Past Medical History:   Diagnosis Date    Anxiety     BPH (benign prostatic hyperplasia)     BPH (benign prostatic hypertrophy)     Cataract     CKD (chronic kidney disease) stage 3, GFR 30-59 ml/min 5/5/2014    Colon polyps     Diabetes mellitus type II     Emphysema NEC     mild    Gout     Hematuria     Hyperlipidemia     Hypertension     Hypothyroidism     IBS (irritable bowel syndrome)     Kidney stones     JANEEN (obstructive sleep apnea)     Plantar fasciitis     Reflux      Past Surgical History:   Procedure Laterality Date    CATARACT EXTRACTION  1/28/2013    RIGHT EYE    CATARACT EXTRACTION      left eye    COLONOSCOPY  Sep 2011    Repeat recommended in 5 years    COLONOSCOPY N/A 10/10/2016    Procedure: COLONOSCOPY;  Surgeon: Noah BROOKE  MD Keanu;  Location: [facility] (4TH FLR);  Service: Endoscopy;  Laterality: N/A;  PM Prep    COLONOSCOPY N/A 10/10/2016    Performed by [facility]a [facility] at [facility] ENDO (4TH FLR)    ESOPHAGOGASTRODUODENOSCOPY (EGD) N/A 6/5/2017    Performed by [facility]a MD at [facility] ENDO (4TH FLR)    ESOPHAGOGASTRODUODENOSCOPY (EGD) N/A 12/23/2014    Performed by Noah [facility]a MD at [facility] END[facility] (4TH FLR)    KIDNEY STONE SURGERY      LITHOTRIPSY-EXTRACORPOREAL SHOCK WAVE Left 3/21/2018    Performed by Adi Murray MD at [facility] OR 1ST FLR     Family History   Problem Relation Age of Onset    Cancer Brother         non-hodgkins T cell lymphoma    Diabetes Mother     Macular degeneration Brother     Coronary artery disease Neg Hx     Colon cancer Neg Hx     Prostate cancer Neg Hx     Esophageal cancer Neg Hx      Social History     Tobacco Use    Smoking status: Never Smoker    Smokeless tobacco: Never Used   Substance Use Topics    Alcohol use: No     Alcohol/week: 0.0 oz     Frequency: Never     Comment: occasionally    Drug use: No     Review of Systems   Constitutional: Negative for chills and fever.   HENT: Negative for congestion, rhinorrhea and sore throat.    Eyes: Negative for photophobia and visual disturbance.   Respiratory: Negative for shortness of breath.    Cardiovascular: Negative for chest pain.   Gastrointestinal: Positive for abdominal pain. Negative for constipation, diarrhea, nausea and vomiting.   Genitourinary: Positive for frequency, hematuria and urgency. Negative for dysuria, flank pain and penile pain.   Musculoskeletal: Negative for arthralgias, back pain and myalgias.   Skin: Negative for rash and wound.   Neurological: Negative for light-headedness, numbness and headaches.   Psychiatric/Behavioral: Negative for confusion.       Physical Exam     Initial Vitals [09/26/18 1032]   BP Pulse Resp Temp SpO2   123/68 95 16 98.3 °F (36.8 °C) 95 %      MAP       --         Physical  Exam    Nursing note and vitals reviewed.  Constitutional: He appears well-developed and well-nourished. He is not diaphoretic. No distress.   HENT:   Head: Normocephalic and atraumatic.   Neck: Normal range of motion. Neck supple.   Cardiovascular: Normal rate, regular rhythm and normal heart sounds. Exam reveals no gallop and no friction rub.    No murmur heard.  Pulmonary/Chest: Breath sounds normal. He has no wheezes. He has no rhonchi. He has no rales.   Abdominal: Soft. Bowel sounds are normal. There is no tenderness. There is no rigidity, no rebound, no guarding and no CVA tenderness.   Musculoskeletal: Normal range of motion.   Neurological: He is alert and oriented to person, place, and time.   Skin: Skin is warm and dry. No rash noted. No erythema.   Psychiatric: He has a normal mood and affect.         ED Course   Procedures  Labs Reviewed   URINALYSIS, REFLEX TO URINE CULTURE - Abnormal; Notable for the following components:       Result Value    Appearance, UA Hazy (*)     Protein, UA 1+ (*)     Occult Blood UA 3+ (*)     Leukocytes, UA Trace (*)     All other components within normal limits    Narrative:     Preferred Collection Type->Urine, Clean Catch   CBC W/ AUTO DIFFERENTIAL - Abnormal; Notable for the following components:    Immature Granulocytes 0.6 (*)     Immature Grans (Abs) 0.06 (*)     All other components within normal limits   BASIC METABOLIC PANEL - Abnormal; Notable for the following components:    Glucose 133 (*)     eGFR if  57.6 (*)     eGFR if non  49.8 (*)     All other components within normal limits   URINALYSIS MICROSCOPIC - Abnormal; Notable for the following components:    RBC, UA >100 (*)     WBC, UA 10 (*)     All other components within normal limits    Narrative:     Preferred Collection Type->Urine, Clean Catch   CULTURE, URINE   PROTIME-INR          Imaging Results          SURG FL Surgery Fluoro Less Than 1 Hour (Final result)  Result  time 09/26/18 16:05:40    Final result by Hattie Bryan RN (09/26/18 16:05:40)                 Impression:    See OP Notes for results.             This procedure was auto-finalized by: Virtual Radiologist                 Narrative:    See OP Notes for results.                                CT Urogram Abd Pelvis W WO (Final result)  Result time 09/26/18 15:24:26    Final result by Zuri Abdul MD (09/26/18 15:24:26)                 Impression:      Two left interureteral stones measuring 0.8 cm and 0.5 cm, located in the mid left ureter and at the left ureterovesical junction respectively.  There is minimal associated obstructive uropathy.    Additional findings as above.    Findings discussed with ED physician at 14:28 10/26/2018    Electronically signed by resident: Yee Morel  Date:    09/26/2018  Time:    13:59    Electronically signed by: Zuri Abdul MD  Date:    09/26/2018  Time:    15:24             Narrative:    EXAMINATION:  CT UROGRAM ABD PELVIS W WO    CLINICAL HISTORY:  Hematuria;    TECHNIQUE:  Low dose axial images, sagittal and coronal reformations were obtained from the lung bases to the pubic symphysis before and after the administration of 125 cc Omnipaque 350 intravenous contrast per CT urogram protocol.  Oral contrast was not administered.    COMPARISON:  CT renal stone study 03/08/2018    FINDINGS:  There is bilateral dependent atelectasis and a left lower lobe granuloma.    Mild nonspecific diffuse thickening of the adrenal glands.  The kidneys are normal in size and location with appropriate contrast excretion.  The mid left ureter contains a 0.8 x 0.5 x 0.5 cm stone with minimal associated collecting system dilatation.    No perinephric fat stranding.  An additional left interureteral stone is present at the left ureterovesical junction measuring 0.4 x 0.4 x 0.5 cm.  No additional stones identified.  There are multiple subcentimeter left renal hypodensities which are  too small to characterize.  The lobulations at the inferior aspect of the right kidney in a possible region of scarring.    There are few sigmoid diverticula, no bowel obstruction or inflammatory change.    There is mild aortic atherosclerotic calcification.    There are small fat containing umbilical and left inguinal hernias.                                 Medical Decision Making:   History:   Old Medical Records: I decided to obtain old medical records.  Old Records Summarized: records from clinic visits.       <> Summary of Records: Followed by urology, Dr Murray. Seen in clinic on 9/20 c/o hematuria. Dr Murray instructed to present to the ED today for CT urogram and possible emergent cystoscope.   Clinical Tests:   Lab Tests: Ordered and Reviewed  Radiological Study: Ordered and Reviewed  Other:   I have discussed this case with another health care provider.       APC / Resident Notes:   73 y/o M with history of HTN, BPH, hyperlipidemia, nephrolithiasis, hypothyroidism presents to the ED c/o gross hematuria x 2 week that worsened last night.  VSS. Abdomen soft and nontender. No CVA tenderness. Gross hematuria noted in urine cup sample. Chart reviewed, labs and CT urogram ordered per Dr Murray. Will consult urology.    No leukocytosis or anemia. Cr 1.4. PT/INR normal. UA shows hematuria without any evidence of infection.    CT urogram shows 2 L interureteral stones.     2:09 PM  Urology was consulted and evaluated in the ED - they will admit. Plan to take to the OR today for stent placement.         Attending Attestation:     Physician Attestation Statement for NP/PA:   I discussed this assessment and plan of this patient with the NP/PA, but I did not personally examine the patient. The face to face encounter was performed by the NP/PA.                     Clinical Impression:   The primary encounter diagnosis was Hematuria, gross. Diagnoses of Nephrolithiasis and Hydronephrosis, unspecified hydronephrosis type were  also pertinent to this visit.      Disposition:   Disposition: Admitted  Condition: Fair  Urology                        Pooja Yu PA-C  09/26/18 1727       Mg Peguero MD  09/26/18 [id number]

## 2018-09-26 NOTE — CONSULTS
[facility]  Urology  Consult Note    Patient Name: Jearrdo Powres  MRN: [id number]  Admission Date: 9/26/2018  Hospital Length of Stay: 0   Code Status: Prior   Attending Provider: Mg Peguero MD   Consulting Provider: Sander Hickman MD  Primary Care Physician: Adams Moore MD  Principal Problem:<principal problem not specified>    Inpatient consult to Urology  Consult performed by: Sander Hickman MD  Consult ordered by: Mg Peguero MD          Subjective:     HPI:  73 yo M w/ pmh HTN, JANEEN, DM2, CKD3, HLD, BPH, and kidney stones. He had an ESWL in March 2018 for left sided renal stones, on follow up KUB these had resolved. He presents today with gross hematuria for the past 2 weeks.   He reports that his gross hematuria worsened overnight and he has been passing clots and tissue since last night. He reports intermittent suprapubic pain that is somewhat relieved with urination. Denies any dysuria, flank pain, or penile pain. He denies any fevers or chills. He is not taking any blood thinners    He was seen by Lily Reynolds on 9/18 and was treated with a course of cipro he just completed. His UC resulted negative.    Had cystoscopy in 2016 for gross hematuria: this revealed a normal appearing bladder and prostate was 4 cm with bilateral   obstruction    He is able to urinate, PVR today in the ED was 160ml.     CT Urogram today revealed: left sided stone with suspected clot in bladder.         Past Medical History:   Diagnosis Date    Anxiety     BPH (benign prostatic hyperplasia)     BPH (benign prostatic hypertrophy)     Cataract     CKD (chronic kidney disease) stage 3, GFR 30-59 ml/min 5/5/2014    Colon polyps     Diabetes mellitus type II     Emphysema NEC     mild    Gout     Hematuria     Hyperlipidemia     Hypertension     Hypothyroidism     IBS (irritable bowel syndrome)     Kidney stones     JANEEN (obstructive sleep apnea)     Plantar fasciitis      Reflux        Past Surgical History:   Procedure Laterality Date    CATARACT EXTRACTION  1/28/2013    RIGHT EYE    CATARACT EXTRACTION      left eye    COLONOSCOPY  Sep 2011    Repeat recommended in 5 years    COLONOSCOPY N/A 10/10/2016    Procedure: COLONOSCOPY;  Surgeon: Noah Colunga MD;  Location: [facility] (4TH FLR);  Service: Endoscopy;  Laterality: N/A;  PM Prep    COLONOSCOPY N/A 10/10/2016    Performed by Noah Colunga MD at [facility] (4TH FLR)    ESOPHAGOGASTRODUODENOSCOPY (EGD) N/A 6/5/2017    Performed by Noah Colunga MD at [facility] ENDO (4TH FLR)    ESOPHAGOGASTRODUODENOSCOPY (EGD) N/A 12/23/2014    Performed by Noah Colunga MD at [facility] (4TH FLR)    KIDNEY STONE SURGERY      LITHOTRIPSY-EXTRACORPOREAL SHOCK WAVE Left 3/21/2018    Performed by Adi Murray MD at [facility] OR [facility]       Review of patient's allergies indicates:   Allergen Reactions    Morphine Hives       Family History     Problem Relation (Age of Onset)    Cancer Brother    Diabetes Mother    Macular degeneration Brother          Tobacco Use    Smoking status: Never Smoker    Smokeless tobacco: Never Used   Substance and Sexual Activity    Alcohol use: No     Alcohol/week: 0.0 oz     Frequency: Never     Comment: occasionally    Drug use: No    Sexual activity: No       Review of Systems   Constitutional: Negative.    HENT: Negative.    Eyes: Negative.    Respiratory: Negative.    Cardiovascular: Negative.    Gastrointestinal: Negative.    Genitourinary: Positive for difficulty urinating, dysuria and hematuria. Negative for flank pain.   Skin: Negative.        Objective:     Temp:  [98.2 °F (36.8 °C)-98.3 °F (36.8 °C)] 98.2 °F (36.8 °C)  Pulse:  [67-95] 67  Resp:  [16] 16  SpO2:  [95 %-96 %] 96 %  BP: (123-134)/(68-82) 134/82     Body mass index is 31.9 kg/m².            Drains          None          Physical Exam   Constitutional: He is oriented to person, place, and time. He appears well-developed  and well-nourished.   HENT:   Head: Normocephalic.   Eyes: Pupils are equal, round, and reactive to light.   Neck: Normal range of motion.   Cardiovascular: Normal rate.    Pulmonary/Chest: Effort normal.   Abdominal: Soft. There is tenderness.   Genitourinary: Penis normal.   Musculoskeletal: Normal range of motion.   Neurological: He is alert and oriented to person, place, and time.       Significant Labs:    BMP:  Recent Labs   Lab  09/20/18   1550  09/26/18   1120   NA  137  139   K  3.6  3.6   CL  104  105   CO2  24  26   BUN  15  13   CREATININE  1.4  1.4   CALCIUM  9.3  9.3       CBC:  Recent Labs   Lab  09/26/18   1120   WBC  9.88   HGB  15.9   HCT  45.6   PLT  241       All pertinent labs results from the past 24 hours have been reviewed.    Significant Imaging:  All pertinent imaging results/findings from the past 24 hours have been reviewed.                    Assessment and Plan:     Hematuria, gross      - Patient to OR today for clot evacuation, TURBT, and left stent and other indicated procedures.   - consented      Sander Hickman MD PGY-5  Urology  [phone]              VTE Risk Mitigation (From admission, onward)    None          Thank you for your consult. I will sign off. Please contact us if you have any additional questions.    Sander Hickman MD  Urology  [facility]

## 2018-09-27 ENCOUNTER — TELEPHONE (OUTPATIENT)
Dept: UROLOGY | Facility: CLINIC | Age: 73
End: 2018-09-27

## 2018-09-27 VITALS
RESPIRATION RATE: 18 BRPM | TEMPERATURE: 98 F | HEART RATE: 62 BPM | HEIGHT: 69 IN | SYSTOLIC BLOOD PRESSURE: 134 MMHG | OXYGEN SATURATION: 96 % | DIASTOLIC BLOOD PRESSURE: 78 MMHG | BODY MASS INDEX: 31.99 KG/M2 | WEIGHT: 216 LBS

## 2018-09-27 DIAGNOSIS — R31.0 HEMATURIA, GROSS: ICD-10-CM

## 2018-09-27 DIAGNOSIS — N20.1 LEFT URETERAL STONE: Primary | ICD-10-CM

## 2018-09-27 LAB — BACTERIA UR CULT: NO GROWTH

## 2018-09-27 NOTE — ANESTHESIA POSTPROCEDURE EVALUATION
"Anesthesia Post Evaluation    Patient: Jerardo Powers    Procedure(s) Performed: Procedure(s) (LRB):  CYSTOSCOPY, WITH URETERAL STENT INSERTION (Left)    Final Anesthesia Type: general  Patient location during evaluation: PACU  Patient participation: Yes- Able to Participate  Level of consciousness: awake and alert  Post-procedure vital signs: reviewed and stable  Pain management: adequate  Airway patency: patent  PONV status at discharge: No PONV  Anesthetic complications: no      Cardiovascular status: blood pressure returned to baseline  Respiratory status: unassisted  Hydration status: euvolemic  Follow-up not needed.        Visit Vitals  /78 (BP Location: Left arm, Patient Position: Sitting)   Pulse 62   Temp 36.6 °C (97.9 °F) (Temporal)   Resp 18   Ht 5' 9" (1.753 m)   Wt 98 kg (216 lb)   SpO2 96%   BMI 31.90 kg/m²       Pain/Khushbu Score: Pain Assessment Performed: Yes (9/26/2018  6:15 PM)  Presence of Pain: non-verbal indicators absent (9/26/2018  6:15 PM)  Pain Rating Prior to Med Admin: 8 (9/26/2018  5:37 PM)  Pain Rating Post Med Admin: 6 (9/26/2018  6:15 PM)  Khushbu Score: 10 (9/26/2018  6:15 PM)        "

## 2018-09-27 NOTE — TELEPHONE ENCOUNTER
Diagnoses and all orders for this visit:    Left ureteral stone  -     Case Request Operating Room: REMOVAL, CALCULUS, URETER, URETEROSCOPIC, LITHOTRIPSY, USING LASER    Hematuria, gross  -     Case Request Operating Room: REMOVAL, CALCULUS, URETER, URETEROSCOPIC, LITHOTRIPSY, USING LASER

## 2018-09-28 ENCOUNTER — PATIENT MESSAGE (OUTPATIENT)
Dept: UROLOGY | Facility: CLINIC | Age: 73
End: 2018-09-28

## 2018-10-02 ENCOUNTER — ANESTHESIA EVENT (OUTPATIENT)
Dept: SURGERY | Facility: HOSPITAL | Age: 73
End: 2018-10-02
Payer: COMMERCIAL

## 2018-10-02 ENCOUNTER — OFFICE VISIT (OUTPATIENT)
Dept: OTOLARYNGOLOGY | Facility: CLINIC | Age: 73
End: 2018-10-02
Payer: COMMERCIAL

## 2018-10-02 ENCOUNTER — TELEPHONE (OUTPATIENT)
Dept: UROLOGY | Facility: CLINIC | Age: 73
End: 2018-10-02

## 2018-10-02 VITALS
BODY MASS INDEX: 31.1 KG/M2 | HEIGHT: 69 IN | HEART RATE: 92 BPM | SYSTOLIC BLOOD PRESSURE: 108 MMHG | WEIGHT: 210 LBS | DIASTOLIC BLOOD PRESSURE: 64 MMHG

## 2018-10-02 DIAGNOSIS — H69.93 ETD (EUSTACHIAN TUBE DYSFUNCTION), BILATERAL: ICD-10-CM

## 2018-10-02 DIAGNOSIS — H90.3 ASYMMETRIC SNHL (SENSORINEURAL HEARING LOSS): ICD-10-CM

## 2018-10-02 DIAGNOSIS — H81.11 BENIGN PAROXYSMAL POSITIONAL VERTIGO OF RIGHT EAR: Primary | ICD-10-CM

## 2018-10-02 PROCEDURE — 3078F DIAST BP <80 MM HG: CPT | Mod: CPTII,S$GLB,, | Performed by: OTOLARYNGOLOGY

## 2018-10-02 PROCEDURE — 95992 CANALITH REPOSITIONING PROC: CPT | Mod: S$GLB,,, | Performed by: OTOLARYNGOLOGY

## 2018-10-02 PROCEDURE — 99213 OFFICE O/P EST LOW 20 MIN: CPT | Mod: 25,S$GLB,, | Performed by: OTOLARYNGOLOGY

## 2018-10-02 PROCEDURE — 1101F PT FALLS ASSESS-DOCD LE1/YR: CPT | Mod: CPTII,S$GLB,, | Performed by: OTOLARYNGOLOGY

## 2018-10-02 PROCEDURE — 3074F SYST BP LT 130 MM HG: CPT | Mod: CPTII,S$GLB,, | Performed by: OTOLARYNGOLOGY

## 2018-10-02 PROCEDURE — 99999 PR PBB SHADOW E&M-EST. PATIENT-LVL III: CPT | Mod: PBBFAC,,, | Performed by: OTOLARYNGOLOGY

## 2018-10-02 NOTE — TELEPHONE ENCOUNTER
Called pt to confirm 11:30am arrival time for procedure. Gave pt NPO instructions and gave pt opportunity to ask questions. Pt verbalized understanding.

## 2018-10-02 NOTE — PROCEDURES
Procedures       Jerardo Powers was seen 10/02/2018 for Epley maneuver for BPPV in the right ear.      A 5-position Epley maneuver was performed for the right.  Patient tolerated the maneuver well and was asymptomatic upon discharge.  No nystagmus seen on post-procedure examination.  Post-Epley home instructions were reviewed and given to the patient.  Understanding was voiced.

## 2018-10-02 NOTE — PROGRESS NOTES
Chief Complaint   Patient presents with    Follow-up    Dizziness     patient reports occurs in the morning when getting up   .     HPI:  Jerardo Powers is a very pleasant 72 y.o. male here to see me today for the first time for evaluation of hearing loss.  He reports hearing loss that has been gradually progressing over the last 2 years.  He has not noted any difference in hearing between the ears, with both ears being the worse hearing ear.  He has not noted any tinnitus in either ear.  He has not had any recent issues with ear pain or ear drainage.  He denies a family history of hearing loss, and has not had any previous otologic surgery.  He denies any history of significant loud noise exposure.He denies issues with dizziness.    Interval HPI 8/17/2018:   72 year old male who follows up for left SNHL, ETD, BPPV.  He reports that he had an episode of vertigo this morning when he rolled over in bed.  He took some meclizine after it started. He feels that the vertigo has subsided. He requested an urgent appointment because he was concerned about his upcoming urologic procedure tomorrow.     Past Medical History:   Diagnosis Date    Anxiety     BPH (benign prostatic hyperplasia)     BPH (benign prostatic hypertrophy)     Cataract     CKD (chronic kidney disease) stage 3, GFR 30-59 ml/min 5/5/2014    Colon polyps     Diabetes mellitus type II     Emphysema NEC     mild    Gout     Hematuria     Hyperlipidemia     Hypertension     Hypothyroidism     IBS (irritable bowel syndrome)     Kidney stones     JANEEN (obstructive sleep apnea)     Plantar fasciitis     Reflux      Social History     Socioeconomic History    Marital status:      Spouse name: Not on file    Number of children: Not on file    Years of education: Not on file    Highest education level: Not on file   Social Needs    Financial resource strain: Not on file    Food insecurity - worry: Not on file    Food insecurity -  inability: Not on file    Transportation needs - medical: Not on file    Transportation needs - non-medical: Not on file   Occupational History    Not on file   Tobacco Use    Smoking status: Never Smoker    Smokeless tobacco: Never Used   Substance and Sexual Activity    Alcohol use: No     Alcohol/week: 0.0 oz     Frequency: Never     Comment: occasionally    Drug use: No    Sexual activity: No   Other Topics Concern    Not on file   Social History Narrative    Not on file     Past Surgical History:   Procedure Laterality Date    CATARACT EXTRACTION  1/28/2013    RIGHT EYE    CATARACT EXTRACTION      left eye    COLONOSCOPY  Sep 2011    Repeat recommended in 5 years    COLONOSCOPY N/A 10/10/2016    Procedure: COLONOSCOPY;  Surgeon: Noah Colunga MD;  Location: [facility] (4TH FLR);  Service: Endoscopy;  Laterality: N/A;  PM Prep    COLONOSCOPY N/A 10/10/2016    Performed by Noah Colunga MD at [facility] (4TH FLR)    CYSTOSCOPY W/ URETERAL STENT PLACEMENT Left 9/26/2018    Procedure: CYSTOSCOPY, WITH URETERAL STENT INSERTION;  Surgeon: Adi Murray MD;  Location: [facility] OR [address] [place] [address];  Service: Urology;  Laterality: Left;    CYSTOSCOPY, WITH URETERAL STENT INSERTION Left 9/26/2018    Performed by Adi Murray MD at [facility] OR [address] [place] [address]    ESOPHAGOGASTRODUODENOSCOPY (EGD) N/A 6/5/2017    Performed by Noah Colunga MD at [facility] (4TH FLR)    ESOPHAGOGASTRODUODENOSCOPY (EGD) N/A 12/23/2014    Performed by Noah Colunga MD at [facility] (4TH FLR)    KIDNEY STONE SURGERY      LITHOTRIPSY-EXTRACORPOREAL SHOCK WAVE Left 3/21/2018    Performed by Adi Murray MD at [facility] OR [address] [place] [address]     Family History   Problem Relation Age of Onset    Cancer Brother         non-hodgkins T cell lymphoma    Diabetes Mother     Macular degeneration Brother     Coronary artery disease Neg Hx     Colon cancer Neg Hx     Prostate cancer Neg Hx     Esophageal cancer Neg Hx          Review of  Systems  General: negative for chills, fever or weight loss  Psychological: negative for mood changes or depression  Ophthalmic: negative for blurry vision, photophobia or eye pain  ENT: see HPI  Respiratory: no cough, shortness of breath, or wheezing  Cardiovascular: no chest pain or dyspnea on exertion  Gastrointestinal: no abdominal pain, change in bowel habits, or black/ bloody stools  Musculoskeletal: negative for gait disturbance or muscular weakness  Neurological: no syncope or seizures; no ataxia  Dermatological: negative for puritis,  rash and jaundice  Hematologic/lymphatic: no easy bruising, no new lumps or bumps      Physical Exam:    Vitals:    10/02/18 1352   BP: 108/64   Pulse: 92       Constitutional: Well appearing / communicating without difficutly.  NAD.  Eyes: EOM I Bilaterally  Head/Face: Normocephalic.  Negative paranasal sinus pressure/tenderness.  Salivary glands WNL.  House Brackmann I Bilaterally.    Right Ear: Auricle normal appearance. External Auditory Canal within normal limits no lesions or masses,TM w/o masses/lesions/perforations. TM mobility noted.   Left Ear: Auricle normal appearance. External Auditory Canal within normal limits no lesions or masses,TM w/o masses/lesions/perforations. TM mobility noted.  Rinne Air conduction >bone conduction bilaterally, Call midline.   Negative Romberg; no nystagmus, negative Fakuda step test, negative head thrust; positive Dixx Hallpike to the right; negative Dixx-Hallpike to the left.   Nose: No gross nasal septal deviation. Inferior Turbinates 3+ bilaterally. No septal perforation. No masses/lesions. External nasal skin appears normal without masses/lesions.  Oral Cavity: Gingiva/lips within normal limits.  Dentition/gingiva healthy appearing. Mucus membranes moist. Floor of mouth soft, no masses palpated. Oral Tongue mobile. Hard Palate appears normal.    Oropharynx: Base of tongue appears normal. No masses/lesions noted. Tonsillar  fossa/pharyngeal wall without lesions. Posterior oropharynx WNL.  Soft palate without masses. Midline uvula.   Neck/Lymphatic: No LAD I-VI bilaterally.  No thyromegaly.  No masses noted on exam.    Mirror laryngoscopy/nasopharyngoscopy: Active gag reflex.  Unable to perform.    Neuro/Psychiatric: AOx3.  Normal mood and affect.   Cardiovascular: Normal carotid pulses bilaterally, no increasing jugular venous distention noted at cervical region bilaterally.    Respiratory: Normal respiratory effort, no stridor, no retractions noted.    Diagnostic testing reviewed:   MRI brain 7/24/2018: No evidence of CPA lesions. There is mucoperiosteal thickening and air-fluid levels in the bilateral maxillary sinuses.        Assessment:    ICD-10-CM ICD-9-CM    1. Benign paroxysmal positional vertigo of right ear H81.11 386.11    2. Asymmetric SNHL (sensorineural hearing loss) H90.5 389.16    3. ETD (Eustachian tube dysfunction), bilateral H69.83 381.81      The primary encounter diagnosis was Benign paroxysmal positional vertigo of right ear. Diagnoses of Asymmetric SNHL (sensorineural hearing loss) and ETD (Eustachian tube dysfunction), bilateral were also pertinent to this visit.      Plan:  No orders of the defined types were placed in this encounter.     MRI of IAC clear of CPA lesion. Patient is medically cleared for hearing aids. Consultation with audiologist was arranged to discuss hearing aid options and he has decided to purchase a hearing aid for the left ear.     Right BPPV- right canalith repositioning maneuver performed today. Post- procedure instructions given. Follow up in 2 weeks for recheck.     AR/CRS: continue Flonase and Levocetirizine.     Radha Vogt MD

## 2018-10-03 ENCOUNTER — HOSPITAL ENCOUNTER (OUTPATIENT)
Facility: HOSPITAL | Age: 73
Discharge: HOME | End: 2018-10-03
Attending: UROLOGY | Admitting: UROLOGY
Payer: COMMERCIAL

## 2018-10-03 ENCOUNTER — PATIENT MESSAGE (OUTPATIENT)
Dept: SURGERY | Facility: HOSPITAL | Age: 73
End: 2018-10-03

## 2018-10-03 ENCOUNTER — ANESTHESIA (OUTPATIENT)
Dept: SURGERY | Facility: HOSPITAL | Age: 73
End: 2018-10-03
Payer: COMMERCIAL

## 2018-10-03 DIAGNOSIS — N20.1 LEFT URETERAL STONE: Primary | ICD-10-CM

## 2018-10-03 DIAGNOSIS — N20.0 KIDNEY STONE ON LEFT SIDE: ICD-10-CM

## 2018-10-03 DIAGNOSIS — M10.9 GOUT, UNSPECIFIED CAUSE, UNSPECIFIED CHRONICITY, UNSPECIFIED SITE: ICD-10-CM

## 2018-10-03 DIAGNOSIS — N20.1 URETERAL STONE: ICD-10-CM

## 2018-10-03 LAB
POCT GLUCOSE: 123 MG/DL (ref 70–110)
POCT GLUCOSE: 126 MG/DL (ref 70–110)

## 2018-10-03 PROCEDURE — 36000707: Performed by: UROLOGY

## 2018-10-03 PROCEDURE — D9220A PRA ANESTHESIA: Mod: ANES,,, | Performed by: ANESTHESIOLOGY

## 2018-10-03 PROCEDURE — 74420 UROGRAPHY RTRGR +-KUB: CPT | Mod: 26,,, | Performed by: UROLOGY

## 2018-10-03 PROCEDURE — 27201423 OPTIME MED/SURG SUP & DEVICES STERILE SUPPLY: Performed by: UROLOGY

## 2018-10-03 PROCEDURE — 36000706: Performed by: UROLOGY

## 2018-10-03 PROCEDURE — 25500020 PHARM REV CODE 255: Performed by: UROLOGY

## 2018-10-03 PROCEDURE — 82962 GLUCOSE BLOOD TEST: CPT | Performed by: UROLOGY

## 2018-10-03 PROCEDURE — 71000016 HC POSTOP RECOV ADDL HR: Performed by: UROLOGY

## 2018-10-03 PROCEDURE — C2617 STENT, NON-COR, TEM W/O DEL: HCPCS | Performed by: UROLOGY

## 2018-10-03 PROCEDURE — 25000003 PHARM REV CODE 250: Performed by: NURSE ANESTHETIST, CERTIFIED REGISTERED

## 2018-10-03 PROCEDURE — 52356 CYSTO/URETERO W/LITHOTRIPSY: CPT | Mod: LT,,, | Performed by: UROLOGY

## 2018-10-03 PROCEDURE — 63600175 PHARM REV CODE 636 W HCPCS: Performed by: STUDENT IN AN ORGANIZED HEALTH CARE EDUCATION/TRAINING PROGRAM

## 2018-10-03 PROCEDURE — 63600175 PHARM REV CODE 636 W HCPCS: Performed by: NURSE ANESTHETIST, CERTIFIED REGISTERED

## 2018-10-03 PROCEDURE — D9220A PRA ANESTHESIA: Mod: CRNA,,, | Performed by: NURSE ANESTHETIST, CERTIFIED REGISTERED

## 2018-10-03 PROCEDURE — 37000009 HC ANESTHESIA EA ADD 15 MINS: Performed by: UROLOGY

## 2018-10-03 PROCEDURE — 37000008 HC ANESTHESIA 1ST 15 MINUTES: Performed by: UROLOGY

## 2018-10-03 PROCEDURE — 71000015 HC POSTOP RECOV 1ST HR: Performed by: UROLOGY

## 2018-10-03 PROCEDURE — 25000003 PHARM REV CODE 250: Performed by: STUDENT IN AN ORGANIZED HEALTH CARE EDUCATION/TRAINING PROGRAM

## 2018-10-03 PROCEDURE — C1769 GUIDE WIRE: HCPCS | Performed by: UROLOGY

## 2018-10-03 PROCEDURE — 82365 CALCULUS SPECTROSCOPY: CPT

## 2018-10-03 PROCEDURE — 76000 FLUOROSCOPY <1 HR PHYS/QHP: CPT | Mod: 26,59,, | Performed by: UROLOGY

## 2018-10-03 DEVICE — STENT URETERAL UNIV 6FR 26CM: Type: IMPLANTABLE DEVICE | Site: URETER | Status: FUNCTIONAL

## 2018-10-03 RX ORDER — CIPROFLOXACIN 500 MG/1
500 TABLET ORAL ONCE
Status: DISCONTINUED | OUTPATIENT
Start: 2018-10-03 | End: 2018-10-16

## 2018-10-03 RX ORDER — MEPERIDINE HYDROCHLORIDE 25 MG/ML
12.5 INJECTION INTRAMUSCULAR; INTRAVENOUS; SUBCUTANEOUS ONCE AS NEEDED
Status: DISCONTINUED | OUTPATIENT
Start: 2018-10-03 | End: 2018-10-03 | Stop reason: HOSPADM

## 2018-10-03 RX ORDER — EPHEDRINE SULFATE 50 MG/ML
INJECTION, SOLUTION INTRAVENOUS
Status: DISCONTINUED | OUTPATIENT
Start: 2018-10-03 | End: 2018-10-03

## 2018-10-03 RX ORDER — SODIUM CHLORIDE 9 MG/ML
INJECTION, SOLUTION INTRAVENOUS CONTINUOUS
Status: DISCONTINUED | OUTPATIENT
Start: 2018-10-03 | End: 2018-10-03 | Stop reason: HOSPADM

## 2018-10-03 RX ORDER — HYDROMORPHONE HYDROCHLORIDE 1 MG/ML
0.2 INJECTION, SOLUTION INTRAMUSCULAR; INTRAVENOUS; SUBCUTANEOUS EVERY 5 MIN PRN
Status: DISCONTINUED | OUTPATIENT
Start: 2018-10-03 | End: 2018-10-03 | Stop reason: HOSPADM

## 2018-10-03 RX ORDER — DEXAMETHASONE SODIUM PHOSPHATE 4 MG/ML
INJECTION, SOLUTION INTRA-ARTICULAR; INTRALESIONAL; INTRAMUSCULAR; INTRAVENOUS; SOFT TISSUE
Status: DISCONTINUED | OUTPATIENT
Start: 2018-10-03 | End: 2018-10-03

## 2018-10-03 RX ORDER — NEOSTIGMINE METHYLSULFATE 1 MG/ML
INJECTION, SOLUTION INTRAVENOUS
Status: DISCONTINUED | OUTPATIENT
Start: 2018-10-03 | End: 2018-10-03

## 2018-10-03 RX ORDER — LIDOCAINE HCL/PF 100 MG/5ML
SYRINGE (ML) INTRAVENOUS
Status: DISCONTINUED | OUTPATIENT
Start: 2018-10-03 | End: 2018-10-03

## 2018-10-03 RX ORDER — FENTANYL CITRATE 50 UG/ML
INJECTION, SOLUTION INTRAMUSCULAR; INTRAVENOUS
Status: DISCONTINUED | OUTPATIENT
Start: 2018-10-03 | End: 2018-10-03

## 2018-10-03 RX ORDER — ROCURONIUM BROMIDE 10 MG/ML
INJECTION, SOLUTION INTRAVENOUS
Status: DISCONTINUED | OUTPATIENT
Start: 2018-10-03 | End: 2018-10-03

## 2018-10-03 RX ORDER — LORAZEPAM 2 MG/ML
0.25 INJECTION INTRAMUSCULAR ONCE AS NEEDED
Status: DISCONTINUED | OUTPATIENT
Start: 2018-10-03 | End: 2018-10-03 | Stop reason: HOSPADM

## 2018-10-03 RX ORDER — CEFAZOLIN SODIUM 1 G/3ML
2 INJECTION, POWDER, FOR SOLUTION INTRAMUSCULAR; INTRAVENOUS
Status: COMPLETED | OUTPATIENT
Start: 2018-10-03 | End: 2018-10-03

## 2018-10-03 RX ORDER — MIDAZOLAM HYDROCHLORIDE 1 MG/ML
INJECTION, SOLUTION INTRAMUSCULAR; INTRAVENOUS
Status: DISCONTINUED | OUTPATIENT
Start: 2018-10-03 | End: 2018-10-03

## 2018-10-03 RX ORDER — LIDOCAINE HYDROCHLORIDE 20 MG/ML
JELLY TOPICAL ONCE
Status: DISCONTINUED | OUTPATIENT
Start: 2018-10-03 | End: 2019-03-14

## 2018-10-03 RX ORDER — PROPOFOL 10 MG/ML
VIAL (ML) INTRAVENOUS
Status: DISCONTINUED | OUTPATIENT
Start: 2018-10-03 | End: 2018-10-03

## 2018-10-03 RX ORDER — SUCCINYLCHOLINE CHLORIDE 20 MG/ML
INJECTION INTRAMUSCULAR; INTRAVENOUS
Status: DISCONTINUED | OUTPATIENT
Start: 2018-10-03 | End: 2018-10-03

## 2018-10-03 RX ORDER — ONDANSETRON 2 MG/ML
INJECTION INTRAMUSCULAR; INTRAVENOUS
Status: DISCONTINUED | OUTPATIENT
Start: 2018-10-03 | End: 2018-10-03

## 2018-10-03 RX ORDER — HYDROCODONE BITARTRATE AND ACETAMINOPHEN 5; 325 MG/1; MG/1
1 TABLET ORAL EVERY 6 HOURS PRN
Qty: 10 TABLET | Refills: 0 | Status: SHIPPED | OUTPATIENT
Start: 2018-10-03 | End: 2018-10-13

## 2018-10-03 RX ORDER — GLYCOPYRROLATE 0.2 MG/ML
INJECTION INTRAMUSCULAR; INTRAVENOUS
Status: DISCONTINUED | OUTPATIENT
Start: 2018-10-03 | End: 2018-10-03

## 2018-10-03 RX ADMIN — SUCCINYLCHOLINE CHLORIDE 160 MG: 20 INJECTION, SOLUTION INTRAMUSCULAR; INTRAVENOUS at 01:10

## 2018-10-03 RX ADMIN — EPHEDRINE SULFATE 10 MG: 50 INJECTION, SOLUTION INTRAMUSCULAR; INTRAVENOUS; SUBCUTANEOUS at 02:10

## 2018-10-03 RX ADMIN — GLYCOPYRROLATE 0.2 MG: 0.2 INJECTION, SOLUTION INTRAMUSCULAR; INTRAVENOUS at 02:10

## 2018-10-03 RX ADMIN — ROCURONIUM BROMIDE 5 MG: 10 INJECTION, SOLUTION INTRAVENOUS at 01:10

## 2018-10-03 RX ADMIN — DEXAMETHASONE SODIUM PHOSPHATE 4 MG: 4 INJECTION, SOLUTION INTRAMUSCULAR; INTRAVENOUS at 01:10

## 2018-10-03 RX ADMIN — ROCURONIUM BROMIDE 35 MG: 10 INJECTION, SOLUTION INTRAVENOUS at 01:10

## 2018-10-03 RX ADMIN — CEFAZOLIN 2 G: 330 INJECTION, POWDER, FOR SOLUTION INTRAMUSCULAR; INTRAVENOUS at 01:10

## 2018-10-03 RX ADMIN — MIDAZOLAM HYDROCHLORIDE 2 MG: 1 INJECTION, SOLUTION INTRAMUSCULAR; INTRAVENOUS at 01:10

## 2018-10-03 RX ADMIN — NEOSTIGMINE METHYLSULFATE 4 MG: 1 INJECTION INTRAVENOUS at 02:10

## 2018-10-03 RX ADMIN — FENTANYL CITRATE 100 MCG: 50 INJECTION, SOLUTION INTRAMUSCULAR; INTRAVENOUS at 01:10

## 2018-10-03 RX ADMIN — ONDANSETRON 4 MG: 2 INJECTION INTRAMUSCULAR; INTRAVENOUS at 01:10

## 2018-10-03 RX ADMIN — LIDOCAINE HYDROCHLORIDE 80 MG: 20 INJECTION, SOLUTION INTRAVENOUS at 01:10

## 2018-10-03 RX ADMIN — PROPOFOL 150 MG: 10 INJECTION, EMULSION INTRAVENOUS at 01:10

## 2018-10-03 RX ADMIN — GLYCOPYRROLATE 0.4 MG: 0.2 INJECTION, SOLUTION INTRAMUSCULAR; INTRAVENOUS at 02:10

## 2018-10-03 RX ADMIN — EPHEDRINE SULFATE 15 MG: 50 INJECTION, SOLUTION INTRAMUSCULAR; INTRAVENOUS; SUBCUTANEOUS at 02:10

## 2018-10-03 RX ADMIN — SODIUM CHLORIDE: 0.9 INJECTION, SOLUTION INTRAVENOUS at 12:10

## 2018-10-03 RX ADMIN — SODIUM CHLORIDE, SODIUM GLUCONATE, SODIUM ACETATE, POTASSIUM CHLORIDE, MAGNESIUM CHLORIDE, SODIUM PHOSPHATE, DIBASIC, AND POTASSIUM PHOSPHATE: .53; .5; .37; .037; .03; .012; .00082 INJECTION, SOLUTION INTRAVENOUS at 02:10

## 2018-10-03 NOTE — DISCHARGE INSTRUCTIONS
Remove stent by pulling on strings on Tuesday at 0600.       Shock Wave Lithotripsy  Passing a kidney stone can be very painful. Shock wave lithotripsy is a treatment that helps by breaking the kidney stone into smaller pieces that are easier to pass. This treatment is also called extracorporeal shock wave lithotripsy (ESWL). Lithotripsy takes about an hour. Its done in a hospital, lithotripsy center, or mobile lithotripsy van. You will likely go home the same day. This treatment is not used for all types of kidney stones. Your healthcare provider will discuss whether this is the right treatment for the type of stone you have.      Energy waves strike the stone, which begins to crack. The stone crumbles into tiny pieces.   During the procedure  · You get medicine to prevent pain and help you relax or sleep during lithotripsy. Once this takes effect, the procedure will start.  · A flexible tube (stent) with holes in it may be placed into your ureter, the tube that connects the kidney and the bladder. This helps keep urine flowing from the kidney.  · Your healthcare provider then uses X-ray or ultrasound to find the exact location of the kidney stone.  · Sound waves are aimed at the stone and sent at high speed. If youre awake, you may feel a tapping as they pass through your body.  After the procedure  · Youll be closely watched in a recovery room for about 1 to 3 hours. Antibiotics and pain medicine may be prescribed before you leave.  · Youll have a follow-up visit in a few weeks. If you received a stent, it will be removed. Your healthcare provider will also check for pieces of stone. If large pieces remain, you may need a second lithotripsy or another procedure.  Possible risks and complications  · Infection  · Bleeding in the kidney  · Bruising of the kidney or skin  · Blockage (obstruction) of the ureter  · Failure to break up the stone (other procedures may be needed)   Passing the stone  It can take a day  to several weeks for the pieces of stone to leave your body. Drink plenty of liquids to help flush your system. During this time:  · Your urine may be cloudy or slightly bloody. You may even see small pieces of stone.  · You may have a slight fever and some pain. Take prescribed or over-the-counter pain medicine as instructed by your healthcare provider.  · You may be asked to strain your urine to collect some stone particles. These will be studied in the lab.  When to call your healthcare provider   Call your healthcare provider right away if you have any of the following:  · Fever of 100.4°F (38°C) or higher, or as directed by your healthcare provider  · Heavy bleeding  · Pain that doesnt go away with medicine  · Upset stomach and vomiting  · Problems urinating   Date Last Reviewed: 1/1/2017 © 2000-2017 [employer]. [address] [place] [address]. All rights reserved. This information is not intended as a substitute for professional medical care. Always follow your healthcare professional's instructions.      Cystoscopy    Cystoscopy is a procedure that lets your doctor look directly inside your urethra and bladder. It can be used to:  · Help diagnose a problem with your urethra, bladder, or kidneys.  · Take a sample (biopsy) of bladder or urethral tissue.  · Treat certain problems (such as removing kidney stones).  · Place a stent to bypass an obstruction.  · Take special X-rays of the kidneys.  Based on the findings, your doctor may recommend other tests or treatments.  What is a cystoscope?  A cystoscope is a telescope-like instrument that contains lenses and fiberoptics (small glass wires that make bright light). The cystoscope may be straight and rigid, or flexible to bend around curves in the urethra. The doctor may look directly into the cystoscope, or project the image onto a monitor.  Getting ready  · Ask your doctor if you should stop taking any medicines before the  procedure.  · Ask whether you should avoid eating or drinking anything after midnight before the procedure.  · Follow any other instructions your doctor gives you.  Tell your doctor before the exam if you:  · Take any medicines, such as aspirin or blood thinners  · Have allergies to any medicines  · Are pregnant   The procedure  Cystoscopy is done in the doctors office, surgery center, or hospital. The doctor and a nurse are present during the procedure. It takes only a few minutes, longer if a biopsy, X-ray, or treatment needs to be done.  During the procedure:  · You lie on an exam table on your back, knees bent and legs apart. You are covered with a drape.  · Your urethra and the area around it are washed. Anesthetic jelly may be applied to numb the urethra. Other pain medicine is usually not needed. In some cases, you may be offered a mild sedative to help you relax. If a more extensive procedure is to be done, such as a biopsy or kidney stone removal, general anesthesia may be needed.  · The cystoscope is inserted. A sterile fluid is put into the bladder to expand it. You may feel pressure from this fluid.  · When the procedure is done, the cystoscope is removed.  After the procedure  If you had a sedative, general anesthesia, or spinal anesthesia, you must have someone drive you home. Once youre home:  · Drink plenty of fluids.  · You may have burning or light bleeding when you urinate--this is normal.  · Medicines may be prescribed to ease any discomfort or prevent infection. Take these as directed.  · Call your doctor if you have heavy bleeding or blood clots, burning that lasts more than a day, a fever over 100°F  (38° C), or trouble urinating.  Date Last Reviewed: 1/1/2017  © 5963-7895 [employer]. [address] [place] [address], [place] [address]. All rights reserved. This information is not intended as a substitute for professional medical care. Always follow your healthcare professional's  instructions.      Recovery After Procedural Sedation (Adult)  You have been given medicine by vein to make you sleep during your surgery. This may have included both a pain medicine and sleeping medicine. Most of the effects have worn off. But you may still have some drowsiness for the next 6 to 8 hours.  Home care  Follow these guidelines when you get home:  · For the next 8 hours, you should be watched by a responsible adult. This person should make sure your condition is not getting worse.  · Don't drink any alcohol for the next 24 hours.  · Don't drive, operate dangerous machinery, or make important business or personal decisions during the next 24 hours.  Note: Your healthcare provider may tell you not to take any medicine by mouth for pain or sleep in the next 4 hours. These medicines may react with the medicines you were given in the hospital. This could cause a much stronger response than usual.  Follow-up care  Follow up with your healthcare provider if you are not alert and back to your usual level of activity within 12 hours.  When to seek medical advice  Call your healthcare provider right away if any of these occur:  · Drowsiness gets worse  · Weakness or dizziness gets worse  · Repeated vomiting  · You can't be awakened   Date Last Reviewed: 10/18/2016  © 4685-7447 [employer]. [address] [place] [address], [place] [address]. All rights reserved. This information is not intended as a substitute for professional medical care. Always follow your healthcare professional's instructions.      PATIENT INSTRUCTIONS  POST-ANESTHESIA    IMMEDIATELY FOLLOWING SURGERY:  Do not drive or operate machinery for the first twenty four hours after surgery.  Do not make any important decisions for twenty four hours after surgery or while taking narcotic pain medications or sedatives.  If you develop intractable nausea and vomiting or a severe headache please notify your doctor immediately.    FOLLOW-UP:  Please  make an appointment with your surgeon as instructed. You do not need to follow up with anesthesia unless specifically instructed to do so.    WOUND CARE INSTRUCTIONS (if applicable):  Keep a dry clean dressing on the anesthesia/puncture wound site if there is drainage.  Once the wound has quit draining you may leave it open to air.  Generally you should leave the bandage intact for twenty four hours unless there is drainage.  If the epidural site drains for more than 36-48 hours please call the anesthesia department.    QUESTIONS?:  Please feel free to call your physician or the hospital  if you have any questions, and they will be happy to assist you.       [facility] Anesthesia Department  [address] [facility]  [phone]

## 2018-10-03 NOTE — OP NOTE
[facility]  Operative Note    Date: 10/03/2018    Pre-Op Diagnosis: left ureteral stone x 2    Patient Active Problem List    Diagnosis Date Noted    Left ureteral stone 10/03/2018    Hematuria, gross 09/20/2018    Vertigo 07/27/2018    Ataxia 07/27/2018    Nephrolithiasis 03/21/2018    Sciatica 02/08/2018    Lower abdominal pain 06/05/2017    History of colon polyps 10/10/2016    Fatigue 07/01/2015    JANEEN (obstructive sleep apnea) 07/01/2015    Depression 03/07/2015    Anxiety and depression 03/07/2015    Dyspepsia 12/23/2014    Diarrhea 12/18/2014    Gout 05/05/2014    Postural dizziness 03/31/2014    Hypokalemia 03/31/2014    Diabetes mellitus, type 2 10/11/2012    Hypothyroidism 10/11/2012    Hypertension 10/11/2012    Benign prostatic hyperplasia     Reflux     IBS (irritable bowel syndrome)     Emphysema NEC     Kidney stone on left side          Post-Op Diagnosis: same    Procedure(s) Performed:   1.  Left ureteroscopy  2.  Cystoscopy with stent removal   3.  Laser lithotripsy  4.  Stone basket extraction  5.  Retrograde pyelogram  6.  Left JJ ureteral stent insertion   7.  Fluoro < 1 h    Specimen(s): left ureteral stones for analysis    Staff Surgeon: Adi Murray MD    Assistant Surgeon: Keanu Gamez MD; Benjamín Degroot MD    Anesthesia: General endotracheal anesthesia    Indications: Jerardo Powers is a 72 y.o. male with 2 left ureteral stones, presenting for definitive stone management.  He currently does have a JJ ureteral stent in place.      Findings:     - two left ureteral stones identified, one removed with basket, one fragmented with laser and fragments removed with basket  - retrograde pyelogram performed-- no filling defects, no extravasation  - stent replaced on strings     2 baskets were used throughout the case.      Estimated Blood Loss: min    Drains: 6 Fr x 26 cm JJ ureteral stent with strings    Procedure in detail:  After informed consent was  obtained, the patient was brought the the cystoscopy suite and placed in the supine position.  SCDs were applied and working.  Anesthesia was administered.  The patient was then placed in the dorsal lithotomy position and prepped and draped in the usual sterile fashion.      A rigid cystoscope in a 22 Fr sheath was introduced into the patient's urethra.  This passed easily.  The entire urethra was visualized which showed no strictures or masses.  Formal cystoscopy was performed which revealed no masses or lesions suspicious for malignancy, no bladder stones, no bladder diverticuli, no trabeculations.  The ureteral orifices were visualized in the normal anatomic position bilaterally.   There was a JJ ureteral stent in place in the left ureter.  This was grasped and pulled to the meatus.      A motion wire was passed up the left ureteral stent and up into the kidney.  This passed easily and placement was confirmed using fluoro.  The cystoscope was removed keeping the guidewire in place and the wire was secured to the drape.      An 8 Fr rigid ureteroscope was passed into the patient's bladder alongside the wire under direct vision.  It was then passed through the left ureteral orifice alongside the wire.  A stone was encountered at the level of the distal ureter.  This was removed with a nitinol tipless basket and placed in the bladder.  The ureteroscope was reinserted and a second stone was identified in the proximal ureter.  This appeared slightly impacted.  We attempted to remove this with a stone basket, but we were unable to pull it past the level of the iliac vessels.  A 200 micron (non-annia) laser fiber was passed through the ureteroscope.  The stone was fragmented using the laser.  A Nitinol tipless basket was introduced through the ureteroscope.  Stone fragments were removed and placed in the bladder.  The scope was replaced in the proximal ureter and a retrograde pyelogram was performed.  This demonstrated  no extravasation and no filling defects.  There was mild residual hydronephrosis.  The ureteroscope was removed keeping the motion wire in place.  A cystoscope was reinserted and the bladder was irrigated to remove the stone fragments.  The bladder was drained the cystoscope removed keeping the wire in place.      A 6 Fr x 26 cm JJ ureteral stent with strings was passed over the wire and up into the renal pelvis using the cystoscope.  When the coil appeared to be in good position in the kidney and the radio-opaque marker of the pusher was at the bladder neck, the wire was removed under continuous fluoro.  Good coils were seen in the kidney and the bladder using fluoro.      The patient tolerated the procedure well and was transferred to the recovery room in stable condition.      Disposition:  The patient will follow up with Dr. Murray in 6 weeks with KUB and RBUS.  He will pull his stent on Tuesday.       Keanu Gamez MD

## 2018-10-03 NOTE — INTERVAL H&P NOTE
The patient has been examined and the H&P has been reviewed:    I concur with the findings and changes have been noted since the H&P was written:       Patient underwent cystoscopy with ureteral stent placement. He presents today for ureteroscopy with laser lithotripsy.    Anesthesia/Surgery risks, benefits and alternative options discussed and understood by patient/family.          Active Hospital Problems    Diagnosis  POA    Left ureteral stone [N20.1]  Yes      Resolved Hospital Problems   No resolved problems to display.

## 2018-10-03 NOTE — DISCHARGE SUMMARY
[facility]  Discharge Note  Short Stay    Admit Date: 10/3/2018    Discharge Date and Time: 10/3/2018       Attending Physician: Adi Murray MD     Discharge Provider: Keanu Gamez MD    Diagnoses:  Active Hospital Problems    Diagnosis  POA    *Left ureteral stone [N20.1]  Yes    Vertigo [R42]  Yes    Nephrolithiasis [N20.0]  Yes    Lower abdominal pain [R10.30]  Yes    JANEEN (obstructive sleep apnea) [G47.33]  Yes    Anxiety and depression [F41.9, F32.9]  Yes    Hypokalemia [E87.6]  Yes    Postural dizziness [R42]  Yes    Hypothyroidism [E03.9]  Yes    Hypertension [I10]  Yes    Diabetes mellitus, type 2 [E11.9]  Yes    Kidney stone on left side [N20.0]  Yes    Benign prostatic hyperplasia [N40.0]  Yes      Resolved Hospital Problems   No resolved problems to display.       Discharged Condition: good    Hospital Course: Patient was admitted for a left ureteroscopy with laser lithotripsy, stone extraction, retrograde pyelogram, and stent exchange, and tolerated the procedure well with no complications.  He will remove his stent on Tuesday morning at 0600.  He will follow up with Dr. Murray in 6 weeks with KUB and RBUS.    Final Diagnoses: Same as principal problem.    Disposition: Home or Self Care    Follow up/Patient Instructions:    Medications:  Reconciled Home Medications:      Medication List      CHANGE how you take these medications    allopurinol 100 MG tablet  Commonly known as:  ZYLOPRIM  Take 1 tablet (100 mg total) by mouth once daily.  What changed:  when to take this     amLODIPine 10 MG tablet  Commonly known as:  NORVASC  TAKE 1 TABLET BY MOUTH EVERY DAY  What changed:  See the new instructions.     citalopram 20 MG tablet  Commonly known as:  CELEXA  TAKE 1 TABLET (20 MG TOTAL) BY MOUTH ONCE DAILY.  What changed:  See the new instructions.     metFORMIN 500 MG 24 hr tablet  Commonly known as:  GLUCOPHAGE-XR  Take 1 tablet (500 mg total) by mouth once daily.  What  "changed:  when to take this        CONTINUE taking these medications    cholecalciferol (vitamin D3) 5,000 unit capsule  Take 1 capsule (5,000 Units total) by mouth once daily.     CONTOUR TEST STRIPS Strp  Generic drug:  blood sugar diagnostic  TEST 3 TIMES A DAY     cyanocobalamin 1000 MCG tablet  Commonly known as:  VITAMIN B-12  Take 1 tablet (1,000 mcg total) by mouth once daily.     dulaglutide 1.5 mg/0.5 mL Pnij  Commonly known as:  TRULICITY  Inject 1.5 mg into the skin once a week.     dutasteride-tamsulosin 0.5-0.4 mg Cm24  Commonly known as:  VIVIENNE  Take 1 capsule by mouth every evening.     HYDROcodone-acetaminophen 5-325 mg per tablet  Commonly known as:  NORCO  Take 1 tablet by mouth every 6 (six) hours as needed for Pain.     irbesartan 150 MG tablet  Commonly known as:  AVAPRO  Take 150 mg by mouth once daily.     lancets Misc  Test sugars once daily.  Dispense 100 lancets (insurance covered brand)     levocetirizine 5 MG tablet  Commonly known as:  XYZAL  Take 1 tablet (5 mg total) by mouth every evening.     levothyroxine 75 MCG tablet  Commonly known as:  SYNTHROID  TAKE 1 TABLET (75 MCG TOTAL) BY MOUTH ONCE DAILY.     magnesium oxide 400 mg tablet  Commonly known as:  MAG-OX  Take 1 tablet by mouth once daily.     meclizine 25 mg tablet  Commonly known as:  ANTIVERT  Take 1 tablet (25 mg total) by mouth every 6 (six) hours.     pen needle, diabetic 31 gauge x 1/4" Ndle  Use weekly with trulicity pen     potassium citrate 15 mEq Tbsr  Commonly known as:  UROCIT-K 15  once daily.          Discharge Procedure Orders   US Retroperitoneal Complete   Standing Status: Future Standing Exp. Date: 10/03/19     Order Specific Question Answer Comments   Reason for Exam: s/p ureteroscopy    May the Radiologist modify the order per protocol to meet the clinical needs of the patient? Yes      X-Ray Abdomen AP 1 View   Standing Status: Future Standing Exp. Date: 10/03/19     Order Specific Question Answer " Comments   Reason for Exam: nephrolithiasis      Call MD for:  temperature >100.4     Call MD for:  persistent nausea and vomiting or diarrhea     Call MD for:  severe uncontrolled pain     Call MD for:  redness, tenderness, or signs of infection (pain, swelling, redness, odor or green/yellow discharge around incision site)     Call MD for:  difficulty breathing or increased cough     Call MD for:  severe persistent headache     Call MD for:  worsening rash     Call MD for:  persistent dizziness, light-headedness, or visual disturbances     Call MD for:  increased confusion or weakness     No dressing needed   Order Comments: Remove stent by pulling on strings on Tuesday at 0600     No driving until:   Order Comments: Off pain medication     Activity as tolerated     Follow-up Information     Adi Murray MD In 6 weeks.    Specialty:  Urology  Why:  post op left ureteroscopy   Contact information:  [address]  [facility] [id number]  [phone]                   Discharge Procedure Orders (must include Diet, Follow-up, Activity):   Discharge Procedure Orders (must include Diet, Follow-up, Activity)   US Retroperitoneal Complete   Standing Status: Future Standing Exp. Date: 10/03/19     Order Specific Question Answer Comments   Reason for Exam: s/p ureteroscopy    May the Radiologist modify the order per protocol to meet the clinical needs of the patient? Yes      X-Ray Abdomen AP 1 View   Standing Status: Future Standing Exp. Date: 10/03/19     Order Specific Question Answer Comments   Reason for Exam: nephrolithiasis      Call MD for:  temperature >100.4     Call MD for:  persistent nausea and vomiting or diarrhea     Call MD for:  severe uncontrolled pain     Call MD for:  redness, tenderness, or signs of infection (pain, swelling, redness, odor or green/yellow discharge around incision site)     Call MD for:  difficulty breathing or increased cough     Call MD for:  severe persistent headache     Call  MD for:  worsening rash     Call MD for:  persistent dizziness, light-headedness, or visual disturbances     Call MD for:  increased confusion or weakness     No dressing needed   Order Comments: Remove stent by pulling on strings on Tuesday at 0600     No driving until:   Order Comments: Off pain medication     Activity as tolerated

## 2018-10-03 NOTE — PLAN OF CARE
Patient and family states they are ready to be discharged. Instructions given to patient and family. Both verbalize understanding. Patient tolerating po liquids and urinating with no difficulty. Patient states pain is at a tolerable level for them. Anesthesia consent and surgical consent in chart upon patient's discharge from [facility].

## 2018-10-03 NOTE — PROGRESS NOTES
Confirmed with Dr. Murray and Dr. Degroot that patient does not need to strain urine, as all stones were removed during procedure. Communicated to family at bedside by MD.

## 2018-10-03 NOTE — PROGRESS NOTES
Patient voided 575cc of pink urine with minimal clots prior to discharge.  Straining and calculus/sediment storage teaching performed with urinal in restroom.  Patient and family verbalized understanding.

## 2018-10-03 NOTE — TRANSFER OF CARE
"Anesthesia Transfer of Care Note    Patient: Jerardo Powers    Procedure(s) Performed: Procedure(s) (LRB):  REMOVAL, CALCULUS, URETER, URETEROSCOPIC (Left)  LITHOTRIPSY, USING LASER (Left)  CYSTOSCOPY  PYELOGRAM, RETROGRADE (Left)  REMOVAL-STENT (Left)  REPLACEMENT, STENT (Left)    Patient location: PACU    Anesthesia Type: general    Transport from OR: Transported from OR on 6-10 L/min O2 by face mask with adequate spontaneous ventilation    Post pain: adequate analgesia    Post assessment: tolerated procedure well and no apparent anesthetic complications    Post vital signs: stable    Level of consciousness: sedated    Nausea/Vomiting: no nausea/vomiting    Complications: none    Transfer of care protocol was followed      Last vitals:   Visit Vitals  /85 (BP Location: Right arm, Patient Position: Lying)   Pulse 68   Temp 36.7 °C (98.1 °F) (Oral)   Resp 16   Ht 5' 9.5" (1.765 m)   Wt 93.9 kg (207 lb)   BMI 30.13 kg/m²     "

## 2018-10-03 NOTE — ANESTHESIA PREPROCEDURE EVALUATION
10/03/2018  Jerardo Powers is a 72 y.o., male.    Anesthesia Evaluation    I have reviewed the Patient Summary Reports.    I have reviewed the Nursing Notes.      Review of Systems  Anesthesia Hx:  No problems with previous Anesthesia    Hematology/Oncology:  Hematology Normal   Oncology Normal     EENT/Dental:EENT/Dental Normal   Cardiovascular:   Hypertension    Pulmonary:   COPD Sleep Apnea    Renal/:   Chronic Renal Disease    Hepatic/GI:  Hepatic/GI Normal    Musculoskeletal:  Musculoskeletal Normal    Neurological:   Neuromuscular Disease,    Endocrine:   Diabetes Hypothyroidism    Dermatological:  Skin Normal    Psych:   Psychiatric History          Physical Exam  General:  Obesity    Airway/Jaw/Neck:  Airway Findings: Mouth Opening: Normal Tongue: Normal  General Airway Assessment: Adult  Mallampati: III  Improves to II with phonation.  TM Distance: Normal, at least 6 cm        Eyes/Ears/Nose:  EYES/EARS/NOSE FINDINGS: Normal   Dental:  Dental Findings: In tact   Chest/Lungs:  Chest/Lungs Clear    Heart/Vascular:  Heart Findings: Normal Heart murmur: negative Vascular Findings: Normal    Abdomen:  Abdomen Findings: Normal    Musculoskeletal:  Musculoskeletal Findings: Normal   Skin:  Skin Findings: Normal    Mental Status:  Mental Status Findings: Normal        Anesthesia Plan  Type of Anesthesia, risks & benefits discussed:  Anesthesia Type:  general  Patient's Preference:   Intra-op Monitoring Plan:   Intra-op Monitoring Plan Comments:   Post Op Pain Control Plan:   Post Op Pain Control Plan Comments:   Induction:   IV  Beta Blocker:  Patient is not currently on a Beta-Blocker (No further documentation required).       Informed Consent: Patient understands risks and agrees with Anesthesia plan.  Questions answered. Anesthesia consent signed with patient.  ASA Score: 3     Day of Surgery Review of  History & Physical:    H&P update referred to the surgeon.         Ready For Surgery From Anesthesia Perspective.

## 2018-10-03 NOTE — ANESTHESIA POSTPROCEDURE EVALUATION
"Anesthesia Post Evaluation    Patient: Jerardo Powers    Procedure(s) Performed: Procedure(s) (LRB):  REMOVAL, CALCULUS, URETER, URETEROSCOPIC (Left)  LITHOTRIPSY, USING LASER (Left)  CYSTOSCOPY  PYELOGRAM, RETROGRADE (Left)  REMOVAL-STENT (Left)  REPLACEMENT, STENT (Left)    Final Anesthesia Type: general  Patient location during evaluation: PACU  Patient participation: Yes- Able to Participate  Level of consciousness: awake and alert  Post-procedure vital signs: reviewed and stable  Pain management: adequate  Airway patency: patent  PONV status at discharge: No PONV  Anesthetic complications: no      Cardiovascular status: blood pressure returned to baseline  Respiratory status: spontaneous ventilation and room air  Hydration status: euvolemic  Follow-up not needed.        Visit Vitals  /68   Pulse 68   Temp (P) 36.8 °C (98.3 °F) (Skin)   Resp 20   Ht 5' 9.5" (1.765 m)   Wt 93.9 kg (207 lb)   SpO2 98%   BMI 30.13 kg/m²       Pain/Khushbu Score: Pain Assessment Performed: Yes (10/3/2018  3:00 PM)  Presence of Pain: non-verbal indicators absent (10/3/2018  3:00 PM)  Pain Rating Prior to Med Admin: 4 (10/3/2018 12:38 PM)  Khushbu Score: 9 (10/3/2018  3:00 PM)        "

## 2018-10-04 VITALS
HEART RATE: 70 BPM | HEIGHT: 70 IN | WEIGHT: 207 LBS | DIASTOLIC BLOOD PRESSURE: 61 MMHG | OXYGEN SATURATION: 96 % | BODY MASS INDEX: 29.63 KG/M2 | RESPIRATION RATE: 18 BRPM | SYSTOLIC BLOOD PRESSURE: 113 MMHG | TEMPERATURE: 98 F

## 2018-10-04 NOTE — PROGRESS NOTES
Jerardo Powers was seen in the clinic today to  his left hearing aid. He was accompanied by his daughter.     Acclimatization was set to 80%. MPO was increased. Whistleblock was enabled. Mr. Powers was pleased with how the hearing aid sounded. The alerts were demonstrated. Mr. Powers was shown how to properly place the hearing aid on the  and how to turn it on and off. Insertion/removal as well as care and maintenance were also reviewed.     A two week follow-up appointment was scheduled. Mr. Powers was encouraged to call the clinic if he needed to be seen sooner.     Hearing Aid Information:    Left ear  : Phonak   Model: Audeo B90-R  Type: DAE  Color: [place]  Battery: Rechargeable  Tube/ length & power: 2xS   Dome size & style: Phonak medium closed     Serial number: [id number]  Warranty expiration: 12/18/2021   L and D expiration: 12/18/2021

## 2018-10-05 ENCOUNTER — CLINICAL SUPPORT (OUTPATIENT)
Dept: AUDIOLOGY | Facility: CLINIC | Age: 73
End: 2018-10-05

## 2018-10-05 DIAGNOSIS — H90.3 SENSORINEURAL HEARING LOSS, BILATERAL: Primary | ICD-10-CM

## 2018-10-05 LAB
ANNOTATION COMMENT IMP: NORMAL
COMPN STONE: NORMAL
SPECIMEN SOURCE: NORMAL
STONE ANALYSIS IR-IMP: NORMAL

## 2018-10-15 NOTE — PROGRESS NOTES
Chief Complaint   Patient presents with    Follow-up    Dizziness     pt reports no changes since last visit   .     HPI:  Jerardo Powers is a very pleasant 72 y.o. male here to see me today for the first time for evaluation of hearing loss.  He reports hearing loss that has been gradually progressing over the last 2 years.  He has not noted any difference in hearing between the ears, with both ears being the worse hearing ear.  He has not noted any tinnitus in either ear.  He has not had any recent issues with ear pain or ear drainage.  He denies a family history of hearing loss, and has not had any previous otologic surgery.  He denies any history of significant loud noise exposure.He denies issues with dizziness.    Interval HPI 10/15/2018   72 year old male who follows up for left SNHL, ETD, BPPV.  He reports that  he has several episodes of vertigo since last visit. He notes that this happens daily when he gets out of bed in the mornings.  He states his hearing is at baseline and he has gotten a hearing aid which helps. He notes tinnitus in the left ear only. He notes worsening nasal congestion and facial pain and pressure. He has been using Flonase and Xyzal without relief.     Past Medical History:   Diagnosis Date    Anxiety     BPH (benign prostatic hyperplasia)     BPH (benign prostatic hypertrophy)     Cataract     CKD (chronic kidney disease) stage 3, GFR 30-59 ml/min 5/5/2014    Colon polyps     Diabetes mellitus type II     Emphysema NEC     mild    Gout     Hematuria     Hyperlipidemia     Hypertension     Hypothyroidism     IBS (irritable bowel syndrome)     Kidney stones     JANEEN (obstructive sleep apnea)     Plantar fasciitis     Reflux      Social History     Socioeconomic History    Marital status:      Spouse name: Not on file    Number of children: Not on file    Years of education: Not on file    Highest education level: Not on file   Social Needs    Financial  resource strain: Not on file    Food insecurity - worry: Not on file    Food insecurity - inability: Not on file    Transportation needs - medical: Not on file    Transportation needs - non-medical: Not on file   Occupational History    Not on file   Tobacco Use    Smoking status: Never Smoker    Smokeless tobacco: Never Used   Substance and Sexual Activity    Alcohol use: No     Alcohol/week: 0.0 oz     Frequency: Never     Comment: occasionally    Drug use: No    Sexual activity: No   Other Topics Concern    Not on file   Social History Narrative    Not on file     Past Surgical History:   Procedure Laterality Date    CATARACT EXTRACTION  1/28/2013    RIGHT EYE    CATARACT EXTRACTION      left eye    COLONOSCOPY  Sep 2011    Repeat recommended in 5 years    COLONOSCOPY N/A 10/10/2016    Procedure: COLONOSCOPY;  Surgeon: Noah Colunga MD;  Location: [address]);  Service: Endoscopy;  Laterality: N/A;  PM Prep    COLONOSCOPY N/A 10/10/2016    Performed by Noah Colunga MD at [facility] ([address] [place] [address])    CYSTOSCOPY  10/3/2018    Procedure: CYSTOSCOPY;  Surgeon: Adi Murray MD;  Location: [address];  Service: Urology;;    CYSTOSCOPY  10/3/2018    Performed by Adi Murray MD at [facility] OR [address] [place] [address]    CYSTOSCOPY W/ URETERAL STENT PLACEMENT Left 9/26/2018    Procedure: CYSTOSCOPY, WITH URETERAL STENT INSERTION;  Surgeon: Adi Murray MD;  Location: [address];  Service: Urology;  Laterality: Left;    CYSTOSCOPY, WITH URETERAL STENT INSERTION Left 9/26/2018    Performed by Adi Murray MD at [facility] OR [address] [place] [address]    ESOPHAGOGASTRODUODENOSCOPY (EGD) N/A 6/5/2017    Performed by Noah Colunga MD at [facility] ([address] [place] [address])    ESOPHAGOGASTRODUODENOSCOPY (EGD) N/A 12/23/2014    Performed by Noah Colunga MD at [facility] ([address] [place] [address])    KIDNEY STONE SURGERY      LASER LITHOTRIPSY Left 10/3/2018    Procedure: LITHOTRIPSY, USING LASER;  Surgeon: Adi Murray MD;  [facility];  Service: Urology;  Laterality: Left;    LITHOTRIPSY, USING LASER Left 10/3/2018    Performed by Adi Murray MD at [facility] OR [address] [place] [address]    LITHOTRIPSY-EXTRACORPOREAL SHOCK WAVE Left 3/21/2018    Performed by Adi Murray MD at [facility] OR [address] [place] [address]    PYELOGRAM, RETROGRADE Left 10/3/2018    Performed by Adi Murray MD at [facility] OR [address] [place] [address]    REMOVAL, CALCULUS, URETER, URETEROSCOPIC Left 10/3/2018    Performed by Adi Murray MD at [facility] OR [address] [place] [address]    REMOVAL-STENT Left 10/3/2018    Performed by Adi Murray MD at [facility] OR [address] [place] [address]    REPLACEMENT OF STENT Left 10/3/2018    Procedure: REPLACEMENT, STENT;  Surgeon: Adi Murray MD;  Location: [facility] OR [address] [place] [address];  Service: Urology;  Laterality: Left;    REPLACEMENT, STENT Left 10/3/2018    Performed by Adi Murray MD at [facility] OR [address] [place] [address]    RETROGRADE PYELOGRAPHY Left 10/3/2018    Procedure: PYELOGRAM, RETROGRADE;  Surgeon: Adi Murray MD;  Location: [facility] OR [address] [place] [address];  Service: Urology;  Laterality: Left;    URETEROSCOPIC REMOVAL OF URETERIC CALCULUS Left 10/3/2018    Procedure: REMOVAL, CALCULUS, URETER, URETEROSCOPIC;  Surgeon: Adi Murray MD;  Location: [facility] OR [address] [place] [address];  Service: Urology;  Laterality: Left;  1.5 hours     Family History   Problem Relation Age of Onset    Cancer Brother         non-hodgkins T cell lymphoma    Diabetes Mother     Macular degeneration Brother     Coronary artery disease Neg Hx     Colon cancer Neg Hx     Prostate cancer Neg Hx     Esophageal cancer Neg Hx          Review of Systems  General: negative for chills, fever or weight loss  Psychological: negative for mood changes or depression  Ophthalmic: negative for blurry vision, photophobia or eye pain  ENT: see HPI  Respiratory: no cough, shortness of breath, or wheezing  Cardiovascular: no chest pain or dyspnea on exertion  Gastrointestinal: no abdominal pain, change in bowel habits, or black/ bloody stools  Musculoskeletal: negative for gait disturbance or  muscular weakness  Neurological: no syncope or seizures; no ataxia  Dermatological: negative for puritis,  rash and jaundice  Hematologic/lymphatic: no easy bruising, no new lumps or bumps      Physical Exam:    Vitals:    10/16/18 1417   BP: 110/68   Pulse: 95       Constitutional: Well appearing / communicating without difficutly.  NAD.  Eyes: EOM I Bilaterally  Head/Face: Normocephalic.  Negative paranasal sinus pressure/tenderness.  Salivary glands WNL.  House Brackmann I Bilaterally.    Right Ear: Auricle normal appearance. External Auditory Canal within normal limits no lesions or masses,TM w/o masses/lesions/perforations. TM mobility noted.   Left Ear: Auricle normal appearance. External Auditory Canal within normal limits no lesions or masses,TM w/o masses/lesions/perforations. TM mobility noted.  Rinne Air conduction >bone conduction bilaterally, Call midline.   Negative Romberg; no nystagmus, negative Fakuda step test, negative head thrust; positive Dixx Hallpike to the right; negative Dixx-Hallpike to the left.   Nose: No gross nasal septal deviation. Inferior Turbinates 3+ bilaterally. No septal perforation. No masses/lesions. External nasal skin appears normal without masses/lesions.  Oral Cavity: Gingiva/lips within normal limits.  Dentition/gingiva healthy appearing. Mucus membranes moist. Floor of mouth soft, no masses palpated. Oral Tongue mobile. Hard Palate appears normal.    Oropharynx: Base of tongue appears normal. No masses/lesions noted. Tonsillar fossa/pharyngeal wall without lesions. Posterior oropharynx WNL.  Soft palate without masses. Midline uvula.   Neck/Lymphatic: No LAD I-VI bilaterally.  No thyromegaly.  No masses noted on exam.    Mirror laryngoscopy/nasopharyngoscopy: Active gag reflex.  Unable to perform.    Neuro/Psychiatric: AOx3.  Normal mood and affect.   Cardiovascular: Normal carotid pulses bilaterally, no increasing jugular venous distention noted at cervical region  bilaterally.    Respiratory: Normal respiratory effort, no stridor, no retractions noted.    Diagnostic testing reviewed:   MRI brain 7/24/2018: No evidence of CPA lesions. There is mucoperiosteal thickening and air-fluid levels in the bilateral maxillary sinuses.        Assessment:    ICD-10-CM ICD-9-CM    1. Benign paroxysmal positional vertigo of right ear H81.11 386.11    2. Asymmetric SNHL (sensorineural hearing loss) H90.5 389.16    3. ETD (Eustachian tube dysfunction), bilateral H69.83 381.81    4. Vertigo R42 780.4 Basic vestibular evaluation     The primary encounter diagnosis was Benign paroxysmal positional vertigo of right ear. Diagnoses of Asymmetric SNHL (sensorineural hearing loss), ETD (Eustachian tube dysfunction), bilateral, and Vertigo were also pertinent to this visit.      Plan:  Orders Placed This Encounter   Procedures    Basic vestibular evaluation     CRS/AR: Levaquin 500mg PO Daily for 10 days. Hold Celexa while on Levaquin.   Continue Flonase and Xyzal.     MRI of IAC clear of CPA lesion. He has a hearing aid but he feels that is not helping as well as he would like,.     Right BPPV/perisistant BPPV- right canalith repositioning maneuver performed today. Post- procedure instructions given to patient.   His symptoms are persistent despite multiple CRMs. Will obtain VNG to assess for any concurrent vestibular abnormality.     Radha Vogt MD

## 2018-10-16 ENCOUNTER — OFFICE VISIT (OUTPATIENT)
Dept: OTOLARYNGOLOGY | Facility: CLINIC | Age: 73
End: 2018-10-16
Payer: COMMERCIAL

## 2018-10-16 ENCOUNTER — TELEPHONE (OUTPATIENT)
Dept: FAMILY MEDICINE | Facility: CLINIC | Age: 73
End: 2018-10-16

## 2018-10-16 VITALS
BODY MASS INDEX: 30.68 KG/M2 | HEART RATE: 95 BPM | HEIGHT: 70 IN | WEIGHT: 214.31 LBS | DIASTOLIC BLOOD PRESSURE: 68 MMHG | SYSTOLIC BLOOD PRESSURE: 110 MMHG

## 2018-10-16 DIAGNOSIS — H69.93 ETD (EUSTACHIAN TUBE DYSFUNCTION), BILATERAL: ICD-10-CM

## 2018-10-16 DIAGNOSIS — H90.3 ASYMMETRIC SNHL (SENSORINEURAL HEARING LOSS): ICD-10-CM

## 2018-10-16 DIAGNOSIS — H81.11 BENIGN PAROXYSMAL POSITIONAL VERTIGO OF RIGHT EAR: Primary | ICD-10-CM

## 2018-10-16 DIAGNOSIS — R42 VERTIGO: ICD-10-CM

## 2018-10-16 PROCEDURE — 3078F DIAST BP <80 MM HG: CPT | Mod: CPTII,S$GLB,, | Performed by: OTOLARYNGOLOGY

## 2018-10-16 PROCEDURE — 95992 CANALITH REPOSITIONING PROC: CPT | Mod: S$GLB,,, | Performed by: OTOLARYNGOLOGY

## 2018-10-16 PROCEDURE — 99214 OFFICE O/P EST MOD 30 MIN: CPT | Mod: 25,S$GLB,, | Performed by: OTOLARYNGOLOGY

## 2018-10-16 PROCEDURE — 1101F PT FALLS ASSESS-DOCD LE1/YR: CPT | Mod: CPTII,S$GLB,, | Performed by: OTOLARYNGOLOGY

## 2018-10-16 PROCEDURE — 99999 PR PBB SHADOW E&M-EST. PATIENT-LVL IV: CPT | Mod: PBBFAC,,, | Performed by: OTOLARYNGOLOGY

## 2018-10-16 PROCEDURE — 3074F SYST BP LT 130 MM HG: CPT | Mod: CPTII,S$GLB,, | Performed by: OTOLARYNGOLOGY

## 2018-10-16 RX ORDER — LEVOFLOXACIN 500 MG/1
500 TABLET, FILM COATED ORAL DAILY
Qty: 14 TABLET | Refills: 0 | Status: SHIPPED | OUTPATIENT
Start: 2018-10-16 | End: 2018-10-30

## 2018-10-16 NOTE — PROCEDURES
Procedures       Jerardo Powers was seen 10/16/2018 for Epley maneuver for BPPV in the right ear.      A 5-position Epley maneuver was performed for the right.  Patient tolerated the maneuver well and was asymptomatic upon discharge.  No nystagmus seen on post-procedure examination.  Post-Epley home instructions were reviewed and given to the patient.  Understanding was voiced.

## 2018-10-16 NOTE — TELEPHONE ENCOUNTER
----- Message from Zonia Ordonez sent at 10/16/2018  3:07 PM CDT -----  Contact: [facility] [phone]  Pharmacy is calling concerning drug interactions with medication levoFLOXacin (LEVAQUIN) 500 MG tablet. Please advise.

## 2018-10-16 NOTE — TELEPHONE ENCOUNTER
Pharmacist stated patient got back with them and stated that physician that prescribed Levaquin told patient to stop citalopram.

## 2018-10-16 NOTE — PATIENT INSTRUCTIONS
[facility] [address], [address]  [place] [address]  [phone]    Your physician may determine a need for one or more tests of your balance system at the time of your visit. One test is called a videonystagmogram (VNG), the other is called Posturography.  We ask that you prepare in the event that either of these tests are ordered.  The tests are simple and painless and take about 90 minutes (combined).  Please dress comfortably.  Certain medications will affect the results of theses tests.  In order to prevent this from happening, it will be necessary for you to follow these instructions:    1. DO NOT  take any of the following medications for at least 24 hours prior to the test:  a. Sleeping pills (Seconal, Donnatol, Dalmane, etc.)  b. Tranquilizers (Librium, Valium, Equanil, etc.)  c. Anti-histamines or over-the-counter cold medications  d. Anti-dizzy medications (Antivert, Dramamine, etc.)  e. Muscle relaxants    Seizure medications (Dilantin, Phenobartibal, etc.) interfere with the accuracy of the test.  They should be stopped BUT ONLY AFTER CLEARANCE FROM THE PHYSICIAN WHO PRESCRIBED THESE MEDICATIONS.    Heart or blood pressure medications ARE allowed.    2. DO NOT  eat or drink anything other than small sips of water for 4 hours prior to the VNG.    3. DO NOT drink alcoholic beverages for 24 hours prior to the test.      4. DO NOT wear makeup, especially mascara or eye liner for the VNG test.    5. Ladies who are scheduled for Posturography should wear slacks rather than skirts or dresses.    6. If you are diabetic, please inform the  when booking your appointment.  Please schedule a time that would make fasting for 4 hours most convenient to your medication routine.  If you have any concerns, check with your primary care physician.    Testing is scheduled at [facility] at [address] [place] [address]. After the physician orders your balance testing, a  representative from the main campus will call you to schedule your appointment. If you need to cancel or re-schedule, please call them at [phone].    If you have any questions, please call [phone] to reach us at [facility] in [place].

## 2018-10-17 ENCOUNTER — CLINICAL SUPPORT (OUTPATIENT)
Dept: AUDIOLOGY | Facility: CLINIC | Age: 73
End: 2018-10-17

## 2018-10-17 DIAGNOSIS — H90.3 SENSORINEURAL HEARING LOSS, BILATERAL: Primary | ICD-10-CM

## 2018-10-17 PROCEDURE — 99499 UNLISTED E&M SERVICE: CPT | Mod: S$GLB,,, | Performed by: OTOLARYNGOLOGY

## 2018-10-17 NOTE — PROGRESS NOTES
Jerardo Powers was seen in the clinic today for a hearing aid follow-up.    Mr. Powers reported he was still having trouble hearing with the Phonak hearing aid. Acclimatization was increased to 90%. A [employer] 440 hearing aid with a #2 M  and medium tulip dome was programed for the left ear. Mr. Powers reported he was interested in rechargeable technology so I will order him a Resound Quattro  to try. I will call Mr. Powers when I get the hearing aid in for him to try.

## 2018-10-24 RX ORDER — CHOLECALCIFEROL (VITAMIN D3) 125 MCG
5000 CAPSULE ORAL DAILY
Qty: 90 CAPSULE | Refills: 3 | COMMUNITY
Start: 2018-10-24 | End: 2019-12-05

## 2018-10-26 ENCOUNTER — OFFICE VISIT (OUTPATIENT)
Dept: CARDIOLOGY | Facility: CLINIC | Age: 73
End: 2018-10-26
Payer: COMMERCIAL

## 2018-10-26 VITALS
HEART RATE: 96 BPM | BODY MASS INDEX: 32.03 KG/M2 | WEIGHT: 216.25 LBS | DIASTOLIC BLOOD PRESSURE: 76 MMHG | HEIGHT: 69 IN | SYSTOLIC BLOOD PRESSURE: 135 MMHG

## 2018-10-26 DIAGNOSIS — I10 ESSENTIAL HYPERTENSION: Primary | ICD-10-CM

## 2018-10-26 DIAGNOSIS — E78.5 DYSLIPIDEMIA: ICD-10-CM

## 2018-10-26 PROCEDURE — 3078F DIAST BP <80 MM HG: CPT | Mod: CPTII,S$GLB,, | Performed by: INTERNAL MEDICINE

## 2018-10-26 PROCEDURE — 3075F SYST BP GE 130 - 139MM HG: CPT | Mod: CPTII,S$GLB,, | Performed by: INTERNAL MEDICINE

## 2018-10-26 PROCEDURE — 99213 OFFICE O/P EST LOW 20 MIN: CPT | Mod: S$GLB,,, | Performed by: INTERNAL MEDICINE

## 2018-10-26 PROCEDURE — 1101F PT FALLS ASSESS-DOCD LE1/YR: CPT | Mod: CPTII,S$GLB,, | Performed by: INTERNAL MEDICINE

## 2018-10-26 PROCEDURE — 99999 PR PBB SHADOW E&M-EST. PATIENT-LVL III: CPT | Mod: PBBFAC,,, | Performed by: INTERNAL MEDICINE

## 2018-10-26 NOTE — PROGRESS NOTES
"Subjective:   Patient ID:  Jerardo Powers is a 72 y.o. male who presents for follow-up of Essential hypertension (1 yr fu) and Dizziness      HPI:   Jerardo Powers presents for follow up of hypertension. Apparently advised by his Urologist to reduce his Irbesartan to 150 mg per day. BP has remained in the 120s/ at home. Not exercising due to knee discomfort and " motivation". Jerardo Powers has dyslipidemia with LDL in the 80s but elevated TG. Jerardo Powers has diabetes and has gained weight. Jerardo Powers denies chest pain, shortness of breath, palpitations, presyncope , or syncope. Did have an episodes of Vertigo .  Review of Systems   Constitution: Positive for weight gain. Negative for weakness, malaise/fatigue and weight loss.   Eyes: Negative for blurred vision.   Cardiovascular: Negative for chest pain, claudication, cyanosis, dyspnea on exertion, irregular heartbeat, leg swelling, near-syncope, orthopnea, palpitations, paroxysmal nocturnal dyspnea and syncope.   Respiratory: Negative for cough, shortness of breath and wheezing.         JANEEN on CPAP   Musculoskeletal: Positive for joint pain. Negative for falls and myalgias.   Gastrointestinal: Negative for abdominal pain, heartburn, nausea and vomiting.   Genitourinary: Negative for nocturia.        Kidney stones   Neurological: Positive for dizziness and light-headedness. Negative for brief paralysis, focal weakness, headaches, numbness and paresthesias.   Psychiatric/Behavioral: Negative for altered mental status.       Current Outpatient Medications   Medication Sig    allopurinol (ZYLOPRIM) 100 MG tablet Take 1 tablet (100 mg total) by mouth once daily. (Patient taking differently: Take 100 mg by mouth nightly. )    amLODIPine (NORVASC) 10 MG tablet TAKE 1 TABLET BY MOUTH EVERY DAY (Patient taking differently: TAKE 1 TABLET BY MOUTH EVERY NIGHT)    cholecalciferol, vitamin D3, 5,000 unit capsule Take 1 capsule (5,000 Units total) by mouth once daily. " "   citalopram (CELEXA) 20 MG tablet TAKE 1 TABLET (20 MG TOTAL) BY MOUTH ONCE DAILY. (Patient taking differently: TAKE 1 TABLET (20 MG TOTAL) BY MOUTH ONCE DAILY @HS)    CONTOUR TEST STRIPS Strp TEST 3 TIMES A DAY    cyanocobalamin (VITAMIN B-12) 1000 MCG tablet Take 1 tablet (1,000 mcg total) by mouth once daily.    dulaglutide (TRULICITY) 1.5 mg/0.5 mL PnIj Inject 1.5 mg into the skin once a week.    dutasteride-tamsulosin (VIVIENNE) 0.5-0.4 mg CM24 Take 1 capsule by mouth every evening.    irbesartan (AVAPRO) 150 MG tablet Take 150 mg by mouth once daily.    lancets Misc Test sugars once daily.  Dispense 100 lancets (insurance covered brand)    levocetirizine (XYZAL) 5 MG tablet Take 1 tablet (5 mg total) by mouth every evening.    levoFLOXacin (LEVAQUIN) 500 MG tablet Take 1 tablet (500 mg total) by mouth once daily. for 14 days    levothyroxine (SYNTHROID) 75 MCG tablet TAKE 1 TABLET (75 MCG TOTAL) BY MOUTH ONCE DAILY.    magnesium oxide (MAG-OX) 400 mg tablet Take 1 tablet by mouth once daily.    meclizine (ANTIVERT) 25 mg tablet Take 1 tablet (25 mg total) by mouth every 6 (six) hours.    metFORMIN (GLUCOPHAGE-XR) 500 MG 24 hr tablet Take 1 tablet (500 mg total) by mouth once daily. (Patient taking differently: Take 500 mg by mouth nightly. )    potassium citrate 15 mEq TbSR once daily.      Current Facility-Administered Medications   Medication    lidocaine HCl 2% urojet     Objective:   Physical Exam   Constitutional: He is oriented to person, place, and time. He appears well-developed. No distress.   /76 (BP Location: Left arm, Patient Position: Sitting, BP Method: X-Large (Automatic))   Pulse 96   Ht 5' 9" (1.753 m)   Wt 98.1 kg (216 lb 4.3 oz)   BMI 31.94 kg/m²    HENT:   Head: Normocephalic.   Right Ear: External ear normal.   Left Ear: External ear normal.   Eyes: EOM are normal. Pupils are equal, round, and reactive to light. No scleral icterus.   Neck: Neck supple. No JVD " present. No thyromegaly present.   Cardiovascular: Normal rate, regular rhythm, normal heart sounds and intact distal pulses. PMI is not displaced. Exam reveals no gallop and no friction rub.   No murmur heard.  Pulmonary/Chest: Effort normal and breath sounds normal. No respiratory distress. He has no wheezes. He has no rales.   Abdominal: Soft. He exhibits no distension. There is no hepatosplenomegaly. There is no tenderness.   Musculoskeletal: He exhibits no edema or tenderness.   Gait normal   Neurological: He is alert and oriented to person, place, and time.   Skin: Skin is warm and dry. No rash noted.   Psychiatric: He has a normal mood and affect. His behavior is normal.       Lab Results   Component Value Date     09/26/2018    K 3.6 09/26/2018     09/26/2018    CO2 26 09/26/2018    BUN 13 09/26/2018    CREATININE 1.4 09/26/2018     (H) 09/26/2018    HGBA1C 7.2 (H) 08/28/2018    MG 1.7 03/31/2014    AST 24 08/28/2018    ALT 35 08/28/2018    ALBUMIN 3.8 08/28/2018    PROT 6.9 08/28/2018    BILITOT 1.1 (H) 08/28/2018    WBC 9.88 09/26/2018    HGB 15.9 09/26/2018    HCT 45.6 09/26/2018    HCT 47 01/18/2014    MCV 85 09/26/2018     09/26/2018    TSH 2.342 08/28/2018    CHOL 189 08/28/2018    HDL 43 08/28/2018    LDLCALC 85.6 08/28/2018    TRIG 302 (H) 08/28/2018       Assessment:     1. Essential hypertension : adequate control   2. Dyslipidemia : Elevated TG       Plan:     Jerardo was seen today for essential hypertension and dizziness.    Diagnoses and all orders for this visit:    Essential hypertension  Continue current regimen    Dyslipidemia  Mediterranean Diet recommended with carbohydrate restriction  Attempt to find a form of Graded exercise working up to  30 minutes a day at least 5 days a week suggested.

## 2018-10-26 NOTE — PATIENT INSTRUCTIONS
Continue current regimen  Mediterranean Diet recommended with carbohydrate restriction  Work up to Graded exercise for 30 minutes a day at least 5 days a week finding an exercise that you can tolerate with the knee issues.

## 2018-10-30 ENCOUNTER — TELEPHONE (OUTPATIENT)
Dept: AUDIOLOGY | Facility: CLINIC | Age: 73
End: 2018-10-30

## 2018-11-05 ENCOUNTER — TELEPHONE (OUTPATIENT)
Dept: AUDIOLOGY | Facility: CLINIC | Age: 73
End: 2018-11-05

## 2018-11-06 ENCOUNTER — CLINICAL SUPPORT (OUTPATIENT)
Dept: AUDIOLOGY | Facility: CLINIC | Age: 73
End: 2018-11-06

## 2018-11-06 ENCOUNTER — CLINICAL SUPPORT (OUTPATIENT)
Dept: AUDIOLOGY | Facility: CLINIC | Age: 73
End: 2018-11-06
Payer: COMMERCIAL

## 2018-11-06 DIAGNOSIS — H81.8X9 OTHER DISORDERS OF VESTIBULAR FUNCTION, UNSPECIFIED EAR: Primary | ICD-10-CM

## 2018-11-06 DIAGNOSIS — H90.3 SENSORINEURAL HEARING LOSS, BILATERAL: Primary | ICD-10-CM

## 2018-11-06 PROCEDURE — 99499 UNLISTED E&M SERVICE: CPT | Mod: S$GLB,,, | Performed by: OTOLARYNGOLOGY

## 2018-11-06 PROCEDURE — 92540 BASIC VESTIBULAR EVALUATION: CPT | Mod: S$GLB,,, | Performed by: AUDIOLOGIST

## 2018-11-06 PROCEDURE — 92537 CALORIC VSTBLR TEST W/REC: CPT | Mod: S$GLB,,, | Performed by: AUDIOLOGIST

## 2018-11-06 NOTE — PROGRESS NOTES
Jerardo Powers was seen in the clinic today to  a left resound hearing aid to try.    He reported he did not care much for the Widex hearing aid. Acclimatization was set to70%. Mr. Powers was shown how to properly place the hearing aid on the . Care and maintenance were reviewed. The hearing aid was paired to his iPhone. He was shown how to adjust the volume and switch between programs using the phone.    He will follow-up in about two weeks and decide which hearing aid he would like to keep between the Phonak, Widex, and Resound.     Hearing Aid Information:     Left ear  : Resound   Model: Quattro [id number]-961  Type: DAE  Color: Medium blonde    Battery: rechargeable  Tube/ length & power: 2 MP    Dome size & style: Resound Standard tulip  Serial number: [id number]   Warranty expiration: 11/18/2021  L and D expiration: 11/18/2021     SN: [id number]  Warranty expiration: 11/18/2019  L and D expiration: 11/18/2019

## 2018-11-06 NOTE — PROGRESS NOTES
VNG/Posturography Evaluation    Referring physician:  Dr. Umesh Vogt    72 y.o. male complains of lightheadedness and imbalance.  Symptoms are provoked by airising from bed and rolling to the right in bed and have been recurring over the past 1 month.  Per Dr. Umesh Vogt's office visit notes of 2018 and 2018, Mr. Powers was diagnosed with right Benign Paroxsymal Positional Vertigo (BPPPV) and a right canalith repositioning procedure (Epley maneuver) was preformed at each of these visits.  At that time, he was given right Epley exercises to try at home help reduce symptoms.  Mr. Powers denied taking any medication that would affect today's test results.      Gaze nystagmus was absent.    Oculomotor function was normal.    Spontaneous nystagmus was absent.    The head-hanging left Hallpike was negative.    The head-hanging right Hallpike was negative.    Static positional nystagmus was absent.    Unilateral weakness: 3% (left)  Directional preponderance 16% (right-beating)    RC: 19 d/s   d/s  RW: 50 d/s  LW: 37 d/s    Fixation suppression was normal.    Impression:VNG results within normal limits; no evidence of vestibular system dysfunction including BPPV.    Results of today's testing should be interpreted with caution due to excessive eye blinks during testing, causing tracing morphology to be considered fair.      Recommend: 1. A trial with Cawthorne exercises to help reduce subjective symptoms.  A copy of these exercises was provided at today's appointment.

## 2018-11-06 NOTE — Clinical Note
Please see results of today's VNG testing.  Please let me know if I can provide additional information.Thank you,Wesley Prabhakar, CCC[facility] Audiologist

## 2018-11-07 ENCOUNTER — PATIENT MESSAGE (OUTPATIENT)
Dept: AUDIOLOGY | Facility: CLINIC | Age: 73
End: 2018-11-07

## 2018-11-21 ENCOUNTER — PATIENT MESSAGE (OUTPATIENT)
Dept: FAMILY MEDICINE | Facility: CLINIC | Age: 73
End: 2018-11-21

## 2018-11-26 RX ORDER — ALLOPURINOL 100 MG/1
100 TABLET ORAL DAILY
Qty: 90 TABLET | Refills: 3 | Status: SHIPPED | OUTPATIENT
Start: 2018-11-26 | End: 2018-11-28 | Stop reason: SDUPTHER

## 2018-11-28 ENCOUNTER — PATIENT MESSAGE (OUTPATIENT)
Dept: FAMILY MEDICINE | Facility: CLINIC | Age: 73
End: 2018-11-28

## 2018-11-28 ENCOUNTER — PATIENT MESSAGE (OUTPATIENT)
Dept: OTOLARYNGOLOGY | Facility: CLINIC | Age: 73
End: 2018-11-28

## 2018-11-28 DIAGNOSIS — E11.22 TYPE 2 DIABETES MELLITUS WITH STAGE 3 CHRONIC KIDNEY DISEASE, WITHOUT LONG-TERM CURRENT USE OF INSULIN: Primary | ICD-10-CM

## 2018-11-28 DIAGNOSIS — N18.30 TYPE 2 DIABETES MELLITUS WITH STAGE 3 CHRONIC KIDNEY DISEASE, WITHOUT LONG-TERM CURRENT USE OF INSULIN: Primary | ICD-10-CM

## 2018-11-28 RX ORDER — MECLIZINE HYDROCHLORIDE 25 MG/1
25 TABLET ORAL EVERY 6 HOURS
Qty: 30 TABLET | Refills: 0 | Status: SHIPPED | OUTPATIENT
Start: 2018-11-28 | End: 2019-09-03

## 2018-11-28 RX ORDER — ALLOPURINOL 100 MG/1
100 TABLET ORAL DAILY
Qty: 90 TABLET | Refills: 3 | Status: SHIPPED | OUTPATIENT
Start: 2018-11-28 | End: 2020-01-14

## 2018-11-29 ENCOUNTER — OFFICE VISIT (OUTPATIENT)
Dept: UROLOGY | Facility: CLINIC | Age: 73
End: 2018-11-29
Payer: COMMERCIAL

## 2018-11-29 VITALS
DIASTOLIC BLOOD PRESSURE: 72 MMHG | HEIGHT: 69 IN | BODY MASS INDEX: 32.72 KG/M2 | WEIGHT: 220.88 LBS | HEART RATE: 74 BPM | SYSTOLIC BLOOD PRESSURE: 122 MMHG

## 2018-11-29 DIAGNOSIS — N40.1 BENIGN PROSTATIC HYPERPLASIA WITH LOWER URINARY TRACT SYMPTOMS, SYMPTOM DETAILS UNSPECIFIED: Primary | ICD-10-CM

## 2018-11-29 DIAGNOSIS — N20.0 KIDNEY STONE ON LEFT SIDE: ICD-10-CM

## 2018-11-29 DIAGNOSIS — N20.1 LEFT URETERAL STONE: ICD-10-CM

## 2018-11-29 PROCEDURE — 3078F DIAST BP <80 MM HG: CPT | Mod: CPTII,S$GLB,, | Performed by: UROLOGY

## 2018-11-29 PROCEDURE — 1101F PT FALLS ASSESS-DOCD LE1/YR: CPT | Mod: CPTII,S$GLB,, | Performed by: UROLOGY

## 2018-11-29 PROCEDURE — 99214 OFFICE O/P EST MOD 30 MIN: CPT | Mod: S$GLB,,, | Performed by: UROLOGY

## 2018-11-29 PROCEDURE — 3074F SYST BP LT 130 MM HG: CPT | Mod: CPTII,S$GLB,, | Performed by: UROLOGY

## 2018-11-29 PROCEDURE — 99999 PR PBB SHADOW E&M-EST. PATIENT-LVL III: CPT | Mod: PBBFAC,,, | Performed by: UROLOGY

## 2018-11-29 RX ORDER — DUTASTERIDE AND TAMSULOSIN HYDROCHLORIDE CAPSULES .5; .4 MG/1; MG/1
1 CAPSULE ORAL NIGHTLY
Qty: 30 CAPSULE | Refills: 12 | Status: SHIPPED | OUTPATIENT
Start: 2018-11-29 | End: 2020-05-11 | Stop reason: SDUPTHER

## 2018-11-29 RX ORDER — CIPROFLOXACIN 500 MG/1
500 TABLET ORAL 2 TIMES DAILY
Qty: 20 TABLET | Refills: 0 | Status: SHIPPED | OUTPATIENT
Start: 2018-11-29 | End: 2018-12-09

## 2018-11-29 NOTE — PROGRESS NOTES
CC: s/p left URS with ureteral stones removal, hematuria    Jerardo Powers is a 72 y.o. man who is here for the evaluation of hematuria  Saw him recently for 2 left ureteral stones, presenting for definitive stone management.  On 10/3/18, he underwent the following surgery.  Procedure(s) Performed:   1.  Left ureteroscopy  2.  Cystoscopy with stent removal   3.  Laser lithotripsy  4.  Stone basket extraction  5.  Retrograde pyelogram  6.  Left JJ ureteral stent insertion   7.  Fluoro < 1 h  Findings:      - two left ureteral stones identified, one removed with basket, one fragmented with laser and fragments removed with basket  - retrograde pyelogram performed-- no filling defects, no extravasation  - stent replaced on strings      2 baskets were used throughout the case.    Drains: 6 Fr x 26 cm JJ ureteral stent with strings    Last seen with Dr. Murray on 3/8/18 for kidney stones.  Had left ESWL on 3/21/18 and noted that he achieved stone free status on a post op x-ray study.  Recently he saw Lily Reynolds on 9/18/18 for gross hematuria.  No associated urinary symptoms, but c/o lower abdominal pain and pain going down to bilateral testicle.  He was treated with cipro and he feels that he is doing better.  Gross hematuria finally stopped yesterday.    hx of BPH with obstruction, prostatitis, and kidney stones.  Reports a good FOS and complete bladder emptying. Denies fever, chills, nausea, or vomiting.     Has been on Sue since 2009.     Past Medical History:   Diagnosis Date    Anxiety     BPH (benign prostatic hyperplasia)     BPH (benign prostatic hypertrophy)     Cataract     CKD (chronic kidney disease) stage 3, GFR 30-59 ml/min 5/5/2014    Colon polyps     Diabetes mellitus type II     Emphysema NEC     mild    Gout     Hematuria     Hyperlipidemia     Hypertension     Hypothyroidism     IBS (irritable bowel syndrome)     Kidney stones     JANEEN (obstructive sleep apnea)     Plantar fasciitis      Reflux      Past Surgical History:   Procedure Laterality Date    CATARACT EXTRACTION  1/28/2013    RIGHT EYE    CATARACT EXTRACTION      left eye    COLONOSCOPY  Sep 2011    Repeat recommended in 5 years    COLONOSCOPY N/A 10/10/2016    Procedure: COLONOSCOPY;  Surgeon: Noah Colunga MD;  Location: [facility] (4TH FLR);  Service: Endoscopy;  Laterality: N/A;  PM Prep    COLONOSCOPY N/A 10/10/2016    Performed by Noah Colunga MD at [facility] (4TH FLR)    CYSTOSCOPY  10/3/2018    Procedure: CYSTOSCOPY;  Surgeon: Adi Murray MD;  Location: [facility] OR [address] [place] [address];  Service: Urology;;    CYSTOSCOPY  10/3/2018    Performed by Adi Murray MD at [facility] OR [address] [place] [address]    CYSTOSCOPY W/ URETERAL STENT PLACEMENT Left 9/26/2018    Procedure: CYSTOSCOPY, WITH URETERAL STENT INSERTION;  Surgeon: Adi Murray MD;  Location: [facility] OR [address] [place] [address];  Service: Urology;  Laterality: Left;    CYSTOSCOPY, WITH URETERAL STENT INSERTION Left 9/26/2018    [facility]    ESOPHAGOGASTRODUODENOSCOPY (EGD) N/A 6/5/2017    Performed by Noah Colunga MD at [facility] (4TH FLR)    ESOPHAGOGASTRODUODENOSCOPY (EGD) N/A 12/23/2014    Performed by Noah Colunga MD at [facility] (4TH FLR)    KIDNEY STONE SURGERY      LASER LITHOTRIPSY Left 10/3/2018    Procedure: LITHOTRIPSY, USING LASER;  Surgeon: Adi Murray MD;  Location: [facility] OR [address] [place] [address];  Service: Urology;  Laterality: Left;    LITHOTRIPSY, USING LASER Left 10/3/2018    Performed by Adi Murray MD at [facility] OR [address] [place] [address]    LITHOTRIPSY-EXTRACORPOREAL SHOCK WAVE Left 3/21/2018    Performed by Adi Murray MD at [facility] OR [address] [place] [address]    PYELOGRAM, RETROGRADE Left 10/3/2018    Performed by Adi Murray MD at [facility] OR [address] [place] [address]    REMOVAL, CALCULUS, URETER, URETEROSCOPIC Left 10/3/2018    Performed by Adi Murray MD at [facility] OR [address] [place] [address]    REMOVAL-STENT Left 10/3/2018    Performed by Adi Murray MD at [facility] OR [address] [place] [address]    REPLACEMENT OF STENT  Left 10/3/2018    Procedure: REPLACEMENT, STENT;  Surgeon: Adi Murray MD;  [facility];  Service: Urology;  Laterality: Left;    REPLACEMENT, STENT Left 10/3/2018    [facility]    RETROGRADE PYELOGRAPHY Left 10/3/2018    Procedure: PYELOGRAM, RETROGRADE;  Surgeon: Adi Murray MD;  [facility];  Service: Urology;  Laterality: Left;    URETEROSCOPIC REMOVAL OF URETERIC CALCULUS Left 10/3/2018    Procedure: REMOVAL, CALCULUS, URETER, URETEROSCOPIC;  Surgeon: Adi Murray MD;  [facility];  Service: Urology;  Laterality: Left;  1.5 hours     Social History     Tobacco Use    Smoking status: Never Smoker    Smokeless tobacco: Never Used   Substance Use Topics    Alcohol use: No     Alcohol/week: 0.0 oz     Frequency: Never     Comment: occasionally    Drug use: No     Family History   Problem Relation Age of Onset    Cancer Brother         non-hodgkins T cell lymphoma    Diabetes Mother     Macular degeneration Brother     Coronary artery disease Neg Hx     Colon cancer Neg Hx     Prostate cancer Neg Hx     Esophageal cancer Neg Hx      Allergy:  Allergies   Allergen Reactions    Morphine Hives     Outpatient Encounter Medications as of 11/29/2018   Medication Sig Dispense Refill    allopurinol (ZYLOPRIM) 100 MG tablet Take 1 tablet (100 mg total) by mouth once daily. 90 tablet 3    amLODIPine (NORVASC) 10 MG tablet TAKE 1 TABLET BY MOUTH EVERY DAY (Patient taking differently: TAKE 1 TABLET BY MOUTH EVERY NIGHT) 30 tablet 11    cholecalciferol, vitamin D3, 5,000 unit capsule Take 1 capsule (5,000 Units total) by mouth once daily. 90 capsule 3    citalopram (CELEXA) 20 MG tablet TAKE 1 TABLET (20 MG TOTAL) BY MOUTH ONCE DAILY. (Patient taking differently: TAKE 1 TABLET (20 MG TOTAL) BY MOUTH ONCE DAILY @HS) 90 tablet 3    CONTOUR TEST STRIPS Strp TEST 3 TIMES A  strip 11    cyanocobalamin (VITAMIN B-12)  1000 MCG tablet Take 1 tablet (1,000 mcg total) by mouth once daily. 90 tablet 3    dulaglutide (TRULICITY) 1.5 mg/0.5 mL PnIj Inject 1.5 mg into the skin once a week. 1 Syringe 11    dutasteride-tamsulosin (VIVIENNE) 0.5-0.4 mg CM24 Take 1 capsule by mouth every evening. 30 capsule 12    irbesartan (AVAPRO) 150 MG tablet Take 150 mg by mouth once daily.  3    lancets Misc Test sugars once daily.  Dispense 100 lancets (insurance covered brand) 100 each 11    levocetirizine (XYZAL) 5 MG tablet Take 1 tablet (5 mg total) by mouth every evening. 30 tablet 11    levothyroxine (SYNTHROID) 75 MCG tablet TAKE 1 TABLET (75 MCG TOTAL) BY MOUTH ONCE DAILY. 90 tablet 3    magnesium oxide (MAG-OX) 400 mg tablet Take 1 tablet by mouth once daily.  3    meclizine (ANTIVERT) 25 mg tablet Take 1 tablet (25 mg total) by mouth every 6 (six) hours. 30 tablet 0    metFORMIN (GLUCOPHAGE-XR) 500 MG 24 hr tablet Take 1 tablet (500 mg total) by mouth once daily. (Patient taking differently: Take 500 mg by mouth nightly. ) 90 tablet 11    [DISCONTINUED] dutasteride-tamsulosin (VIVIENNE) 0.5-0.4 mg CM24 Take 1 capsule by mouth every evening. 30 capsule 12    [DISCONTINUED] potassium citrate 15 mEq TbSR once daily.       ciprofloxacin HCl (CIPRO) 500 MG tablet Take 1 tablet (500 mg total) by mouth 2 (two) times daily. for 20 doses 20 tablet 0     Facility-Administered Encounter Medications as of 11/29/2018   Medication Dose Route Frequency Provider Last Rate Last Dose    lidocaine HCl 2% urojet   Urethral Once Adi HKlaus Murray MD         Review of Systems   General ROS: GENERAL:  No weight gain or loss  SKIN:  No rashes or lacerations  HEAD:  No headaches  EYES:  No exophthalmos, jaundice or blindness  EARS:  No dizziness, tinnitus or hearing loss  NOSE:  No changes in smell  MOUTH & THROAT:  No dyskinetic movements or obvious goiter  CHEST:  No shortness of breath, hyperventilation or cough  CARDIOVASCULAR:  No tachycardia or chest  pain  ABDOMEN:  No nausea, vomiting, pain, constipation or diarrhea  URINARY:  No frequency, dysuria or sexual dysfunction  ENDOCRINE:  No polydipsia, polyuria  MUSCULOSKELETAL:  No pain or stiffness of the joints  NEUROLOGIC:  No weakness, sensory changes, seizures, confusion, memory loss, tremor or other abnormal movements  Physical Exam     Vitals:    11/29/18 1326   BP: 122/72   Pulse: 74     General Appearance:  Alert, cooperative, no distress, appears stated age   Head:  Normocephalic, without obvious abnormality, atraumatic   Eyes:  PERRL, conjunctiva/corneas clear, EOM's intact, fundi benign, both eyes   Ears:  Normal TM's and external ear canals, both ears   Nose: Nares normal, septum midline, mucosa normal, no drainage or sinus tenderness   Throat: Lips, mucosa, and tongue normal; teeth and gums normal   Neck: Supple, symmetrical, trachea midline, no adenopathy, thyroid: not enlarged, symmetric, no tenderness/mass/nodules, no carotid bruit or JVD   Back:   Symmetric, no curvature, ROM normal, no CVA tenderness   Lungs:   Clear to auscultation bilaterally, respirations unlabored   Chest Wall:  No tenderness or deformity   Heart:  Regular rate and rhythm, S1, S2 normal, no murmur, rub or gallop   Abdomen:   Soft, non-tender, bowel sounds active all four quadrants,  no masses, no organomegaly of liver and spleen  No hernia noted   Genitalia:  Scrotum: no rash or lesion  Normal symmetric epididymis without masses  Normal vas palpated  Normal size, symmetric testicles with no masses   Normal urethral meatus with no discharge  Normal circumcised penis with no lesion   Rectal:  Normal perineum and anus upon inspection.  Normal tone, no masses or tenderness;   Extremities: Extremities normal, atraumatic, no cyanosis or edema   Pulses: 2+ and symmetric   Skin: Skin color, texture, turgor normal, no rashes or lesions   Lymph nodes: Cervical, supraclavicular, and axillary nodes normal   Neurologic: Normal      Prostate 30 grams smooth with no tenderness and no nodules      LABS:  UA today showed +leuk and +blood. Spec grav 1.005 and ph 6.   PVR with bladder scanner was 41 cc.   Lab Results   Component Value Date    PSA 0.74 07/14/2017    PSA 0.59 07/17/2015    PSA 0.96 08/22/2012    PSA 1.11 04/16/2012    PSA 1.4 02/14/2011    PSA 2.4 05/12/2010    PSA 4.0 02/15/2010    PSA 4.7 (H) 08/20/2009    PSA 4.2 (H) 07/06/2009    PSA 3.6 03/25/2008    PSADIAG 1.3 02/19/2016    PSADIAG 0.62 12/01/2014    PSADIAG 0.97 12/04/2013     Results for orders placed or performed in visit on 02/19/16   Prostate Specific Antigen, Diagnostic   Result Value Ref Range    PSA DIAGNOSTIC 1.3 0.00-4.00 ng/mL   Results for orders placed or performed in visit on 12/01/14   Prostate Specific Antigen, Diagnostic   Result Value Ref Range    PSA DIAGNOSTIC 0.62 0.00-4.00 ng/mL   Results for orders placed or performed in visit on 12/04/13   Prostate Specific Antigen, Diagnostic   Result Value Ref Range    PSA DIAGNOSTIC 0.97 0.00-4.00 ng/mL     Lab Results   Component Value Date    CREATININE 1.4 09/26/2018    CREATININE 1.4 09/20/2018    CREATININE 1.5 (H) 08/28/2018     Urine Culture, Routine   Date Value Ref Range Status   09/26/2018 No growth  Final     CT RSS 3/8/18  Left-sided nonobstructing nephrolithiasis.  Previously identified stone in the left ureteropelvic junction has apparently migrated into the renal collecting system.    Diverticulosis coli without evidence of diverticulitis.    KUB 9/18/18  No definite signs for renal calculi seen     Assessment and Plan:  Jerardo was seen today for post-op evaluation.    Diagnoses and all orders for this visit:    Benign prostatic hyperplasia with lower urinary tract symptoms, symptom details unspecified  -     Prostate Specific Antigen, Diagnostic; Future  -     dutasteride-tamsulosin (VIVIENNE) 0.5-0.4 mg CM24; Take 1 capsule by mouth every evening.    Kidney stone on left side  -     CT Renal Stone  Study ABD Pelvis WO; Future  -     Basic metabolic panel; Future    Left ureteral stone    Other orders  -     ciprofloxacin HCl (CIPRO) 500 MG tablet; Take 1 tablet (500 mg total) by mouth 2 (two) times daily. for 20 doses       General risk factors for kidney stones and the conservative measures to prevent kidney stones in the future were discussed with the patient in detail.  The patient was encouraged to drink 2-3 liters of water a day, have lots of fruits and veggies, limit oxalate and salt in the diet, limit iced tea and angelito, to avoid Tums and Rolaids, to avoid unnecessary supplements and NSAIDS.   I explained all questions regarding his kidney stone disease.  He can drink home made lemon juice water.  OK to stop urocit K.  Will follow up with CT RSS and BMP.    For his BPH, continue Sue.  He is travelling to [facility], and would like to get cipro for his travelling.    I spent 25 minutes with the patient of which more than half was spent in direct consultation with the patient in regards to our treatment and plan.    Follow-up:  Follow-up in about 1 year (around 11/29/2019) for CT RSS, PSA, BMP.

## 2018-11-30 RX ORDER — MEFLOQUINE HYDROCHLORIDE 250 MG/1
250 TABLET ORAL
Qty: 10 TABLET | Refills: 0 | Status: SHIPPED | OUTPATIENT
Start: 2018-11-30 | End: 2019-12-05

## 2018-11-30 NOTE — TELEPHONE ENCOUNTER
Please contact patient with information about travel clinic at [facility] as he needs his hepatitis-A and typhoid vaccine done.  Orders are placed in the chart.  Also put his A1c order in the chart                1.  I saw your urologist wrote prescription for Cipro already.    2.  Vaccines needed:    -You need your 2nd hepatitis a vaccine, place order and have my staff contact to about getting this  -You would need typhoid vaccination.  There is a pill but since I see your prescribed antibiotics, if you are currently taking the pill would not be effective.  You  would need to shot vaccine.  We do not have this here but you  would need this done at the travel clinic at [facility].  I can have my nurse call you with the information for the travel clinic.  -tetanus and flu shots are up-to-date    3.  Malaria prevention:  Will send mefloquine 250 mg  to take every week.  Start 2-3 weeks prior to departure, take through your trip every week,  continue for an additional 4 weeks after returning    4. Will set up a1c, you can follow up in the office after your return.    Adams Moore MD      ----- Message -----     From: Jerardo Powers     Sent: 11/28/2018  4:25 AM CST       To: Adams Moore MD  Subject: Visit Follow-Up    Dr. Moore,  My last A1C was done on 8/28/18. Do I need to do this again since it has been 3 months already. If so,  please order this test and any other tests deemed necessary prior to my travel. Please order at [facility] location.   Also, do we need any shots/medicines for travel to [place]? Please let me know.  Thanks.  Jerardo Powers   [phone]

## 2018-12-04 ENCOUNTER — PATIENT MESSAGE (OUTPATIENT)
Dept: FAMILY MEDICINE | Facility: CLINIC | Age: 73
End: 2018-12-04

## 2018-12-04 ENCOUNTER — TELEPHONE (OUTPATIENT)
Dept: FAMILY MEDICINE | Facility: CLINIC | Age: 73
End: 2018-12-04

## 2018-12-04 NOTE — TELEPHONE ENCOUNTER
Pharmacy advised that Cipro and Celexa are not compatible.  Should patient stop Celexa while taking the Cipro?  Please advise.

## 2018-12-05 DIAGNOSIS — F41.9 ANXIETY: ICD-10-CM

## 2018-12-05 RX ORDER — CITALOPRAM 20 MG/1
20 TABLET, FILM COATED ORAL DAILY
Qty: 90 TABLET | Refills: 3 | Status: SHIPPED | OUTPATIENT
Start: 2018-12-05 | End: 2020-03-02 | Stop reason: SDUPTHER

## 2019-01-15 ENCOUNTER — TELEPHONE (OUTPATIENT)
Dept: FAMILY MEDICINE | Facility: CLINIC | Age: 74
End: 2019-01-15

## 2019-01-15 DIAGNOSIS — E11.22 TYPE 2 DIABETES MELLITUS WITH STAGE 3 CHRONIC KIDNEY DISEASE, WITHOUT LONG-TERM CURRENT USE OF INSULIN: Primary | ICD-10-CM

## 2019-01-15 DIAGNOSIS — E55.9 VITAMIN D DEFICIENCY: ICD-10-CM

## 2019-01-15 DIAGNOSIS — E53.8 B12 DEFICIENCY: ICD-10-CM

## 2019-01-15 DIAGNOSIS — E03.9 HYPOTHYROIDISM, UNSPECIFIED TYPE: ICD-10-CM

## 2019-01-15 DIAGNOSIS — M10.9 GOUT, UNSPECIFIED CAUSE, UNSPECIFIED CHRONICITY, UNSPECIFIED SITE: ICD-10-CM

## 2019-01-15 DIAGNOSIS — N18.30 TYPE 2 DIABETES MELLITUS WITH STAGE 3 CHRONIC KIDNEY DISEASE, WITHOUT LONG-TERM CURRENT USE OF INSULIN: Primary | ICD-10-CM

## 2019-01-15 DIAGNOSIS — I10 HYPERTENSION, UNSPECIFIED TYPE: ICD-10-CM

## 2019-01-15 NOTE — TELEPHONE ENCOUNTER
Patient is coming in for a follow up since being out of the country and is asking if there are any labs that you would like for him to have done.  Please advise.

## 2019-01-18 ENCOUNTER — OFFICE VISIT (OUTPATIENT)
Dept: URGENT CARE | Facility: CLINIC | Age: 74
End: 2019-01-18
Payer: COMMERCIAL

## 2019-01-18 ENCOUNTER — TELEPHONE (OUTPATIENT)
Dept: FAMILY MEDICINE | Facility: CLINIC | Age: 74
End: 2019-01-18

## 2019-01-18 VITALS
BODY MASS INDEX: 31.99 KG/M2 | OXYGEN SATURATION: 96 % | RESPIRATION RATE: 18 BRPM | WEIGHT: 216 LBS | HEIGHT: 69 IN | TEMPERATURE: 98 F | SYSTOLIC BLOOD PRESSURE: 136 MMHG | DIASTOLIC BLOOD PRESSURE: 80 MMHG | HEART RATE: 80 BPM

## 2019-01-18 DIAGNOSIS — H10.9 CONJUNCTIVITIS OF BOTH EYES, UNSPECIFIED CONJUNCTIVITIS TYPE: ICD-10-CM

## 2019-01-18 DIAGNOSIS — J06.9 VIRAL URI WITH COUGH: Primary | ICD-10-CM

## 2019-01-18 PROCEDURE — 99214 OFFICE O/P EST MOD 30 MIN: CPT | Mod: S$GLB,,, | Performed by: PHYSICIAN ASSISTANT

## 2019-01-18 PROCEDURE — 3079F PR MOST RECENT DIASTOLIC BLOOD PRESSURE 80-89 MM HG: ICD-10-PCS | Mod: CPTII,S$GLB,, | Performed by: PHYSICIAN ASSISTANT

## 2019-01-18 PROCEDURE — 3075F PR MOST RECENT SYSTOLIC BLOOD PRESS GE 130-139MM HG: ICD-10-PCS | Mod: CPTII,S$GLB,, | Performed by: PHYSICIAN ASSISTANT

## 2019-01-18 PROCEDURE — 1101F PT FALLS ASSESS-DOCD LE1/YR: CPT | Mod: CPTII,S$GLB,, | Performed by: PHYSICIAN ASSISTANT

## 2019-01-18 PROCEDURE — 99214 PR OFFICE/OUTPT VISIT, EST, LEVL IV, 30-39 MIN: ICD-10-PCS | Mod: S$GLB,,, | Performed by: PHYSICIAN ASSISTANT

## 2019-01-18 PROCEDURE — 1101F PR PT FALLS ASSESS DOC 0-1 FALLS W/OUT INJ PAST YR: ICD-10-PCS | Mod: CPTII,S$GLB,, | Performed by: PHYSICIAN ASSISTANT

## 2019-01-18 PROCEDURE — 3075F SYST BP GE 130 - 139MM HG: CPT | Mod: CPTII,S$GLB,, | Performed by: PHYSICIAN ASSISTANT

## 2019-01-18 PROCEDURE — 3079F DIAST BP 80-89 MM HG: CPT | Mod: CPTII,S$GLB,, | Performed by: PHYSICIAN ASSISTANT

## 2019-01-18 RX ORDER — BENZONATATE 100 MG/1
100 CAPSULE ORAL 3 TIMES DAILY PRN
Qty: 30 CAPSULE | Refills: 0 | Status: SHIPPED | OUTPATIENT
Start: 2019-01-18 | End: 2019-01-28

## 2019-01-18 RX ORDER — POLYMYXIN B SULFATE AND TRIMETHOPRIM 1; 10000 MG/ML; [USP'U]/ML
1 SOLUTION OPHTHALMIC EVERY 6 HOURS
Qty: 4 ML | Refills: 0 | Status: SHIPPED | OUTPATIENT
Start: 2019-01-18 | End: 2019-01-18 | Stop reason: CLARIF

## 2019-01-18 RX ORDER — POLYMYXIN B SULFATE AND TRIMETHOPRIM 1; 10000 MG/ML; [USP'U]/ML
1 SOLUTION OPHTHALMIC EVERY 6 HOURS
Qty: 4 ML | Refills: 0 | Status: SHIPPED | OUTPATIENT
Start: 2019-01-18 | End: 2019-01-25

## 2019-01-18 RX ORDER — BENZONATATE 100 MG/1
100 CAPSULE ORAL 3 TIMES DAILY PRN
Qty: 30 CAPSULE | Refills: 0 | Status: SHIPPED | OUTPATIENT
Start: 2019-01-18 | End: 2019-01-18 | Stop reason: CLARIF

## 2019-01-18 NOTE — PATIENT INSTRUCTIONS
"- Rest.    - Drink plenty of fluids.    - Tylenol or Ibuprofen as directed as needed for fever/pain.  - You can take over-the-counter claritin, zyrtec, allegra, or xyzal as directed. This will help to dry up post-nasal drip which is likely causing sore throat and cough.   - You can use Flonase nasal spray as directed for sinus congestion and postnasal drip. Discontinue if you develop nose bleed  - use nasal saline prior to Flonase.  - Use Ocean Spray Nasal Saline 1-3 puffs each nostril every 2-3 hours then blow out onto tissue. This is to irrigate the nasal passage way to clear the sinus openings. Use until sinus problem resolved.  - you can take over-the-counter Mucinex 1200 mg twice a day to help loosen mucous  -warm salt water gargles can help with sore throat  - warm tea with honey can help with cough. Honey is a natural cough suppressant.      - use antibiotic eyedrops as prescribed.  You can use any allergy Relief eye drops over-the-counter to help with eye redness and watering. (example ingredients- olopatadine, naphazoline-pheniramine) (example medications- clear eyes, naphcon-A, or any eye drops that say "allergy relief eye drops")    - Follow up with your PCP or specialty clinic as directed in the next 1-2 weeks if not improved or as needed.  You can call [phone] to schedule an appointment with the appropriate provider.    - Go to the ED if your symptoms worsen.    - You must understand that you have received an Urgent Care treatment only and that you may be released before all of your medical problems are known or treated.   - You, the patient, will arrange for follow up care as instructed.   - If your condition worsens or fails to improve we recommend that you receive another evaluation at the ER immediately or contact your PCP to discuss your concerns or return here.       What Is Conjunctivitis?    Conjunctivitis is an irritation or infection. It affects the membrane that covers the white of " your eye and the inside of your eyelid (conjunctiva). It can happen to one or both eyes. The membrane swells and the blood vessels enlarge (dilate). This makes your eye red. That's why conjunctivitis is sometimes called red eye or pink eye.  What are the symptoms?  If you have one or more of these symptoms, see an eye doctor:  · Redness in and around your eye  · Eyes that are puffy and sore  · Itching, burning, or stinging eyes  · Watery eyes or discharge from your eye  · Eyelids that are crusty or stuck together when you wake up in the morning  · Pink color in the whites of one or both eyes  Getting treatment quickly can help prevent damage to your eyes.  How is it diagnosed?  Conjunctivitis is usually a minor eye infection. But it can sometimes become a more serious problem. Some more serious eye diseases have symptoms that look like conjunctivitis. So it's important for an eye doctor to diagnose you. Your eye doctor will ask about your symptoms and any medicines you take. He or she will ask about any illnesses or medical conditions you may have. The doctor will also check your eyes with a hand-held light and a special microscope called a slit lamp.  Date Last Reviewed: 6/11/2015  © 0545-3666 [employer]. [address] [place] [address], [place] [address]. All rights reserved. This information is not intended as a substitute for professional medical care. Always follow your healthcare professional's instructions.        Viral Upper Respiratory Illness (Adult)  You have a viral upper respiratory illness (URI), which is another term for the common cold. This illness is contagious during the first few days. It is spread through the air by coughing and sneezing. It may also be spread by direct contact (touching the sick person and then touching your own eyes, nose, or mouth). Frequent handwashing will decrease risk of spread. Most viral illnesses go away within 7 to 10 days with rest and simple home remedies.  Sometimes the illness may last for several weeks. Antibiotics will not kill a virus, and they are generally not prescribed for this condition.    Home care  · If symptoms are severe, rest at home for the first 2 to 3 days. When you resume activity, don't let yourself get too tired.  · Avoid being exposed to cigarette smoke (yours or others).  · You may use acetaminophen or ibuprofen to control pain and fever, unless another medicine was prescribed. (Note: If you have chronic liver or kidney disease, have ever had a stomach ulcer or gastrointestinal bleeding, or are taking blood-thinning medicines, talk with your healthcare provider before using these medicines.) Aspirin should never be given to anyone under 18 years of age who is ill with a viral infection or fever. It may cause severe liver or brain damage.  · Your appetite may be poor, so a light diet is fine. Avoid dehydration by drinking 6 to 8 glasses of fluids per day (water, soft drinks, juices, tea, or soup). Extra fluids will help loosen secretions in the nose and lungs.  · Over-the-counter cold medicines will not shorten the length of time youre sick, but they may be helpful for the following symptoms: cough, sore throat, and nasal and sinus congestion. (Note: Do not use decongestants if you have high blood pressure.)  Follow-up care  Follow up with your healthcare provider, or as advised.  When to seek medical advice  Call your healthcare provider right away if any of these occur:  · Cough with lots of colored sputum (mucus)  · Severe headache; face, neck, or ear pain  · Difficulty swallowing due to throat pain  · Fever of 100.4°F (38°C)  Call 911, or get immediate medical care  Call emergency services right away if any of these occur:  · Chest pain, shortness of breath, wheezing, or difficulty breathing  · Coughing up blood  · Inability to swallow due to throat pain  Date Last Reviewed: 9/13/2015  © 7326-9269 [employer]. [address] [place] [address]  [place] [address]. All rights reserved. This information is not intended as a substitute for professional medical care. Always follow your healthcare professional's instructions.

## 2019-01-18 NOTE — PROGRESS NOTES
"Subjective:       Patient ID: ARAVINDjchristiane Powers is a 73 y.o. male.    Vitals:  height is 5' 9" (1.753 m) and weight is 98 kg (216 lb). His temperature is 97.9 °F (36.6 °C). His blood pressure is 136/80 and his pulse is 80. His respiration is 18 and oxygen saturation is 96%.     Chief Complaint: No chief complaint on file.    URI    This is a new problem. Episode onset: 2 days. The problem has been unchanged. There has been no fever. Associated symptoms include congestion, coughing, sinus pain and a sore throat. Pertinent negatives include no ear pain, nausea, rash, vomiting or wheezing. He has tried nothing for the symptoms.       Constitution: Negative for chills, sweating, fatigue and fever.   HENT: Positive for congestion, sinus pain, sinus pressure and sore throat. Negative for ear pain and voice change.    Neck: Negative for painful lymph nodes.   Eyes: Positive for eye discharge, eye itching and eye redness. Negative for eye trauma, foreign body in eye, eye pain, photophobia, vision loss, double vision and eyelid swelling.   Respiratory: Positive for cough and sputum production. Negative for chest tightness, bloody sputum, COPD, shortness of breath, stridor, wheezing and asthma.    Gastrointestinal: Negative for nausea and vomiting.   Musculoskeletal: Negative for muscle ache.   Skin: Negative for rash.   Allergic/Immunologic: Negative for seasonal allergies and asthma.   Hematologic/Lymphatic: Negative for swollen lymph nodes.       Objective:      Physical Exam   Constitutional: He is oriented to person, place, and time. He appears well-developed and well-nourished. He is cooperative.  Non-toxic appearance. He does not appear ill. No distress.   HENT:   Head: Normocephalic and atraumatic.   Right Ear: Hearing, tympanic membrane, external ear and ear canal normal.   Left Ear: Hearing, tympanic membrane, external ear and ear canal normal.   Nose: Mucosal edema present. No rhinorrhea or nasal deformity. No " epistaxis. Right sinus exhibits no maxillary sinus tenderness and no frontal sinus tenderness. Left sinus exhibits no maxillary sinus tenderness and no frontal sinus tenderness.   Mouth/Throat: Uvula is midline, oropharynx is clear and moist and mucous membranes are normal. No trismus in the jaw. Normal dentition. No uvula swelling. No oropharyngeal exudate, posterior oropharyngeal edema, posterior oropharyngeal erythema or tonsillar abscesses. Tonsils are 1+ on the right. Tonsils are 1+ on the left. No tonsillar exudate.   Eyes: EOM and lids are normal. Pupils are equal, round, and reactive to light. Lids are everted and swept, no foreign bodies found. Right eye exhibits discharge. Left eye exhibits discharge. Right conjunctiva is injected. Left conjunctiva is injected. No scleral icterus.   Sclera clear bilat   Neck: Trachea normal, full passive range of motion without pain and phonation normal. Neck supple.   Cardiovascular: Normal rate, regular rhythm, normal heart sounds, intact distal pulses and normal pulses.   Pulmonary/Chest: Effort normal and breath sounds normal. No stridor. No respiratory distress. He has no decreased breath sounds. He has no wheezes. He has no rhonchi. He has no rales.   Abdominal: Soft. Normal appearance and bowel sounds are normal. He exhibits no distension. There is no tenderness.   Musculoskeletal: Normal range of motion. He exhibits no edema or deformity.   Neurological: He is alert and oriented to person, place, and time. He exhibits normal muscle tone. Coordination normal.   Skin: Skin is warm, dry and intact. He is not diaphoretic. No pallor.   Psychiatric: He has a normal mood and affect. His speech is normal and behavior is normal. Judgment and thought content normal. Cognition and memory are normal.   Nursing note and vitals reviewed.      Assessment:       1. Viral URI with cough    2. Conjunctivitis of both eyes, unspecified conjunctivitis type        Plan:         Viral  "URI with cough  -     benzonatate (TESSALON) 100 MG capsule; Take 1 capsule (100 mg total) by mouth 3 (three) times daily as needed for Cough.  Dispense: 30 capsule; Refill: 0    Conjunctivitis of both eyes, unspecified conjunctivitis type  -     polymyxin B sulf-trimethoprim (POLYTRIM) 10,000 unit- 1 mg/mL Drop; Place 1 drop into both eyes every 6 (six) hours. for 7 days  Dispense: 4 mL; Refill: 0      Patient Instructions   - Rest.    - Drink plenty of fluids.    - Tylenol or Ibuprofen as directed as needed for fever/pain.  - You can take over-the-counter claritin, zyrtec, allegra, or xyzal as directed. This will help to dry up post-nasal drip which is likely causing sore throat and cough.   - You can use Flonase nasal spray as directed for sinus congestion and postnasal drip. Discontinue if you develop nose bleed  - use nasal saline prior to Flonase.  - Use Ocean Spray Nasal Saline 1-3 puffs each nostril every 2-3 hours then blow out onto tissue. This is to irrigate the nasal passage way to clear the sinus openings. Use until sinus problem resolved.  - you can take over-the-counter Mucinex 1200 mg twice a day to help loosen mucous  -warm salt water gargles can help with sore throat  - warm tea with honey can help with cough. Honey is a natural cough suppressant.      - use antibiotic eyedrops as prescribed.  You can use any allergy Relief eye drops over-the-counter to help with eye redness and watering. (example ingredients- olopatadine, naphazoline-pheniramine) (example medications- clear eyes, naphcon-A, or any eye drops that say "allergy relief eye drops")    - Follow up with your PCP or specialty clinic as directed in the next 1-2 weeks if not improved or as needed.  You can call [phone] to schedule an appointment with the appropriate provider.    - Go to the ED if your symptoms worsen.    - You must understand that you have received an Urgent Care treatment only and that you may be released before all " of your medical problems are known or treated.   - You, the patient, will arrange for follow up care as instructed.   - If your condition worsens or fails to improve we recommend that you receive another evaluation at the ER immediately or contact your PCP to discuss your concerns or return here.       What Is Conjunctivitis?    Conjunctivitis is an irritation or infection. It affects the membrane that covers the white of your eye and the inside of your eyelid (conjunctiva). It can happen to one or both eyes. The membrane swells and the blood vessels enlarge (dilate). This makes your eye red. That's why conjunctivitis is sometimes called red eye or pink eye.  What are the symptoms?  If you have one or more of these symptoms, see an eye doctor:  · Redness in and around your eye  · Eyes that are puffy and sore  · Itching, burning, or stinging eyes  · Watery eyes or discharge from your eye  · Eyelids that are crusty or stuck together when you wake up in the morning  · Pink color in the whites of one or both eyes  Getting treatment quickly can help prevent damage to your eyes.  How is it diagnosed?  Conjunctivitis is usually a minor eye infection. But it can sometimes become a more serious problem. Some more serious eye diseases have symptoms that look like conjunctivitis. So it's important for an eye doctor to diagnose you. Your eye doctor will ask about your symptoms and any medicines you take. He or she will ask about any illnesses or medical conditions you may have. The doctor will also check your eyes with a hand-held light and a special microscope called a slit lamp.  Date Last Reviewed: 6/11/2015 © 2000-2017 [employer]. [address] [place] [address], [place] [address]. All rights reserved. This information is not intended as a substitute for professional medical care. Always follow your healthcare professional's instructions.        Viral Upper Respiratory Illness (Adult)  You have a viral upper  respiratory illness (URI), which is another term for the common cold. This illness is contagious during the first few days. It is spread through the air by coughing and sneezing. It may also be spread by direct contact (touching the sick person and then touching your own eyes, nose, or mouth). Frequent handwashing will decrease risk of spread. Most viral illnesses go away within 7 to 10 days with rest and simple home remedies. Sometimes the illness may last for several weeks. Antibiotics will not kill a virus, and they are generally not prescribed for this condition.    Home care  · If symptoms are severe, rest at home for the first 2 to 3 days. When you resume activity, don't let yourself get too tired.  · Avoid being exposed to cigarette smoke (yours or others).  · You may use acetaminophen or ibuprofen to control pain and fever, unless another medicine was prescribed. (Note: If you have chronic liver or kidney disease, have ever had a stomach ulcer or gastrointestinal bleeding, or are taking blood-thinning medicines, talk with your healthcare provider before using these medicines.) Aspirin should never be given to anyone under 18 years of age who is ill with a viral infection or fever. It may cause severe liver or brain damage.  · Your appetite may be poor, so a light diet is fine. Avoid dehydration by drinking 6 to 8 glasses of fluids per day (water, soft drinks, juices, tea, or soup). Extra fluids will help loosen secretions in the nose and lungs.  · Over-the-counter cold medicines will not shorten the length of time youre sick, but they may be helpful for the following symptoms: cough, sore throat, and nasal and sinus congestion. (Note: Do not use decongestants if you have high blood pressure.)  Follow-up care  Follow up with your healthcare provider, or as advised.  When to seek medical advice  Call your healthcare provider right away if any of these occur:  · Cough with lots of colored sputum  (mucus)  · Severe headache; face, neck, or ear pain  · Difficulty swallowing due to throat pain  · Fever of 100.4°F (38°C)  Call 911, or get immediate medical care  Call emergency services right away if any of these occur:  · Chest pain, shortness of breath, wheezing, or difficulty breathing  · Coughing up blood  · Inability to swallow due to throat pain  Date Last Reviewed: 9/13/2015  © 6708-6338 [employer]. [address] [place] [address], [place] [address]. All rights reserved. This information is not intended as a substitute for professional medical care. Always follow your healthcare professional's instructions.

## 2019-01-18 NOTE — TELEPHONE ENCOUNTER
Orders Placed This Encounter   Procedures    CBC auto differential    Comprehensive metabolic panel    Hemoglobin A1c    Lipid panel    Vitamin B12    Vitamin D    TSH     Labs prior to upcoming appointment

## 2019-01-25 ENCOUNTER — PATIENT MESSAGE (OUTPATIENT)
Dept: FAMILY MEDICINE | Facility: CLINIC | Age: 74
End: 2019-01-25

## 2019-01-25 ENCOUNTER — HOSPITAL ENCOUNTER (OUTPATIENT)
Dept: RADIOLOGY | Facility: HOSPITAL | Age: 74
Discharge: HOME | End: 2019-01-25
Attending: FAMILY MEDICINE
Payer: COMMERCIAL

## 2019-01-25 ENCOUNTER — OFFICE VISIT (OUTPATIENT)
Dept: FAMILY MEDICINE | Facility: CLINIC | Age: 74
End: 2019-01-25
Payer: COMMERCIAL

## 2019-01-25 VITALS
HEIGHT: 70 IN | DIASTOLIC BLOOD PRESSURE: 73 MMHG | BODY MASS INDEX: 30.68 KG/M2 | SYSTOLIC BLOOD PRESSURE: 126 MMHG | HEART RATE: 94 BPM | WEIGHT: 214.31 LBS | TEMPERATURE: 98 F

## 2019-01-25 DIAGNOSIS — E11.22 TYPE 2 DIABETES MELLITUS WITH STAGE 3 CHRONIC KIDNEY DISEASE, WITHOUT LONG-TERM CURRENT USE OF INSULIN: ICD-10-CM

## 2019-01-25 DIAGNOSIS — N18.30 TYPE 2 DIABETES MELLITUS WITH STAGE 3 CHRONIC KIDNEY DISEASE, WITHOUT LONG-TERM CURRENT USE OF INSULIN: ICD-10-CM

## 2019-01-25 DIAGNOSIS — R06.00 DYSPNEA, UNSPECIFIED TYPE: ICD-10-CM

## 2019-01-25 DIAGNOSIS — E55.9 VITAMIN D DEFICIENCY: ICD-10-CM

## 2019-01-25 DIAGNOSIS — R05.9 COUGH: ICD-10-CM

## 2019-01-25 DIAGNOSIS — R42 VERTIGO: ICD-10-CM

## 2019-01-25 DIAGNOSIS — E03.9 HYPOTHYROIDISM, UNSPECIFIED TYPE: ICD-10-CM

## 2019-01-25 DIAGNOSIS — I10 ESSENTIAL HYPERTENSION: ICD-10-CM

## 2019-01-25 DIAGNOSIS — R05.9 COUGH: Primary | ICD-10-CM

## 2019-01-25 DIAGNOSIS — I10 HYPERTENSION, UNSPECIFIED TYPE: ICD-10-CM

## 2019-01-25 DIAGNOSIS — E53.8 B12 DEFICIENCY: ICD-10-CM

## 2019-01-25 DIAGNOSIS — M10.9 GOUT, UNSPECIFIED CAUSE, UNSPECIFIED CHRONICITY, UNSPECIFIED SITE: ICD-10-CM

## 2019-01-25 PROCEDURE — 99999 PR PBB SHADOW E&M-EST. PATIENT-LVL III: CPT | Mod: PBBFAC,,, | Performed by: FAMILY MEDICINE

## 2019-01-25 PROCEDURE — 3078F DIAST BP <80 MM HG: CPT | Mod: CPTII,S$GLB,, | Performed by: FAMILY MEDICINE

## 2019-01-25 PROCEDURE — 1101F PT FALLS ASSESS-DOCD LE1/YR: CPT | Mod: CPTII,S$GLB,, | Performed by: FAMILY MEDICINE

## 2019-01-25 PROCEDURE — 99215 PR OFFICE/OUTPT VISIT, EST, LEVL V, 40-54 MIN: ICD-10-PCS | Mod: S$GLB,,, | Performed by: FAMILY MEDICINE

## 2019-01-25 PROCEDURE — 71046 X-RAY EXAM CHEST 2 VIEWS: CPT | Mod: TC,FY

## 2019-01-25 PROCEDURE — 3078F PR MOST RECENT DIASTOLIC BLOOD PRESSURE < 80 MM HG: ICD-10-PCS | Mod: CPTII,S$GLB,, | Performed by: FAMILY MEDICINE

## 2019-01-25 PROCEDURE — 99215 OFFICE O/P EST HI 40 MIN: CPT | Mod: S$GLB,,, | Performed by: FAMILY MEDICINE

## 2019-01-25 PROCEDURE — 3045F PR MOST RECENT HEMOGLOBIN A1C LEVEL 7.0-9.0%: CPT | Mod: CPTII,S$GLB,, | Performed by: FAMILY MEDICINE

## 2019-01-25 PROCEDURE — 71046 XR CHEST PA AND LATERAL: ICD-10-PCS | Mod: 26,,, | Performed by: RADIOLOGY

## 2019-01-25 PROCEDURE — 3074F PR MOST RECENT SYSTOLIC BLOOD PRESSURE < 130 MM HG: ICD-10-PCS | Mod: CPTII,S$GLB,, | Performed by: FAMILY MEDICINE

## 2019-01-25 PROCEDURE — 99999 PR PBB SHADOW E&M-EST. PATIENT-LVL III: ICD-10-PCS | Mod: PBBFAC,,, | Performed by: FAMILY MEDICINE

## 2019-01-25 PROCEDURE — 3045F PR MOST RECENT HEMOGLOBIN A1C LEVEL 7.0-9.0%: ICD-10-PCS | Mod: CPTII,S$GLB,, | Performed by: FAMILY MEDICINE

## 2019-01-25 PROCEDURE — 1101F PR PT FALLS ASSESS DOC 0-1 FALLS W/OUT INJ PAST YR: ICD-10-PCS | Mod: CPTII,S$GLB,, | Performed by: FAMILY MEDICINE

## 2019-01-25 PROCEDURE — 71046 X-RAY EXAM CHEST 2 VIEWS: CPT | Mod: 26,,, | Performed by: RADIOLOGY

## 2019-01-25 PROCEDURE — 3074F SYST BP LT 130 MM HG: CPT | Mod: CPTII,S$GLB,, | Performed by: FAMILY MEDICINE

## 2019-01-25 RX ORDER — AZITHROMYCIN 250 MG/1
TABLET, FILM COATED ORAL
Refills: 0 | COMMUNITY
Start: 2018-12-15 | End: 2019-03-14

## 2019-01-25 RX ORDER — POTASSIUM CITRATE 15 MEQ/1
TABLET, EXTENDED RELEASE ORAL
Refills: 3 | COMMUNITY
Start: 2018-12-16 | End: 2019-03-14

## 2019-01-25 RX ORDER — OSELTAMIVIR PHOSPHATE 75 MG/1
75 CAPSULE ORAL 2 TIMES DAILY
Refills: 0 | COMMUNITY
Start: 2018-12-15 | End: 2019-03-14

## 2019-01-25 NOTE — PROGRESS NOTES
(Portions of this note were dictated using voice recognition software and may contain dictation related errors in spelling/grammar/syntax not found on text review)    CC:  Medical follow-up    HPI: 73 y.o. male here for medical follow-up.  Just returned from [place].  Has labs ordered in the chart, need to be scheduled    Diabetes, with chronic kidney disease stage 3.  On Trulicity 1.5 mg weekly.  Was intolerant to metformin secondary to diarrhea but restarted med a lower dose of 500 mg extended release daily.   currently back to 1 pill metformin in am and 2 in pm.   Ft exam up-to-date September 2018  Eye exam up-to-date June 2018    BPPV, follows with ENT, last visit 10/16/2018     Hypertension:  On amlodipine 10 mg daily, Avapro 150 mg daily.    Patient states that at home his blood pressures are very well controlled, usually  Around 117/80.  Saw Cardiology in October, blood pressure was stable.  Discussed dietary modification and exercise     Vitamin-D deficiency, advised OTC vitamin D3, 5000 units daily    Vitamin B12 deficiency:  Started B12 orally 1000 mcg daily at LOV, potentially switch to injection therapy if oral therapy is not sufficient    Hypothyroidism on synthroid 75 mcg. Last tsh below     BPH, followed by urology.  On kavita.  Did have hematuria and was admitted in September.  CT urogram demonstrated to left into ureteral stones measuring 0.8 in 0.5 cm located in mid left ureter and left ureterovesical junction with minimal associated obstructive uropathy.  He was treated with Cipro in the hospital at that time.  Had a urinary stone analysis done 10/03/2018 showing calcium oxalate stone     Anxiety on citalopram 20 mg daily     Gout on allopurinol 100 mg daily     JANEEN, followed by sleep medicine, on CPAP     Not on statin, have addressed at multiple visits but patient has declined despite counseling of benefit     Have discussed weight loss in detail on prior visits .  Not much exercise or attention to  calorie counting.  Had referred to medical fitness program on prior visits but had declined.  His cardiologist also recommended significant attention to dietary and exercise modification.     Was seen in urgent care on 01/18/2019 for cough for 2 days along with congestion and sore throat.  Was given benzonatate, Polytrim eye drops secondary to conjunctivitis.  Feels like benzonatate did not really help.  Has been taking dextromethorphan, also Zyrtec.  No fevers.  No headaches.  Does still have nasal congestion. Has still dry cough.  Sometimes feels shortness of breath if he does any significant activity.  No vomiting.  No body aches      Past Medical History:   Diagnosis Date    Anxiety     BPH (benign prostatic hyperplasia)     BPH (benign prostatic hypertrophy)     Cataract     CKD (chronic kidney disease) stage 3, GFR 30-59 ml/min 5/5/2014    Colon polyps     Diabetes mellitus type II     Emphysema NEC     mild    Gout     Hematuria     Hyperlipidemia     Hypertension     Hypothyroidism     IBS (irritable bowel syndrome)     Kidney stones     JANEEN (obstructive sleep apnea)     Plantar fasciitis     Reflux        Past Surgical History:   Procedure Laterality Date    CATARACT EXTRACTION  1/28/2013    RIGHT EYE    CATARACT EXTRACTION      left eye    COLONOSCOPY  Sep 2011    Repeat recommended in 5 years    COLONOSCOPY N/A 10/10/2016    Performed by [facility] at [facility] (4TH FLR)    CYSTOSCOPY  10/3/2018    Performed by Adi Murray [facility] at [facility] OR 1ST FLR    CYSTOSCOPY, WITH URETERAL STENT INSERTION Left 9/26/2018    [facility] at [facility] OR 1ST FLR    ESOPHAGOGASTRODUODENOSCOPY (EGD) N/A 6/5/2017    Performed by Noah [facility]unga MD at [facility] (4TH FLR)    ESOPHAGOGASTRODUODENOSCOPY (EGD) N/A 12/23/2014    Performed by Noah [facility]a [facility] at [facility] (4TH FLR)    KIDNEY STONE SURGERY      LITHOTRIPSY, USING LASER Left 10/3/2018    Performed by Adi GERMAN  [facility] FLR    LITHOTRIPSY-EXTRACORPOREAL SHOCK WAVE Left 3/21/2018    [facility] at [facility] 1ST FLR    PYELOGRAM, RETROGRADE Left 10/3/2018    [facility] by [facility] at [facility] 1ST FLR    REMOVAL, CALCULUS, URETER, URETEROSCOPIC Left 10/3/2018    Performed by [facility] at [facility] OR [facility]    REMOVAL-STENT Left 10/3/2018    Performed by [facility] at [facility] OR [facility]    REPLACEMENT, STENT Left 10/3/2018    Performed by [facility] at [facility] OR 1ST FLR       Family History   Problem Relation Age of Onset    Cancer Brother         non-hodgkins T cell lymphoma    Diabetes Mother     Macular degeneration Brother     Coronary artery disease Neg Hx     Colon cancer Neg Hx     Prostate cancer Neg Hx     Esophageal cancer Neg Hx        Social History     Socioeconomic History    Marital status:      Spouse name: Not on file    Number of children: Not on file    Years of education: Not on file    Highest education level: Not on file   Social Needs    Financial resource strain: Not on file    Food insecurity - worry: Not on file    Food insecurity - inability: Not on file    Transportation needs - medical: Not on file    Transportation needs - non-medical: Not on file   Occupational History    Not on file   Tobacco Use    Smoking status: Never Smoker    Smokeless tobacco: Never Used   Substance and Sexual Activity    Alcohol use: No     Alcohol/week: 0.0 oz     Frequency: Never     Comment: occasionally    Drug use: No    Sexual activity: No   Other Topics Concern    Not on file   Social History Narrative    Not on file     Lab Results   Component Value Date    WBC 9.88 09/26/2018    HGB 15.9 09/26/2018    HCT 45.6 09/26/2018     09/26/2018    CHOL 189 08/28/2018    TRIG 302 (H) 08/28/2018    HDL 43 08/28/2018    ALT 35 08/28/2018    AST 24 08/28/2018     09/26/2018    K 3.6 09/26/2018     09/26/2018     CREATININE 1.4 09/26/2018    CALCIUM 9.3 09/26/2018    ALBUMIN 3.8 08/28/2018    BUN 13 09/26/2018    CO2 26 09/26/2018    TSH 2.342 08/28/2018    PSA 0.74 07/14/2017    INR 1.0 09/26/2018    HGBA1C 7.2 (H) 08/28/2018    LDLCALC 85.6 08/28/2018     (H) 09/26/2018           ROS:  GENERAL:  Hi T, lightheadedness  SKIN: No rashes, no itching.  HEAD: No headaches.  EYES: No visual changes  EARS: No ear pain or changes in hearing.  NOSE:  Above  MOUTH & THROAT:  Sore throat.  NODES: Denies swollen glands.  CHEST:  Above  CARDIOVASCULAR: Denies chest pain, PND, orthopnea.  ABDOMEN: No nausea, vomiting, or changes in bowel function.  URINARY: No flank pain, dysuria or hematuria.  PERIPHERAL VASCULAR: No claudication or cyanosis.  MUSCULOSKELETAL: No joint stiffness or swelling. Denies back pain.  NEUROLOGIC:  Vertigo, recurrence    Vital signs reviewed  PE:   APPEARANCE: Well nourished, well developed, in no acute distress.    HEAD: Normocephalic, atraumatic.  No sinus tenderness  EYES: PERRL. EOMI.   Conjunctivae noninjected.  EARS: TM's intact. Light reflex normal. No retraction or perforation  NOSE:  Turbinate erythema and edema bilaterally  MOUTH & THROAT: No tonsillar enlargement.  Mild posterior pharyngeal erythema with cobblestoning noted.  No exudate  NECK: Supple with no cervical lymphadenopathy.    CHEST: Good inspiratory effort. Lungs overall clear to auscultation with no wheezes or crackles but air entry is somewhat diminished left lung field.  CARDIOVASCULAR: Normal S1, S2. No rubs, murmurs, or gallops.  ABDOMEN: Bowel sounds normal. Not distended. Soft. No tenderness or masses. No organomegaly.  EXTREMITIES: No edema, cyanosis, or clubbing.  Diabetic foot exam up-to-date September 2018    IMPRESSION  1. Cough    2. Dyspnea, unspecified type    3. Hypothyroidism, unspecified type    4. Hypertension, unspecified type    5. B12 deficiency    6. Type 2 diabetes mellitus with stage 3 chronic kidney disease,  without long-term current use of insulin    7. Vitamin D deficiency    8. Gout, unspecified cause, unspecified chronicity, unspecified site    9. Essential hypertension    10. Vertigo            PLAN  Reviewed urgent care notes.  Likely viral bronchitis but given his concern about some recent dyspnea, and some decrease left-sided air entry will get chest x-ray to rule out pneumonia.    If chest x-ray negative, advised supportive management with Sudafed added to his current therapy of Zyrtec, Delsym to help with any postnasal drip and congestion symptoms that could be precipitating his other symptoms.  Advise Neti pot although patient is not much in favor of this.  If symptoms are persistent over the course of the next week, could consider bacterial sinusitis in the differential although no acute signs of this in the office today    Hypertension controlled    Diabetes:  Notices since coming back from [place] his blood sugars have been very elevated, likely from dietary indiscretion.  Back to his metformin 1500 mg of the extended release daily although this does upset his stomach as we have discussed in the past.  Does not feel like Trulicity is working.  Discussed potential need to go to basal insulin although patient is not in favor of this.  He feels like since being on a higher dose of metformin and [place], sugar has gotten a bit better.  Hopefully with further dietary discretion now, we can get better stability of his sugars. will check his labs as prior scheduled.  Concern the past for potentially adding sulfonylurea as an option although I would feel like yield would be fairly low if he is not responding well to the other medications listed above.     Gout stable.  Check uric acid with labs    Vitamin-D and B12 deficiencies:  Recheck lab parameters.  Compliant with oral therapy as mentioned above    Vertigo:  Continue ENT follow-up as directed        SCREENINGS  Colonoscopy: 2016.  Normal  colonoscopy except for medium size internal hemorrhoids visualized.  Prostate : [facility], Dr. Murray.     Immunizations  pvx 2013  Pcv: 2016  Tetanus 7/20/15  flu:  09/06/2018  Zoster:  Can get through pharmacy      Stress echo August 2015, normal  EKGs  2016: nsr  48 hour Holter monitor April 2014. 3 episodes of shortness of breath reported, corresponding rhythm is sinus rhythm. One syncopal episode reported and corresponding rhythm was sinus rhythm at 79 bpm. One episode of lightheadedness reported, corresponding rhythm was sinus bradycardia 59 bpm. No high-grade AV block. Rare PVCs. No ventricular tachycardia. Very rare PACs

## 2019-01-25 NOTE — TELEPHONE ENCOUNTER
Dear Jerardo Powers    Good news in that the chest x-ray does not show any signs of pneumonia.  This may just be a bad viral bronchitis, similar to what we have been seeing going around.  Hopefully adding the Sudafed to the Zyrtec and taking the Delsym will help with congestion, postnasal drip, and cough over the next several days.  If congestion is not any better by the middle or end of next week or your getting any headaches or fevers developing, please let me know.    Adams Moore MD

## 2019-01-26 ENCOUNTER — LAB VISIT (OUTPATIENT)
Dept: LAB | Facility: HOSPITAL | Age: 74
End: 2019-01-26
Attending: FAMILY MEDICINE
Payer: COMMERCIAL

## 2019-01-26 DIAGNOSIS — E11.22 TYPE 2 DIABETES MELLITUS WITH STAGE 3 CHRONIC KIDNEY DISEASE, WITHOUT LONG-TERM CURRENT USE OF INSULIN: ICD-10-CM

## 2019-01-26 DIAGNOSIS — E53.8 B12 DEFICIENCY: ICD-10-CM

## 2019-01-26 DIAGNOSIS — N18.30 TYPE 2 DIABETES MELLITUS WITH STAGE 3 CHRONIC KIDNEY DISEASE, WITHOUT LONG-TERM CURRENT USE OF INSULIN: ICD-10-CM

## 2019-01-26 DIAGNOSIS — E03.9 HYPOTHYROIDISM, UNSPECIFIED TYPE: ICD-10-CM

## 2019-01-26 DIAGNOSIS — E55.9 VITAMIN D DEFICIENCY: ICD-10-CM

## 2019-01-26 DIAGNOSIS — M10.9 GOUT, UNSPECIFIED CAUSE, UNSPECIFIED CHRONICITY, UNSPECIFIED SITE: ICD-10-CM

## 2019-01-26 DIAGNOSIS — I10 HYPERTENSION, UNSPECIFIED TYPE: ICD-10-CM

## 2019-01-26 LAB
25(OH)D3+25(OH)D2 SERPL-MCNC: 20 NG/ML
ALBUMIN SERPL BCP-MCNC: 3.5 G/DL
ALP SERPL-CCNC: 124 U/L
ALT SERPL W/O P-5'-P-CCNC: 21 U/L
ANION GAP SERPL CALC-SCNC: 10 MMOL/L
AST SERPL-CCNC: 20 U/L
BASOPHILS # BLD AUTO: 0.06 K/UL
BASOPHILS NFR BLD: 0.5 %
BILIRUB SERPL-MCNC: 0.9 MG/DL
BUN SERPL-MCNC: 10 MG/DL
CALCIUM SERPL-MCNC: 9.4 MG/DL
CHLORIDE SERPL-SCNC: 101 MMOL/L
CHOLEST SERPL-MCNC: 139 MG/DL
CHOLEST/HDLC SERPL: 3.2 {RATIO}
CO2 SERPL-SCNC: 26 MMOL/L
CREAT SERPL-MCNC: 1.4 MG/DL
DIFFERENTIAL METHOD: ABNORMAL
EOSINOPHIL # BLD AUTO: 0.6 K/UL
EOSINOPHIL NFR BLD: 4.5 %
ERYTHROCYTE [DISTWIDTH] IN BLOOD BY AUTOMATED COUNT: 12.5 %
EST. GFR  (AFRICAN AMERICAN): 57.2 ML/MIN/1.73 M^2
EST. GFR  (NON AFRICAN AMERICAN): 49.5 ML/MIN/1.73 M^2
ESTIMATED AVG GLUCOSE: 177 MG/DL
GLUCOSE SERPL-MCNC: 171 MG/DL
HBA1C MFR BLD HPLC: 7.8 %
HCT VFR BLD AUTO: 44.8 %
HDLC SERPL-MCNC: 43 MG/DL
HDLC SERPL: 30.9 %
HGB BLD-MCNC: 15.2 G/DL
IMM GRANULOCYTES # BLD AUTO: 0.25 K/UL
IMM GRANULOCYTES NFR BLD AUTO: 2 %
LDLC SERPL CALC-MCNC: 59 MG/DL
LYMPHOCYTES # BLD AUTO: 2.4 K/UL
LYMPHOCYTES NFR BLD: 19.8 %
MCH RBC QN AUTO: 29 PG
MCHC RBC AUTO-ENTMCNC: 33.9 G/DL
MCV RBC AUTO: 85 FL
MONOCYTES # BLD AUTO: 0.8 K/UL
MONOCYTES NFR BLD: 6.3 %
NEUTROPHILS # BLD AUTO: 8.3 K/UL
NEUTROPHILS NFR BLD: 66.9 %
NONHDLC SERPL-MCNC: 96 MG/DL
NRBC BLD-RTO: 0 /100 WBC
PLATELET # BLD AUTO: 291 K/UL
PMV BLD AUTO: 12 FL
POTASSIUM SERPL-SCNC: 3.6 MMOL/L
PROT SERPL-MCNC: 7.4 G/DL
RBC # BLD AUTO: 5.25 M/UL
SODIUM SERPL-SCNC: 137 MMOL/L
T4 FREE SERPL-MCNC: 1.19 NG/DL
TRIGL SERPL-MCNC: 185 MG/DL
TSH SERPL DL<=0.005 MIU/L-ACNC: 5.31 UIU/ML
URATE SERPL-MCNC: 5.3 MG/DL
VIT B12 SERPL-MCNC: 958 PG/ML
WBC # BLD AUTO: 12.32 K/UL

## 2019-01-26 PROCEDURE — 83036 HEMOGLOBIN GLYCOSYLATED A1C: CPT

## 2019-01-26 PROCEDURE — 84443 ASSAY THYROID STIM HORMONE: CPT

## 2019-01-26 PROCEDURE — 84439 ASSAY OF FREE THYROXINE: CPT

## 2019-01-26 PROCEDURE — 84550 ASSAY OF BLOOD/URIC ACID: CPT

## 2019-01-26 PROCEDURE — 80061 LIPID PANEL: CPT

## 2019-01-26 PROCEDURE — 82306 VITAMIN D 25 HYDROXY: CPT

## 2019-01-26 PROCEDURE — 80053 COMPREHEN METABOLIC PANEL: CPT

## 2019-01-26 PROCEDURE — 36415 COLL VENOUS BLD VENIPUNCTURE: CPT | Mod: PO

## 2019-01-26 PROCEDURE — 82607 VITAMIN B-12: CPT

## 2019-01-26 PROCEDURE — 85025 COMPLETE CBC W/AUTO DIFF WBC: CPT

## 2019-02-08 ENCOUNTER — PATIENT MESSAGE (OUTPATIENT)
Dept: FAMILY MEDICINE | Facility: CLINIC | Age: 74
End: 2019-02-08

## 2019-02-08 DIAGNOSIS — R42 VERTIGO: Primary | ICD-10-CM

## 2019-02-10 ENCOUNTER — PATIENT MESSAGE (OUTPATIENT)
Dept: FAMILY MEDICINE | Facility: CLINIC | Age: 74
End: 2019-02-10

## 2019-02-11 ENCOUNTER — PATIENT MESSAGE (OUTPATIENT)
Dept: FAMILY MEDICINE | Facility: CLINIC | Age: 74
End: 2019-02-11

## 2019-02-11 NOTE — TELEPHONE ENCOUNTER
Dear Jerardo Powers    Your recent labs were reviewed and released to your account. Your lab results showed the a1c level expectedly elevated as you had mentioned with some dietary changes when [place] of the country your blood sugars were running high. Hopefully with getting back to better eating habits the blood sugar will continue to improve, and we can follow up labs in 3 months.     ALso your thyroid test was very mildly abnormal but since it was an isolated reading, I think you can continue your current synthroid dose for now, and we can recheck in 2-3 months. If still abnormal at that time we could make some slight adjustments.  Please let me know if you have any questions.    Adams Moore MD

## 2019-02-27 ENCOUNTER — TELEPHONE (OUTPATIENT)
Dept: SLEEP MEDICINE | Facility: CLINIC | Age: 74
End: 2019-02-27

## 2019-03-14 ENCOUNTER — HOSPITAL ENCOUNTER (EMERGENCY)
Facility: HOSPITAL | Age: 74
Discharge: HOME | End: 2019-03-14
Attending: EMERGENCY MEDICINE
Payer: COMMERCIAL

## 2019-03-14 VITALS
DIASTOLIC BLOOD PRESSURE: 58 MMHG | BODY MASS INDEX: 30.35 KG/M2 | WEIGHT: 212 LBS | TEMPERATURE: 99 F | SYSTOLIC BLOOD PRESSURE: 105 MMHG | HEART RATE: 88 BPM | RESPIRATION RATE: 18 BRPM | OXYGEN SATURATION: 95 % | HEIGHT: 70 IN

## 2019-03-14 DIAGNOSIS — J10.1 INFLUENZA A: Primary | ICD-10-CM

## 2019-03-14 DIAGNOSIS — R05.9 COUGH: ICD-10-CM

## 2019-03-14 LAB
INFLUENZA A, MOLECULAR: POSITIVE
INFLUENZA B, MOLECULAR: NEGATIVE
SPECIMEN SOURCE: ABNORMAL

## 2019-03-14 PROCEDURE — 25000003 PHARM REV CODE 250: Performed by: EMERGENCY MEDICINE

## 2019-03-14 PROCEDURE — 94644 CONT INHLJ TX 1ST HOUR: CPT

## 2019-03-14 PROCEDURE — 96360 HYDRATION IV INFUSION INIT: CPT

## 2019-03-14 PROCEDURE — 87502 INFLUENZA DNA AMP PROBE: CPT

## 2019-03-14 PROCEDURE — 25000003 PHARM REV CODE 250: Performed by: PHYSICIAN ASSISTANT

## 2019-03-14 PROCEDURE — 94640 AIRWAY INHALATION TREATMENT: CPT

## 2019-03-14 PROCEDURE — 25000242 PHARM REV CODE 250 ALT 637 W/ HCPCS: Performed by: EMERGENCY MEDICINE

## 2019-03-14 PROCEDURE — 99284 EMERGENCY DEPT VISIT MOD MDM: CPT | Mod: 25

## 2019-03-14 RX ORDER — ALBUTEROL SULFATE 2.5 MG/.5ML
10 SOLUTION RESPIRATORY (INHALATION)
Status: COMPLETED | OUTPATIENT
Start: 2019-03-14 | End: 2019-03-14

## 2019-03-14 RX ORDER — SODIUM CHLORIDE 9 MG/ML
1000 INJECTION, SOLUTION INTRAVENOUS
Status: COMPLETED | OUTPATIENT
Start: 2019-03-14 | End: 2019-03-14

## 2019-03-14 RX ORDER — ACETAMINOPHEN 500 MG
1000 TABLET ORAL
Status: COMPLETED | OUTPATIENT
Start: 2019-03-14 | End: 2019-03-14

## 2019-03-14 RX ORDER — IPRATROPIUM BROMIDE AND ALBUTEROL SULFATE 2.5; .5 MG/3ML; MG/3ML
3 SOLUTION RESPIRATORY (INHALATION)
Status: COMPLETED | OUTPATIENT
Start: 2019-03-14 | End: 2019-03-14

## 2019-03-14 RX ORDER — OSELTAMIVIR PHOSPHATE 75 MG/1
75 CAPSULE ORAL 2 TIMES DAILY
Qty: 10 CAPSULE | Refills: 0 | Status: SHIPPED | OUTPATIENT
Start: 2019-03-14 | End: 2019-03-19

## 2019-03-14 RX ORDER — BENZONATATE 100 MG/1
100 CAPSULE ORAL 3 TIMES DAILY PRN
Qty: 20 CAPSULE | Refills: 0 | Status: SHIPPED | OUTPATIENT
Start: 2019-03-14 | End: 2019-03-22

## 2019-03-14 RX ADMIN — IPRATROPIUM BROMIDE AND ALBUTEROL SULFATE 3 ML: .5; 3 SOLUTION RESPIRATORY (INHALATION) at 01:03

## 2019-03-14 RX ADMIN — ALBUTEROL SULFATE 10 MG: 2.5 SOLUTION RESPIRATORY (INHALATION) at 09:03

## 2019-03-14 RX ADMIN — ACETAMINOPHEN 1000 MG: 500 TABLET ORAL at 09:03

## 2019-03-14 RX ADMIN — SODIUM CHLORIDE 1000 ML: 0.9 INJECTION, SOLUTION INTRAVENOUS at 10:03

## 2019-03-14 NOTE — ED PROVIDER NOTES
Encounter Date: 3/14/2019    SCRIBE #1 NOTE: I, Donavon Bernard, am scribing for, and in the presence of,  Dr. Bates. I have scribed the entire note.       History     Chief Complaint   Patient presents with    Cough     c/o cough, congestion, fever, chills, and unsteady gait x2 days.      Time seen by provider: 9:18 AM    This is a 73 y.o. male w/ PMHx of HTN who presents with complaint of cough x 2 days (less than 48 hours prior to presentation). The patient reports a productive cough with yellow sputum associated with wheezing, congestion, fever, chills, body aches.  The patient's fever was taken at home at 101.9 degrees. He denies decreased PO intake, Hx of asthma, any recent travel, or sick contacts. He has taken Tylenol, Amoxicillin (left over medications he had, per patient), and Mucinex with no alleviation of symptoms.      The history is provided by the patient.     Review of patient's allergies indicates:   Allergen Reactions    Morphine Hives     Past Medical History:   Diagnosis Date    Anxiety     BPH (benign prostatic hyperplasia)     BPH (benign prostatic hypertrophy)     Cataract     CKD (chronic kidney disease) stage 3, GFR 30-59 ml/min 5/5/2014    Colon polyps     Diabetes mellitus type II     Emphysema NEC     mild    Gout     Hematuria     Hyperlipidemia     Hypertension     Hypothyroidism     IBS (irritable bowel syndrome)     Kidney stones     JANEEN (obstructive sleep apnea)     Plantar fasciitis     Reflux      Past Surgical History:   Procedure Laterality Date    CATARACT EXTRACTION  1/28/2013    RIGHT EYE    CATARACT EXTRACTION      left eye    COLONOSCOPY  Sep 2011    Repeat recommended in 5 years    COLONOSCOPY N/A 10/10/2016    Performed by [facility] at [facility] (4TH FLR)    CYSTOSCOPY  10/3/2018    Performed by Adi Murray MD at [facility] OR 1ST FLR    CYSTOSCOPY, WITH URETERAL STENT INSERTION Left 9/26/2018    Performed by [facility] at [facility]  OR 1ST FLR    ESOPHAGOGASTRODUODENOSCOPY (EGD) N/A 6/5/2017    Performed by [facility] MD at [facility] ENDO (4TH FLR)    ESOPHAGOGASTRODUODENOSCOPY (EGD) N/A 12/23/2014    Performed by Noah [facility]a MD at [facility] ENDO (4TH FLR)    KIDNEY STONE SURGERY      LITHOTRIPSY, USING LASER Left 10/3/2018    Performed by [facility] at [facility] OR 1ST FLR    LITHOTRIPSY-EXTRACORPOREAL SHOCK WAVE Left 3/21/2018    Performed by Ad[facility] MD at [facility] OR 1ST FLR    PYELOGRAM, RETROGRADE Left 10/3/2018    Per[facility]    REMOVAL, CALCULUS, URETER, URETEROSCOPIC Left 10/3/2018    Performed by Adi [facility] at [facility] OR 1ST FLR    REMOVAL-STENT Left 10/3/2018    Performed by [facility] at [facility]    REPLACEMENT, STENT Left 10/3/2018    Performed by Adi Murray MD at [facility] OR 1ST FLR     Family History   Problem Relation Age of Onset    Cancer Brother         non-hodgkins T cell lymphoma    Diabetes Mother     Macular degeneration Brother     Coronary artery disease Neg Hx     Colon cancer Neg Hx     Prostate cancer Neg Hx     Esophageal cancer Neg Hx      Social History     Tobacco Use    Smoking status: Never Smoker    Smokeless tobacco: Never Used   Substance Use Topics    Alcohol use: No     Alcohol/week: 0.0 oz     Frequency: Never     Comment: occasionally    Drug use: No     Review of Systems   Constitutional: Positive for chills and fever.   HENT: Positive for congestion. Negative for facial swelling and sore throat.    Eyes: Negative for pain and redness.   Respiratory: Positive for cough and wheezing. Negative for shortness of breath.    Cardiovascular: Negative for chest pain.   Gastrointestinal: Negative for abdominal pain, diarrhea, nausea and vomiting.   Genitourinary: Negative for dysuria and hematuria.   Musculoskeletal: Negative for back pain and neck pain.        Leg pain   Skin: Negative for rash.   Neurological: Negative for  seizures and facial asymmetry.   Psychiatric/Behavioral: Negative for agitation and confusion.       Physical Exam     Initial Vitals [03/14/19 0912]   BP Pulse Resp Temp SpO2   129/75 91 20 100.1 °F (37.8 °C) (!) 94 %      MAP       --         Physical Exam    Nursing note and vitals reviewed.  Constitutional: He appears well-developed and well-nourished. He is not diaphoretic. No distress.   HENT:   Head: Normocephalic and atraumatic.   Right Ear: External ear normal.   Left Ear: External ear normal.   Mouth/Throat: Oropharynx is clear and moist.   No anterior cervical adenopathy  No posterior cervical adenopathy   TM's clear bilaterally   Eyes: Conjunctivae and EOM are normal.   Neck: Normal range of motion. Neck supple.   Cardiovascular: Normal rate, regular rhythm and normal heart sounds.   Pulmonary/Chest: No respiratory distress. He has wheezes (expiratory).   Diffuse expiratory wheezing in all lung fields.   No respiratory distress.  No increased WOB.    Abdominal: Soft. There is no tenderness.   Musculoskeletal: Normal range of motion. He exhibits no edema or tenderness.   Neurological: He is alert and oriented to person, place, and time. He has normal strength. GCS score is 15. GCS eye subscore is 4. GCS verbal subscore is 5. GCS motor subscore is 6.   Skin: Skin is warm and dry. Capillary refill takes less than 2 seconds.         ED Course   Procedures  Labs Reviewed   INFLUENZA A & B BY MOLECULAR - Abnormal; Notable for the following components:       Result Value    Influenza A, Molecular Positive (*)     All other components within normal limits          Imaging Results          X-Ray Chest PA And Lateral (Final result)  Result time 03/14/19 12:02:56    Final result by Sander Galeano MD (03/14/19 12:02:56)                 Impression:      No acute abnormality.      Electronically signed by: Sander Galeano MD  Date:    03/14/2019  Time:    12:02             Narrative:    EXAMINATION:  XR CHEST PA  AND LATERAL    CLINICAL HISTORY:  Cough    TECHNIQUE:  PA and lateral views of the chest were performed.    COMPARISON:  Chest radiograph from 01/25/2019    FINDINGS:  The lungs are clear, with normal appearance of pulmonary vasculature and no pleural effusion or pneumothorax.    The cardiac silhouette is normal in size. The hilar and mediastinal contours are unremarkable.    Bones are intact.                                 Medical Decision Making:   Clinical Tests:   Lab Tests: Ordered and Reviewed  Radiological Study: Ordered and Reviewed  ED Management:  - Influenza antigen positive for influenza A  - CXR demonstrates no acute cardiopulmonary process per my interpretation, per final radiology read   - Pt administered continuous breathing treatment with improvement of symptoms, wheezing  - pt in 48 hour window for initiating treatment with Tamiflu; discussed possible side effects, benefits of taking; pt verbalized understanding and in agreement for plan   - pt ambulatory in ED without acute decompensation and/or desaturation  - will discharge pt with rx for Tamiflu, albuterol inhaler, and benzonatate  - No further intervention is indicated at this time after having taken into account the patient's history, physical exam findings, and empirical and objective data obtained during the patient's emergency department workup.   - The patient is at low risk for an emergent medical condition at this time, and I am of the belief that that it is safe to discharge the patient from the emergency department.   - The patient is instructed to follow up as outpatient as indicated on the discharge paperwork.    - I have discussed the specifics of the workup with the patient and the patient has verbalized understanding of the details of the workup, the diagnosis, the treatment plan, and the need for outpatient follow-up.    - Although the patient has no emergent etiology today this does not preclude the development of an emergent  condition so, in addition, I have advised the patient that they can return to the ED and/or activate EMS at any time with worsening of their symptoms, change of their symptoms, or with any other medical complaint.    - The patient remained comfortable and stable during their visit in the ED.    - Discharge and follow-up instructions discussed with the patient who expressed understanding and willingness to comply with my recommendations.  - Results of all emergency department tests  discussed thoroughly with patient; all patient questions answered; pt in agreement with plan  - Pt instructed to follow up with PCP in one week for recheck of today's complaints  - Pt given strict emergency department return precautions for any new or worsening of symptoms  - Pt discharged from the emergency department in stable condition, in no acute distress                        Clinical Impression:     1. Influenza A    2. Cough      Disposition:   Disposition: Discharged  Condition: Stable      I, Chad Bates,  personally performed the services described in this documentation. All medical record entries made by the scribe were at my direction and in my presence.  I have reviewed the chart and agree that the record reflects my personal performance and is accurate and complete. Chad Bates M.D. 7:00 PM03/14/2019                 Chad Bates MD  03/14/19 7025

## 2019-03-14 NOTE — ED NOTES
Pt presents to the ED for cough, fever, and body aches x 2 days. Pt denies recent sick contacts. Reports that cough is productive with yellow sputum. Denies relief with Mucinex and Zyrtec. Decreased aeration noted in all lung fields.

## 2019-03-14 NOTE — DISCHARGE INSTRUCTIONS
Take medications as prescribed.    Follow up with your primary care physician in the next seven (7) days for a recheck of today's complaints.    Return to the emergency department for any new or worsening of symptoms.

## 2019-03-19 ENCOUNTER — PATIENT MESSAGE (OUTPATIENT)
Dept: FAMILY MEDICINE | Facility: CLINIC | Age: 74
End: 2019-03-19

## 2019-03-19 ENCOUNTER — TELEPHONE (OUTPATIENT)
Dept: FAMILY MEDICINE | Facility: CLINIC | Age: 74
End: 2019-03-19

## 2019-03-21 ENCOUNTER — PATIENT MESSAGE (OUTPATIENT)
Dept: FAMILY MEDICINE | Facility: CLINIC | Age: 74
End: 2019-03-21

## 2019-03-22 ENCOUNTER — PATIENT MESSAGE (OUTPATIENT)
Dept: FAMILY MEDICINE | Facility: CLINIC | Age: 74
End: 2019-03-22

## 2019-03-22 ENCOUNTER — HOSPITAL ENCOUNTER (OUTPATIENT)
Dept: RADIOLOGY | Facility: HOSPITAL | Age: 74
Discharge: HOME | End: 2019-03-22
Attending: FAMILY MEDICINE
Payer: COMMERCIAL

## 2019-03-22 ENCOUNTER — OFFICE VISIT (OUTPATIENT)
Dept: FAMILY MEDICINE | Facility: CLINIC | Age: 74
End: 2019-03-22
Attending: FAMILY MEDICINE
Payer: COMMERCIAL

## 2019-03-22 VITALS
SYSTOLIC BLOOD PRESSURE: 100 MMHG | DIASTOLIC BLOOD PRESSURE: 60 MMHG | BODY MASS INDEX: 29.95 KG/M2 | HEART RATE: 76 BPM | OXYGEN SATURATION: 95 % | WEIGHT: 209.19 LBS | TEMPERATURE: 98 F | HEIGHT: 70 IN

## 2019-03-22 DIAGNOSIS — R05.3 PERSISTENT COUGH: Primary | ICD-10-CM

## 2019-03-22 DIAGNOSIS — R05.3 PERSISTENT COUGH: ICD-10-CM

## 2019-03-22 DIAGNOSIS — B34.9 VIRAL SYNDROME: ICD-10-CM

## 2019-03-22 PROCEDURE — 3074F SYST BP LT 130 MM HG: CPT | Mod: CPTII,S$GLB,, | Performed by: FAMILY MEDICINE

## 2019-03-22 PROCEDURE — 99214 OFFICE O/P EST MOD 30 MIN: CPT | Mod: S$GLB,,, | Performed by: FAMILY MEDICINE

## 2019-03-22 PROCEDURE — 71046 X-RAY EXAM CHEST 2 VIEWS: CPT | Mod: 26,,, | Performed by: RADIOLOGY

## 2019-03-22 PROCEDURE — 99214 PR OFFICE/OUTPT VISIT, EST, LEVL IV, 30-39 MIN: ICD-10-PCS | Mod: S$GLB,,, | Performed by: FAMILY MEDICINE

## 2019-03-22 PROCEDURE — 1101F PT FALLS ASSESS-DOCD LE1/YR: CPT | Mod: CPTII,S$GLB,, | Performed by: FAMILY MEDICINE

## 2019-03-22 PROCEDURE — 3078F PR MOST RECENT DIASTOLIC BLOOD PRESSURE < 80 MM HG: ICD-10-PCS | Mod: CPTII,S$GLB,, | Performed by: FAMILY MEDICINE

## 2019-03-22 PROCEDURE — 3078F DIAST BP <80 MM HG: CPT | Mod: CPTII,S$GLB,, | Performed by: FAMILY MEDICINE

## 2019-03-22 PROCEDURE — 99999 PR PBB SHADOW E&M-EST. PATIENT-LVL III: ICD-10-PCS | Mod: PBBFAC,,, | Performed by: FAMILY MEDICINE

## 2019-03-22 PROCEDURE — 1101F PR PT FALLS ASSESS DOC 0-1 FALLS W/OUT INJ PAST YR: ICD-10-PCS | Mod: CPTII,S$GLB,, | Performed by: FAMILY MEDICINE

## 2019-03-22 PROCEDURE — 99999 PR PBB SHADOW E&M-EST. PATIENT-LVL III: CPT | Mod: PBBFAC,,, | Performed by: FAMILY MEDICINE

## 2019-03-22 PROCEDURE — 71046 XR CHEST PA AND LATERAL: ICD-10-PCS | Mod: 26,,, | Performed by: RADIOLOGY

## 2019-03-22 PROCEDURE — 71046 X-RAY EXAM CHEST 2 VIEWS: CPT | Mod: TC,FY

## 2019-03-22 PROCEDURE — 3074F PR MOST RECENT SYSTOLIC BLOOD PRESSURE < 130 MM HG: ICD-10-PCS | Mod: CPTII,S$GLB,, | Performed by: FAMILY MEDICINE

## 2019-03-22 RX ORDER — LEVOFLOXACIN 500 MG/1
TABLET, FILM COATED ORAL
COMMUNITY
End: 2019-09-03

## 2019-03-22 RX ORDER — IRBESARTAN 150 MG/1
150 TABLET ORAL DAILY
COMMUNITY
End: 2022-01-10 | Stop reason: SDUPTHER

## 2019-03-22 RX ORDER — ERGOCALCIFEROL 1.25 MG/1
CAPSULE ORAL
COMMUNITY
End: 2019-09-03

## 2019-03-22 RX ORDER — OSELTAMIVIR PHOSPHATE 75 MG/1
CAPSULE ORAL
COMMUNITY
End: 2019-09-03

## 2019-03-22 RX ORDER — PROMETHAZINE HYDROCHLORIDE AND DEXTROMETHORPHAN HYDROBROMIDE 6.25; 15 MG/5ML; MG/5ML
5 SYRUP ORAL 2 TIMES DAILY PRN
Qty: 180 ML | Refills: 1 | Status: SHIPPED | OUTPATIENT
Start: 2019-03-22 | End: 2019-04-01

## 2019-03-22 RX ORDER — POLYMYXIN B SULFATE AND TRIMETHOPRIM 1; 10000 MG/ML; [USP'U]/ML
SOLUTION OPHTHALMIC
COMMUNITY
End: 2019-09-03

## 2019-03-22 RX ORDER — ALLOPURINOL 100 MG/1
TABLET ORAL
COMMUNITY
End: 2019-09-03 | Stop reason: SDUPTHER

## 2019-03-22 RX ORDER — CIPROFLOXACIN 500 MG/1
TABLET ORAL
COMMUNITY
End: 2019-09-03

## 2019-03-22 RX ORDER — POTASSIUM CITRATE 15 MEQ/1
TABLET, EXTENDED RELEASE ORAL
COMMUNITY
End: 2019-09-03

## 2019-03-22 RX ORDER — AZITHROMYCIN 250 MG/1
TABLET, FILM COATED ORAL
COMMUNITY
End: 2019-09-03

## 2019-03-22 RX ORDER — HYDROCODONE BITARTRATE AND ACETAMINOPHEN 5; 325 MG/1; MG/1
TABLET ORAL
COMMUNITY
End: 2019-09-03

## 2019-03-22 NOTE — PROGRESS NOTES
Subjective:       Patient ID: ARAVINDjchristiane Powers is a 73 y.o. male.    Chief Complaint: Cough; Fatigue; and Flu (Tested positive for the Flu last Thursday)    73 yr old pleasant male with DM II, HTN, and other co morbidities presents today as new patient and evaluation of post viral syndrome, persistent cough and fatigue. Onset 10 days ago and gradually worsening. No fever. He was diagnosed with flue more than a week ago and he finished tamiflu. He still has cough and no SOB. Details as follows -      History as below - reviewed       Cough   This is a new problem. The current episode started 1 to 4 weeks ago. The problem has been unchanged. The problem occurs constantly. The cough is non-productive. Associated symptoms include nasal congestion, rhinorrhea and wheezing. Pertinent negatives include no chest pain, ear congestion, ear pain, fever, myalgias, rash or sore throat. He has tried OTC cough suppressant and prescription cough suppressant for the symptoms. The treatment provided no relief. His past medical history is significant for environmental allergies.     Review of Systems   Constitutional: Negative.  Negative for activity change, diaphoresis, fever and unexpected weight change.   HENT: Positive for rhinorrhea. Negative for congestion, ear pain, mouth sores, sore throat and voice change.    Eyes: Negative.  Negative for pain, discharge and visual disturbance.   Respiratory: Positive for cough and wheezing. Negative for apnea.    Cardiovascular: Negative.  Negative for chest pain and palpitations.   Gastrointestinal: Negative.  Negative for abdominal distention, anal bleeding, diarrhea and vomiting.   Endocrine: Negative.  Negative for cold intolerance and polyuria.   Genitourinary: Negative.  Negative for decreased urine volume, difficulty urinating, discharge, frequency and scrotal swelling.   Musculoskeletal: Negative.  Negative for back pain, myalgias and neck stiffness.   Skin: Negative.  Negative for  color change and rash.   Allergic/Immunologic: Positive for environmental allergies. Negative for immunocompromised state.   Neurological: Negative.  Negative for dizziness, speech difficulty, weakness and light-headedness.   Hematological: Negative.    Psychiatric/Behavioral: Negative.  Negative for agitation, dysphoric mood and suicidal ideas. The patient is not nervous/anxious.        PMH/PSH/FH/SH/MED/ALLERGY reviewed    Objective:       Vitals:    03/22/19 1133   BP: 100/60   Pulse: 76   Temp: 98.3 °F (36.8 °C)       Physical Exam   Constitutional: He is oriented to person, place, and time. He appears well-developed and well-nourished.   HENT:   Head: Normocephalic and atraumatic.   Right Ear: External ear normal.   Left Ear: External ear normal.   Nose: Nose normal.   Mouth/Throat: Oropharynx is clear and moist. No oropharyngeal exudate.   Eyes: Conjunctivae and EOM are normal. Pupils are equal, round, and reactive to light. Right eye exhibits no discharge. Left eye exhibits no discharge. No scleral icterus.   Neck: Normal range of motion. Neck supple. No JVD present. No tracheal deviation present. No thyromegaly present.   Cardiovascular: Normal rate, regular rhythm, normal heart sounds and intact distal pulses. Exam reveals no gallop and no friction rub.   No murmur heard.  Pulmonary/Chest: Effort normal. No stridor. No respiratory distress. He has wheezes (mild wheeze RLQ). He has no rales. He exhibits no tenderness.   Abdominal: Soft. Bowel sounds are normal. He exhibits no distension and no mass. There is no tenderness. There is no rebound and no guarding. No hernia.   Musculoskeletal: Normal range of motion. He exhibits no edema or tenderness.   Lymphadenopathy:     He has no cervical adenopathy.   Neurological: He is alert and oriented to person, place, and time. He has normal reflexes. He displays normal reflexes. No cranial nerve deficit. He exhibits normal muscle tone. Coordination normal.   Skin:  Skin is warm and dry. No rash noted. No erythema. No pallor.   Psychiatric: He has a normal mood and affect. His behavior is normal. Judgment and thought content normal.       Assessment:       1. Persistent cough    2. Viral syndrome        Plan:           Jerardo was seen today for cough, fatigue and flu.    Diagnoses and all orders for this visit:    Persistent cough  -     X-Ray Chest PA And Lateral; Future  -     promethazine-dextromethorphan (PROMETHAZINE-DM) 6.25-15 mg/5 mL Syrp; Take 5 mLs by mouth 2 (two) times daily as needed (cough).    Viral syndrome  -     promethazine-dextromethorphan (PROMETHAZINE-DM) 6.25-15 mg/5 mL Syrp; Take 5 mLs by mouth 2 (two) times daily as needed (cough).      CXr to r/o PNA    Cough med as needed    Spent adequate time in obtaining history and explaining differentials    40 minutes spent during this visit of which greater than 50% devoted to face-face counseling and coordination of care regarding diagnosis and management plan      Follow-up if symptoms worsen or fail to improve.

## 2019-03-23 ENCOUNTER — TELEPHONE (OUTPATIENT)
Dept: FAMILY MEDICINE | Facility: CLINIC | Age: 74
End: 2019-03-23

## 2019-03-23 DIAGNOSIS — R05.9 COUGH: Primary | ICD-10-CM

## 2019-03-23 RX ORDER — CODEINE PHOSPHATE AND GUAIFENESIN 10; 100 MG/5ML; MG/5ML
5 SOLUTION ORAL 3 TIMES DAILY PRN
Qty: 180 ML | Refills: 0 | Status: SHIPPED | OUTPATIENT
Start: 2019-03-23 | End: 2019-04-02

## 2019-03-23 NOTE — TELEPHONE ENCOUNTER
Called pt per dr. Inman's request to see if he would be okay taking cough medicine with codeine. Left a message requesting a call back,.

## 2019-03-28 ENCOUNTER — OFFICE VISIT (OUTPATIENT)
Dept: FAMILY MEDICINE | Facility: CLINIC | Age: 74
End: 2019-03-28
Payer: COMMERCIAL

## 2019-03-28 VITALS
TEMPERATURE: 98 F | DIASTOLIC BLOOD PRESSURE: 70 MMHG | WEIGHT: 211.19 LBS | HEART RATE: 78 BPM | HEIGHT: 70 IN | OXYGEN SATURATION: 95 % | BODY MASS INDEX: 30.23 KG/M2 | SYSTOLIC BLOOD PRESSURE: 102 MMHG

## 2019-03-28 DIAGNOSIS — Z87.09 HISTORY OF INFLUENZA: ICD-10-CM

## 2019-03-28 DIAGNOSIS — R05.9 COUGH: Primary | ICD-10-CM

## 2019-03-28 DIAGNOSIS — J18.9 ATYPICAL PNEUMONIA: ICD-10-CM

## 2019-03-28 DIAGNOSIS — E11.22 TYPE 2 DIABETES MELLITUS WITH STAGE 3 CHRONIC KIDNEY DISEASE, WITHOUT LONG-TERM CURRENT USE OF INSULIN: ICD-10-CM

## 2019-03-28 DIAGNOSIS — N18.30 TYPE 2 DIABETES MELLITUS WITH STAGE 3 CHRONIC KIDNEY DISEASE, WITHOUT LONG-TERM CURRENT USE OF INSULIN: ICD-10-CM

## 2019-03-28 PROCEDURE — 3078F DIAST BP <80 MM HG: CPT | Mod: CPTII,S$GLB,, | Performed by: FAMILY MEDICINE

## 2019-03-28 PROCEDURE — 3045F PR MOST RECENT HEMOGLOBIN A1C LEVEL 7.0-9.0%: CPT | Mod: CPTII,S$GLB,, | Performed by: FAMILY MEDICINE

## 2019-03-28 PROCEDURE — 99215 OFFICE O/P EST HI 40 MIN: CPT | Mod: S$GLB,,, | Performed by: FAMILY MEDICINE

## 2019-03-28 PROCEDURE — 3045F PR MOST RECENT HEMOGLOBIN A1C LEVEL 7.0-9.0%: ICD-10-PCS | Mod: CPTII,S$GLB,, | Performed by: FAMILY MEDICINE

## 2019-03-28 PROCEDURE — 99999 PR PBB SHADOW E&M-EST. PATIENT-LVL V: CPT | Mod: PBBFAC,,, | Performed by: FAMILY MEDICINE

## 2019-03-28 PROCEDURE — 99215 PR OFFICE/OUTPT VISIT, EST, LEVL V, 40-54 MIN: ICD-10-PCS | Mod: S$GLB,,, | Performed by: FAMILY MEDICINE

## 2019-03-28 PROCEDURE — 99999 PR PBB SHADOW E&M-EST. PATIENT-LVL V: ICD-10-PCS | Mod: PBBFAC,,, | Performed by: FAMILY MEDICINE

## 2019-03-28 PROCEDURE — 1101F PR PT FALLS ASSESS DOC 0-1 FALLS W/OUT INJ PAST YR: ICD-10-PCS | Mod: CPTII,S$GLB,, | Performed by: FAMILY MEDICINE

## 2019-03-28 PROCEDURE — 1101F PT FALLS ASSESS-DOCD LE1/YR: CPT | Mod: CPTII,S$GLB,, | Performed by: FAMILY MEDICINE

## 2019-03-28 PROCEDURE — 3074F SYST BP LT 130 MM HG: CPT | Mod: CPTII,S$GLB,, | Performed by: FAMILY MEDICINE

## 2019-03-28 PROCEDURE — 3078F PR MOST RECENT DIASTOLIC BLOOD PRESSURE < 80 MM HG: ICD-10-PCS | Mod: CPTII,S$GLB,, | Performed by: FAMILY MEDICINE

## 2019-03-28 PROCEDURE — 3074F PR MOST RECENT SYSTOLIC BLOOD PRESSURE < 130 MM HG: ICD-10-PCS | Mod: CPTII,S$GLB,, | Performed by: FAMILY MEDICINE

## 2019-03-28 RX ORDER — DOXYCYCLINE 100 MG/1
100 CAPSULE ORAL 2 TIMES DAILY
Qty: 14 CAPSULE | Refills: 0 | Status: SHIPPED | OUTPATIENT
Start: 2019-03-28 | End: 2019-09-03

## 2019-03-28 NOTE — PROGRESS NOTES
(Portions of this note were dictated using voice recognition software and may contain dictation related errors in spelling/grammar/syntax not found on text review)    CC: No chief complaint on file.      HPI: 73 y.o. male     3/14/19: went to ED with cough/fatigue/fever 101.9 /weakness.Ed note from that date has been reviewed.  Dx'ed with flu A. CXR neg for PNA. Given IVF, tamiflu/tessalon/combivent (had taken some leftover amox at home prior to ED visit).     Saw Dr. Inman 3/22/19 (note reviewed) with persistent cough, no sob. CXR repeated and negative. Was given prometh/dm syrup --/>chg to guaif/codeine given lack of pharmacy availability.     Never picked up the codeine cough syrup, did realize it was available at the pharmacy.  Is not currently taking anything for cough.  His cough is dry but persistent.  Has associated chest tightness and also dizziness when he coughs.  No necessary dizziness or lightheadedness independent of coughing.  Feels significant headache with coughing.  No more fevers or achiness.  Denies any significant nasal congestion or rhinorrhea    Of note he is diabetic, sugars have been running 150-170 in the morning    Past Medical History:   Diagnosis Date    Anxiety     BPH (benign prostatic hyperplasia)     BPH (benign prostatic hypertrophy)     Cataract     CKD (chronic kidney disease) stage 3, GFR 30-59 ml/min 5/5/2014    Colon polyps     Diabetes mellitus type II     Emphysema NEC     mild    Gout     Hematuria     Hyperlipidemia     Hypertension     Hypothyroidism     IBS (irritable bowel syndrome)     Kidney stones     JANEEN (obstructive sleep apnea)     Plantar fasciitis     Reflux        Past Surgical History:   Procedure Laterality Date    CATARACT EXTRACTION  1/28/2013    RIGHT EYE    CATARACT EXTRACTION      left eye    COLONOSCOPY  Sep 2011    Repeat recommended in 5 years    COLONOSCOPY N/A 10/10/2016    [facility]formed by [facility] at [facility] (4[facility] F[facility])     CYSTOSCOPY  10/3/2018    [facility] OR 1ST FLR    CYSTOSCOPY, WITH URETERAL STENT INSERTION Left 9/26/2018    [facility]    ESOPHAGOGASTRODUODENOSCOPY (EGD) N/A 6/5/2017    Performed by Noah [facility]ung[facility] at [facility] ENDO (4TH FLR)    ESOPHAGOGASTRODUODENOSCOPY (EGD) N/A 12/23/2014    Performed by [facility] at [facility] ENDO (4TH FLR)    KIDNEY STONE SURGERY      LITHOTRIPSY, USING LASER Left 10/3/2018    Per[facility] at [facility] OR [address] [place] [address]    LITHOTRIPSY-EXTRACORPOREAL SHOCK WAVE Left 3/21/2018    Per[facility] by [facility] at [facility] OR [address] [place] [address]    PYELOGRAM, RETROGRADE Left 10/3/2018    [facility]    REMOVAL, CALCULUS, URETER, URETEROSCOPIC Left 10/3/2018    [facility] by [facility] OR [facility]    REMOVAL-STENT Left 10/3/2018    [facility]    REPLACEMENT, STENT Left 10/3/2018    Performed by [facility] at [facility] OR [address] [place] [address]       Family History   Problem Relation Age of Onset    Cancer Brother         non-hodgkins T cell lymphoma    Diabetes Mother     Macular degeneration Brother     Coronary artery disease Neg Hx     Colon cancer Neg Hx     Prostate cancer Neg Hx     Esophageal cancer Neg Hx        Social History     Socioeconomic History    Marital status:      Spouse name: Not on file    Number of children: Not on file    Years of education: Not on file    Highest education level: Not on file   Occupational History    Not on file   Social Needs    Financial resource strain: Not on file    Food insecurity:     Worry: Not on file     Inability: Not on file    Transportation needs:     Medical: Not on file     Non-medical: Not on file   Tobacco Use    Smoking status: Never Smoker    Smokeless tobacco: Never Used   Substance and Sexual Activity    Alcohol use: No     Alcohol/week: 0.0 oz      Frequency: Never     Comment: occasionally    Drug use: No    Sexual activity: Never   Lifestyle    Physical activity:     Days per week: Not on file     Minutes per session: Not on file    Stress: Not on file   Relationships    Social connections:     Talks on phone: Not on file     Gets together: Not on file     Attends Rastafari service: Not on file     Active member of club or organization: Not on file     Attends meetings of clubs or organizations: Not on file     Relationship status: Not on file    Intimate partner violence:     Fear of current or ex partner: Not on file     Emotionally abused: Not on file     Physically abused: Not on file     Forced sexual activity: Not on file   Other Topics Concern    Not on file   Social History Narrative    Not on file     Lab Results   Component Value Date    WBC 12.32 01/26/2019    HGB 15.2 01/26/2019    HCT 44.8 01/26/2019    MCV 85 01/26/2019     01/26/2019    CHOL 139 01/26/2019    TRIG 185 (H) 01/26/2019    HDL 43 01/26/2019    ALT 21 01/26/2019    AST 20 01/26/2019     01/26/2019    K 3.6 01/26/2019     01/26/2019    CREATININE 1.4 01/26/2019    CALCIUM 9.4 01/26/2019    ALBUMIN 3.5 01/26/2019    BUN 10 01/26/2019    CO2 26 01/26/2019    TSH 5.309 (H) 01/26/2019    PSA 0.74 07/14/2017    INR 1.0 09/26/2018    HGBA1C 7.8 (H) 01/26/2019    LDLCALC 59.0 (L) 01/26/2019     (H) 01/26/2019       Glucose   Date Value Ref Range Status   01/26/2019 171 (H) 70 - 110 mg/dL Final   09/26/2018 133 (H) 70 - 110 mg/dL Final   09/20/2018 116 (H) 70 - 110 mg/dL Final   08/28/2018 168 (H) 70 - 110 mg/dL Final   07/28/2018 220 (H) 70 - 110 mg/dL Final   07/27/2018 171 (H) 70 - 110 mg/dL Final   07/19/2018 145 (H) 70 - 110 mg/dL Final   05/28/2018 136 (H) 70 - 110 mg/dL Final   03/21/2018 166 (H) 70 - 110 mg/dL Final   03/09/2018 239 (H) 70 - 110 mg/dL Final     Hemoglobin A1C   Date Value Ref Range Status   01/26/2019 7.8 (H) 4.0 - 5.6 % Final      Comment:     ADA Screening Guidelines:  5.7-6.4%  Consistent with prediabetes  >or=6.5%  Consistent with diabetes  High levels of fetal hemoglobin interfere with the HbA1C  assay. Heterozygous hemoglobin variants (HbS, HgC, etc)do  not significantly interfere with this assay.   However, presence of multiple variants may affect accuracy.     08/28/2018 7.2 (H) 4.0 - 5.6 % Final     Comment:     ADA Screening Guidelines:  5.7-6.4%  Consistent with prediabetes  >or=6.5%  Consistent with diabetes  High levels of fetal hemoglobin interfere with the HbA1C  assay. Heterozygous hemoglobin variants (HbS, HgC, etc)do  not significantly interfere with this assay.   However, presence of multiple variants may affect accuracy.     07/19/2018 6.7 (H) 4.0 - 5.6 % Final     Comment:     ADA Screening Guidelines:  5.7-6.4%  Consistent with prediabetes  >or=6.5%  Consistent with diabetes  High levels of fetal hemoglobin interfere with the HbA1C  assay. Heterozygous hemoglobin variants (HbS, HgC, etc)do  not significantly interfere with this assay.   However, presence of multiple variants may affect accuracy.     05/28/2018 6.4 (H) 4.0 - 5.6 % Final     Comment:     According to ADA guidelines, hemoglobin A1c <7.0% represents  optimal control in non-pregnant diabetic patients. Different  metrics may apply to specific patient populations.   Standards of Medical Care in Diabetes-2016.  For the purpose of screening for the presence of diabetes:  <5.7%     Consistent with the absence of diabetes  5.7-6.4%  Consistent with increasing risk for diabetes   (prediabetes)  >or=6.5%  Consistent with diabetes  Currently, no consensus exists for use of hemoglobin A1c  for diagnosis of diabetes for children.  This Hemoglobin A1c assay has significant interference with fetal   hemoglobin   (HbF). The results are invalid for patients with abnormal amounts of   HbF,   including those with known Hereditary Persistence   of Fetal Hemoglobin. Heterozygous  hemoglobin variants (HbAS, HbAC,   HbAD, HbAE, HbA2) do not significantly interfere with this assay;   however, presence of multiple variants in a sample may impact the %   interference.       ]    ROS:  GENERAL:  Above  SKIN: No rashes, no itching.  HEAD:  Above  EYES: No visual changes  EARS: No ear pain or changes in hearing.  NOSE: No congestion or rhinorrhea.  MOUTH & THROAT: No hoarseness, change in voice, or sore throat.  NODES: Denies swollen glands.  CHEST:  Above  CARDIOVASCULAR:  Above  ABDOMEN: No nausea, vomiting, or changes in bowel function.  URINARY: No flank pain, dysuria or hematuria.  PERIPHERAL VASCULAR: No claudication or cyanosis.  MUSCULOSKELETAL: No joint stiffness or swelling. Denies back pain.  NEUROLOGIC: No weakness or numbness.    Vital signs reviewed  PE:   APPEARANCE: Well nourished, well developed, in no acute distress.    HEAD: Normocephalic, atraumatic.  EYES: PERRL. EOMI.   Conjunctivae noninjected.  EARS: TM's intact. Light reflex normal. No retraction or perforation  NOSE: Mucosa pink. Airway clear.  MOUTH & THROAT: No tonsillar enlargement. No pharyngeal erythema or exudate.   NECK: Supple with no cervical lymphadenopathy.    CHEST: Good inspiratory effort. Lungs overall clear with no wheezes.  Questionable subtle left basilar crackles posterior lung field  CARDIOVASCULAR: Normal S1, S2. No rubs, murmurs, or gallops.  ABDOMEN: Bowel sounds normal. Not distended. Soft. No tenderness or masses. No organomegaly.  EXTREMITIES: No edema, cyanosis, or clubbing.      IMPRESSION  1. Cough    2. History of influenza    3. Atypical pneumonia    4. Type 2 diabetes mellitus with stage 3 chronic kidney disease, without long-term current use of insulin            PLAN  New problem to me, needs further treatment as below  Reviewed notes from Dr. Inman and ER as summarized above  Reviewed his most recent chest x-ray and labs as of noted above    Given flu diagnosis 2 wks ago, Discussed potential  for postviral cough syndrome although given persistent symptoms without any improvement over the last 2+ weeks along with potentially high risk comorbidities such as uncontrolled diabetes and stage 3 chronic kidney disease, will add coverage for atypical pneumonia with doxycycline 100 mg b.i.d. for 7 days.  He was advised to ask the pharmacy about the prescription of codeine/guaifenesin that was prescribed by Dr. Inman.  He can continue to do albuterol inhaler 3 to 4 times a day as necessary for the next several days.  Since Dr. Inman just did chest x-ray last week, will not immediately repeat since being treated as above, however any progressive coughing or respiratory symptoms may need to be re-evaluated by repeat chest x-ray

## 2019-04-08 RX ORDER — METFORMIN HYDROCHLORIDE 500 MG/1
TABLET, EXTENDED RELEASE ORAL
Qty: 270 TABLET | Refills: 3 | Status: SHIPPED | OUTPATIENT
Start: 2019-04-08 | End: 2020-02-19

## 2019-06-24 RX ORDER — DULAGLUTIDE 1.5 MG/.5ML
INJECTION, SOLUTION SUBCUTANEOUS
Qty: 2 SYRINGE | Refills: 11 | Status: SHIPPED | OUTPATIENT
Start: 2019-06-24 | End: 2020-06-03

## 2019-08-12 ENCOUNTER — CLINICAL SUPPORT (OUTPATIENT)
Dept: AUDIOLOGY | Facility: CLINIC | Age: 74
End: 2019-08-12

## 2019-08-12 DIAGNOSIS — H90.3 ASYMMETRICAL SENSORINEURAL HEARING LOSS: Primary | ICD-10-CM

## 2019-08-12 PROCEDURE — 99499 UNLISTED E&M SERVICE: CPT | Mod: S$GLB,,, | Performed by: OTOLARYNGOLOGY

## 2019-08-12 PROCEDURE — 99499 NO LOS: ICD-10-PCS | Mod: S$GLB,,, | Performed by: OTOLARYNGOLOGY

## 2019-08-12 NOTE — PROGRESS NOTES
Jerardo Powers was seen in the clinic today for a hearing aid follow-up.    Mr. Powers reported he was not wearing the hearing aid and that it had become unpaired from his phone. Mr. Powers was counseled on the importance of wearing the hearing aid every day. Acclimatization was increased from 70% to 90%. Speech in moderate and loud noise was increased 2 dB. The hearing aid was paired to the iPhone. The Smart 3D woodrow was downloaded. Mr. Powers was shown how to adjust the hearing aid using the iPhone. Mr. Powers stated he wanted streaming enabled to his phone. He was shown how to disable streaming if he decided he didn't like this feature.     A two week follow-up appointment was scheduled to track his progress. Mr. Powers was encourage to call the clinic if he needed to be seen sooner.

## 2019-08-22 RX ORDER — LEVOTHYROXINE SODIUM 75 UG/1
TABLET ORAL
Qty: 90 TABLET | Refills: 3 | Status: SHIPPED | OUTPATIENT
Start: 2019-08-22 | End: 2019-08-27 | Stop reason: SDUPTHER

## 2019-08-26 ENCOUNTER — CLINICAL SUPPORT (OUTPATIENT)
Dept: AUDIOLOGY | Facility: CLINIC | Age: 74
End: 2019-08-26

## 2019-08-26 DIAGNOSIS — H90.3 ASYMMETRICAL SENSORINEURAL HEARING LOSS: Primary | ICD-10-CM

## 2019-08-26 PROCEDURE — 99499 UNLISTED E&M SERVICE: CPT | Mod: S$GLB,,, | Performed by: OTOLARYNGOLOGY

## 2019-08-26 PROCEDURE — 99499 NO LOS: ICD-10-PCS | Mod: S$GLB,,, | Performed by: OTOLARYNGOLOGY

## 2019-08-26 NOTE — PROGRESS NOTES
"Jerardo Powers was seen in the clinic today for a hearing aid follow-up.    Mr. Powers reported the dome was itching in his ear. He was instructed to try putting a few drops of baby oil in his ear at night to help keep the skin moisturized. An impression was also taken for a custom ear mold to be made. He reported he liked the way his "Favorite 2" program sounded. Bass was decreased 1 dB, mid frequencies were increased 3 dB, and high frequencies were decreased 1 dB. He will try these settings and report back when he returns to the clinic to  his ear mold.    We will call Mr. Powers when his ear mold comes in to set up an appointment.   "

## 2019-08-28 RX ORDER — LEVOTHYROXINE SODIUM 75 UG/1
TABLET ORAL
Qty: 90 TABLET | Refills: 3 | Status: SHIPPED | OUTPATIENT
Start: 2019-08-28 | End: 2019-09-09 | Stop reason: SDUPTHER

## 2019-08-30 ENCOUNTER — OFFICE VISIT (OUTPATIENT)
Dept: SLEEP MEDICINE | Facility: CLINIC | Age: 74
End: 2019-08-30
Payer: COMMERCIAL

## 2019-08-30 VITALS
HEART RATE: 104 BPM | BODY MASS INDEX: 30.4 KG/M2 | WEIGHT: 212.38 LBS | SYSTOLIC BLOOD PRESSURE: 120 MMHG | HEIGHT: 70 IN | DIASTOLIC BLOOD PRESSURE: 72 MMHG

## 2019-08-30 DIAGNOSIS — N18.30 TYPE 2 DIABETES MELLITUS WITH STAGE 3 CHRONIC KIDNEY DISEASE, WITHOUT LONG-TERM CURRENT USE OF INSULIN: ICD-10-CM

## 2019-08-30 DIAGNOSIS — G47.33 OSA (OBSTRUCTIVE SLEEP APNEA): ICD-10-CM

## 2019-08-30 DIAGNOSIS — E11.22 TYPE 2 DIABETES MELLITUS WITH STAGE 3 CHRONIC KIDNEY DISEASE, WITHOUT LONG-TERM CURRENT USE OF INSULIN: ICD-10-CM

## 2019-08-30 DIAGNOSIS — I10 ESSENTIAL HYPERTENSION: ICD-10-CM

## 2019-08-30 DIAGNOSIS — G47.33 OBSTRUCTIVE SLEEP APNEA: Primary | ICD-10-CM

## 2019-08-30 PROCEDURE — 3074F PR MOST RECENT SYSTOLIC BLOOD PRESSURE < 130 MM HG: ICD-10-PCS | Mod: CPTII,S$GLB,, | Performed by: NURSE PRACTITIONER

## 2019-08-30 PROCEDURE — 1101F PT FALLS ASSESS-DOCD LE1/YR: CPT | Mod: CPTII,S$GLB,, | Performed by: NURSE PRACTITIONER

## 2019-08-30 PROCEDURE — 3045F PR MOST RECENT HEMOGLOBIN A1C LEVEL 7.0-9.0%: CPT | Mod: CPTII,S$GLB,, | Performed by: NURSE PRACTITIONER

## 2019-08-30 PROCEDURE — 99214 PR OFFICE/OUTPT VISIT, EST, LEVL IV, 30-39 MIN: ICD-10-PCS | Mod: S$GLB,,, | Performed by: NURSE PRACTITIONER

## 2019-08-30 PROCEDURE — 99999 PR PBB SHADOW E&M-EST. PATIENT-LVL III: CPT | Mod: PBBFAC,,, | Performed by: NURSE PRACTITIONER

## 2019-08-30 PROCEDURE — 3078F PR MOST RECENT DIASTOLIC BLOOD PRESSURE < 80 MM HG: ICD-10-PCS | Mod: CPTII,S$GLB,, | Performed by: NURSE PRACTITIONER

## 2019-08-30 PROCEDURE — 99214 OFFICE O/P EST MOD 30 MIN: CPT | Mod: S$GLB,,, | Performed by: NURSE PRACTITIONER

## 2019-08-30 PROCEDURE — 3045F PR MOST RECENT HEMOGLOBIN A1C LEVEL 7.0-9.0%: ICD-10-PCS | Mod: CPTII,S$GLB,, | Performed by: NURSE PRACTITIONER

## 2019-08-30 PROCEDURE — 1101F PR PT FALLS ASSESS DOC 0-1 FALLS W/OUT INJ PAST YR: ICD-10-PCS | Mod: CPTII,S$GLB,, | Performed by: NURSE PRACTITIONER

## 2019-08-30 PROCEDURE — 3074F SYST BP LT 130 MM HG: CPT | Mod: CPTII,S$GLB,, | Performed by: NURSE PRACTITIONER

## 2019-08-30 PROCEDURE — 3078F DIAST BP <80 MM HG: CPT | Mod: CPTII,S$GLB,, | Performed by: NURSE PRACTITIONER

## 2019-08-30 PROCEDURE — 99999 PR PBB SHADOW E&M-EST. PATIENT-LVL III: ICD-10-PCS | Mod: PBBFAC,,, | Performed by: NURSE PRACTITIONER

## 2019-08-30 NOTE — PROGRESS NOTES
Jerardo Powers  was seen as f/u for mgt of JANEEN, last seen 12/2017    He remains adherent with apap 11-20cm, using faithfully. Needs his 6-mos filter. Using dreamwear but no chin strap and having oral drying. ESS=14.He prefers sleep schedule 3-4a--12pm, feels tired though at work.   Stress echo 2015 EF 55-60%  Interrogation- avg use 7-8h/night. AHI 4.1-5.3, periodic 2-11%, 99% mask fit. 90% tile 13-14.7cm.   HgBA1c 7.8 (1/2019)        HISTORY  10/20/2017 INITIAL HISTORY OF PRESENT ILLNESS:  Jerardo Powers is a 73 y.o. male is here to be evaluated for a sleep disorder.       CHIEF COMPLAINT:      The patient's complaints include excessive daytime sleepiness, excessive daytime fatigue, snoring,  witnessed breathing pauses,  gasping for air in sleep and interrupted sleep since  Since 2008 at least when he was diagnosed with severe JANEEN.    Several near misses.    He has been using CPAP 13 cm. His machine is old and needs replacement. He may have gained some weight since.     Reports occasional  dry mouth and sore throat  Denies nasal congestion   Denies  morning headaches  Reports  interrupted sleep  Denies frequent leg movements  Denies symptoms concerning for parasomnia    The ESS ([place] Sleepiness Score) taken on initial visit is 24 /24    The patient never had tonsillectomy, adenoidectomy or UPPP       INTERVAL HISTORY:    12/20/2017  The patient has not presented any new complaints since the previous visit.   The patient reports improved sleep continuity and daytime sleepiness on PAP. ESS today is 11/24.  Denies break through snoring. No dry mouth.   Therapy Event Summary Date Range: 11/20/2017 - 12/19/2017   Some periodic breathing was noted (possibly due to arousals), but no significant centrals.  Hide APAP 11-18; 90% 11-13 with Dreamwear mask     Compliance Summary  Apnea Indices  Ventilator Statistics    Days with Device Usage:  22 days  Average AHI:  5.2  Average Breath Rate:  14.3 bpm    Percentage of  "Days >=4 Hours:  66.7%  Average OA Index:  1.2  Average % Patient Triggered Breaths:  N/A    Average Usage (Days Used):  6 hrs. 28 mins. 37 secs.  Average CA Index:  1.6  Average Tidal Volume:  471.7 ml    Average Usage (All Days):  4 hrs. 44 mins. 59 secs.    Average Minute Vent:  N/A        Large Leak  Periodic Breathing     Average Time in Large Leak:  30 secs.  Average % of Night in PB:  8.0%     Average % of Night in Large Leak:  0.1%                    SLEEP ROUTINE AND LIFESTYLE 08/30/2019 :    Occupation:working    Bed partner:      Time to bed - wake up time on a workday : 1-2 to 10 AM  Time to bed - wake up time on a day off: 1-2 AM to  11-2  Sleep onset latency: 30 min  Disruptions or awakenings: 2  Time to fall back into sleep: 30 min   Perceived sleep quality: 0/  Perceived total sleep time:  5-6  hours.  Daytime naps: one    Exercise routine: no     PREVIOUS SLEEP STUDIES:     PSG/ SPLIT night study  In 5/28/08 showed significant JANEEN with the AHI of 34.7/hour and SaO2 minimum of 81 %.       REVIEW OF SYSTEMS:   Sleep related symptoms as per HPI,stable wgt. Otherwise a balance review of 10-systems is negative.     PHYSICAL EXAM:  /72   Pulse 104   Ht 5' 9.5" (1.765 m)   Wt 96.3 kg (212 lb 6.4 oz)   BMI 30.92 kg/m²   GENERAL: Normal development, well groomed, obese      ASSESSMENT:    1. JANEEN (obstructive sleep apnea).  Benefiting from CPAP use in terms of sleep continuity and daytime sleepiness. Met compliance. 8/30/19: Remains adherent with apap, 90 %tile 13-15cm, some concern for complex sleep apnea, periodic breathing 2-11% and review of raw data some cheyne Boyd resp pattern (encore). AHI 4.9  He has medical co-morbidities of DM2 with CKD stage 3 and hypertension,  which can be worsened by JANEEN. This warrants treatment.          PLAN:      Continue CPAP 11-18 cm and OBTAIN dedicated cpap/BIPAP titration, if centrals emerge and HAWA >5 if able switch to ASV. Current 90% tile pressure using " is 14-15cm  Education: During our discussion today, we talked about the etiology of obstructive sleep apnea as well as the potential ramifications of untreated sleep apnea, which could include daytime sleepiness, hypertension, heart disease and/or stroke.      Discussed etiology of JANEEN/central and potential ramifications of untreated JANEEN, including heart disease, hypertension, cognitive difficulties, stroke, and diabetes.    See PCP re: DM/HTN mgt/continue meds

## 2019-09-03 ENCOUNTER — OFFICE VISIT (OUTPATIENT)
Dept: FAMILY MEDICINE | Facility: CLINIC | Age: 74
End: 2019-09-03
Payer: COMMERCIAL

## 2019-09-03 VITALS
DIASTOLIC BLOOD PRESSURE: 76 MMHG | SYSTOLIC BLOOD PRESSURE: 122 MMHG | HEART RATE: 75 BPM | HEIGHT: 70 IN | BODY MASS INDEX: 30.52 KG/M2 | OXYGEN SATURATION: 96 % | TEMPERATURE: 98 F | WEIGHT: 213.19 LBS

## 2019-09-03 DIAGNOSIS — E55.9 VITAMIN D DEFICIENCY: ICD-10-CM

## 2019-09-03 DIAGNOSIS — N18.30 TYPE 2 DIABETES MELLITUS WITH STAGE 3 CHRONIC KIDNEY DISEASE, WITHOUT LONG-TERM CURRENT USE OF INSULIN: ICD-10-CM

## 2019-09-03 DIAGNOSIS — G57.11 MERALGIA PARESTHETICA OF RIGHT SIDE: ICD-10-CM

## 2019-09-03 DIAGNOSIS — Z00.00 ROUTINE GENERAL MEDICAL EXAMINATION AT A HEALTH CARE FACILITY: Primary | ICD-10-CM

## 2019-09-03 DIAGNOSIS — I10 HYPERTENSION, UNSPECIFIED TYPE: ICD-10-CM

## 2019-09-03 DIAGNOSIS — M10.9 GOUT, UNSPECIFIED CAUSE, UNSPECIFIED CHRONICITY, UNSPECIFIED SITE: ICD-10-CM

## 2019-09-03 DIAGNOSIS — G89.29 ABDOMINAL PAIN, CHRONIC, BILATERAL LOWER QUADRANT: ICD-10-CM

## 2019-09-03 DIAGNOSIS — E03.9 HYPOTHYROIDISM, UNSPECIFIED TYPE: ICD-10-CM

## 2019-09-03 DIAGNOSIS — J43.8 OTHER EMPHYSEMA: ICD-10-CM

## 2019-09-03 DIAGNOSIS — E11.22 TYPE 2 DIABETES MELLITUS WITH STAGE 3 CHRONIC KIDNEY DISEASE, WITHOUT LONG-TERM CURRENT USE OF INSULIN: ICD-10-CM

## 2019-09-03 DIAGNOSIS — E53.8 B12 DEFICIENCY: ICD-10-CM

## 2019-09-03 DIAGNOSIS — R10.31 ABDOMINAL PAIN, CHRONIC, BILATERAL LOWER QUADRANT: ICD-10-CM

## 2019-09-03 DIAGNOSIS — R10.32 ABDOMINAL PAIN, CHRONIC, BILATERAL LOWER QUADRANT: ICD-10-CM

## 2019-09-03 PROCEDURE — 99397 PR PREVENTIVE VISIT,EST,65 & OVER: ICD-10-PCS | Mod: S$GLB,,, | Performed by: FAMILY MEDICINE

## 2019-09-03 PROCEDURE — 99999 PR PBB SHADOW E&M-EST. PATIENT-LVL IV: CPT | Mod: PBBFAC,,, | Performed by: FAMILY MEDICINE

## 2019-09-03 PROCEDURE — 3045F PR MOST RECENT HEMOGLOBIN A1C LEVEL 7.0-9.0%: CPT | Mod: CPTII,S$GLB,, | Performed by: FAMILY MEDICINE

## 2019-09-03 PROCEDURE — 3074F SYST BP LT 130 MM HG: CPT | Mod: CPTII,S$GLB,, | Performed by: FAMILY MEDICINE

## 2019-09-03 PROCEDURE — 3078F DIAST BP <80 MM HG: CPT | Mod: CPTII,S$GLB,, | Performed by: FAMILY MEDICINE

## 2019-09-03 PROCEDURE — 3078F PR MOST RECENT DIASTOLIC BLOOD PRESSURE < 80 MM HG: ICD-10-PCS | Mod: CPTII,S$GLB,, | Performed by: FAMILY MEDICINE

## 2019-09-03 PROCEDURE — 99397 PER PM REEVAL EST PAT 65+ YR: CPT | Mod: S$GLB,,, | Performed by: FAMILY MEDICINE

## 2019-09-03 PROCEDURE — 3045F PR MOST RECENT HEMOGLOBIN A1C LEVEL 7.0-9.0%: ICD-10-PCS | Mod: CPTII,S$GLB,, | Performed by: FAMILY MEDICINE

## 2019-09-03 PROCEDURE — 3074F PR MOST RECENT SYSTOLIC BLOOD PRESSURE < 130 MM HG: ICD-10-PCS | Mod: CPTII,S$GLB,, | Performed by: FAMILY MEDICINE

## 2019-09-03 PROCEDURE — 99999 PR PBB SHADOW E&M-EST. PATIENT-LVL IV: ICD-10-PCS | Mod: PBBFAC,,, | Performed by: FAMILY MEDICINE

## 2019-09-03 NOTE — PATIENT INSTRUCTIONS
You could try decreasing to 1/2 pill of the norvasc (amlodipine).--goal blood pressure should stay less than 140/90.       Look into getting the Shingles vaccine (SHINGRIX) at your local pharmacy (2 part series, done once at/after age 50)      CETAPHIL cream moisturizer--apply daily

## 2019-09-03 NOTE — PROGRESS NOTES
(Portions of this note were dictated using voice recognition software and may contain dictation related errors in spelling/grammar/syntax not found on text review)    CC:   Chief Complaint   Patient presents with    Annual Exam       HPI: 73 y.o. male     Diabetes, with chronic kidney disease stage 3.  On Trulicity 1.5 mg weekly.  Was intolerant to metformin secondary to diarrhea but restarted med a lower dose of 500 mg extended release daily.   currently back to 1 pill metformin in am and 2 in pm.   Ft exam  September 2018  Eye exam  June 2018, appt next week.      BPPV, follows with ENT, last visit 10/16/2018     Hypertension:  On amlodipine 10 mg daily, Avapro 150 mg daily.  Sometimes notices his blood pressure goes low, 1 time he went on the treadmill (is not too often) and his blood pressure was 105/51.  Sometimes he will feel lightheaded during these times.     Vitamin-D deficiency, advised OTC vitamin D3, 5000 units daily     Vitamin B12 deficiency:  Started B12 orally 1000 mcg daily., potentially switch to injection therapy if oral therapy is not sufficient     Hypothyroidism on synthroid 75 mcg. Last tsh below     BPH, followed by urology.  On kavita.  Did have hematuria and was admitted in September 2018.  CT urogram demonstrated to left into ureteral stones measuring 0.8 in 0.5 cm located in mid left ureter and left ureterovesical junction with minimal associated obstructive uropathy.  He was treated with Cipro in the hospital at that time.  Had a urinary stone analysis done 10/03/2018 showing calcium oxalate stone     Anxiety on citalopram 20 mg daily     Gout on allopurinol 100 mg daily     JANEEN, followed by sleep medicine, on CPAP     Not on statin, have addressed at multiple visits but patient has declined despite counseling of benefit     Have discussed weight loss in detail on prior visits .  Not much exercise or attention to calorie counting.  Had referred to medical fitness program on prior visits  but had declined.  His cardiologist also recommended significant attention to dietary and exercise modification.    Occasional numbness right upper outer thigh usually when he is standing for long time.  No associated pain    Also has some dry skin left forearm, not itchy    Past Medical History:   Diagnosis Date    Anxiety     BPH (benign prostatic hyperplasia)     BPH (benign prostatic hypertrophy)     Cataract     CKD (chronic kidney disease) stage 3, GFR 30-59 ml/min 5/5/2014    Colon polyps     Diabetes mellitus type II     Emphysema NEC     mild    Gout     Hematuria     Hyperlipidemia     Hypertension     Hypothyroidism     IBS (irritable bowel syndrome)     Kidney stones     JANEEN (obstructive sleep apnea)     Plantar fasciitis     Reflux        Past Surgical History:   Procedure Laterality Date    CATARACT EXTRACTION  1/28/2013    RIGHT EYE    CATARACT EXTRACTION      left eye    COLONOSCOPY  Sep 2011    Repeat recommended in 5 years    COLONOSCOPY N/A 10/10/2016    [facility] (4TH FLR)    CYSTOSCOPY  10/3/2018    [facility] at [facility] OR 1ST FLR    CYSTOSCOPY, WITH URETERAL STENT INSERTION Left 9/26/2018    [facility]    ESOPHAGOGASTRODUODENOSCOPY (EGD) N/A 6/5/2017    Performed by [facility] at [facility] (4TH FLR)    ESOPHAGOGASTRODUODENOSCOPY (EGD) N/A 12/23/2014    [facility] at [facility] (4TH FLR)    KIDNEY STONE SURGERY      LITHOTRIPSY, USING LASER Left 10/3/2018    [facility] at [facility] OR [address] [place] [address]    LITHOTRIPSY-EXTRACORPOREAL SHOCK WAVE Left 3/21/2018    [facility] at [facility] OR [address] [place] [address]    PYELOGRAM, RETROGRADE Left 10/3/2018    [facility]    REMOVAL, CALCULUS, URETER, URETEROSCOPIC Left 10/3/2018    [facility]    REMOVAL-STENT Left  10/3/2018    [facility] OR 1ST FLR    REPLACEMENT, STENT Left 10/3/2018    [facility] OR 1ST FLR       Family History   Problem Relation Age of Onset    Cancer Brother         non-hodgkins T cell lymphoma    Diabetes Mother     Macular degeneration Brother     Coronary artery disease Neg Hx     Colon cancer Neg Hx     Prostate cancer Neg Hx     Esophageal cancer Neg Hx        Social History     Socioeconomic History    Marital status:      Spouse name: Not on file    Number of children: Not on file    Years of education: Not on file    Highest education level: Not on file   Occupational History    Not on file   Social Needs    Financial resource strain: Not hard at all    Food insecurity:     Worry: Never true     Inability: Never true    Transportation needs:     Medical: No     Non-medical: No   Tobacco Use    Smoking status: Never Smoker    Smokeless tobacco: Never Used   Substance and Sexual Activity    Alcohol use: No     Alcohol/week: 0.0 oz     Frequency: Monthly or less     Drinks per session: 1 or 2     Binge frequency: Never     Comment: occasionally    Drug use: No    Sexual activity: Never   Lifestyle    Physical activity:     Days per week: 0 days     Minutes per session: 0 min    Stress: Not at all   Relationships    Social connections:     Talks on phone: Once a week     Gets together: Once a week     Attends Worship service: Not on file     Active member of club or organization: No     Attends meetings of clubs or organizations: Never     Relationship status:    Other Topics Concern    Not on file   Social History Narrative    Not on file       Answers for HPI/ROS submitted by the patient on 9/2/2019   activity change: No  unexpected weight change: No  neck pain: No  hearing loss: Yes  rhinorrhea: No  trouble swallowing: No  eye discharge: No  visual disturbance: No  chest tightness: No  wheezing:  No  chest pain: No  palpitations: No  blood in stool: No  constipation: No  vomiting: No  diarrhea: Yes  polydipsia: No  polyuria: No  difficulty urinating: No  urgency: No  hematuria: No  joint swelling: No  arthralgias: Yes  headaches: No  weakness: Yes  confusion: No  dysphoric mood: No    Lab Results   Component Value Date    WBC 12.32 01/26/2019    HGB 15.2 01/26/2019    HCT 44.8 01/26/2019    MCV 85 01/26/2019     01/26/2019    CHOL 139 01/26/2019    TRIG 185 (H) 01/26/2019    HDL 43 01/26/2019    ALT 21 01/26/2019    AST 20 01/26/2019    BILITOT 0.9 01/26/2019    ALKPHOS 124 01/26/2019     01/26/2019    K 3.6 01/26/2019     01/26/2019    CREATININE 1.4 01/26/2019    CALCIUM 9.4 01/26/2019    ALBUMIN 3.5 01/26/2019    BUN 10 01/26/2019    CO2 26 01/26/2019    TSH 5.309 (H) 01/26/2019    PSA 0.74 07/14/2017    INR 1.0 09/26/2018    HGBA1C 7.8 (H) 01/26/2019    LDLCALC 59.0 (L) 01/26/2019     (H) 01/26/2019       Vitamin B12 (01/26/2019) 958  Vitamin-D (01/26/2019) 20    Hemoglobin A1C (%)   Date Value   01/26/2019 7.8 (H)   08/28/2018 7.2 (H)   07/19/2018 6.7 (H)   05/28/2018 6.4 (H)   02/09/2018 6.4 (H)   07/14/2017 6.7 (H)     PSA DIAGNOSTIC   Date Value Ref Range Status   02/19/2016 1.3 0.00 - 4.00 ng/mL Final     Comment:     PSA Expected levels:  Hormonal Therapy: <0.05 ng/ml  Prostatectomy: <0.01 ng/ml  Radiation Therapy: <1.00 ng/ml         Vital signs reviewed  PE:   APPEARANCE: Well nourished, well developed, in no acute distress.    HEAD: Normocephalic, atraumatic.  EYES: PERRL. EOMI.   Conjunctivae noninjected.  EARS: TM's intact. Light reflex normal. No retraction or perforation.    NOSE: Mucosa pink. Airway clear.  MOUTH & THROAT: No tonsillar enlargement. No pharyngeal erythema or exudate.   NECK: Supple with no cervical lymphadenopathy.  No carotid bruits.  No thyromegaly  CHEST: Good inspiratory effort. Lungs clear to auscultation with no wheezes or  crackles.  CARDIOVASCULAR: Normal S1, S2. No rubs, murmurs, or gallops.  ABDOMEN: Bowel sounds normal. Not distended. Soft. No masses. No organomegaly. Does have some bilateral lower quadrant tenderness to palpation, denies any significant abdominal pain normally except for during palpation, also when he gets massage and has the abdominal wall stretched.  EXTREMITIES: No edema, cyanosis, or clubbing.  Slight xerosis noted left forearm dorsum.  No inflammatory changes.  DIABETIC FOOT EXAM: Protective Sensation (w/ 10 gram monofilament):  Right: Intact  Left: Intact    Visual Inspection:  Normal -  Bilateral    Pedal Pulses:   Right: Present  Left: Present    Posterior tibialis:   Right:Present  Left: Present          IMPRESSION  1. Routine general medical examination at a health care facility    2. Hypothyroidism, unspecified type    3. Hypertension, unspecified type    4. B12 deficiency    5. Vitamin D deficiency    6. Gout, unspecified cause, unspecified chronicity, unspecified site    7. Type 2 diabetes mellitus with stage 3 chronic kidney disease, without long-term current use of insulin    8. Meralgia paresthetica of right side    9. Abdominal pain, chronic, bilateral lower quadrant        PLAN  Orders Placed This Encounter   Procedures    CBC auto differential    Comprehensive metabolic panel    Lipid panel    TSH    Hemoglobin A1c    Vitamin D    Vitamin B12    Uric acid     Diabetes health maintenance:  -- advise regular and consistent glucose monitoring and medication compliance.  -- advise daily foot checks  -- advise yearly ophthalmologic exams  -- advise adequate dietary and exercise modification  -- advise regular dental visits  -- advise daily low-dose aspirin use if patient is not on other anticoagulants and there are no other contraindications.  -- Medication management:  Continue Trulicity 1.5 mg weekly, metformin 500 mg, 1 pill in the morning and 2 pills in the evening.    Hypertension  controlled.  Given some occasional hypotension concerns Can try to reduce amlodipine to 5 mg (half of his current 10 mg pill), Avapro 150 mg daily to see if his blood pressure stable, goal less than 140/90.  If he notices elevation of blood pressure at or above this goal, should probably go back up to the 10 mg amlodipine dose.  Emphasize importance of monitoring sodium, regular exercise    Hypothyroidism:  Continue Synthroid 75 mcg daily.  Check labs    Gout:  Continue allopurinol 100 mg prophylaxis    Meralgia paresthetica:  Reassurance.  Emphasize weight loss    Lower quadrant abdominal pain, usually only on direct palpation or stretching.  Not normally noted.  No bowel changes.  No systemic symptoms.  He asks about possibility of abdominal wall hernia although I do not feel this on exam today.  Does have some chronic diarrhea on account his metformin, not sure if this is in any way related to his symptoms.  Would advise if symptoms are more persistent and not just with certain movements or palpation on during exam, may need further workup.  Up-to-date on colonoscopy.      SCREENINGS  Colonoscopy: 2016.  Normal colonoscopy except for medium size internal hemorrhoids visualized.  Prostate : [facility], Dr. Murray.     Immunizations  pvx 2013  Pcv: 2016  Tetanus 7/20/15  flu:  09/06/2018  Zoster:  Can get through pharmacy        Stress echo August 2015, normal  EKGs  2016: nsr  48 hour Holter monitor April 2014. 3 episodes of shortness of breath reported, corresponding rhythm is sinus rhythm. One syncopal episode reported and corresponding rhythm was sinus rhythm at 79 bpm. One episode of lightheadedness reported, corresponding rhythm was sinus bradycardia 59 bpm. No high-grade AV block. Rare PVCs. No ventricular tachycardia. Very rare PACs

## 2019-09-04 ENCOUNTER — LAB VISIT (OUTPATIENT)
Dept: LAB | Facility: HOSPITAL | Age: 74
End: 2019-09-04
Attending: FAMILY MEDICINE
Payer: COMMERCIAL

## 2019-09-04 DIAGNOSIS — E55.9 VITAMIN D DEFICIENCY: ICD-10-CM

## 2019-09-04 DIAGNOSIS — M10.9 GOUT, UNSPECIFIED CAUSE, UNSPECIFIED CHRONICITY, UNSPECIFIED SITE: ICD-10-CM

## 2019-09-04 DIAGNOSIS — E53.8 B12 DEFICIENCY: ICD-10-CM

## 2019-09-04 DIAGNOSIS — E03.9 HYPOTHYROIDISM, UNSPECIFIED TYPE: ICD-10-CM

## 2019-09-04 DIAGNOSIS — E11.22 TYPE 2 DIABETES MELLITUS WITH STAGE 3 CHRONIC KIDNEY DISEASE, WITHOUT LONG-TERM CURRENT USE OF INSULIN: ICD-10-CM

## 2019-09-04 DIAGNOSIS — I10 HYPERTENSION, UNSPECIFIED TYPE: ICD-10-CM

## 2019-09-04 DIAGNOSIS — Z00.00 ROUTINE GENERAL MEDICAL EXAMINATION AT A HEALTH CARE FACILITY: ICD-10-CM

## 2019-09-04 DIAGNOSIS — N18.30 TYPE 2 DIABETES MELLITUS WITH STAGE 3 CHRONIC KIDNEY DISEASE, WITHOUT LONG-TERM CURRENT USE OF INSULIN: ICD-10-CM

## 2019-09-04 LAB
ALBUMIN SERPL BCP-MCNC: 3.9 G/DL (ref 3.5–5.2)
ALP SERPL-CCNC: 99 U/L (ref 55–135)
ALT SERPL W/O P-5'-P-CCNC: 23 U/L (ref 10–44)
ANION GAP SERPL CALC-SCNC: 10 MMOL/L (ref 8–16)
AST SERPL-CCNC: 17 U/L (ref 10–40)
BASOPHILS # BLD AUTO: 0.04 K/UL (ref 0–0.2)
BASOPHILS NFR BLD: 0.4 % (ref 0–1.9)
BILIRUB SERPL-MCNC: 0.5 MG/DL (ref 0.1–1)
BUN SERPL-MCNC: 12 MG/DL (ref 8–23)
CALCIUM SERPL-MCNC: 9 MG/DL (ref 8.7–10.5)
CHLORIDE SERPL-SCNC: 106 MMOL/L (ref 95–110)
CHOLEST SERPL-MCNC: 188 MG/DL (ref 120–199)
CHOLEST/HDLC SERPL: 4.1 {RATIO} (ref 2–5)
CO2 SERPL-SCNC: 25 MMOL/L (ref 23–29)
CREAT SERPL-MCNC: 1.3 MG/DL (ref 0.5–1.4)
DIFFERENTIAL METHOD: ABNORMAL
EOSINOPHIL # BLD AUTO: 0.7 K/UL (ref 0–0.5)
EOSINOPHIL NFR BLD: 6.9 % (ref 0–8)
ERYTHROCYTE [DISTWIDTH] IN BLOOD BY AUTOMATED COUNT: 13 % (ref 11.5–14.5)
EST. GFR  (AFRICAN AMERICAN): >60 ML/MIN/1.73 M^2
EST. GFR  (NON AFRICAN AMERICAN): 54.1 ML/MIN/1.73 M^2
ESTIMATED AVG GLUCOSE: 140 MG/DL (ref 68–131)
GLUCOSE SERPL-MCNC: 124 MG/DL (ref 70–110)
HBA1C MFR BLD HPLC: 6.5 % (ref 4–5.6)
HCT VFR BLD AUTO: 45.9 % (ref 40–54)
HDLC SERPL-MCNC: 46 MG/DL (ref 40–75)
HDLC SERPL: 24.5 % (ref 20–50)
HGB BLD-MCNC: 15.3 G/DL (ref 14–18)
IMM GRANULOCYTES # BLD AUTO: 0.09 K/UL (ref 0–0.04)
IMM GRANULOCYTES NFR BLD AUTO: 0.9 % (ref 0–0.5)
LDLC SERPL CALC-MCNC: ABNORMAL MG/DL (ref 63–159)
LYMPHOCYTES # BLD AUTO: 2.7 K/UL (ref 1–4.8)
LYMPHOCYTES NFR BLD: 25.7 % (ref 18–48)
MCH RBC QN AUTO: 29.3 PG (ref 27–31)
MCHC RBC AUTO-ENTMCNC: 33.3 G/DL (ref 32–36)
MCV RBC AUTO: 88 FL (ref 82–98)
MONOCYTES # BLD AUTO: 0.8 K/UL (ref 0.3–1)
MONOCYTES NFR BLD: 7.6 % (ref 4–15)
NEUTROPHILS # BLD AUTO: 6.1 K/UL (ref 1.8–7.7)
NEUTROPHILS NFR BLD: 58.5 % (ref 38–73)
NONHDLC SERPL-MCNC: 142 MG/DL
NRBC BLD-RTO: 0 /100 WBC
PLATELET # BLD AUTO: 246 K/UL (ref 150–350)
PMV BLD AUTO: 12.1 FL (ref 9.2–12.9)
POTASSIUM SERPL-SCNC: 3.6 MMOL/L (ref 3.5–5.1)
PROT SERPL-MCNC: 7 G/DL (ref 6–8.4)
RBC # BLD AUTO: 5.23 M/UL (ref 4.6–6.2)
SODIUM SERPL-SCNC: 141 MMOL/L (ref 136–145)
T4 FREE SERPL-MCNC: 1.04 NG/DL (ref 0.71–1.51)
TRIGL SERPL-MCNC: 410 MG/DL (ref 30–150)
TSH SERPL DL<=0.005 MIU/L-ACNC: 4.74 UIU/ML (ref 0.4–4)
URATE SERPL-MCNC: 5.8 MG/DL (ref 3.4–7)
WBC # BLD AUTO: 10.48 K/UL (ref 3.9–12.7)

## 2019-09-04 PROCEDURE — 83036 HEMOGLOBIN GLYCOSYLATED A1C: CPT

## 2019-09-04 PROCEDURE — 85025 COMPLETE CBC W/AUTO DIFF WBC: CPT

## 2019-09-04 PROCEDURE — 80061 LIPID PANEL: CPT

## 2019-09-04 PROCEDURE — 82306 VITAMIN D 25 HYDROXY: CPT

## 2019-09-04 PROCEDURE — 82607 VITAMIN B-12: CPT

## 2019-09-04 PROCEDURE — 84550 ASSAY OF BLOOD/URIC ACID: CPT

## 2019-09-04 PROCEDURE — 80053 COMPREHEN METABOLIC PANEL: CPT

## 2019-09-04 PROCEDURE — 84439 ASSAY OF FREE THYROXINE: CPT

## 2019-09-04 PROCEDURE — 84443 ASSAY THYROID STIM HORMONE: CPT

## 2019-09-04 PROCEDURE — 36415 COLL VENOUS BLD VENIPUNCTURE: CPT | Mod: PO

## 2019-09-05 ENCOUNTER — TELEPHONE (OUTPATIENT)
Dept: AUDIOLOGY | Facility: CLINIC | Age: 74
End: 2019-09-05

## 2019-09-05 LAB
25(OH)D3+25(OH)D2 SERPL-MCNC: 24 NG/ML (ref 30–96)
VIT B12 SERPL-MCNC: 644 PG/ML (ref 210–950)

## 2019-09-06 ENCOUNTER — CLINICAL SUPPORT (OUTPATIENT)
Dept: AUDIOLOGY | Facility: CLINIC | Age: 74
End: 2019-09-06
Payer: COMMERCIAL

## 2019-09-06 DIAGNOSIS — H90.3 ASYMMETRICAL SENSORINEURAL HEARING LOSS: Primary | ICD-10-CM

## 2019-09-06 PROCEDURE — 99499 UNLISTED E&M SERVICE: CPT | Mod: S$GLB,,, | Performed by: AUDIOLOGIST-HEARING AID FITTER

## 2019-09-06 PROCEDURE — 99499 NO LOS: ICD-10-PCS | Mod: S$GLB,,, | Performed by: AUDIOLOGIST-HEARING AID FITTER

## 2019-09-06 NOTE — PROGRESS NOTES
ARAVINDjuliethchristiane Powers was seen in the clinic today to  his ear mold.     Mr. Powers was pleased with how the canal mold fit. The hearing aid was reprogrammed. 65 dB gain was decreased 1 dB for the high frequencies. He was pleased with how the hearing aid sounded.    Mr. Powers will call the clinic for a follow-up appointment as needed.      Ear mold information:    Left ear  : [employer]   length/strength: # MP  Serial number: [id number]  Warranty: 03/03/2020

## 2019-09-09 ENCOUNTER — PATIENT MESSAGE (OUTPATIENT)
Dept: FAMILY MEDICINE | Facility: CLINIC | Age: 74
End: 2019-09-09

## 2019-09-09 ENCOUNTER — TELEPHONE (OUTPATIENT)
Dept: SLEEP MEDICINE | Facility: OTHER | Age: 74
End: 2019-09-09

## 2019-09-09 RX ORDER — LEVOTHYROXINE SODIUM 88 UG/1
TABLET ORAL
Qty: 90 TABLET | Refills: 3 | Status: SHIPPED | OUTPATIENT
Start: 2019-09-09 | End: 2020-08-24

## 2019-09-09 NOTE — TELEPHONE ENCOUNTER
Dear Jerardo Powers    Your recent labs were reviewed and released to your account. Your diabetes lab test is looking to be well controlled.  The thyroid test is a bit underactive, not by much, but your triglycerides or fat in the blood are also quite elevated.  Sometimes underactive thyroid can contribute to this.  I would like to increase your Synthroid up from 75 mcg to 88 mcg daily.  We should recheck another thyroid test along with diabetes test and cholesterol test in the next 3 months.. Please let me know if you have any questions.    Adams Moore MD

## 2019-09-23 RX ORDER — AMLODIPINE BESYLATE 10 MG/1
TABLET ORAL
Qty: 90 TABLET | Refills: 3 | Status: SHIPPED | OUTPATIENT
Start: 2019-09-23 | End: 2020-09-03 | Stop reason: SDUPTHER

## 2019-09-23 RX ORDER — LANOLIN ALCOHOL/MO/W.PET/CERES
1000 CREAM (GRAM) TOPICAL DAILY
Qty: 90 TABLET | Refills: 3 | Status: SHIPPED | OUTPATIENT
Start: 2019-09-23 | End: 2020-09-25

## 2019-09-25 ENCOUNTER — PATIENT OUTREACH (OUTPATIENT)
Dept: ADMINISTRATIVE | Facility: OTHER | Age: 74
End: 2019-09-25

## 2019-09-26 ENCOUNTER — OFFICE VISIT (OUTPATIENT)
Dept: OPTOMETRY | Facility: CLINIC | Age: 74
End: 2019-09-26
Payer: COMMERCIAL

## 2019-09-26 DIAGNOSIS — E11.9 TYPE 2 DIABETES MELLITUS WITHOUT OPHTHALMIC MANIFESTATIONS: Primary | ICD-10-CM

## 2019-09-26 DIAGNOSIS — H35.413 LATTICE DEGENERATION OF BOTH RETINAS: ICD-10-CM

## 2019-09-26 DIAGNOSIS — H52.4 PRESBYOPIA: ICD-10-CM

## 2019-09-26 PROCEDURE — 99999 PR PBB SHADOW E&M-EST. PATIENT-LVL II: CPT | Mod: PBBFAC,,, | Performed by: OPTOMETRIST

## 2019-09-26 PROCEDURE — 92014 COMPRE OPH EXAM EST PT 1/>: CPT | Mod: S$GLB,,, | Performed by: OPTOMETRIST

## 2019-09-26 PROCEDURE — 92014 PR EYE EXAM, EST PATIENT,COMPREHESV: ICD-10-PCS | Mod: S$GLB,,, | Performed by: OPTOMETRIST

## 2019-09-26 PROCEDURE — 99999 PR PBB SHADOW E&M-EST. PATIENT-LVL II: ICD-10-PCS | Mod: PBBFAC,,, | Performed by: OPTOMETRIST

## 2019-09-26 NOTE — PROGRESS NOTES
"       HPI     Last eye exam as 06/05/2018 with Dr Rivera. Presents for annual diabetic   eye exam with blurry vision in distance and near in both eyes. Wears PALs   - "glasses are getting weaker".  Blood pressure and blood sugar controlled with medications.  Denies headaches, flashes, pain, diplopia, eye drops. Occasional floaters.      Last edited by Yareli Rivera, OD on 9/26/2019  3:00 PM. (History)            Assessment /Plan     For exam results, see Encounter Report.    Type 2 diabetes mellitus without ophthalmic manifestations    Lattice degeneration of both retinas    Presbyopia          1.  No retinopathy--monitor yearly.  BS control.    2.  Laser treatment OU--no new holes, tears, or breaks.  3.  Continue w/ current rx.  Artificial tears as needed OU.          RTC 1 year for dm exam.                                          "

## 2019-10-02 ENCOUNTER — TELEPHONE (OUTPATIENT)
Dept: SLEEP MEDICINE | Facility: OTHER | Age: 74
End: 2019-10-02

## 2019-10-08 ENCOUNTER — TELEPHONE (OUTPATIENT)
Dept: SLEEP MEDICINE | Facility: OTHER | Age: 74
End: 2019-10-08

## 2019-10-23 ENCOUNTER — TELEPHONE (OUTPATIENT)
Dept: SLEEP MEDICINE | Facility: OTHER | Age: 74
End: 2019-10-23

## 2019-12-05 ENCOUNTER — CLINICAL SUPPORT (OUTPATIENT)
Dept: AUDIOLOGY | Facility: CLINIC | Age: 74
End: 2019-12-05
Payer: COMMERCIAL

## 2019-12-05 ENCOUNTER — OFFICE VISIT (OUTPATIENT)
Dept: OTOLARYNGOLOGY | Facility: CLINIC | Age: 74
End: 2019-12-05
Payer: COMMERCIAL

## 2019-12-05 ENCOUNTER — OFFICE VISIT (OUTPATIENT)
Dept: FAMILY MEDICINE | Facility: CLINIC | Age: 74
End: 2019-12-05
Payer: COMMERCIAL

## 2019-12-05 VITALS
HEIGHT: 70 IN | DIASTOLIC BLOOD PRESSURE: 70 MMHG | WEIGHT: 215.38 LBS | SYSTOLIC BLOOD PRESSURE: 120 MMHG | TEMPERATURE: 98 F | OXYGEN SATURATION: 93 % | HEART RATE: 78 BPM | BODY MASS INDEX: 30.83 KG/M2

## 2019-12-05 VITALS
DIASTOLIC BLOOD PRESSURE: 70 MMHG | HEART RATE: 78 BPM | SYSTOLIC BLOOD PRESSURE: 120 MMHG | WEIGHT: 215.38 LBS | HEIGHT: 69 IN | BODY MASS INDEX: 31.9 KG/M2

## 2019-12-05 DIAGNOSIS — R05.9 COUGH: ICD-10-CM

## 2019-12-05 DIAGNOSIS — H91.90 PERCEIVED HEARING LOSS: ICD-10-CM

## 2019-12-05 DIAGNOSIS — E03.9 HYPOTHYROIDISM, UNSPECIFIED TYPE: ICD-10-CM

## 2019-12-05 DIAGNOSIS — E55.9 VITAMIN D DEFICIENCY: ICD-10-CM

## 2019-12-05 DIAGNOSIS — H65.00 ACUTE SEROUS OTITIS MEDIA, RECURRENCE NOT SPECIFIED, UNSPECIFIED LATERALITY: ICD-10-CM

## 2019-12-05 DIAGNOSIS — R06.2 WHEEZE: Primary | ICD-10-CM

## 2019-12-05 DIAGNOSIS — H90.6 MIXED CONDUCTIVE AND SENSORINEURAL HEARING LOSS OF BOTH EARS: Primary | ICD-10-CM

## 2019-12-05 DIAGNOSIS — H91.90 HEARING LOSS, UNSPECIFIED HEARING LOSS TYPE, UNSPECIFIED LATERALITY: Primary | ICD-10-CM

## 2019-12-05 DIAGNOSIS — I10 HYPERTENSION, UNSPECIFIED TYPE: ICD-10-CM

## 2019-12-05 DIAGNOSIS — H69.93 DYSFUNCTION OF BOTH EUSTACHIAN TUBES: ICD-10-CM

## 2019-12-05 DIAGNOSIS — Z97.4 WEARS HEARING AID IN LEFT EAR: ICD-10-CM

## 2019-12-05 DIAGNOSIS — N18.30 TYPE 2 DIABETES MELLITUS WITH STAGE 3 CHRONIC KIDNEY DISEASE, WITHOUT LONG-TERM CURRENT USE OF INSULIN: ICD-10-CM

## 2019-12-05 DIAGNOSIS — J31.0 RHINITIS, UNSPECIFIED TYPE: ICD-10-CM

## 2019-12-05 DIAGNOSIS — E11.22 TYPE 2 DIABETES MELLITUS WITH STAGE 3 CHRONIC KIDNEY DISEASE, WITHOUT LONG-TERM CURRENT USE OF INSULIN: ICD-10-CM

## 2019-12-05 DIAGNOSIS — H91.90 HEARING LOSS, UNSPECIFIED HEARING LOSS TYPE, UNSPECIFIED LATERALITY: ICD-10-CM

## 2019-12-05 PROCEDURE — 99213 PR OFFICE/OUTPT VISIT, EST, LEVL III, 20-29 MIN: ICD-10-PCS | Mod: 25,S$GLB,, | Performed by: OTOLARYNGOLOGY

## 2019-12-05 PROCEDURE — 3078F PR MOST RECENT DIASTOLIC BLOOD PRESSURE < 80 MM HG: ICD-10-PCS | Mod: CPTII,S$GLB,, | Performed by: OTOLARYNGOLOGY

## 2019-12-05 PROCEDURE — 99999 PR PBB SHADOW E&M-EST. PATIENT-LVL IV: CPT | Mod: PBBFAC,,, | Performed by: FAMILY MEDICINE

## 2019-12-05 PROCEDURE — 92557 PR COMPREHENSIVE HEARING TEST: ICD-10-PCS | Mod: S$GLB,,, | Performed by: AUDIOLOGIST

## 2019-12-05 PROCEDURE — 3074F PR MOST RECENT SYSTOLIC BLOOD PRESSURE < 130 MM HG: ICD-10-PCS | Mod: CPTII,S$GLB,, | Performed by: OTOLARYNGOLOGY

## 2019-12-05 PROCEDURE — 99999 PR PBB SHADOW E&M-EST. PATIENT-LVL III: CPT | Mod: PBBFAC,,, | Performed by: OTOLARYNGOLOGY

## 2019-12-05 PROCEDURE — 92557 COMPREHENSIVE HEARING TEST: CPT | Mod: S$GLB,,, | Performed by: AUDIOLOGIST

## 2019-12-05 PROCEDURE — 1101F PR PT FALLS ASSESS DOC 0-1 FALLS W/OUT INJ PAST YR: ICD-10-PCS | Mod: CPTII,S$GLB,, | Performed by: OTOLARYNGOLOGY

## 2019-12-05 PROCEDURE — 1101F PT FALLS ASSESS-DOCD LE1/YR: CPT | Mod: CPTII,S$GLB,, | Performed by: FAMILY MEDICINE

## 2019-12-05 PROCEDURE — 99999 PR PBB SHADOW E&M-EST. PATIENT-LVL I: CPT | Mod: PBBFAC,,, | Performed by: AUDIOLOGIST

## 2019-12-05 PROCEDURE — 99999 PR PBB SHADOW E&M-EST. PATIENT-LVL III: ICD-10-PCS | Mod: PBBFAC,,, | Performed by: OTOLARYNGOLOGY

## 2019-12-05 PROCEDURE — 1101F PR PT FALLS ASSESS DOC 0-1 FALLS W/OUT INJ PAST YR: ICD-10-PCS | Mod: CPTII,S$GLB,, | Performed by: FAMILY MEDICINE

## 2019-12-05 PROCEDURE — 1159F MED LIST DOCD IN RCRD: CPT | Mod: S$GLB,,, | Performed by: FAMILY MEDICINE

## 2019-12-05 PROCEDURE — 99214 OFFICE O/P EST MOD 30 MIN: CPT | Mod: S$GLB,,, | Performed by: FAMILY MEDICINE

## 2019-12-05 PROCEDURE — 1126F PR PAIN SEVERITY QUANTIFIED, NO PAIN PRESENT: ICD-10-PCS | Mod: S$GLB,,, | Performed by: OTOLARYNGOLOGY

## 2019-12-05 PROCEDURE — 3078F DIAST BP <80 MM HG: CPT | Mod: CPTII,S$GLB,, | Performed by: FAMILY MEDICINE

## 2019-12-05 PROCEDURE — 1159F PR MEDICATION LIST DOCUMENTED IN MEDICAL RECORD: ICD-10-PCS | Mod: S$GLB,,, | Performed by: FAMILY MEDICINE

## 2019-12-05 PROCEDURE — 1159F MED LIST DOCD IN RCRD: CPT | Mod: S$GLB,,, | Performed by: OTOLARYNGOLOGY

## 2019-12-05 PROCEDURE — 3074F PR MOST RECENT SYSTOLIC BLOOD PRESSURE < 130 MM HG: ICD-10-PCS | Mod: CPTII,S$GLB,, | Performed by: FAMILY MEDICINE

## 2019-12-05 PROCEDURE — 3074F SYST BP LT 130 MM HG: CPT | Mod: CPTII,S$GLB,, | Performed by: FAMILY MEDICINE

## 2019-12-05 PROCEDURE — 3078F DIAST BP <80 MM HG: CPT | Mod: CPTII,S$GLB,, | Performed by: OTOLARYNGOLOGY

## 2019-12-05 PROCEDURE — 3044F HG A1C LEVEL LT 7.0%: CPT | Mod: CPTII,S$GLB,, | Performed by: FAMILY MEDICINE

## 2019-12-05 PROCEDURE — 99214 PR OFFICE/OUTPT VISIT, EST, LEVL IV, 30-39 MIN: ICD-10-PCS | Mod: S$GLB,,, | Performed by: FAMILY MEDICINE

## 2019-12-05 PROCEDURE — 92567 TYMPANOMETRY: CPT | Mod: S$GLB,,, | Performed by: AUDIOLOGIST

## 2019-12-05 PROCEDURE — 69210 REMOVE IMPACTED EAR WAX UNI: CPT | Mod: S$GLB,,, | Performed by: OTOLARYNGOLOGY

## 2019-12-05 PROCEDURE — 1126F AMNT PAIN NOTED NONE PRSNT: CPT | Mod: S$GLB,,, | Performed by: OTOLARYNGOLOGY

## 2019-12-05 PROCEDURE — 99213 OFFICE O/P EST LOW 20 MIN: CPT | Mod: 25,S$GLB,, | Performed by: OTOLARYNGOLOGY

## 2019-12-05 PROCEDURE — 92567 PR TYMPA2METRY: ICD-10-PCS | Mod: S$GLB,,, | Performed by: AUDIOLOGIST

## 2019-12-05 PROCEDURE — 3074F SYST BP LT 130 MM HG: CPT | Mod: CPTII,S$GLB,, | Performed by: OTOLARYNGOLOGY

## 2019-12-05 PROCEDURE — 1159F PR MEDICATION LIST DOCUMENTED IN MEDICAL RECORD: ICD-10-PCS | Mod: S$GLB,,, | Performed by: OTOLARYNGOLOGY

## 2019-12-05 PROCEDURE — 99999 PR PBB SHADOW E&M-EST. PATIENT-LVL IV: ICD-10-PCS | Mod: PBBFAC,,, | Performed by: FAMILY MEDICINE

## 2019-12-05 PROCEDURE — 3078F PR MOST RECENT DIASTOLIC BLOOD PRESSURE < 80 MM HG: ICD-10-PCS | Mod: CPTII,S$GLB,, | Performed by: FAMILY MEDICINE

## 2019-12-05 PROCEDURE — 99999 PR PBB SHADOW E&M-EST. PATIENT-LVL I: ICD-10-PCS | Mod: PBBFAC,,, | Performed by: AUDIOLOGIST

## 2019-12-05 PROCEDURE — 69210 PR REMOVAL IMPACTED CERUMEN REQUIRING INSTRUMENTATION, UNILATERAL: ICD-10-PCS | Mod: S$GLB,,, | Performed by: OTOLARYNGOLOGY

## 2019-12-05 PROCEDURE — 1101F PT FALLS ASSESS-DOCD LE1/YR: CPT | Mod: CPTII,S$GLB,, | Performed by: OTOLARYNGOLOGY

## 2019-12-05 PROCEDURE — 1126F AMNT PAIN NOTED NONE PRSNT: CPT | Mod: S$GLB,,, | Performed by: FAMILY MEDICINE

## 2019-12-05 PROCEDURE — 1126F PR PAIN SEVERITY QUANTIFIED, NO PAIN PRESENT: ICD-10-PCS | Mod: S$GLB,,, | Performed by: FAMILY MEDICINE

## 2019-12-05 PROCEDURE — 3044F PR MOST RECENT HEMOGLOBIN A1C LEVEL <7.0%: ICD-10-PCS | Mod: CPTII,S$GLB,, | Performed by: FAMILY MEDICINE

## 2019-12-05 RX ORDER — ALLOPURINOL 100 MG/1
TABLET ORAL
COMMUNITY
End: 2020-01-14

## 2019-12-05 RX ORDER — PREDNISONE 10 MG/1
TABLET ORAL
Qty: 20 TABLET | Refills: 0 | Status: SHIPPED | OUTPATIENT
Start: 2019-12-05 | End: 2020-03-02 | Stop reason: ALTCHOICE

## 2019-12-05 RX ORDER — MUPIROCIN 20 MG/G
OINTMENT TOPICAL
Refills: 0 | COMMUNITY
Start: 2019-11-24 | End: 2020-09-21

## 2019-12-05 RX ORDER — POLYMYXIN B SULFATE AND TRIMETHOPRIM 1; 10000 MG/ML; [USP'U]/ML
SOLUTION OPHTHALMIC
COMMUNITY
End: 2020-03-02 | Stop reason: ALTCHOICE

## 2019-12-05 RX ORDER — CLINDAMYCIN HYDROCHLORIDE 150 MG/1
CAPSULE ORAL
Refills: 0 | COMMUNITY
Start: 2019-09-22 | End: 2020-03-02 | Stop reason: ALTCHOICE

## 2019-12-05 RX ORDER — AMLODIPINE BESYLATE 10 MG/1
TABLET ORAL
COMMUNITY
End: 2022-04-25 | Stop reason: SDUPTHER

## 2019-12-05 RX ORDER — OSELTAMIVIR PHOSPHATE 75 MG/1
CAPSULE ORAL
COMMUNITY
End: 2021-02-12

## 2019-12-05 RX ORDER — HYDROCODONE BITARTRATE AND ACETAMINOPHEN 5; 325 MG/1; MG/1
TABLET ORAL
COMMUNITY
End: 2021-02-12

## 2019-12-05 RX ORDER — MECLIZINE HYDROCHLORIDE 25 MG/1
TABLET ORAL
COMMUNITY
End: 2021-04-26 | Stop reason: SDUPTHER

## 2019-12-05 RX ORDER — LEVOTHYROXINE SODIUM 75 UG/1
TABLET ORAL
COMMUNITY
Start: 2019-11-23 | End: 2020-08-24

## 2019-12-05 RX ORDER — BENZONATATE 100 MG/1
CAPSULE ORAL
COMMUNITY
End: 2020-09-21

## 2019-12-05 RX ORDER — MEFLOQUINE HYDROCHLORIDE 250 MG/1
TABLET ORAL
COMMUNITY
End: 2020-09-21

## 2019-12-05 RX ORDER — DUTASTERIDE AND TAMSULOSIN HYDROCHLORIDE CAPSULES .5; .4 MG/1; MG/1
CAPSULE ORAL
COMMUNITY
End: 2020-02-11 | Stop reason: SDUPTHER

## 2019-12-05 RX ORDER — ERGOCALCIFEROL 1.25 MG/1
CAPSULE ORAL
COMMUNITY
End: 2020-09-21

## 2019-12-05 RX ORDER — AMOXICILLIN 875 MG/1
875 TABLET, FILM COATED ORAL 2 TIMES DAILY
Qty: 20 TABLET | Refills: 0 | Status: SHIPPED | OUTPATIENT
Start: 2019-12-05 | End: 2019-12-15

## 2019-12-05 RX ORDER — POTASSIUM CITRATE 15 MEQ/1
TABLET, EXTENDED RELEASE ORAL
COMMUNITY
End: 2020-09-21

## 2019-12-05 RX ORDER — CITALOPRAM 20 MG/1
TABLET, FILM COATED ORAL
COMMUNITY
End: 2021-02-25

## 2019-12-05 RX ORDER — SULFAMETHOXAZOLE AND TRIMETHOPRIM 800; 160 MG/1; MG/1
TABLET ORAL
COMMUNITY
Start: 2019-11-29 | End: 2020-03-02 | Stop reason: ALTCHOICE

## 2019-12-05 RX ORDER — IRBESARTAN 150 MG/1
TABLET ORAL
COMMUNITY

## 2019-12-05 RX ORDER — CIPROFLOXACIN 500 MG/1
TABLET ORAL
COMMUNITY
End: 2020-03-02 | Stop reason: ALTCHOICE

## 2019-12-05 RX ORDER — AZITHROMYCIN 250 MG/1
TABLET, FILM COATED ORAL
COMMUNITY
End: 2020-03-02 | Stop reason: ALTCHOICE

## 2019-12-05 RX ORDER — LEVOTHYROXINE SODIUM 75 UG/1
TABLET ORAL
COMMUNITY
End: 2020-08-24

## 2019-12-05 RX ORDER — AZELASTINE 1 MG/ML
SPRAY, METERED NASAL
Refills: 2 | COMMUNITY
Start: 2019-10-01 | End: 2020-09-21

## 2019-12-05 RX ORDER — LEVOFLOXACIN 500 MG/1
TABLET, FILM COATED ORAL
COMMUNITY
End: 2021-09-24

## 2019-12-05 NOTE — PATIENT INSTRUCTIONS
AS C.I. > AD C.I. extracted from deep eacs  Audiometry reviewed ( after politzerization attempt)  E.T. Literature provided; gentle middle ear insufflation techniques encouraged prn  Flonase NSS use encouraged; 2 sprays @ nostril once a day x 3 weeks  Rx for Amoxil 875; take BID x 10 days  Rx for oral prednisone 10 mg; 2 pills p.o. daily x 5 days with food if tolerated ( watch for hyperglycemia)   RTC 2 weeks; repeat audiometry on return   AS hearing aid use encouraged

## 2019-12-05 NOTE — PROGRESS NOTES
CC:  Right ear hearing loss x3 weeks  HPI:Mr. Powers is a 73-year-old type II diabetic gentleman who took a trip to [place] 3 weeks ago experienced decreased hearing perception in his right ear ever since.    He took a few days of Zyrtec for treatment of allergy symptoms which did not affect his hearing.    He denies any ear pain of either ear.  He does indicate itching in both ears however.    He was fit for a left ear hearing aid here ( asymmetric AS HL) .  He does not like wearing the device as it does not help him much at this point.      Past Medical History:   Diagnosis Date    Anxiety     BPH (benign prostatic hyperplasia)     BPH (benign prostatic hypertrophy)     Cataract     CKD (chronic kidney disease) stage 3, GFR 30-59 ml/min 5/5/2014    Colon polyps     Diabetes mellitus type II     Emphysema NEC     mild    Gout     Hematuria     Hyperlipidemia     Hypertension     Hypothyroidism     IBS (irritable bowel syndrome)     Kidney stones     JANEEN (obstructive sleep apnea)     Plantar fasciitis     Reflux      Current Outpatient Medications on File Prior to Visit   Medication Sig Dispense Refill    allopurinol (ZYLOPRIM) 100 MG tablet Take 1 tablet (100 mg total) by mouth once daily. 90 tablet 3    allopurinol (ZYLOPRIM) 100 MG tablet allopurinol 100 mg tablet      amLODIPine (NORVASC) 10 MG tablet TAKE 1 TABLET BY MOUTH EVERY DAY 90 tablet 3    amLODIPine (NORVASC) 10 MG tablet amlodipine 10 mg tablet      azelastine (ASTELIN) 137 mcg (0.1 %) nasal spray SPRAY 2 SPRAYS INTO EACH NOSTRIL TWICE A DAY  2    azithromycin (Z-EVENS) 250 MG tablet azithromycin 250 mg tablet      benzonatate (TESSALON) 100 MG capsule benzonatate 100 mg capsule      blood sugar diagnostic (CONTOUR TEST STRIPS MISC) Contour Test Strips      ciprofloxacin HCl (CIPRO) 500 MG tablet ciprofloxacin 500 mg tablet      citalopram (CELEXA) 20 MG tablet Take 1 tablet (20 mg total) by mouth once daily. (Patient  taking differently: Take 20 mg by mouth once daily. Pt takes half tablet daily) 90 tablet 3    citalopram (CELEXA) 20 MG tablet citalopram 20 mg tablet      clindamycin (CLEOCIN) 150 MG capsule TAKE ONE CAPSULE FOUR TIMES A DAY  0    CONTOUR TEST STRIPS Strp TEST 3 TIMES A  strip 11    cyanocobalamin (VITAMIN B-12) 1000 MCG tablet TAKE 1 TABLET (1,000 MCG TOTAL) BY MOUTH ONCE DAILY. 90 tablet 3    dulaglutide (TRULICITY) 0.75 mg/0.5 mL PnIj Trulicity 0.75 mg/0.5 mL subcutaneous pen injector      dulaglutide (TRULICITY) 1.5 mg/0.5 mL PnIj Trulicity 1.5 mg/0.5 mL subcutaneous pen injector      dutasteride-tamsulosin (VIVIENNE) 0.5-0.4 mg CM24 Take 1 capsule by mouth every evening. 30 capsule 12    dutasteride-tamsulosin 0.5-0.4 mg CM24 dutasteride 0.5 mg-tamsulosin ER 0.4 mg capsule ext.release 24hr mphas      ergocalciferol (ERGOCALCIFEROL) 50,000 unit Cap Vitamin D2 1,250 mcg (50,000 unit) capsule      FLUZONE HIGH-DOSE 2019-20, PF, 180 mcg/0.5 mL Syrg TO BE ADMINISTERED BY PHARMACIST FOR IMMUNIZATION  0    HYDROcodone-acetaminophen (NORCO) 5-325 mg per tablet hydrocodone 5 mg-acetaminophen 325 mg tablet      influenza (FLUZONE HIGH-DOSE) 180 mcg/0.5 mL vaccine Fluzone High-Dose 5924-2084 (PF) 180 mcg/0.5 mL intramuscular syringe      irbesartan (AVAPRO) 150 MG tablet irbesartan 150 mg tablet      irbesartan (AVAPRO) 150 MG tablet irbesartan 150 mg tablet      lancets Misc Test sugars once daily.  Dispense 100 lancets (insurance covered brand) 100 each 11    levocetirizine (XYZAL) 5 MG tablet Take 5 mg by mouth every evening.  11    levoFLOXacin (LEVAQUIN) 500 MG tablet levofloxacin 500 mg tablet      levothyroxine (SYNTHROID) 75 MCG tablet levothyroxine 75 mcg tablet      levothyroxine (SYNTHROID) 75 MCG tablet       levothyroxine (SYNTHROID) 88 MCG tablet TAKE 1 TABLET (88 MCG TOTAL) BY MOUTH ONCE DAILY. 90 tablet 3    meclizine (ANTIVERT) 25 mg tablet meclizine 25 mg tablet       mefloquine (LARIAM) 250 mg tablet mefloquine 250 mg tablet      metFORMIN (GLUCOPHAGE-XR) 500 MG 24 hr tablet Take 1 pill in the morning and 2 pills in the evening 270 tablet 3    mupirocin (BACTROBAN) 2 % ointment APPLY BID  0    oseltamivir (TAMIFLU) 75 MG capsule oseltamivir 75 mg capsule      polymyxin B sulf-trimethoprim (POLYTRIM) 10,000 unit- 1 mg/mL Drop polymyxin B sulfate 10,000 unit-trimethoprim 1 mg/mL eye drops      potassium citrate (UROCIT-K 15) 15 mEq TbSR potassium citrate ER 15 mEq (1,620 mg) tablet,extended release      sulfamethoxazole-trimethoprim 800-160mg (BACTRIM DS) 800-160 mg Tab       TRULICITY 1.5 mg/0.5 mL PnIj INJECT 1.5 MG INTO THE SKIN ONCE A WEEK. 2 Syringe 11    [DISCONTINUED] cholecalciferol, vitamin D3, 5,000 unit capsule Take 1 capsule (5,000 Units total) by mouth once daily. 90 capsule 3    [DISCONTINUED] ipratropium-albuterol (COMBIVENT)  mcg/actuation inhaler Inhale 1 puff into the lungs every 6 (six) hours as needed for Wheezing. Rescue 1 Package 0    [DISCONTINUED] mefloquine (LARIAM) 250 mg tablet Take 1 tablet (250 mg total) by mouth every 7 days. 10 tablet 0     No current facility-administered medications on file prior to visit.          PE:  Blood pressure 120/70 pulse 78 height 5 ft 9 in weight 215 lb  General:  Alert and oriented gentleman a slightly deep voice in no acute distress  Both ears were examined under the microscope in the micro procedure room  Procedures:  A small amount of cerumen is removed from the right tympanic membrane surface superiorly with suction.  Right eardrum is dull and opaque and in may be evidence of serous otitis media.  This is difficult to ascertain.  A large volume of cerumen is suction from the deep left ear canal abutting the left eardrum.  Left eardrum is slightly opaque as well.  Nasal exam is unremarkable for polypoid disease or purulent discharge of either passage.  Ruddy-Synephrine sprayed in each nasal passage.   Politzerization is attempted through both nostrils prior to audiometry performed later this morning.  Oropharyngeal exam is unremarkable for infection ulceration thrush or pharyngitis.  Neck examination is within normal limits.    The patient completed an audiometric study performed by the [facility] audiology service today.  The study results are duplicated below and compared to those of a previous study.    DIAGNOSIS:     ICD-10-CM ICD-9-CM    1. Hearing loss, unspecified hearing loss type, unspecified laterality H91.90 389.9 Ambulatory referral to Audiology   2. Perceived hearing loss H90.5 389.8 amoxicillin (AMOXIL) 875 MG tablet    AD, after trip to [place] 3 weeks ago     3. Wears hearing aid in left ear Z97.4 [id number]    4. Dysfunction of both eustachian tubes H69.83 381.81 predniSONE (DELTASONE) 10 MG tablet   5. Acute serous otitis media, recurrence not specified, unspecified laterality H65.00 381.01 amoxicillin (AMOXIL) 875 MG tablet      predniSONE (DELTASONE) 10 MG tablet     PLAN: AS C.I. > AD C.I. extracted from deep eacs  Audiometry reviewed ( after politzerization attempt)  E.T. Literature provided; gentle middle ear insufflation techniques encouraged prn  Flonase NSS use encouraged; 2 sprays @ nostril once a day x 3 weeks  Rx for Amoxil 875; take BID x 10 days  Rx for oral prednisone 10 mg; 2 pills p.o. daily x 5 days with food if tolerated ( watch for hyperglycemia)   RTC 2 weeks; repeat audiometry on return   AS hearing aid use encouraged

## 2019-12-05 NOTE — PROGRESS NOTES
Jerardo Powers was seen today in the clinic for an audiologic evaluation.  Patients main complaint was hearing loss in both ears.  Mr. Powers reported that he lost hearing in both ears two weeks ago.  Pt has known loss in his left ear, but reported that prior to this issue he has no trouble hearing in his right ear.    Tympanometry revealed Type B with normal ear canal volume in the right ear and Type B with normal ear canal volume in the left ear.  Audiogram results revealed mild sloping to severe mixed hearing loss in the right ear and mild sloping to severe mixed hearing loss in the left ear.  Speech reception thresholds were noted at 40 dB in the right ear and 75 dB in the left ear.  Speech discrimination scores were 92% in the right ear and 84% in the left ear.    Recommendations:  1. Otologic evaluation  2. Annual audiogram  3. Noise protection when in noise  4. Hearing aid follow up

## 2019-12-05 NOTE — PROGRESS NOTES
(Portions of this note were dictated using voice recognition software and may contain dictation related errors in spelling/grammar/syntax not found on text review)    CC:   Chief Complaint   Patient presents with    Wheezing     sore throat, coughing       HPI: 73 y.o. male Last seen September 3, 2019 for annual exam.  Here for urgent care  visit for concern about left-sided wheezing.  Denies any significant coughing.  Had been seen earlier in the year for cough, fever of almost 102°, a diagnosis of influenza a with persistent cough.  Empiric atypical pneumonia coverage was added with doxycycline at the time along with instructions on albuterol      Current symptoms started about 2 weeks ago.  He has stuffy and runny nose, ear stuffiness (has normally decreased hearing on the left side but feels like the right side has been decreased as well in the last couple of weeks), sore throat.  No chest pain.  No shortness of breath.  No pleuritic pain.  No abdominal pain, nausea, vomiting    Also needs reassessment of his diabetes status, on Trulicity 1.5 mg weekly and metformin, increased back up to 2000 mg daily of the extended release.  Last A1c was 6.5.  Needs his TSH reach checked as well as this was mildly elevated on last assessment, along with his triglycerides that were up.      Past Medical History:   Diagnosis Date    Anxiety     BPH (benign prostatic hyperplasia)     BPH (benign prostatic hypertrophy)     Cataract     CKD (chronic kidney disease) stage 3, GFR 30-59 ml/min 5/5/2014    Colon polyps     Diabetes mellitus type II     Emphysema NEC     mild    Gout     Hematuria     Hyperlipidemia     Hypertension     Hypothyroidism     IBS (irritable bowel syndrome)     Kidney stones     JANEEN (obstructive sleep apnea)     Plantar fasciitis     Reflux        Past Surgical History:   Procedure Laterality Date    CATARACT EXTRACTION  1/28/2013    RIGHT EYE    CATARACT EXTRACTION      left eye     COLONOSCOPY  Sep 2011    Repeat recommended in 5 years    COLONOSCOPY N/A 10/10/2016    Procedure: COLONOSCOPY;  Surgeon: Noah Colunga MD;  Location: [facility] ENDO (4TH FLR);  Service: Endoscopy;  Laterality: N/A;  PM Prep    CYSTOSCOPY  10/3/2018    Procedure: CYSTOSCOPY;  Surgeon: Adi Murray MD;  Location: [facility] OR 1ST FLR;  Service: Urology;;    CYSTOSCOPY W/ URETERAL STENT PLACEMENT Left 9/26/2018    Procedure: CYSTOSCOPY, WITH URETERAL STENT INSERTION;  Surgeon: Adi Murray MD;  Location: [facility] OR [facility] FLR;  Service: Urology;  Laterality: Left;    KIDNEY STONE SURGERY      LASER LITHOTRIPSY Left 10/3/2018    Procedure: LITHOTRIPSY, USING LASER;  Surgeon: Adi Murray MD;  Location: [facility] OR [facility] FLR;  Service: Urology;  Laterality: Left;    REPLACEMENT OF STENT Left 10/3/2018    Procedure: REPLACEMENT, STENT;  Surgeon: Adi Murray MD;  [facility];  Service: Urology;  Laterality: Left;    RETROGRADE PYELOGRAPHY Left 10/3/2018    Procedure: PYELOGRAM, RETROGRADE;  Surgeon: Adi Murray MD;  [facility];  Service: Urology;  Laterality: Left;    URETEROSCOPIC REMOVAL OF URETERIC CALCULUS Left 10/3/2018    Procedure: REMOVAL, CALCULUS, URETER, URETEROSCOPIC;  Surgeon: Adi Murray MD;  [facility];  Service: Urology;  Laterality: Left;  1.5 hours       Family History   Problem Relation Age of Onset    Cancer Brother         non-hodgkins T cell lymphoma    Diabetes Mother     Macular degeneration Brother     Coronary artery disease Neg Hx     Colon cancer Neg Hx     Prostate cancer Neg Hx     Esophageal cancer Neg Hx        Social History     Tobacco Use    Smoking status: Never Smoker    Smokeless tobacco: Never Used   Substance Use Topics    Alcohol use: No     Alcohol/week: 0.0 standard drinks     Frequency: Monthly or less     Drinks per session: 1 or 2     Binge frequency: Never     Comment: occasionally    Drug use: No       Lab Results    Component Value Date    WBC 10.48 09/04/2019    HGB 15.3 09/04/2019    HCT 45.9 09/04/2019    MCV 88 09/04/2019     09/04/2019    CHOL 188 09/04/2019    TRIG 410 (H) 09/04/2019    HDL 46 09/04/2019    ALT 23 09/04/2019    AST 17 09/04/2019    BILITOT 0.5 09/04/2019    ALKPHOS 99 09/04/2019     09/04/2019    K 3.6 09/04/2019     09/04/2019    CREATININE 1.3 09/04/2019    ESTGFRAFRICA >60.0 09/04/2019    EGFRNONAA 54.1 (A) 09/04/2019    CALCIUM 9.0 09/04/2019    ALBUMIN 3.9 09/04/2019    BUN 12 09/04/2019    CO2 25 09/04/2019    TSH 4.735 (H) 09/04/2019    PSA 0.74 07/14/2017    PSADIAG 1.3 02/19/2016    INR 1.0 09/26/2018    HGBA1C 6.5 (H) 09/04/2019    MICALBCREAT 12.0 07/19/2018    LDLCALC Invalid, Trig>400.0 09/04/2019     (H) 09/04/2019                 ROS:  GENERAL: No fever, chills, fatigability or weight loss.  SKIN: No rashes, no itching.  HEAD: No headaches.  EYES: No visual changes  EARS:  Above.  NOSE:  Above.  MOUTH & THROAT:  Above  NODES: Denies swollen glands.  CHEST:  Above.  CARDIOVASCULAR: Denies chest pain, PND, orthopnea.  ABDOMEN: No nausea, vomiting, or changes in bowel function.  URINARY: No flank pain, dysuria or hematuria.  PERIPHERAL VASCULAR: No claudication or cyanosis.  MUSCULOSKELETAL: No joint stiffness or swelling. Denies back pain.  NEUROLOGIC: No weakness or numbness.    Vital signs reviewed  PE:   APPEARANCE: Well nourished, well developed, in no acute distress.    HEAD: Normocephalic, atraumatic.  EYES: PERRL. EOMI.   Conjunctivae noninjected.  EARS:  Right TM intact, although somewhat opaque appearing possibly related to serous effusion.  No external canal findings.  Very minimal cerumen.  Left TM does show some cerumen deep just against the TM.  NOSE: Mucosa pink. Airway clear.  MOUTH & THROAT: No tonsillar enlargement.  Mild pharyngeal erythema with cobblestoning noted.  NECK: Supple with no cervical lymphadenopathy.    CHEST: Good inspiratory effort.   Normal air entry bilaterally.  Sporadic left basilar crackle although not consistently heard on repeat assessments.  No wheeze is noted. No chest wall tenderness.  CARDIOVASCULAR: Normal S1, S2. No rubs, murmurs, or gallops.  ABDOMEN: Bowel sounds normal. Not distended. Soft. No tenderness or masses. No organomegaly.         IMPRESSION  1. Wheeze    2. Cough    3. Vitamin D deficiency    4. Hypertension, unspecified type    5. Hypothyroidism, unspecified type    6. Type 2 diabetes mellitus with stage 3 chronic kidney disease, without long-term current use of insulin    7. Hearing loss, unspecified hearing loss type, unspecified laterality    8. Rhinitis, unspecified type            PLAN  Subjective feeling of left-sided wheezing with very little cough but with some coexisting nasal congestion, decreased hearing, and sore throat possibly postnasal drip syndrome related.  Consider allergic rhinitis and differential.  No acute infective signs. Patient feels relatively asymptomatic from a pulmonary standpoint aside from the above.  Exam overall benign except for sporadic mild abnormality as noted above.  Given clinical stability and lack of significant symptomatology I think we can hold off on chest x-ray evaluation.  Would advise symptomatic management with Claritin, Flonase, trial of Mucinex DM if cough is more significant.  He has albuterol inhaler still left over from earlier in the year when he had influenza.  He can use this if needed if wheezing is getting worse.  If any significant worsening of his pulmonary symptoms would advise chest x-ray at that time.    He has an ENT appointment coming up to discusses decreased hearing    He can get his labs as below to reassess diabetes, thyroid, lipid status    Orders Placed This Encounter   Procedures    Comprehensive metabolic panel    Lipid panel    Hemoglobin A1c    TSH         Answers for HPI/ROS submitted by the patient on 12/4/2019   Ear pain  Affected ear:  both  Chronicity: new  Onset: 1 to 4 weeks ago  Progression since onset: rapidly worsening  Frequency: constantly  Fever: no fever  Pain - numeric: 6/10  abdominal pain: No  ear discharge: No  rash: No  cough: Yes  headaches: No  rhinorrhea: No  diarrhea: Yes  hearing loss: Yes  sore throat: Yes  neck pain: No  vomiting: No  drainage: No  Treatments tried: nothing  Improvement on treatment: no relief  Pain severity: mild  hearing loss: Yes

## 2019-12-06 ENCOUNTER — LAB VISIT (OUTPATIENT)
Dept: LAB | Facility: HOSPITAL | Age: 74
End: 2019-12-06
Attending: FAMILY MEDICINE
Payer: COMMERCIAL

## 2019-12-06 DIAGNOSIS — N18.30 TYPE 2 DIABETES MELLITUS WITH STAGE 3 CHRONIC KIDNEY DISEASE, WITHOUT LONG-TERM CURRENT USE OF INSULIN: ICD-10-CM

## 2019-12-06 DIAGNOSIS — E11.22 TYPE 2 DIABETES MELLITUS WITH STAGE 3 CHRONIC KIDNEY DISEASE, WITHOUT LONG-TERM CURRENT USE OF INSULIN: ICD-10-CM

## 2019-12-06 DIAGNOSIS — R05.9 COUGH: ICD-10-CM

## 2019-12-06 DIAGNOSIS — E03.9 HYPOTHYROIDISM, UNSPECIFIED TYPE: ICD-10-CM

## 2019-12-06 DIAGNOSIS — E55.9 VITAMIN D DEFICIENCY: ICD-10-CM

## 2019-12-06 DIAGNOSIS — I10 HYPERTENSION, UNSPECIFIED TYPE: ICD-10-CM

## 2019-12-06 LAB
ALBUMIN SERPL BCP-MCNC: 3.6 G/DL (ref 3.5–5.2)
ALP SERPL-CCNC: 92 U/L (ref 55–135)
ALT SERPL W/O P-5'-P-CCNC: 28 U/L (ref 10–44)
ANION GAP SERPL CALC-SCNC: 10 MMOL/L (ref 8–16)
AST SERPL-CCNC: 23 U/L (ref 10–40)
BILIRUB SERPL-MCNC: 0.8 MG/DL (ref 0.1–1)
BUN SERPL-MCNC: 9 MG/DL (ref 8–23)
CALCIUM SERPL-MCNC: 9 MG/DL (ref 8.7–10.5)
CHLORIDE SERPL-SCNC: 102 MMOL/L (ref 95–110)
CHOLEST SERPL-MCNC: 179 MG/DL (ref 120–199)
CHOLEST/HDLC SERPL: 3.5 {RATIO} (ref 2–5)
CO2 SERPL-SCNC: 27 MMOL/L (ref 23–29)
CREAT SERPL-MCNC: 1.3 MG/DL (ref 0.5–1.4)
EST. GFR  (AFRICAN AMERICAN): >60 ML/MIN/1.73 M^2
EST. GFR  (NON AFRICAN AMERICAN): 54.1 ML/MIN/1.73 M^2
GLUCOSE SERPL-MCNC: 141 MG/DL (ref 70–110)
HDLC SERPL-MCNC: 51 MG/DL (ref 40–75)
HDLC SERPL: 28.5 % (ref 20–50)
LDLC SERPL CALC-MCNC: 102.4 MG/DL (ref 63–159)
NONHDLC SERPL-MCNC: 128 MG/DL
POTASSIUM SERPL-SCNC: 3.7 MMOL/L (ref 3.5–5.1)
PROT SERPL-MCNC: 7.1 G/DL (ref 6–8.4)
SODIUM SERPL-SCNC: 139 MMOL/L (ref 136–145)
TRIGL SERPL-MCNC: 128 MG/DL (ref 30–150)
TSH SERPL DL<=0.005 MIU/L-ACNC: 1.37 UIU/ML (ref 0.4–4)

## 2019-12-06 PROCEDURE — 80053 COMPREHEN METABOLIC PANEL: CPT

## 2019-12-06 PROCEDURE — 84443 ASSAY THYROID STIM HORMONE: CPT

## 2019-12-06 PROCEDURE — 83036 HEMOGLOBIN GLYCOSYLATED A1C: CPT

## 2019-12-06 PROCEDURE — 80061 LIPID PANEL: CPT

## 2019-12-06 PROCEDURE — 36415 COLL VENOUS BLD VENIPUNCTURE: CPT | Mod: PO

## 2019-12-07 ENCOUNTER — PATIENT MESSAGE (OUTPATIENT)
Dept: FAMILY MEDICINE | Facility: CLINIC | Age: 74
End: 2019-12-07

## 2019-12-07 LAB
ESTIMATED AVG GLUCOSE: 151 MG/DL (ref 68–131)
HBA1C MFR BLD HPLC: 6.9 % (ref 4–5.6)

## 2019-12-07 NOTE — TELEPHONE ENCOUNTER
Dear Jerardo Powers    Your recent labs were reviewed and released to your account. Your lab results were mostly normal.  A1c test looks controlled.  Thyroid test is in normal range.  Kidney test is slightly sluggish but overall stable.  Blood counts are normal.  Your bad cholesterol is a little bit high especially given diabetes.  I think we had talked about using a statin medication to help lower your cholesterol but additionally to help prevent heart issues which are more common in patients with diabetes.  I know you had been resistant to trying a statin before but it is something that is recommended and that you should think about.  Please let me know if you have any questions.    Adams Moore MD

## 2019-12-10 ENCOUNTER — HOSPITAL ENCOUNTER (OUTPATIENT)
Dept: SLEEP MEDICINE | Facility: HOSPITAL | Age: 74
Discharge: HOME | End: 2019-12-10
Attending: NURSE PRACTITIONER
Payer: COMMERCIAL

## 2019-12-10 DIAGNOSIS — G47.33 OBSTRUCTIVE SLEEP APNEA: ICD-10-CM

## 2019-12-10 PROCEDURE — 95811 POLYSOM 6/>YRS CPAP 4/> PARM: CPT | Mod: 26,,, | Performed by: INTERNAL MEDICINE

## 2019-12-10 PROCEDURE — 95811 POLYSOM 6/>YRS CPAP 4/> PARM: CPT

## 2019-12-10 PROCEDURE — 95811 PR POLYSOMNOGRAPHY W/CPAP: ICD-10-PCS | Mod: 26,,, | Performed by: INTERNAL MEDICINE

## 2019-12-11 NOTE — PROGRESS NOTES
"Education was done via explanation of sleep study process and procedure. All questions were answered.    Pt. tolerated CPAP/BiPAP well. Optimal pressure CPAP 13 appeared to have eliminated most respiratory events. BiPAP explored per orders.    Low sat of 87% was observed in study. EKG revealed rare PVC. Medium Simplus Full Face Mask was used during CPAP titration after observed mouthleaks. Pt. response to titration in a.m. "O.K."    Thank you letter was given in a.m.    "

## 2019-12-16 ENCOUNTER — OFFICE VISIT (OUTPATIENT)
Dept: OTOLARYNGOLOGY | Facility: CLINIC | Age: 74
End: 2019-12-16
Payer: COMMERCIAL

## 2019-12-16 ENCOUNTER — CLINICAL SUPPORT (OUTPATIENT)
Dept: AUDIOLOGY | Facility: CLINIC | Age: 74
End: 2019-12-16
Payer: COMMERCIAL

## 2019-12-16 VITALS — WEIGHT: 213.88 LBS | BODY MASS INDEX: 31.58 KG/M2

## 2019-12-16 DIAGNOSIS — L29.9 ITCHING OF EAR: ICD-10-CM

## 2019-12-16 DIAGNOSIS — Z86.69 HISTORY OF SEROUS OTITIS MEDIA: ICD-10-CM

## 2019-12-16 DIAGNOSIS — H69.92 DYSFUNCTION OF LEFT EUSTACHIAN TUBE: Primary | ICD-10-CM

## 2019-12-16 DIAGNOSIS — H90.A32 MIXED CONDUCTIVE AND SENSORINEURAL HEARING LOSS OF LEFT EAR WITH RESTRICTED HEARING OF RIGHT EAR: Primary | ICD-10-CM

## 2019-12-16 DIAGNOSIS — H90.A32 MIXED CONDUCTIVE AND SENSORINEURAL HEARING LOSS OF LEFT EAR WITH RESTRICTED HEARING OF RIGHT EAR: ICD-10-CM

## 2019-12-16 DIAGNOSIS — H90.5 HIGH FREQUENCY SENSORINEURAL HEARING LOSS OF RIGHT EAR: ICD-10-CM

## 2019-12-16 PROCEDURE — 99999 PR PBB SHADOW E&M-EST. PATIENT-LVL IV: CPT | Mod: PBBFAC,,, | Performed by: OTOLARYNGOLOGY

## 2019-12-16 PROCEDURE — 1101F PT FALLS ASSESS-DOCD LE1/YR: CPT | Mod: CPTII,S$GLB,, | Performed by: OTOLARYNGOLOGY

## 2019-12-16 PROCEDURE — 99213 OFFICE O/P EST LOW 20 MIN: CPT | Mod: S$GLB,,, | Performed by: OTOLARYNGOLOGY

## 2019-12-16 PROCEDURE — 99999 PR PBB SHADOW E&M-EST. PATIENT-LVL IV: ICD-10-PCS | Mod: PBBFAC,,, | Performed by: OTOLARYNGOLOGY

## 2019-12-16 PROCEDURE — 1159F MED LIST DOCD IN RCRD: CPT | Mod: S$GLB,,, | Performed by: OTOLARYNGOLOGY

## 2019-12-16 PROCEDURE — 92557 PR COMPREHENSIVE HEARING TEST: ICD-10-PCS | Mod: S$GLB,,, | Performed by: AUDIOLOGIST

## 2019-12-16 PROCEDURE — 92567 PR TYMPA2METRY: ICD-10-PCS | Mod: S$GLB,,, | Performed by: AUDIOLOGIST

## 2019-12-16 PROCEDURE — 92567 TYMPANOMETRY: CPT | Mod: S$GLB,,, | Performed by: AUDIOLOGIST

## 2019-12-16 PROCEDURE — 1101F PR PT FALLS ASSESS DOC 0-1 FALLS W/OUT INJ PAST YR: ICD-10-PCS | Mod: CPTII,S$GLB,, | Performed by: OTOLARYNGOLOGY

## 2019-12-16 PROCEDURE — 92557 COMPREHENSIVE HEARING TEST: CPT | Mod: S$GLB,,, | Performed by: AUDIOLOGIST

## 2019-12-16 PROCEDURE — 1159F PR MEDICATION LIST DOCUMENTED IN MEDICAL RECORD: ICD-10-PCS | Mod: S$GLB,,, | Performed by: OTOLARYNGOLOGY

## 2019-12-16 PROCEDURE — 99213 PR OFFICE/OUTPT VISIT, EST, LEVL III, 20-29 MIN: ICD-10-PCS | Mod: S$GLB,,, | Performed by: OTOLARYNGOLOGY

## 2019-12-16 NOTE — PROCEDURES
"Dear Provider,     You have ordered sleep LAB services to perform the sleep study for Jerrado Powers.  The sleep study that you ordered is complete.      Please find Sleep Study result in "Chart Review" under the "Media tab."      As the ordering provider, you are responsible for reviewing the results and implementing a treatment plan with your patient.    If you need a Sleep Medicine provider to explain the sleep study findings and arrange treatment for the patient, please refer patient for consultation to our Sleep Clinic via [facility] with Ambulatory Consult Sleep.    To do that please place an order for an  "Ambulatory Consult Sleep" - it will go to our clinic work queue for our Medical Assistant to contact the patient for an appointment.     For any questions, please contact our clinic staff at [phone] to talk to clinical staff.   "

## 2019-12-16 NOTE — PATIENT INSTRUCTIONS
Audiometry reviewed; AD hearing improcved to norm  Rx for Valisone ( betamethasone valerate) lotion; 3 drops to itchy ear canal BID prn  Consider AS otologic surgery consultation with Dr. JG Obrien or Dr. PRATIMA Atkinson if interested  Potential candidate for left eustachian tube balloon catheterization ( Dr. Paula or Dr. Obrien)   Gentle middle ear insufflation techniques encouraged prn  RTC prn     Tongue

## 2019-12-16 NOTE — PROGRESS NOTES
CC: Review of AD status  HPI:Mr. Powers is a 74-year-old gentleman who indicates improvement in his right ear hearing acumen after treatment with a 10 day course of amoxicillin as well as a short course of oral prednisone.  He was diagnosed with the right serous otitis media condition during his last visit with me 2019.    Audiometry indicated a change in his right ear hearing status compared to previous study.  Tympanometry revealed bilateral type B tympanograms.   He had taken a trip to [place] 3 weeks prior during which he experienced decreased hearing perception in his right ear ever since.  He had also complained of an itching sensation in both ears.    He has a known and previously documented left ear mixed hearing loss.  He wears a hearing aid in his left ear.    He completed an MRI scan of the brain in 2018 which indicated trace mastoid fluid without evidence of stroke or intracranial pathology.  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    Past Medical History:   Diagnosis Date    Anxiety     BPH (benign prostatic hyperplasia)     BPH (benign prostatic hypertrophy)     Cataract     CKD (chronic kidney disease) stage 3, GFR 30-59 ml/min 2014    Colon polyps     Diabetes mellitus type II     Emphysema NEC     mild    Gout     Hematuria     Hyperlipidemia     Hypertension     Hypothyroidism     IBS (irritable bowel syndrome)     Kidney stones     JANEEN (obstructive sleep apnea)     Plantar fasciitis     Reflux      Current Outpatient Medications on File Prior to Visit   Medication Sig Dispense Refill    allopurinol (ZYLOPRIM) 100 MG tablet Take 1 tablet (100 mg total) by mouth once daily. 90 tablet 3    allopurinol (ZYLOPRIM) 100 MG tablet allopurinol 100 mg tablet      amLODIPine (NORVASC) 10 MG tablet TAKE 1 TABLET BY MOUTH EVERY DAY 90 tablet 3    amLODIPine (NORVASC) 10 MG tablet amlodipine 10 mg tablet      [] amoxicillin (AMOXIL) 875 MG tablet Take 1 tablet  (875 mg total) by mouth 2 (two) times daily. for 10 days 20 tablet 0    azelastine (ASTELIN) 137 mcg (0.1 %) nasal spray SPRAY 2 SPRAYS INTO EACH NOSTRIL TWICE A DAY  2    azithromycin (Z-EVENS) 250 MG tablet azithromycin 250 mg tablet      benzonatate (TESSALON) 100 MG capsule benzonatate 100 mg capsule      blood sugar diagnostic (CONTOUR TEST STRIPS MISC) Contour Test Strips      ciprofloxacin HCl (CIPRO) 500 MG tablet ciprofloxacin 500 mg tablet      citalopram (CELEXA) 20 MG tablet Take 1 tablet (20 mg total) by mouth once daily. (Patient taking differently: Take 20 mg by mouth once daily. Pt takes half tablet daily) 90 tablet 3    citalopram (CELEXA) 20 MG tablet citalopram 20 mg tablet      clindamycin (CLEOCIN) 150 MG capsule TAKE ONE CAPSULE FOUR TIMES A DAY  0    CONTOUR TEST STRIPS Strp TEST 3 TIMES A  strip 11    cyanocobalamin (VITAMIN B-12) 1000 MCG tablet TAKE 1 TABLET (1,000 MCG TOTAL) BY MOUTH ONCE DAILY. 90 tablet 3    dulaglutide (TRULICITY) 0.75 mg/0.5 mL PnIj Trulicity 0.75 mg/0.5 mL subcutaneous pen injector      dulaglutide (TRULICITY) 1.5 mg/0.5 mL PnIj Trulicity 1.5 mg/0.5 mL subcutaneous pen injector      dutasteride-tamsulosin (VIVIENNE) 0.5-0.4 mg CM24 Take 1 capsule by mouth every evening. 30 capsule 12    dutasteride-tamsulosin 0.5-0.4 mg CM24 dutasteride 0.5 mg-tamsulosin ER 0.4 mg capsule ext.release 24hr mphas      ergocalciferol (ERGOCALCIFEROL) 50,000 unit Cap Vitamin D2 1,250 mcg (50,000 unit) capsule      FLUZONE HIGH-DOSE 2019-20, PF, 180 mcg/0.5 mL Syrg TO BE ADMINISTERED BY PHARMACIST FOR IMMUNIZATION  0    HYDROcodone-acetaminophen (NORCO) 5-325 mg per tablet hydrocodone 5 mg-acetaminophen 325 mg tablet      influenza (FLUZONE HIGH-DOSE) 180 mcg/0.5 mL vaccine Fluzone High-Dose 6002-5028 (PF) 180 mcg/0.5 mL intramuscular syringe      irbesartan (AVAPRO) 150 MG tablet irbesartan 150 mg tablet      irbesartan (AVAPRO) 150 MG tablet irbesartan 150 mg tablet       lancets Misc Test sugars once daily.  Dispense 100 lancets (insurance covered brand) 100 each 11    levocetirizine (XYZAL) 5 MG tablet Take 5 mg by mouth every evening.  11    levoFLOXacin (LEVAQUIN) 500 MG tablet levofloxacin 500 mg tablet      levothyroxine (SYNTHROID) 75 MCG tablet levothyroxine 75 mcg tablet      levothyroxine (SYNTHROID) 75 MCG tablet       levothyroxine (SYNTHROID) 88 MCG tablet TAKE 1 TABLET (88 MCG TOTAL) BY MOUTH ONCE DAILY. 90 tablet 3    meclizine (ANTIVERT) 25 mg tablet meclizine 25 mg tablet      mefloquine (LARIAM) 250 mg tablet mefloquine 250 mg tablet      metFORMIN (GLUCOPHAGE-XR) 500 MG 24 hr tablet Take 1 pill in the morning and 2 pills in the evening 270 tablet 3    mupirocin (BACTROBAN) 2 % ointment APPLY BID  0    oseltamivir (TAMIFLU) 75 MG capsule oseltamivir 75 mg capsule      polymyxin B sulf-trimethoprim (POLYTRIM) 10,000 unit- 1 mg/mL Drop polymyxin B sulfate 10,000 unit-trimethoprim 1 mg/mL eye drops      potassium citrate (UROCIT-K 15) 15 mEq TbSR potassium citrate ER 15 mEq (1,620 mg) tablet,extended release      predniSONE (DELTASONE) 10 MG tablet 2 pills daily x 5 days with food 20 tablet 0    sulfamethoxazole-trimethoprim 800-160mg (BACTRIM DS) 800-160 mg Tab       TRULICITY 1.5 mg/0.5 mL PnIj INJECT 1.5 MG INTO THE SKIN ONCE A WEEK. 2 Syringe 11     No current facility-administered medications on file prior to visit.          PE:  Height 5 ft 9 in weight 213 lb  General:  Alert and oriented gentleman in no acute distress  Both ears were examined under the microscope in the micro procedure room.  Right eardrum is slightly opaque but there is no jaja evidence of a serous otitis media condition noted today.  Left eardrum is retracted in the pars superior aspect.  There is no jaja evidence of a left serous otitis media condition(?).    Mr. Powers completed an audiometric study today the results of which duplicated below and compared to those of  a previous study.          DIAGNOSIS:     ICD-10-CM ICD-9-CM    1. Dysfunction of left eustachian tube H69.82 381.81    2. Mixed conductive and sensorineural hearing loss of left ear with restricted hearing of right ear H90.A32 389.22    3. High frequency sensorineural hearing loss of right ear H90.41 389.15    4. History of serous otitis media Z86.69 V12.49     AD; relieved with antibiotic and steroid course     PLAN: Audiometry reviewed; AD hearing improcved to norm  Rx for Valisone ( betamethasone valerate) lotion; 3 drops to itchy ear canal BID prn  Consider AS otologic surgery consultation with Dr. JG Obrien or Dr. PRATIMA Atkinson if interested  Potential candidate for left eustachian tube balloon catheterization ( Dr. Paula or Dr. Obrien)   Gentle middle ear insufflation techniques encouraged prn  RTC prn

## 2019-12-16 NOTE — PROGRESS NOTES
Jerardo Powers was seen today in the clinic for an audiologic evaluation.      Tympanometry revealed Type A in the right ear and Type B with normal ear canal volume in the left ear.  Audiogram results revealed normal sloping to severe sensorineural hearing loss (SNHL) in the right ear and mild to profound mixed hearing loss in the left ear.  Speech reception thresholds were noted at 25 dB in the right ear and 55 dB in the left ear.  Speech discrimination scores were 100% in the right ear and 80% in the left ear.    Recommendations:  1. Otologic evaluation  2. Annual audiogram  3. Noise protection when in noise  4. Hearing aid follow-up

## 2019-12-18 ENCOUNTER — TELEPHONE (OUTPATIENT)
Dept: SLEEP MEDICINE | Facility: CLINIC | Age: 74
End: 2019-12-18

## 2019-12-18 DIAGNOSIS — G47.33 OBSTRUCTIVE SLEEP APNEA: Primary | ICD-10-CM

## 2020-01-14 RX ORDER — ALLOPURINOL 100 MG/1
TABLET ORAL
Qty: 90 TABLET | Refills: 3 | Status: SHIPPED | OUTPATIENT
Start: 2020-01-14 | End: 2021-03-11 | Stop reason: SDUPTHER

## 2020-02-11 DIAGNOSIS — N20.0 KIDNEY STONES: Primary | ICD-10-CM

## 2020-02-11 RX ORDER — DUTASTERIDE AND TAMSULOSIN HYDROCHLORIDE CAPSULES .5; .4 MG/1; MG/1
CAPSULE ORAL
Qty: 90 CAPSULE | Refills: 0 | Status: SHIPPED | OUTPATIENT
Start: 2020-02-11 | End: 2020-05-11 | Stop reason: SDUPTHER

## 2020-02-11 RX ORDER — DUTASTERIDE AND TAMSULOSIN HYDROCHLORIDE CAPSULES .5; .4 MG/1; MG/1
CAPSULE ORAL
Qty: 90 CAPSULE | Refills: 0 | Status: CANCELLED | OUTPATIENT
Start: 2020-02-11

## 2020-02-19 RX ORDER — METFORMIN HYDROCHLORIDE 500 MG/1
TABLET, EXTENDED RELEASE ORAL
Qty: 270 TABLET | Refills: 3 | Status: SHIPPED | OUTPATIENT
Start: 2020-02-19 | End: 2020-09-25

## 2020-02-28 RX ORDER — CITALOPRAM 20 MG/1
TABLET, FILM COATED ORAL
Qty: 90 TABLET | Refills: 3 | Status: SHIPPED | OUTPATIENT
Start: 2020-02-28 | End: 2020-03-02 | Stop reason: SDUPTHER

## 2020-03-01 ENCOUNTER — PATIENT MESSAGE (OUTPATIENT)
Dept: FAMILY MEDICINE | Facility: CLINIC | Age: 75
End: 2020-03-01

## 2020-03-02 ENCOUNTER — PATIENT MESSAGE (OUTPATIENT)
Dept: INTERNAL MEDICINE | Facility: CLINIC | Age: 75
End: 2020-03-02

## 2020-03-02 ENCOUNTER — TELEPHONE (OUTPATIENT)
Dept: FAMILY MEDICINE | Facility: CLINIC | Age: 75
End: 2020-03-02

## 2020-03-02 ENCOUNTER — OFFICE VISIT (OUTPATIENT)
Dept: INTERNAL MEDICINE | Facility: CLINIC | Age: 75
End: 2020-03-02
Payer: COMMERCIAL

## 2020-03-02 VITALS
SYSTOLIC BLOOD PRESSURE: 119 MMHG | WEIGHT: 213.38 LBS | DIASTOLIC BLOOD PRESSURE: 77 MMHG | TEMPERATURE: 98 F | HEIGHT: 69 IN | BODY MASS INDEX: 31.6 KG/M2 | HEART RATE: 84 BPM | OXYGEN SATURATION: 96 %

## 2020-03-02 DIAGNOSIS — J01.90 ACUTE SINUSITIS, RECURRENCE NOT SPECIFIED, UNSPECIFIED LOCATION: Primary | ICD-10-CM

## 2020-03-02 DIAGNOSIS — J40 BRONCHITIS: ICD-10-CM

## 2020-03-02 PROCEDURE — 3074F PR MOST RECENT SYSTOLIC BLOOD PRESSURE < 130 MM HG: ICD-10-PCS | Mod: CPTII,S$GLB,, | Performed by: FAMILY MEDICINE

## 2020-03-02 PROCEDURE — 3078F PR MOST RECENT DIASTOLIC BLOOD PRESSURE < 80 MM HG: ICD-10-PCS | Mod: CPTII,S$GLB,, | Performed by: FAMILY MEDICINE

## 2020-03-02 PROCEDURE — 99999 PR PBB SHADOW E&M-EST. PATIENT-LVL III: ICD-10-PCS | Mod: PBBFAC,,, | Performed by: FAMILY MEDICINE

## 2020-03-02 PROCEDURE — 1101F PR PT FALLS ASSESS DOC 0-1 FALLS W/OUT INJ PAST YR: ICD-10-PCS | Mod: CPTII,S$GLB,, | Performed by: FAMILY MEDICINE

## 2020-03-02 PROCEDURE — 1159F PR MEDICATION LIST DOCUMENTED IN MEDICAL RECORD: ICD-10-PCS | Mod: S$GLB,,, | Performed by: FAMILY MEDICINE

## 2020-03-02 PROCEDURE — 3074F SYST BP LT 130 MM HG: CPT | Mod: CPTII,S$GLB,, | Performed by: FAMILY MEDICINE

## 2020-03-02 PROCEDURE — 1101F PT FALLS ASSESS-DOCD LE1/YR: CPT | Mod: CPTII,S$GLB,, | Performed by: FAMILY MEDICINE

## 2020-03-02 PROCEDURE — 1126F PR PAIN SEVERITY QUANTIFIED, NO PAIN PRESENT: ICD-10-PCS | Mod: S$GLB,,, | Performed by: FAMILY MEDICINE

## 2020-03-02 PROCEDURE — 99214 PR OFFICE/OUTPT VISIT, EST, LEVL IV, 30-39 MIN: ICD-10-PCS | Mod: S$GLB,,, | Performed by: FAMILY MEDICINE

## 2020-03-02 PROCEDURE — 99214 OFFICE O/P EST MOD 30 MIN: CPT | Mod: S$GLB,,, | Performed by: FAMILY MEDICINE

## 2020-03-02 PROCEDURE — 1126F AMNT PAIN NOTED NONE PRSNT: CPT | Mod: S$GLB,,, | Performed by: FAMILY MEDICINE

## 2020-03-02 PROCEDURE — 3078F DIAST BP <80 MM HG: CPT | Mod: CPTII,S$GLB,, | Performed by: FAMILY MEDICINE

## 2020-03-02 PROCEDURE — 99999 PR PBB SHADOW E&M-EST. PATIENT-LVL III: CPT | Mod: PBBFAC,,, | Performed by: FAMILY MEDICINE

## 2020-03-02 PROCEDURE — 1159F MED LIST DOCD IN RCRD: CPT | Mod: S$GLB,,, | Performed by: FAMILY MEDICINE

## 2020-03-02 RX ORDER — ALBUTEROL SULFATE 0.83 MG/ML
2.5 SOLUTION RESPIRATORY (INHALATION) EVERY 6 HOURS PRN
Qty: 1 BOX | Refills: 0 | Status: SHIPPED | OUTPATIENT
Start: 2020-03-02 | End: 2020-09-21

## 2020-03-02 RX ORDER — AZITHROMYCIN 250 MG/1
TABLET, FILM COATED ORAL
Qty: 6 TABLET | Refills: 0 | Status: SHIPPED | OUTPATIENT
Start: 2020-03-02 | End: 2020-03-07

## 2020-03-02 RX ORDER — FLUTICASONE PROPIONATE 220 UG/1
1 AEROSOL, METERED RESPIRATORY (INHALATION) 2 TIMES DAILY
Qty: 12 G | Refills: 0 | Status: SHIPPED | OUTPATIENT
Start: 2020-03-02 | End: 2020-03-24

## 2020-03-02 NOTE — PATIENT INSTRUCTIONS
Acute Sinusitis    Acute sinusitis is irritation and swelling of the sinuses. It is usually caused by a viral infection after a common cold. Your doctor can help you find relief.  What is acute sinusitis?  Sinuses are air-filled spaces in the skull behind the face. They are kept moist and clean by a lining of mucosa. Things such as pollen, smoke, and chemical fumes can irritate the mucosa. It can then swell up. As a response to irritation, the mucosa makes more mucus and other fluids. Tiny hairlike cilia cover the mucosa. Cilia help carry mucus toward the opening of the sinus. Too much mucus may cause the cilia to stop working. This blocks the sinus opening. A buildup of fluid in the sinuses then causes pain and pressure. It can also encourage bacteria to grow in the sinuses.  Common symptoms of acute sinusitis  You may have:  · Facial soreness pain  · Headache  · Fever  · Fluid draining in the back of the throat (postnasal drip)  · Congestion  · Drainage that is thick and colored, instead of clear  · Cough  Diagnosing acute sinusitis  Your doctor will ask about your symptoms and health history. He or she will look at your ear, nose, and throat. You usually won't need to have X-rays taken.    The doctor may take a sample of mucus to check for bacteria. If you have sinusitis that keeps coming back, you may need imaging tests such as X-rays or CAT scans. This will help your doctor check for a structural problem that may be causing the infection.  Treating acute sinusitis  Treatment is aimed at unblocking the sinus opening and helping the cilia work again. You may need to take antihistamine and decongestant medicine. These can reduce inflammation and decrease the amount of fluid your sinuses make. If you have a bacterial infection, you will need to take antibiotic medicine for 10 to 14 days. Take this medicine until it is gone, even if you feel better.  Date Last Reviewed: 10/1/2016  © 4941-9440 [employer] [address] [place] [address]. All rights reserved. This information is not intended as a substitute for professional medical care. Always follow your healthcare professional's instructions.

## 2020-03-02 NOTE — TELEPHONE ENCOUNTER
----- Message from Gini Turner sent at 3/2/2020  9:55 AM CST -----  Contact: Self [phone]  Patient is requesting to see if he can be seen today due to his condition is worst. Didn't give any details of what is wrong.

## 2020-03-02 NOTE — TELEPHONE ENCOUNTER
Patient advised that Dr Moore was booked out today.  Offered after hours appt at [place].  Patient declined because it was a NP.  Asked if he could be scheduled at urgent care.  Advised that this was walk in and I did not have capability of scheduling.  Asked for [facility], advised that there were appointments at [facility] however they were with a Physician Assistant.  Patient wanted me to schedule for a future date for an urgent visit.  Patient advised that urgent visits can only be scheduled same day.  He would have to call back due to me not being able to schedule in advance.  Patient verbalized understanding.

## 2020-03-02 NOTE — PROGRESS NOTES
Subjective:       Patient ID: Jerardo Powers is a 74 y.o. male.    Chief Complaint:   Cough; Nasal Congestion; Chest Congestion; and URI    Cough   Associated symptoms include headaches and wheezing. Pertinent negatives include no chest pain.   URI    Associated symptoms include coughing, headaches and wheezing. Pertinent negatives include no chest pain.   He is planning to get a nebulizer and is asking for albuterol solution for it.    Review of Systems   Respiratory: Positive for cough and wheezing.    Cardiovascular: Negative for chest pain and palpitations.   Neurological: Positive for headaches.     Current Outpatient Medications   Medication Sig    allopurinol (ZYLOPRIM) 100 MG tablet TAKE 1 TABLET BY MOUTH EVERY DAY    amLODIPine (NORVASC) 10 MG tablet TAKE 1 TABLET BY MOUTH EVERY DAY    blood sugar diagnostic (CONTOUR TEST STRIPS MISC) Contour Test Strips    CONTOUR TEST STRIPS Strp TEST 3 TIMES A DAY    cyanocobalamin (VITAMIN B-12) 1000 MCG tablet TAKE 1 TABLET (1,000 MCG TOTAL) BY MOUTH ONCE DAILY.    dulaglutide (TRULICITY) 1.5 mg/0.5 mL PnIj Trulicity 1.5 mg/0.5 mL subcutaneous pen injector    dutasteride-tamsulosin (VIVIENNE) 0.5-0.4 mg CM24 Take 1 capsule by mouth every evening.    dutasteride-tamsulosin 0.5-0.4 mg CM24 dutasteride 0.5 mg-tamsulosin ER 0.4 mg capsule ext.release 24hr mphas    ergocalciferol (ERGOCALCIFEROL) 50,000 unit Cap Vitamin D2 1,250 mcg (50,000 unit) capsule    FLUZONE HIGH-DOSE 2019-20, PF, 180 mcg/0.5 mL Syrg TO BE ADMINISTERED BY PHARMACIST FOR IMMUNIZATION    influenza (FLUZONE HIGH-DOSE) 180 mcg/0.5 mL vaccine Fluzone High-Dose 8500-0485 (PF) 180 mcg/0.5 mL intramuscular syringe    irbesartan (AVAPRO) 150 MG tablet 150 mg once daily.     lancets Misc Test sugars once daily.  Dispense 100 lancets (insurance covered brand)    levothyroxine (SYNTHROID) 88 MCG tablet TAKE 1 TABLET (88 MCG TOTAL) BY MOUTH ONCE DAILY.    metFORMIN (GLUCOPHAGE-XR) 500 MG XR 24hr  tablet TAKE 1 PILL IN THE MORNING AND 2 PILLS IN THE EVENING    amLODIPine (NORVASC) 10 MG tablet amlodipine 10 mg tablet    azelastine (ASTELIN) 137 mcg (0.1 %) nasal spray SPRAY 2 SPRAYS INTO EACH NOSTRIL TWICE A DAY    benzonatate (TESSALON) 100 MG capsule benzonatate 100 mg capsule    citalopram (CELEXA) 20 MG tablet citalopram 20 mg tablet    dulaglutide (TRULICITY) 0.75 mg/0.5 mL PnIj Trulicity 0.75 mg/0.5 mL subcutaneous pen injector    HYDROcodone-acetaminophen (NORCO) 5-325 mg per tablet hydrocodone 5 mg-acetaminophen 325 mg tablet    irbesartan (AVAPRO) 150 MG tablet irbesartan 150 mg tablet    levocetirizine (XYZAL) 5 MG tablet Take 5 mg by mouth every evening.    levoFLOXacin (LEVAQUIN) 500 MG tablet levofloxacin 500 mg tablet    levothyroxine (SYNTHROID) 75 MCG tablet levothyroxine 75 mcg tablet    levothyroxine (SYNTHROID) 75 MCG tablet     meclizine (ANTIVERT) 25 mg tablet meclizine 25 mg tablet    mefloquine (LARIAM) 250 mg tablet mefloquine 250 mg tablet    mupirocin (BACTROBAN) 2 % ointment APPLY BID    oseltamivir (TAMIFLU) 75 MG capsule oseltamivir 75 mg capsule    potassium citrate (UROCIT-K 15) 15 mEq TbSR potassium citrate ER 15 mEq (1,620 mg) tablet,extended release    TRULICITY 1.5 mg/0.5 mL PnIj INJECT 1.5 MG INTO THE SKIN ONCE A WEEK. (Patient not taking: Reported on 3/2/2020)     No current facility-administered medications for this visit.      Past Medical History:   Diagnosis Date    Anxiety     BPH (benign prostatic hyperplasia)     BPH (benign prostatic hypertrophy)     Cataract     CKD (chronic kidney disease) stage 3, GFR 30-59 ml/min 5/5/2014    Colon polyps     Diabetes mellitus type II     Emphysema NEC     mild    Gout     Hematuria     Hyperlipidemia     Hypertension     Hypothyroidism     IBS (irritable bowel syndrome)     Kidney stones     JANEEN (obstructive sleep apnea)     Plantar fasciitis     Reflux      Family History   Problem Relation  "Age of Onset    Cancer Brother         non-hodgkins T cell lymphoma    Diabetes Mother     Macular degeneration Brother     Coronary artery disease Neg Hx     Colon cancer Neg Hx     Prostate cancer Neg Hx     Esophageal cancer Neg Hx      Social History     Tobacco Use    Smoking status: Never Smoker    Smokeless tobacco: Never Used   Substance Use Topics    Alcohol use: No     Alcohol/week: 0.0 standard drinks     Frequency: Monthly or less     Drinks per session: 1 or 2     Binge frequency: Never     Comment: occasionally    Drug use: No       Objective:      Vitals:    03/02/20 1521   BP: 119/77   BP Location: Right arm   Patient Position: Sitting   BP Method: Medium (Automatic)   Pulse: 84   Temp: 98.2 °F (36.8 °C)   SpO2: 96%   Weight: 96.8 kg (213 lb 6.5 oz)   Height: 5' 9" (1.753 m)     Physical Exam   Constitutional: He is oriented to person, place, and time. He appears well-developed and well-nourished. No distress.   HENT:   Head: Normocephalic and atraumatic.   Right Ear: Tympanic membrane normal. No tenderness.   Left Ear: Tympanic membrane normal. No tenderness.   Nose: Rhinorrhea present.   Mouth/Throat: Uvula is midline and mucous membranes are normal.   PND  Mild diffuse pharyngeal erythema.   Eyes: Pupils are equal, round, and reactive to light. Conjunctivae and EOM are normal.   Neck: Normal range of motion. Neck supple.   Cardiovascular: Normal rate, regular rhythm and normal heart sounds. Exam reveals no gallop and no friction rub.   No murmur heard.  Pulmonary/Chest: Effort normal and breath sounds normal. He has no wheezes. He has no rales.   Neurological: He is alert and oriented to person, place, and time.   Skin: Skin is warm and dry. He is not diaphoretic.   Psychiatric: He has a normal mood and affect. His behavior is normal. Thought content normal.   Nursing note and vitals reviewed.           Assessment and Plan:     Acute sinusitis, recurrence not specified, unspecified " location  -     azithromycin (Z-EVENS) 250 MG tablet; Take 2 tabs on day one, then take 1 tab daily for 4 days.  Dispense: 6 tablet; Refill: 0    Bronchitis  -     fluticasone propionate (FLOVENT HFA) 220 mcg/actuation inhaler; Inhale 1 puff into the lungs 2 (two) times daily.  Dispense: 12 g; Refill: 0  -     albuterol (PROVENTIL) 2.5 mg /3 mL (0.083 %) nebulizer solution; Take 3 mLs (2.5 mg total) by nebulization every 6 (six) hours as needed for Wheezing or Shortness of Breath. Rescue  Dispense: 1 Box; Refill: 0  -     albuterol sulfate (PROAIR RESPICLICK) 90 mcg/actuation AePB; Inhale 2 puffs into the lungs every 4 (four) hours as needed (for wheezing or shortness of breath). Rescue  Dispense: 1 each; Refill: 1          Follow up if symptoms worsen or fail to improve.      Raad Rodriguez MD

## 2020-03-03 ENCOUNTER — PATIENT MESSAGE (OUTPATIENT)
Dept: FAMILY MEDICINE | Facility: CLINIC | Age: 75
End: 2020-03-03

## 2020-03-03 DIAGNOSIS — J40 BRONCHITIS: Primary | ICD-10-CM

## 2020-03-03 NOTE — TELEPHONE ENCOUNTER
We have been seeing a lot of viral cold symptoms and coughing from this can sometimes last for couple of weeks.  I would recommend continuing Mucinex DM for cough.  You can use a Neti pot sinus wash to help with nasal congestion, Tylenol or Motrin to help with sore throat along with warm salt water gargles and warm liquids like honey lemon tea.  You could also take pseudoephedrine temporarily for a week as long as your blood pressure is stable as this will help with nasal congestion and drainage as well.  I believe in the past we had given in a Flonase nasal spray 2 sprays each nostril once a day which  you could try although this is better for allergies than viral upper respiratory symptoms.  If you're having any persistent symptoms over 2 weeks including development of shortness of breath, fevers, or persistent worsening headaches or nasal congestion, let me know in case we need to further evaluate you    Adams Moore MD      ===View-only below this line===      ----- Message -----     From: Jerardo Powers     Sent: 3/1/2020 11:42 AM CST       To: Adams Moore MD  Subject: Visit Follow-Up    Dr. Moore,  I have a cold from last week. No temperature. Only coughing, stubbed up nose and throat irritations. I started taking Mucinex DM for 2 days and heavy thick mucus is coming with cough. Color of mucus is mostly yellow. Do I need to see you right way? I have to go to [place] tomorrow  at 11:00 am for 3 days. Do I need any antibiotics? Please advise.  Thanks.  Jerardo Powers  [phone]

## 2020-03-24 DIAGNOSIS — J40 BRONCHITIS: ICD-10-CM

## 2020-03-24 RX ORDER — FLUTICASONE PROPIONATE 220 UG/1
AEROSOL, METERED RESPIRATORY (INHALATION)
Qty: 12 G | Refills: 0 | Status: SHIPPED | OUTPATIENT
Start: 2020-03-24 | End: 2020-04-15

## 2020-04-15 DIAGNOSIS — J40 BRONCHITIS: ICD-10-CM

## 2020-04-15 RX ORDER — FLUTICASONE PROPIONATE 220 UG/1
AEROSOL, METERED RESPIRATORY (INHALATION)
Qty: 12 G | Refills: 0 | Status: SHIPPED | OUTPATIENT
Start: 2020-04-15 | End: 2020-05-12

## 2020-04-18 ENCOUNTER — PATIENT MESSAGE (OUTPATIENT)
Dept: UROLOGY | Facility: CLINIC | Age: 75
End: 2020-04-18

## 2020-04-20 ENCOUNTER — OFFICE VISIT (OUTPATIENT)
Dept: UROLOGY | Facility: CLINIC | Age: 75
End: 2020-04-20
Payer: COMMERCIAL

## 2020-04-20 DIAGNOSIS — N41.0 ACUTE PROSTATITIS: ICD-10-CM

## 2020-04-20 DIAGNOSIS — R31.0 HEMATURIA, GROSS: Primary | ICD-10-CM

## 2020-04-20 PROCEDURE — 99214 PR OFFICE/OUTPT VISIT, EST, LEVL IV, 30-39 MIN: ICD-10-PCS | Mod: 95,,, | Performed by: UROLOGY

## 2020-04-20 PROCEDURE — 1159F MED LIST DOCD IN RCRD: CPT | Mod: 95,,, | Performed by: UROLOGY

## 2020-04-20 PROCEDURE — 1159F PR MEDICATION LIST DOCUMENTED IN MEDICAL RECORD: ICD-10-PCS | Mod: 95,,, | Performed by: UROLOGY

## 2020-04-20 PROCEDURE — 1101F PR PT FALLS ASSESS DOC 0-1 FALLS W/OUT INJ PAST YR: ICD-10-PCS | Mod: CPTII,95,, | Performed by: UROLOGY

## 2020-04-20 PROCEDURE — 99214 OFFICE O/P EST MOD 30 MIN: CPT | Mod: 95,,, | Performed by: UROLOGY

## 2020-04-20 PROCEDURE — 1101F PT FALLS ASSESS-DOCD LE1/YR: CPT | Mod: CPTII,95,, | Performed by: UROLOGY

## 2020-04-20 RX ORDER — CIPROFLOXACIN 500 MG/1
500 TABLET ORAL 2 TIMES DAILY
Qty: 14 TABLET | Refills: 1 | Status: SHIPPED | OUTPATIENT
Start: 2020-04-20 | End: 2020-04-27

## 2020-04-20 NOTE — PROGRESS NOTES
The patient location is: home  The chief complaint leading to consultation is:  Pain over the bladder, gross hematuria  Visit type: audio only  Total time spent with patient: 12 mins  Each patient to whom he or she provides medical services by telemedicine is:  (1) informed of the relationship between the physician and patient and the respective role of any other health care provider with respect to management of the patient; and (2) notified that he or she may decline to receive medical services by telemedicine and may withdraw from such care at any time.    Notes: CC: hematuria, bladder and testicular pain    Jerardo Powers is a 74 y.o. man with BPH, prostatitis, and kidney stone disease.  C/o gross blood in urine with no flank pain,dysuria or fever or chills.  He does have ? Prostate pain over the bladder.  Now he also feels discomfort in the testicle.    Previously he had the evaluation of hematuria  Saw him recently for 2 left ureteral stones, presenting for definitive stone management.  On 10/3/18, he underwent the following surgery.  Procedure(s) Performed:   1.  Left ureteroscopy  2.  Cystoscopy with stent removal   3.  Laser lithotripsy  4.  Stone basket extraction  5.  Retrograde pyelogram  6.  Left JJ ureteral stent insertion   7.  Fluoro < 1 h  Findings:   - two left ureteral stones identified, one removed with basket, one fragmented with laser and fragments removed with basket  - retrograde pyelogram performed-- no filling defects, no extravasation  - stent replaced on strings    2 baskets were used throughout the case.    Drains: 6 Fr x 26 cm JJ ureteral stent with strings    Had left [facility] on 3/21/18 and noted that he achieved stone free status on a post op x-ray study.  Recently he saw Lily Reynolds on 9/18/18 for gross hematuria.  No associated urinary symptoms, but c/o lower abdominal pain and pain going down to bilateral testicle.  He was treated with cipro and he feels that he is doing better.  Marvin  hematuria finally stopped yesterday.    hx of BPH with obstruction, prostatitis, and kidney stones.  Reports a good FOS and complete bladder emptying. Denies fever, chills, nausea, or vomiting.     Has been on Sue since 2009.     Past Medical History:   Diagnosis Date    Anxiety     BPH (benign prostatic hyperplasia)     BPH (benign prostatic hypertrophy)     Cataract     CKD (chronic kidney disease) stage 3, GFR 30-59 ml/min 5/5/2014    Colon polyps     Diabetes mellitus type II     Emphysema NEC     mild    Gout     Hematuria     Hyperlipidemia     Hypertension     Hypothyroidism     IBS (irritable bowel syndrome)     Kidney stones     JANEEN (obstructive sleep apnea)     Plantar fasciitis     Reflux      Past Surgical History:   Procedure Laterality Date    CATARACT EXTRACTION  1/28/2013    RIGHT EYE    CATARACT EXTRACTION      left eye    COLONOSCOPY  Sep 2011    Repeat recommended in 5 years    COLONOSCOPY N/A 10/10/2016    Procedure: COLONOSCOPY;  Surgeon: Noah Colunga MD;  Location: [facility] (4TH FLR);  Service: Endoscopy;  Laterality: N/A;  PM Prep    CYSTOSCOPY  10/3/2018    Procedure: CYSTOSCOPY;  Surgeon: Adi Murray MD;  Location: [address];  Service: Urology;;    CYSTOSCOPY W/ URETERAL STENT PLACEMENT Left 9/26/2018    Procedure: CYSTOSCOPY, WITH URETERAL STENT INSERTION;  Surgeon: Adi Murray MD;  [facility];  Service: Urology;  Laterality: Left;    KIDNEY STONE SURGERY      LASER LITHOTRIPSY Left 10/3/2018    Procedure: LITHOTRIPSY, USING LASER;  Surgeon: Adi Murray MD;  Location: [address];  Service: Urology;  Laterality: Left;    REPLACEMENT OF STENT Left 10/3/2018    Procedure: REPLACEMENT, STENT;  Surgeon: Aid Murray MD;  Location: [address];  Service: Urology;  Laterality: Left;    RETROGRADE PYELOGRAPHY Left 10/3/2018    Procedure: PYELOGRAM, RETROGRADE;  Surgeon: Adi Murray MD;  Location: [facility] OR [address] [place] [address];   Service: Urology;  Laterality: Left;    URETEROSCOPIC REMOVAL OF URETERIC CALCULUS Left 10/3/2018    Procedure: REMOVAL, CALCULUS, URETER, URETEROSCOPIC;  Surgeon: Adi Murray MD;  Location: [facility] OR [address] [place] [address];  Service: Urology;  Laterality: Left;  1.5 hours     Social History     Tobacco Use    Smoking status: Never Smoker    Smokeless tobacco: Never Used   Substance Use Topics    Alcohol use: No     Alcohol/week: 0.0 standard drinks     Frequency: Monthly or less     Drinks per session: 1 or 2     Binge frequency: Never     Comment: occasionally    Drug use: No     Family History   Problem Relation Age of Onset    Cancer Brother         non-hodgkins T cell lymphoma    Diabetes Mother     Macular degeneration Brother     Coronary artery disease Neg Hx     Colon cancer Neg Hx     Prostate cancer Neg Hx     Esophageal cancer Neg Hx      Allergy:  Allergies   Allergen Reactions    Morphine Hives     Outpatient Encounter Medications as of 4/20/2020   Medication Sig Dispense Refill    albuterol (PROVENTIL) 2.5 mg /3 mL (0.083 %) nebulizer solution Take 3 mLs (2.5 mg total) by nebulization every 6 (six) hours as needed for Wheezing or Shortness of Breath. Rescue 1 Box 0    albuterol sulfate (PROAIR RESPICLICK) 90 mcg/actuation AePB Inhale 2 puffs into the lungs every 4 (four) hours as needed (for wheezing or shortness of breath). Rescue 1 each 1    allopurinol (ZYLOPRIM) 100 MG tablet TAKE 1 TABLET BY MOUTH EVERY DAY 90 tablet 3    amLODIPine (NORVASC) 10 MG tablet TAKE 1 TABLET BY MOUTH EVERY DAY 90 tablet 3    amLODIPine (NORVASC) 10 MG tablet amlodipine 10 mg tablet      azelastine (ASTELIN) 137 mcg (0.1 %) nasal spray SPRAY 2 SPRAYS INTO EACH NOSTRIL TWICE A DAY  2    benzonatate (TESSALON) 100 MG capsule benzonatate 100 mg capsule      blood sugar diagnostic (CONTOUR TEST STRIPS MISC) Contour Test Strips      ciprofloxacin HCl (CIPRO) 500 MG tablet Take 1 tablet (500 mg total) by mouth 2 (two)  times daily. for 14 doses 14 tablet 1    citalopram (CELEXA) 20 MG tablet citalopram 20 mg tablet      CONTOUR TEST STRIPS Strp TEST 3 TIMES A  strip 11    cyanocobalamin (VITAMIN B-12) 1000 MCG tablet TAKE 1 TABLET (1,000 MCG TOTAL) BY MOUTH ONCE DAILY. 90 tablet 3    dulaglutide (TRULICITY) 0.75 mg/0.5 mL PnIj Trulicity 0.75 mg/0.5 mL subcutaneous pen injector      dulaglutide (TRULICITY) 1.5 mg/0.5 mL PnIj Trulicity 1.5 mg/0.5 mL subcutaneous pen injector      dutasteride-tamsulosin (VIVIENNE) 0.5-0.4 mg CM24 Take 1 capsule by mouth every evening. 30 capsule 12    dutasteride-tamsulosin 0.5-0.4 mg CM24 dutasteride 0.5 mg-tamsulosin ER 0.4 mg capsule ext.release 24hr mphas 90 capsule 0    ergocalciferol (ERGOCALCIFEROL) 50,000 unit Cap Vitamin D2 1,250 mcg (50,000 unit) capsule      fluticasone propionate (FLOVENT HFA) 220 mcg/actuation inhaler INHALE 1 PUFF BY MOUTH TWICE A DAY 12 g 0    FLUZONE HIGH-DOSE 2019-20, PF, 180 mcg/0.5 mL Syrg TO BE ADMINISTERED BY PHARMACIST FOR IMMUNIZATION  0    HYDROcodone-acetaminophen (NORCO) 5-325 mg per tablet hydrocodone 5 mg-acetaminophen 325 mg tablet      influenza (FLUZONE HIGH-DOSE) 180 mcg/0.5 mL vaccine Fluzone High-Dose 5409-6413 (PF) 180 mcg/0.5 mL intramuscular syringe      irbesartan (AVAPRO) 150 MG tablet 150 mg once daily.       irbesartan (AVAPRO) 150 MG tablet irbesartan 150 mg tablet      lancets Misc Test sugars once daily.  Dispense 100 lancets (insurance covered brand) 100 each 11    levocetirizine (XYZAL) 5 MG tablet Take 5 mg by mouth every evening.  11    levoFLOXacin (LEVAQUIN) 500 MG tablet levofloxacin 500 mg tablet      levothyroxine (SYNTHROID) 75 MCG tablet levothyroxine 75 mcg tablet      levothyroxine (SYNTHROID) 75 MCG tablet       levothyroxine (SYNTHROID) 88 MCG tablet TAKE 1 TABLET (88 MCG TOTAL) BY MOUTH ONCE DAILY. 90 tablet 3    meclizine (ANTIVERT) 25 mg tablet meclizine 25 mg tablet      mefloquine (LARIAM) 250  mg tablet mefloquine 250 mg tablet      metFORMIN (GLUCOPHAGE-XR) 500 MG XR 24hr tablet TAKE 1 PILL IN THE MORNING AND 2 PILLS IN THE EVENING 270 tablet 3    mupirocin (BACTROBAN) 2 % ointment APPLY BID  0    oseltamivir (TAMIFLU) 75 MG capsule oseltamivir 75 mg capsule      potassium citrate (UROCIT-K 15) 15 mEq TbSR potassium citrate ER 15 mEq (1,620 mg) tablet,extended release      TRULICITY 1.5 mg/0.5 mL PnIj INJECT 1.5 MG INTO THE SKIN ONCE A WEEK. (Patient not taking: Reported on 3/2/2020) 2 Syringe 11     No facility-administered encounter medications on file as of 4/20/2020.      Review of Systems   General ROS: GENERAL:  No weight gain or loss  SKIN:  No rashes or lacerations  HEAD:  No headaches  EYES:  No exophthalmos, jaundice or blindness  EARS:  No dizziness, tinnitus or hearing loss  NOSE:  No changes in smell  MOUTH & THROAT:  No dyskinetic movements or obvious goiter  CHEST:  No shortness of breath, hyperventilation or cough  CARDIOVASCULAR:  No tachycardia or chest pain  ABDOMEN:  No nausea, vomiting, pain, constipation or diarrhea  URINARY:  No frequency, dysuria or sexual dysfunction  ENDOCRINE:  No polydipsia, polyuria  MUSCULOSKELETAL:  No pain or stiffness of the joints  NEUROLOGIC:  No weakness, sensory changes, seizures, confusion, memory loss, tremor or other abnormal movements  Physical Exam     There were no vitals filed for this visit.    LABS:  .   Lab Results   Component Value Date    PSA 0.74 07/14/2017    PSA 0.59 07/17/2015    PSA 0.96 08/22/2012    PSA 1.11 04/16/2012    PSA 1.4 02/14/2011    PSA 2.4 05/12/2010    PSA 4.0 02/15/2010    PSA 4.7 (H) 08/20/2009    PSA 4.2 (H) 07/06/2009    PSA 3.6 03/25/2008    PSADIAG 1.3 02/19/2016    PSADIAG 0.62 12/01/2014    PSADIAG 0.97 12/04/2013     Results for orders placed or performed in visit on 02/19/16   Prostate Specific Antigen, Diagnostic   Result Value Ref Range    PSA DIAGNOSTIC 1.3 0.00-4.00 ng/mL   Results for orders placed  or performed in visit on 12/01/14   Prostate Specific Antigen, Diagnostic   Result Value Ref Range    PSA DIAGNOSTIC 0.62 0.00-4.00 ng/mL   Results for orders placed or performed in visit on 12/04/13   Prostate Specific Antigen, Diagnostic   Result Value Ref Range    PSA DIAGNOSTIC 0.97 0.00-4.00 ng/mL     Lab Results   Component Value Date    CREATININE 1.3 12/06/2019    CREATININE 1.3 09/04/2019    CREATININE 1.4 01/26/2019     Urine Culture, Routine   Date Value Ref Range Status   09/26/2018 No growth  Final     CT RSS 3/8/18  Left-sided nonobstructing nephrolithiasis.  Previously identified stone in the left ureteropelvic junction has apparently migrated into the renal collecting system.    Diverticulosis coli without evidence of diverticulitis.    KUB 9/18/18  No definite signs for renal calculi seen     Assessment and Plan:  Diagnoses and all orders for this visit:    Hematuria, gross  -     ciprofloxacin HCl (CIPRO) 500 MG tablet; Take 1 tablet (500 mg total) by mouth 2 (two) times daily. for 14 doses    Acute prostatitis  -     ciprofloxacin HCl (CIPRO) 500 MG tablet; Take 1 tablet (500 mg total) by mouth 2 (two) times daily. for 14 doses       His symptoms do not sound like having another kidney stone.  Suspect prostatitis with mild epididymitis.  cipro for 1 wk and see how he feels.  One more refill given.    I spent 25 minutes with the patient of which more than half was spent in direct consultation with the patient in regards to our treatment and plan.    Follow-up:  Follow up if symptoms worsen or fail to improve.

## 2020-05-11 RX ORDER — DUTASTERIDE AND TAMSULOSIN HYDROCHLORIDE CAPSULES .5; .4 MG/1; MG/1
CAPSULE ORAL
Qty: 90 CAPSULE | Refills: 3 | Status: SHIPPED | OUTPATIENT
Start: 2020-05-11 | End: 2021-04-26

## 2020-05-12 ENCOUNTER — PATIENT MESSAGE (OUTPATIENT)
Dept: INTERNAL MEDICINE | Facility: CLINIC | Age: 75
End: 2020-05-12

## 2020-05-12 DIAGNOSIS — J40 BRONCHITIS: ICD-10-CM

## 2020-05-12 RX ORDER — FLUTICASONE PROPIONATE 220 UG/1
AEROSOL, METERED RESPIRATORY (INHALATION)
Qty: 12 G | Refills: 0 | Status: SHIPPED | OUTPATIENT
Start: 2020-05-12 | End: 2021-10-26

## 2020-06-19 DIAGNOSIS — E11.9 TYPE 2 DIABETES MELLITUS WITHOUT COMPLICATION: ICD-10-CM

## 2020-06-21 ENCOUNTER — PATIENT MESSAGE (OUTPATIENT)
Dept: FAMILY MEDICINE | Facility: CLINIC | Age: 75
End: 2020-06-21

## 2020-06-22 ENCOUNTER — TELEPHONE (OUTPATIENT)
Dept: FAMILY MEDICINE | Facility: CLINIC | Age: 75
End: 2020-06-22

## 2020-06-22 NOTE — TELEPHONE ENCOUNTER
See my chart message.  Will place CoVID test for patient and his wife were both patients of mine.  He asked for testing for his son Zaira Powers although I do not think I am his physician. I believe he is established with Dr. Gilliam at [facility]  I do not know if I am able to put testing orders in for him but I will forward a message to his PCP

## 2020-06-22 NOTE — TELEPHONE ENCOUNTER
----- Message from Zonia Ordonez sent at 6/22/2020  3:16 PM CDT -----  Type:  Needs Medical Advice    Who Called: pt  Advice Regarding: COVID Testing  Would the patient rather a call back or a response via [facility]? call  Best Call Back Number: [phone]  Additional Information: n/a

## 2020-06-25 ENCOUNTER — LAB VISIT (OUTPATIENT)
Dept: INTERNAL MEDICINE | Facility: CLINIC | Age: 75
End: 2020-06-25
Payer: COMMERCIAL

## 2020-06-25 LAB — SARS-COV-2 RNA RESP QL NAA+PROBE: NOT DETECTED

## 2020-06-25 PROCEDURE — U0003 INFECTIOUS AGENT DETECTION BY NUCLEIC ACID (DNA OR RNA); SEVERE ACUTE RESPIRATORY SYNDROME CORONAVIRUS 2 (SARS-COV-2) (CORONAVIRUS DISEASE [COVID-19]), AMPLIFIED PROBE TECHNIQUE, MAKING USE OF HIGH THROUGHPUT TECHNOLOGIES AS DESCRIBED BY CMS-2020-01-R: HCPCS

## 2020-09-21 ENCOUNTER — PATIENT MESSAGE (OUTPATIENT)
Dept: GASTROENTEROLOGY | Facility: CLINIC | Age: 75
End: 2020-09-21

## 2020-09-21 RX ORDER — ESOMEPRAZOLE MAGNESIUM 40 MG/1
40 CAPSULE, DELAYED RELEASE ORAL
Qty: 30 CAPSULE | Refills: 3 | Status: SHIPPED | OUTPATIENT
Start: 2020-09-21 | End: 2020-12-18

## 2020-09-25 ENCOUNTER — PATIENT MESSAGE (OUTPATIENT)
Dept: OTHER | Facility: OTHER | Age: 75
End: 2020-09-25

## 2020-09-25 ENCOUNTER — PATIENT MESSAGE (OUTPATIENT)
Dept: ADMINISTRATIVE | Facility: OTHER | Age: 75
End: 2020-09-25

## 2020-09-28 DIAGNOSIS — E11.9 TYPE 2 DIABETES MELLITUS: ICD-10-CM

## 2020-09-29 ENCOUNTER — OFFICE VISIT (OUTPATIENT)
Dept: FAMILY MEDICINE | Facility: CLINIC | Age: 75
End: 2020-09-29
Payer: COMMERCIAL

## 2020-09-29 VITALS
DIASTOLIC BLOOD PRESSURE: 64 MMHG | HEART RATE: 80 BPM | BODY MASS INDEX: 32.13 KG/M2 | WEIGHT: 216.94 LBS | TEMPERATURE: 98 F | SYSTOLIC BLOOD PRESSURE: 116 MMHG | HEIGHT: 69 IN | OXYGEN SATURATION: 95 %

## 2020-09-29 DIAGNOSIS — I10 HYPERTENSION, UNSPECIFIED TYPE: ICD-10-CM

## 2020-09-29 DIAGNOSIS — N18.30 TYPE 2 DIABETES MELLITUS WITH STAGE 3 CHRONIC KIDNEY DISEASE, WITHOUT LONG-TERM CURRENT USE OF INSULIN: Primary | ICD-10-CM

## 2020-09-29 DIAGNOSIS — E03.9 HYPOTHYROIDISM, UNSPECIFIED TYPE: ICD-10-CM

## 2020-09-29 DIAGNOSIS — E55.9 VITAMIN D DEFICIENCY: ICD-10-CM

## 2020-09-29 DIAGNOSIS — M10.9 GOUT, UNSPECIFIED CAUSE, UNSPECIFIED CHRONICITY, UNSPECIFIED SITE: ICD-10-CM

## 2020-09-29 DIAGNOSIS — E11.22 TYPE 2 DIABETES MELLITUS WITH STAGE 3 CHRONIC KIDNEY DISEASE, WITHOUT LONG-TERM CURRENT USE OF INSULIN: Primary | ICD-10-CM

## 2020-09-29 PROCEDURE — 3008F PR BODY MASS INDEX (BMI) DOCUMENTED: ICD-10-PCS | Mod: CPTII,S$GLB,, | Performed by: FAMILY MEDICINE

## 2020-09-29 PROCEDURE — 3044F PR MOST RECENT HEMOGLOBIN A1C LEVEL <7.0%: ICD-10-PCS | Mod: CPTII,S$GLB,, | Performed by: FAMILY MEDICINE

## 2020-09-29 PROCEDURE — 3008F BODY MASS INDEX DOCD: CPT | Mod: CPTII,S$GLB,, | Performed by: FAMILY MEDICINE

## 2020-09-29 PROCEDURE — 1101F PR PT FALLS ASSESS DOC 0-1 FALLS W/OUT INJ PAST YR: ICD-10-PCS | Mod: CPTII,S$GLB,, | Performed by: FAMILY MEDICINE

## 2020-09-29 PROCEDURE — 3074F SYST BP LT 130 MM HG: CPT | Mod: CPTII,S$GLB,, | Performed by: FAMILY MEDICINE

## 2020-09-29 PROCEDURE — 1126F AMNT PAIN NOTED NONE PRSNT: CPT | Mod: S$GLB,,, | Performed by: FAMILY MEDICINE

## 2020-09-29 PROCEDURE — 1159F PR MEDICATION LIST DOCUMENTED IN MEDICAL RECORD: ICD-10-PCS | Mod: S$GLB,,, | Performed by: FAMILY MEDICINE

## 2020-09-29 PROCEDURE — 99214 OFFICE O/P EST MOD 30 MIN: CPT | Mod: 25,S$GLB,, | Performed by: FAMILY MEDICINE

## 2020-09-29 PROCEDURE — 3078F PR MOST RECENT DIASTOLIC BLOOD PRESSURE < 80 MM HG: ICD-10-PCS | Mod: CPTII,S$GLB,, | Performed by: FAMILY MEDICINE

## 2020-09-29 PROCEDURE — 99999 PR PBB SHADOW E&M-EST. PATIENT-LVL V: CPT | Mod: PBBFAC,,, | Performed by: FAMILY MEDICINE

## 2020-09-29 PROCEDURE — 90694 VACC AIIV4 NO PRSRV 0.5ML IM: CPT | Mod: S$GLB,,, | Performed by: FAMILY MEDICINE

## 2020-09-29 PROCEDURE — 1159F MED LIST DOCD IN RCRD: CPT | Mod: S$GLB,,, | Performed by: FAMILY MEDICINE

## 2020-09-29 PROCEDURE — 90471 IMMUNIZATION ADMIN: CPT | Mod: S$GLB,,, | Performed by: FAMILY MEDICINE

## 2020-09-29 PROCEDURE — 90694 FLU VACCINE - QUADRIVALENT - ADJUVANTED: ICD-10-PCS | Mod: S$GLB,,, | Performed by: FAMILY MEDICINE

## 2020-09-29 PROCEDURE — 99999 PR PBB SHADOW E&M-EST. PATIENT-LVL V: ICD-10-PCS | Mod: PBBFAC,,, | Performed by: FAMILY MEDICINE

## 2020-09-29 PROCEDURE — 3044F HG A1C LEVEL LT 7.0%: CPT | Mod: CPTII,S$GLB,, | Performed by: FAMILY MEDICINE

## 2020-09-29 PROCEDURE — 3074F PR MOST RECENT SYSTOLIC BLOOD PRESSURE < 130 MM HG: ICD-10-PCS | Mod: CPTII,S$GLB,, | Performed by: FAMILY MEDICINE

## 2020-09-29 PROCEDURE — 99214 PR OFFICE/OUTPT VISIT, EST, LEVL IV, 30-39 MIN: ICD-10-PCS | Mod: 25,S$GLB,, | Performed by: FAMILY MEDICINE

## 2020-09-29 PROCEDURE — 1126F PR PAIN SEVERITY QUANTIFIED, NO PAIN PRESENT: ICD-10-PCS | Mod: S$GLB,,, | Performed by: FAMILY MEDICINE

## 2020-09-29 PROCEDURE — 1101F PT FALLS ASSESS-DOCD LE1/YR: CPT | Mod: CPTII,S$GLB,, | Performed by: FAMILY MEDICINE

## 2020-09-29 PROCEDURE — 3078F DIAST BP <80 MM HG: CPT | Mod: CPTII,S$GLB,, | Performed by: FAMILY MEDICINE

## 2020-09-29 PROCEDURE — 90471 FLU VACCINE - QUADRIVALENT - ADJUVANTED: ICD-10-PCS | Mod: S$GLB,,, | Performed by: FAMILY MEDICINE

## 2020-09-29 NOTE — PROGRESS NOTES
(Portions of this note were dictated using voice recognition software and may contain dictation related errors in spelling/grammar/syntax not found on text review)    CC:   Chief Complaint   Patient presents with    Fatigue    Fluid Retention       HPI: 74 y.o. male presents for fatigue and fluid retention.  Medical history below    Diabetes on metformin 2 g daily, Trulicity 1.5 mg weekly    Hypertension:  Irbesartan 150 mg daily, amlodipine 10 mg daily.  Blood pressure stable    Hypothyroidism on Synthroid 88 mcg daily.  Last TSH as below    Anxiety on citalopram 20 mg daily    Gout on allopurinol 100 mg daily for prophylaxis    Feels fatigued at times.  Also feels like his retain more fluid in his feet, comes goes..  His wife states that he does eat a lot of salt to his foods.  He does tend the lot of sugary foods as well.  Has not been as active given COVID-19 pandemic.  Denies any chest pain or shortness of breath.  Was on the treadmill 10 min once or week or so does not feeling symptoms during those times.    Past Medical History:   Diagnosis Date    Anxiety     BPH (benign prostatic hyperplasia)     BPH (benign prostatic hypertrophy)     Cataract     CKD (chronic kidney disease) stage 3, GFR 30-59 ml/min 5/5/2014    Colon polyps     Diabetes mellitus type II     Emphysema NEC     mild    Gout     Hematuria     Hyperlipidemia     Hypertension     Hypothyroidism     IBS (irritable bowel syndrome)     Kidney stones     JANEEN (obstructive sleep apnea)     Plantar fasciitis     Reflux        Past Surgical History:   Procedure Laterality Date    CATARACT EXTRACTION  1/28/2013    RIGHT EYE    CATARACT EXTRACTION      left eye    COLONOSCOPY  Sep 2011    Repeat recommended in 5 years    COLONOSCOPY N/A 10/10/2016    Procedure: COLONOSCOPY;  Surgeon: Noah Colunga MD;  Location: [address]);  Service: Endoscopy;  Laterality: N/A;  PM Prep    CYSTOSCOPY  10/3/2018    Procedure:  CYSTOSCOPY;  Surgeon: Adi Murray MD;  Location: [facility] OR [address] [place] [address];  Service: Urology;;    CYSTOSCOPY W/ URETERAL STENT PLACEMENT Left 9/26/2018    Procedure: CYSTOSCOPY, WITH URETERAL STENT INSERTION;  Surgeon: Adi Murray MD;  Location: [facility] OR [address] [place] [address];  Service: Urology;  Laterality: Left;    KIDNEY STONE SURGERY      LASER LITHOTRIPSY Left 10/3/2018    Procedure: LITHOTRIPSY, USING LASER;  Surgeon: Adi Murray MD;  [facility];  Service: Urology;  Laterality: Left;    REPLACEMENT OF STENT Left 10/3/2018    Procedure: REPLACEMENT, STENT;  Surgeon: Adi Murray MD;  Location: [facility] OR [address] [place] [address];  Service: Urology;  Laterality: Left;    RETROGRADE PYELOGRAPHY Left 10/3/2018    Procedure: PYELOGRAM, RETROGRADE;  Surgeon: Adi Murray MD;  Location: [facility] OR [address] [place] [address];  Service: Urology;  Laterality: Left;    URETEROSCOPIC REMOVAL OF URETERIC CALCULUS Left 10/3/2018    Procedure: REMOVAL, CALCULUS, URETER, URETEROSCOPIC;  Surgeon: Adi Murray MD;  Location: [address];  Service: Urology;  Laterality: Left;  1.5 hours       Family History   Problem Relation Age of Onset    Cancer Brother         non-hodgkins T cell lymphoma    Diabetes Mother     Macular degeneration Brother     Coronary artery disease Neg Hx     Colon cancer Neg Hx     Prostate cancer Neg Hx     Esophageal cancer Neg Hx        Social History     Tobacco Use    Smoking status: Never Smoker    Smokeless tobacco: Never Used   Substance Use Topics    Alcohol use: No     Alcohol/week: 0.0 standard drinks     Frequency: Monthly or less     Drinks per session: 1 or 2     Binge frequency: Never     Comment: occasionally    Drug use: No       Lab Results   Component Value Date    WBC 10.48 09/04/2019    HGB 15.3 09/04/2019    HCT 45.9 09/04/2019    MCV 88 09/04/2019     09/04/2019    CHOL 179 12/06/2019    TRIG 128 12/06/2019    HDL 51 12/06/2019    ALT 28 12/06/2019    AST 23 12/06/2019    BILITOT 0.8  12/06/2019    ALKPHOS 92 12/06/2019     12/06/2019    K 3.7 12/06/2019     12/06/2019    CREATININE 1.3 12/06/2019    ESTGFRAFRICA >60.0 12/06/2019    EGFRNONAA 54.1 (A) 12/06/2019    CALCIUM 9.0 12/06/2019    ALBUMIN 3.6 12/06/2019    BUN 9 12/06/2019    CO2 27 12/06/2019    TSH 1.371 12/06/2019    PSA 0.74 07/14/2017    PSADIAG 1.3 02/19/2016    INR 1.0 09/26/2018    HGBA1C 6.9 (H) 12/06/2019    MICALBCREAT 12.0 07/19/2018    LDLCALC 102.4 12/06/2019     (H) 12/06/2019                 ROS:  GENERAL:  Above  SKIN: No rashes, no itching.  HEAD: No headaches.  EYES: No visual changes  EARS: No ear pain or changes in hearing.  NOSE: No congestion or rhinorrhea.  MOUTH & THROAT: No hoarseness, change in voice, or sore throat.  NODES: Denies swollen glands.  CHEST:  Mild dry cough and occasional wheezing  CARDIOVASCULAR: Denies chest pain, PND, orthopnea.  ABDOMEN: No nausea, vomiting, or changes in bowel function.  URINARY: No flank pain, dysuria or hematuria.  PERIPHERAL VASCULAR:  Above  MUSCULOSKELETAL: No joint stiffness or swelling. Denies back pain.  NEUROLOGIC: No weakness or numbness.    Vital signs reviewed  PE:   APPEARANCE: Well nourished, well developed, in no acute distress.    HEAD: Normocephalic, atraumatic.  EYES:    Conjunctivae noninjected.  NECK: Supple with no cervical lymphadenopathy.  No thyromegaly.  No carotid bruits   CHEST: Good inspiratory effort. Lungs clear to auscultation with no wheezes or crackles.  CARDIOVASCULAR: Normal S1, S2. No rubs, murmurs, or gallops.  ABDOMEN: Bowel sounds normal. Not distended. Soft. No tenderness or masses. No organomegaly.  EXTREMITIES:  Trace pitting edema bilateral ankles.  Normal distal pulses.  DIABETIC FOOT EXAM: Protective Sensation (w/ 10 gram monofilament):  Right: Intact  Left: Intact    Visual Inspection:  Normal -  Bilateral    Pedal Pulses:   Right: Present  Left: Present    Posterior tibialis:   Right:Present  Left:  Present              IMPRESSION    1. Type 2 diabetes mellitus with stage 3 chronic kidney disease, without long-term current use of insulin    2. Hypothyroidism, unspecified type    3. Hypertension, unspecified type    4. Gout, unspecified cause, unspecified chronicity, unspecified site    5. Vitamin D deficiency          PLAN  Orders Placed This Encounter   Procedures    Influenza (FLUAD) - Quadrivalent (Adjuvanted) *Preferred* (65+) (PF)    CBC auto differential    Comprehensive metabolic panel    Lipid Panel    TSH    Hemoglobin A1C    Vitamin D    Uric acid     Screening labs above    Fatigue:  Check labs.  Advised on regular exercise.  Blood pressure low normal today.  Overall doing well with his hypertension control.  Given some fluid retention issues, could try to break amlodipine down to 5 mg daily along with his irbesartan 150 mg daily    Had mention some occasional dry cough and wheezing.  Medical history list COPD although currently not smoking, has not had recent issues with this.  Advised to try to get more physical activity and work on weight.  Pulmonary exam normal in the office.  If still concerns, can reexamine lung status    SCREENINGS  Colonoscopy: 2016.  Normal colonoscopy except for medium size internal hemorrhoids visualized.  Prostate : [facility], Dr. Murray.     Immunizations  pvx 2013  Pcv: 2016  Tetanus 7/20/15  flu:  Today    Zoster:  Can get through pharmacy        Stress echo August 2015, normal  EKGs  2016: nsr  48 hour Holter monitor April 2014. 3 episodes of shortness of breath reported, corresponding rhythm is sinus rhythm. One syncopal episode reported and corresponding rhythm was sinus rhythm at 79 bpm. One episode of lightheadedness reported, corresponding rhythm was sinus bradycardia 59 bpm. No high-grade AV block. Rare PVCs. No ventricular tachycardia. Very rare PACs

## 2020-09-29 NOTE — PATIENT INSTRUCTIONS
Orders Placed This Encounter   Procedures    CBC auto differential    Comprehensive metabolic panel    Lipid Panel    TSH    Hemoglobin A1C    Vitamin D       Try half amlodipine (5 mg total) daily with your irbesartan 150 mg daily to see if swelling goes down with decrease in amlodipine dose. Goal blood pressure less than 140/90

## 2020-09-30 ENCOUNTER — LAB VISIT (OUTPATIENT)
Dept: LAB | Facility: HOSPITAL | Age: 75
End: 2020-09-30
Attending: FAMILY MEDICINE
Payer: COMMERCIAL

## 2020-09-30 DIAGNOSIS — E03.9 HYPOTHYROIDISM, UNSPECIFIED TYPE: ICD-10-CM

## 2020-09-30 DIAGNOSIS — I10 HYPERTENSION, UNSPECIFIED TYPE: ICD-10-CM

## 2020-09-30 DIAGNOSIS — M10.9 GOUT, UNSPECIFIED CAUSE, UNSPECIFIED CHRONICITY, UNSPECIFIED SITE: ICD-10-CM

## 2020-09-30 DIAGNOSIS — N18.30 TYPE 2 DIABETES MELLITUS WITH STAGE 3 CHRONIC KIDNEY DISEASE, WITHOUT LONG-TERM CURRENT USE OF INSULIN: ICD-10-CM

## 2020-09-30 DIAGNOSIS — E11.22 TYPE 2 DIABETES MELLITUS WITH STAGE 3 CHRONIC KIDNEY DISEASE, WITHOUT LONG-TERM CURRENT USE OF INSULIN: ICD-10-CM

## 2020-09-30 DIAGNOSIS — E55.9 VITAMIN D DEFICIENCY: ICD-10-CM

## 2020-09-30 LAB
25(OH)D3+25(OH)D2 SERPL-MCNC: 31 NG/ML (ref 30–96)
ALBUMIN SERPL BCP-MCNC: 3.9 G/DL (ref 3.5–5.2)
ALP SERPL-CCNC: 89 U/L (ref 55–135)
ALT SERPL W/O P-5'-P-CCNC: 29 U/L (ref 10–44)
ANION GAP SERPL CALC-SCNC: 11 MMOL/L (ref 8–16)
AST SERPL-CCNC: 21 U/L (ref 10–40)
BASOPHILS # BLD AUTO: 0.05 K/UL (ref 0–0.2)
BASOPHILS NFR BLD: 0.5 % (ref 0–1.9)
BILIRUB SERPL-MCNC: 1 MG/DL (ref 0.1–1)
BUN SERPL-MCNC: 15 MG/DL (ref 8–23)
CALCIUM SERPL-MCNC: 8.9 MG/DL (ref 8.7–10.5)
CHLORIDE SERPL-SCNC: 102 MMOL/L (ref 95–110)
CHOLEST SERPL-MCNC: 171 MG/DL (ref 120–199)
CHOLEST/HDLC SERPL: 3.4 {RATIO} (ref 2–5)
CO2 SERPL-SCNC: 26 MMOL/L (ref 23–29)
CREAT SERPL-MCNC: 1.5 MG/DL (ref 0.5–1.4)
DIFFERENTIAL METHOD: ABNORMAL
EOSINOPHIL # BLD AUTO: 1.1 K/UL (ref 0–0.5)
EOSINOPHIL NFR BLD: 10.2 % (ref 0–8)
ERYTHROCYTE [DISTWIDTH] IN BLOOD BY AUTOMATED COUNT: 13.1 % (ref 11.5–14.5)
EST. GFR  (AFRICAN AMERICAN): 52.3 ML/MIN/1.73 M^2
EST. GFR  (NON AFRICAN AMERICAN): 45.2 ML/MIN/1.73 M^2
ESTIMATED AVG GLUCOSE: 151 MG/DL (ref 68–131)
GLUCOSE SERPL-MCNC: 154 MG/DL (ref 70–110)
HBA1C MFR BLD HPLC: 6.9 % (ref 4–5.6)
HCT VFR BLD AUTO: 46.5 % (ref 40–54)
HDLC SERPL-MCNC: 51 MG/DL (ref 40–75)
HDLC SERPL: 29.8 % (ref 20–50)
HGB BLD-MCNC: 14.8 G/DL (ref 14–18)
IMM GRANULOCYTES # BLD AUTO: 0.07 K/UL (ref 0–0.04)
IMM GRANULOCYTES NFR BLD AUTO: 0.7 % (ref 0–0.5)
LDLC SERPL CALC-MCNC: 90.8 MG/DL (ref 63–159)
LYMPHOCYTES # BLD AUTO: 1.8 K/UL (ref 1–4.8)
LYMPHOCYTES NFR BLD: 17.4 % (ref 18–48)
MCH RBC QN AUTO: 28.6 PG (ref 27–31)
MCHC RBC AUTO-ENTMCNC: 31.8 G/DL (ref 32–36)
MCV RBC AUTO: 90 FL (ref 82–98)
MONOCYTES # BLD AUTO: 0.7 K/UL (ref 0.3–1)
MONOCYTES NFR BLD: 6.8 % (ref 4–15)
NEUTROPHILS # BLD AUTO: 6.7 K/UL (ref 1.8–7.7)
NEUTROPHILS NFR BLD: 64.4 % (ref 38–73)
NONHDLC SERPL-MCNC: 120 MG/DL
NRBC BLD-RTO: 0 /100 WBC
PLATELET # BLD AUTO: 207 K/UL (ref 150–350)
PMV BLD AUTO: 11.9 FL (ref 9.2–12.9)
POTASSIUM SERPL-SCNC: 3.7 MMOL/L (ref 3.5–5.1)
PROT SERPL-MCNC: 7 G/DL (ref 6–8.4)
RBC # BLD AUTO: 5.17 M/UL (ref 4.6–6.2)
SODIUM SERPL-SCNC: 139 MMOL/L (ref 136–145)
TRIGL SERPL-MCNC: 146 MG/DL (ref 30–150)
TSH SERPL DL<=0.005 MIU/L-ACNC: 2.68 UIU/ML (ref 0.4–4)
URATE SERPL-MCNC: 7.6 MG/DL (ref 3.4–7)
WBC # BLD AUTO: 10.44 K/UL (ref 3.9–12.7)

## 2020-09-30 PROCEDURE — 36415 COLL VENOUS BLD VENIPUNCTURE: CPT | Mod: PO

## 2020-09-30 PROCEDURE — 85025 COMPLETE CBC W/AUTO DIFF WBC: CPT

## 2020-09-30 PROCEDURE — 84550 ASSAY OF BLOOD/URIC ACID: CPT

## 2020-09-30 PROCEDURE — 82306 VITAMIN D 25 HYDROXY: CPT

## 2020-09-30 PROCEDURE — 80053 COMPREHEN METABOLIC PANEL: CPT

## 2020-09-30 PROCEDURE — 83036 HEMOGLOBIN GLYCOSYLATED A1C: CPT

## 2020-09-30 PROCEDURE — 80061 LIPID PANEL: CPT

## 2020-09-30 PROCEDURE — 84443 ASSAY THYROID STIM HORMONE: CPT

## 2020-10-02 ENCOUNTER — PATIENT MESSAGE (OUTPATIENT)
Dept: FAMILY MEDICINE | Facility: CLINIC | Age: 75
End: 2020-10-02

## 2020-10-02 NOTE — TELEPHONE ENCOUNTER
Dear Jerardo Powers    Your labs are in normal range.  Diabetes test looks to be well controlled.  Your uric acid level is mildly elevated.   you are currently on allopurinol 100 mg daily for gout prevention.  If you are having any issues with gout periodically, we may want to increase this to 200 mg.  Kidney function is slightly low but overall stable.  We can recheck labs in 3 months.      Adams Moore MD

## 2020-10-05 ENCOUNTER — PATIENT MESSAGE (OUTPATIENT)
Dept: ADMINISTRATIVE | Facility: HOSPITAL | Age: 75
End: 2020-10-05

## 2020-10-05 ENCOUNTER — PATIENT MESSAGE (OUTPATIENT)
Dept: FAMILY MEDICINE | Facility: CLINIC | Age: 75
End: 2020-10-05

## 2020-12-09 ENCOUNTER — PATIENT MESSAGE (OUTPATIENT)
Dept: FAMILY MEDICINE | Facility: CLINIC | Age: 75
End: 2020-12-09

## 2020-12-09 DIAGNOSIS — E11.22 TYPE 2 DIABETES MELLITUS WITH STAGE 3 CHRONIC KIDNEY DISEASE, WITHOUT LONG-TERM CURRENT USE OF INSULIN, UNSPECIFIED WHETHER STAGE 3A OR 3B CKD: Primary | ICD-10-CM

## 2020-12-09 DIAGNOSIS — N18.30 TYPE 2 DIABETES MELLITUS WITH STAGE 3 CHRONIC KIDNEY DISEASE, WITHOUT LONG-TERM CURRENT USE OF INSULIN, UNSPECIFIED WHETHER STAGE 3A OR 3B CKD: Primary | ICD-10-CM

## 2020-12-10 RX ORDER — BLOOD-GLUCOSE SENSOR
EACH MISCELLANEOUS
Qty: 1 DEVICE | Refills: 11 | Status: SHIPPED | OUTPATIENT
Start: 2020-12-10 | End: 2021-10-26

## 2020-12-10 RX ORDER — BLOOD-GLUCOSE,RECEIVER,CONT
EACH MISCELLANEOUS
Qty: 1 EACH | Refills: 11 | Status: SHIPPED | OUTPATIENT
Start: 2020-12-10 | End: 2021-10-26

## 2020-12-10 RX ORDER — BLOOD-GLUCOSE TRANSMITTER
EACH MISCELLANEOUS
Qty: 1 EACH | Refills: 11 | Status: SHIPPED | OUTPATIENT
Start: 2020-12-10 | End: 2021-10-26

## 2020-12-10 RX ORDER — BLOOD-GLUCOSE TRANSMITTER
EACH MISCELLANEOUS
Qty: 1 EACH | Refills: 11 | Status: SHIPPED | OUTPATIENT
Start: 2020-12-10 | End: 2020-12-10

## 2020-12-10 RX ORDER — BLOOD-GLUCOSE,RECEIVER,CONT
EACH MISCELLANEOUS
Qty: 1 EACH | Refills: 11 | Status: SHIPPED | OUTPATIENT
Start: 2020-12-10 | End: 2020-12-10

## 2020-12-10 RX ORDER — BLOOD-GLUCOSE SENSOR
EACH MISCELLANEOUS
Qty: 1 DEVICE | Refills: 11 | Status: SHIPPED | OUTPATIENT
Start: 2020-12-10 | End: 2020-12-10

## 2020-12-13 ENCOUNTER — PATIENT MESSAGE (OUTPATIENT)
Dept: FAMILY MEDICINE | Facility: CLINIC | Age: 75
End: 2020-12-13

## 2020-12-14 ENCOUNTER — PATIENT MESSAGE (OUTPATIENT)
Dept: FAMILY MEDICINE | Facility: CLINIC | Age: 75
End: 2020-12-14

## 2020-12-15 ENCOUNTER — PATIENT MESSAGE (OUTPATIENT)
Dept: FAMILY MEDICINE | Facility: CLINIC | Age: 75
End: 2020-12-15

## 2020-12-15 ENCOUNTER — OFFICE VISIT (OUTPATIENT)
Dept: URGENT CARE | Facility: CLINIC | Age: 75
End: 2020-12-15
Payer: COMMERCIAL

## 2020-12-15 VITALS — TEMPERATURE: 98 F

## 2020-12-15 DIAGNOSIS — Z03.818 ENCNTR FOR OBS FOR SUSP EXPSR TO OTH BIOLG AGENTS RULED OUT: ICD-10-CM

## 2020-12-15 DIAGNOSIS — R05.9 COUGH IN ADULT: ICD-10-CM

## 2020-12-15 DIAGNOSIS — R52 BODY ACHES: Primary | ICD-10-CM

## 2020-12-15 DIAGNOSIS — J02.9 ACUTE PHARYNGITIS, UNSPECIFIED ETIOLOGY: ICD-10-CM

## 2020-12-15 DIAGNOSIS — Z03.818 ENCOUNTER FOR OBSERVATION FOR SUSPECTED EXPOSURE TO OTHER BIOLOGICAL AGENTS RULED OUT: ICD-10-CM

## 2020-12-15 LAB
CTP QC/QA: YES
CTP QC/QA: YES
MOLECULAR STREP A: NEGATIVE
SARS-COV-2 RDRP RESP QL NAA+PROBE: NEGATIVE

## 2020-12-15 PROCEDURE — 3072F PR LOW RISK FOR RETINOPATHY: ICD-10-PCS | Mod: S$GLB,,, | Performed by: FAMILY MEDICINE

## 2020-12-15 PROCEDURE — U0002: ICD-10-PCS | Mod: QW,S$GLB,, | Performed by: FAMILY MEDICINE

## 2020-12-15 PROCEDURE — 87651 POCT STREP A MOLECULAR: ICD-10-PCS | Mod: QW,S$GLB,, | Performed by: FAMILY MEDICINE

## 2020-12-15 PROCEDURE — U0002 COVID-19 LAB TEST NON-CDC: HCPCS | Mod: QW,S$GLB,, | Performed by: FAMILY MEDICINE

## 2020-12-15 PROCEDURE — 87651 STREP A DNA AMP PROBE: CPT | Mod: QW,S$GLB,, | Performed by: FAMILY MEDICINE

## 2020-12-15 PROCEDURE — U0003 INFECTIOUS AGENT DETECTION BY NUCLEIC ACID (DNA OR RNA); SEVERE ACUTE RESPIRATORY SYNDROME CORONAVIRUS 2 (SARS-COV-2) (CORONAVIRUS DISEASE [COVID-19]), AMPLIFIED PROBE TECHNIQUE, MAKING USE OF HIGH THROUGHPUT TECHNOLOGIES AS DESCRIBED BY CMS-2020-01-R: HCPCS

## 2020-12-15 PROCEDURE — 99214 PR OFFICE/OUTPT VISIT, EST, LEVL IV, 30-39 MIN: ICD-10-PCS | Mod: S$GLB,CS,, | Performed by: FAMILY MEDICINE

## 2020-12-15 PROCEDURE — 99214 OFFICE O/P EST MOD 30 MIN: CPT | Mod: S$GLB,CS,, | Performed by: FAMILY MEDICINE

## 2020-12-15 PROCEDURE — 3072F LOW RISK FOR RETINOPATHY: CPT | Mod: S$GLB,,, | Performed by: FAMILY MEDICINE

## 2020-12-15 RX ORDER — AZITHROMYCIN 250 MG/1
TABLET, FILM COATED ORAL
Qty: 6 TABLET | Refills: 0 | Status: SHIPPED | OUTPATIENT
Start: 2020-12-15 | End: 2021-02-12

## 2020-12-15 RX ORDER — LORATADINE 10 MG/1
10 TABLET ORAL DAILY
Qty: 30 TABLET | Refills: 2 | Status: SHIPPED | OUTPATIENT
Start: 2020-12-15 | End: 2021-03-07

## 2020-12-16 NOTE — PROGRESS NOTES
"Subjective:       Patient ID: Jerardo Powers is a 75 y.o. male.    Vitals:  height is 5' 9" (1.753 m) (pended) and weight is 96.2 kg (212 lb) (pended). His oral temperature is 98.3 °F (36.8 °C). His blood pressure is 133/70 (pended) and his pulse is 78 (pended). His respiration is 16 (pended) and oxygen saturation is 96% (pended).     Chief Complaint: Sinus Problem    Pt reports that he was exposed to someone at work, has been having some post nasal drip, and sore throat. Was exposed to someone covid pos on Wednesday     Sinus Problem  This is a new problem. The current episode started in the past 7 days. The problem is unchanged. There has been no fever. His pain is at a severity of 0/10. He is experiencing no pain. Associated symptoms include congestion, coughing and a sore throat. Pertinent negatives include no chills, diaphoresis, ear pain, shortness of breath or sinus pressure. Past treatments include nothing. The treatment provided no relief.       Constitution: Negative for chills, sweating, fatigue and fever.   HENT: Positive for congestion, postnasal drip and sore throat. Negative for ear pain, sinus pain, sinus pressure and voice change.    Neck: Negative for painful lymph nodes.   Eyes: Negative for eye redness.   Respiratory: Positive for cough. Negative for chest tightness, sputum production, bloody sputum, COPD, shortness of breath, stridor, wheezing and asthma.    Gastrointestinal: Negative for nausea and vomiting.   Musculoskeletal: Negative for muscle ache.   Skin: Negative for rash.   Allergic/Immunologic: Negative for seasonal allergies and asthma.   Hematologic/Lymphatic: Negative for swollen lymph nodes.       Objective:      Physical Exam      Assessment:       1. Body aches    2. Acute pharyngitis, unspecified etiology    3. Encntr for obs for susp expsr to oth biolg agents ruled out    4. Cough in adult    5. Encounter for observation for suspected exposure to other biological agents ruled " out        Plan:         Body aches  -     POCT COVID-19 Rapid Screening  -     POCT Strep A, Molecular    Acute pharyngitis, unspecified etiology  -     POCT Strep A, Molecular    Encntr for obs for susp expsr to ot biolg agents ruled out  -     POCT Strep A, Molecular    Cough in adult  -     X-Ray Chest PA And Lateral; Future; Expected date: 12/15/2020    Encounter for observation for suspected exposure to other biological agents ruled out  -     COVID-19 Routine Screening    Other orders  -     loratadine (CLARITIN) 10 mg tablet; Take 1 tablet (10 mg total) by mouth once daily.  Dispense: 30 tablet; Refill: 2  -     azithromycin (ZITHROMAX Z-EVENS) 250 MG tablet; Take 2 tablets (500 mg) on  Day 1,  followed by 1 tablet (250 mg) once daily on Days 2 through 5. (Patient not taking: Reported on 12/15/2020)  Dispense: 6 tablet; Refill: 0          Pt wanted to leave will get chest x-ray done at later date that was ordered since SON mentioned that he has been having chronic cough for over 6 months

## 2020-12-16 NOTE — PATIENT INSTRUCTIONS

## 2020-12-17 LAB — SARS-COV-2 RNA RESP QL NAA+PROBE: NOT DETECTED

## 2021-01-02 ENCOUNTER — PATIENT MESSAGE (OUTPATIENT)
Dept: FAMILY MEDICINE | Facility: CLINIC | Age: 76
End: 2021-01-02

## 2021-01-04 ENCOUNTER — PATIENT MESSAGE (OUTPATIENT)
Dept: ADMINISTRATIVE | Facility: HOSPITAL | Age: 76
End: 2021-01-04

## 2021-01-06 DIAGNOSIS — E11.9 TYPE 2 DIABETES MELLITUS WITHOUT COMPLICATION, UNSPECIFIED WHETHER LONG TERM INSULIN USE: ICD-10-CM

## 2021-01-14 ENCOUNTER — IMMUNIZATION (OUTPATIENT)
Dept: INTERNAL MEDICINE | Facility: CLINIC | Age: 76
End: 2021-01-14
Payer: COMMERCIAL

## 2021-01-14 DIAGNOSIS — Z23 NEED FOR VACCINATION: ICD-10-CM

## 2021-01-14 PROCEDURE — 91300 COVID-19, MRNA, LNP-S, PF, 30 MCG/0.3 ML DOSE VACCINE: CPT | Mod: PBBFAC | Performed by: FAMILY MEDICINE

## 2021-02-06 ENCOUNTER — IMMUNIZATION (OUTPATIENT)
Dept: INTERNAL MEDICINE | Facility: CLINIC | Age: 76
End: 2021-02-06
Payer: COMMERCIAL

## 2021-02-06 DIAGNOSIS — Z23 NEED FOR VACCINATION: Primary | ICD-10-CM

## 2021-02-06 PROCEDURE — 91300 COVID-19, MRNA, LNP-S, PF, 30 MCG/0.3 ML DOSE VACCINE: CPT | Mod: PBBFAC | Performed by: FAMILY MEDICINE

## 2021-02-06 PROCEDURE — 0002A COVID-19, MRNA, LNP-S, PF, 30 MCG/0.3 ML DOSE VACCINE: CPT | Mod: PBBFAC | Performed by: FAMILY MEDICINE

## 2021-02-07 ENCOUNTER — PATIENT MESSAGE (OUTPATIENT)
Dept: FAMILY MEDICINE | Facility: CLINIC | Age: 76
End: 2021-02-07

## 2021-02-08 DIAGNOSIS — N18.30 TYPE 2 DIABETES MELLITUS WITH STAGE 3 CHRONIC KIDNEY DISEASE, WITHOUT LONG-TERM CURRENT USE OF INSULIN, UNSPECIFIED WHETHER STAGE 3A OR 3B CKD: Primary | ICD-10-CM

## 2021-02-08 DIAGNOSIS — E11.22 TYPE 2 DIABETES MELLITUS WITH STAGE 3 CHRONIC KIDNEY DISEASE, WITHOUT LONG-TERM CURRENT USE OF INSULIN, UNSPECIFIED WHETHER STAGE 3A OR 3B CKD: Primary | ICD-10-CM

## 2021-02-08 RX ORDER — DULAGLUTIDE 1.5 MG/.5ML
1.5 INJECTION, SOLUTION SUBCUTANEOUS WEEKLY
Qty: 4 PEN | Refills: 11 | Status: SHIPPED | OUTPATIENT
Start: 2021-02-08 | End: 2021-05-17

## 2021-02-10 ENCOUNTER — PATIENT MESSAGE (OUTPATIENT)
Dept: FAMILY MEDICINE | Facility: CLINIC | Age: 76
End: 2021-02-10

## 2021-02-12 ENCOUNTER — PATIENT MESSAGE (OUTPATIENT)
Dept: FAMILY MEDICINE | Facility: CLINIC | Age: 76
End: 2021-02-12

## 2021-02-12 ENCOUNTER — OFFICE VISIT (OUTPATIENT)
Dept: FAMILY MEDICINE | Facility: CLINIC | Age: 76
End: 2021-02-12
Payer: COMMERCIAL

## 2021-02-12 DIAGNOSIS — R05.9 COUGH: ICD-10-CM

## 2021-02-12 DIAGNOSIS — K21.9 GASTROESOPHAGEAL REFLUX DISEASE, UNSPECIFIED WHETHER ESOPHAGITIS PRESENT: ICD-10-CM

## 2021-02-12 DIAGNOSIS — N18.30 TYPE 2 DIABETES MELLITUS WITH STAGE 3 CHRONIC KIDNEY DISEASE, WITHOUT LONG-TERM CURRENT USE OF INSULIN, UNSPECIFIED WHETHER STAGE 3A OR 3B CKD: Primary | ICD-10-CM

## 2021-02-12 DIAGNOSIS — E11.22 TYPE 2 DIABETES MELLITUS WITH STAGE 3 CHRONIC KIDNEY DISEASE, WITHOUT LONG-TERM CURRENT USE OF INSULIN, UNSPECIFIED WHETHER STAGE 3A OR 3B CKD: Primary | ICD-10-CM

## 2021-02-12 PROCEDURE — 3044F PR MOST RECENT HEMOGLOBIN A1C LEVEL <7.0%: ICD-10-PCS | Mod: CPTII,,, | Performed by: FAMILY MEDICINE

## 2021-02-12 PROCEDURE — 99214 OFFICE O/P EST MOD 30 MIN: CPT | Mod: 95,,, | Performed by: FAMILY MEDICINE

## 2021-02-12 PROCEDURE — 1159F MED LIST DOCD IN RCRD: CPT | Mod: ,,, | Performed by: FAMILY MEDICINE

## 2021-02-12 PROCEDURE — 1159F PR MEDICATION LIST DOCUMENTED IN MEDICAL RECORD: ICD-10-PCS | Mod: ,,, | Performed by: FAMILY MEDICINE

## 2021-02-12 PROCEDURE — 99214 PR OFFICE/OUTPT VISIT, EST, LEVL IV, 30-39 MIN: ICD-10-PCS | Mod: 95,,, | Performed by: FAMILY MEDICINE

## 2021-02-12 PROCEDURE — 3044F HG A1C LEVEL LT 7.0%: CPT | Mod: CPTII,,, | Performed by: FAMILY MEDICINE

## 2021-02-12 RX ORDER — EMPAGLIFLOZIN 10 MG/1
10 TABLET, FILM COATED ORAL DAILY
Qty: 30 TABLET | Refills: 11 | Status: SHIPPED | OUTPATIENT
Start: 2021-02-12 | End: 2021-05-17

## 2021-02-20 ENCOUNTER — HOSPITAL ENCOUNTER (OUTPATIENT)
Dept: RADIOLOGY | Facility: HOSPITAL | Age: 76
Discharge: HOME | End: 2021-02-20
Attending: FAMILY MEDICINE
Payer: COMMERCIAL

## 2021-02-20 DIAGNOSIS — R05.9 COUGH: ICD-10-CM

## 2021-02-20 PROCEDURE — 71046 XR CHEST PA AND LATERAL: ICD-10-PCS | Mod: 26,,, | Performed by: RADIOLOGY

## 2021-02-20 PROCEDURE — 71046 X-RAY EXAM CHEST 2 VIEWS: CPT | Mod: TC,FY

## 2021-02-20 PROCEDURE — 71046 X-RAY EXAM CHEST 2 VIEWS: CPT | Mod: 26,,, | Performed by: RADIOLOGY

## 2021-02-23 ENCOUNTER — PATIENT MESSAGE (OUTPATIENT)
Dept: FAMILY MEDICINE | Facility: CLINIC | Age: 76
End: 2021-02-23

## 2021-03-05 ENCOUNTER — LAB VISIT (OUTPATIENT)
Dept: LAB | Facility: HOSPITAL | Age: 76
End: 2021-03-05
Attending: FAMILY MEDICINE
Payer: COMMERCIAL

## 2021-03-05 DIAGNOSIS — E11.22 TYPE 2 DIABETES MELLITUS WITH STAGE 3 CHRONIC KIDNEY DISEASE, WITHOUT LONG-TERM CURRENT USE OF INSULIN, UNSPECIFIED WHETHER STAGE 3A OR 3B CKD: ICD-10-CM

## 2021-03-05 DIAGNOSIS — N18.30 TYPE 2 DIABETES MELLITUS WITH STAGE 3 CHRONIC KIDNEY DISEASE, WITHOUT LONG-TERM CURRENT USE OF INSULIN, UNSPECIFIED WHETHER STAGE 3A OR 3B CKD: ICD-10-CM

## 2021-03-05 LAB
ALBUMIN SERPL BCP-MCNC: 3.9 G/DL (ref 3.5–5.2)
ALP SERPL-CCNC: 108 U/L (ref 55–135)
ALT SERPL W/O P-5'-P-CCNC: 19 U/L (ref 10–44)
ANION GAP SERPL CALC-SCNC: 9 MMOL/L (ref 8–16)
AST SERPL-CCNC: 16 U/L (ref 10–40)
BASOPHILS # BLD AUTO: 0.04 K/UL (ref 0–0.2)
BASOPHILS NFR BLD: 0.4 % (ref 0–1.9)
BILIRUB SERPL-MCNC: 0.7 MG/DL (ref 0.1–1)
BUN SERPL-MCNC: 19 MG/DL (ref 8–23)
CALCIUM SERPL-MCNC: 9.2 MG/DL (ref 8.7–10.5)
CHLORIDE SERPL-SCNC: 104 MMOL/L (ref 95–110)
CHOLEST SERPL-MCNC: 174 MG/DL (ref 120–199)
CHOLEST/HDLC SERPL: 3.8 {RATIO} (ref 2–5)
CO2 SERPL-SCNC: 24 MMOL/L (ref 23–29)
CREAT SERPL-MCNC: 1.5 MG/DL (ref 0.5–1.4)
DIFFERENTIAL METHOD: ABNORMAL
EOSINOPHIL # BLD AUTO: 0.6 K/UL (ref 0–0.5)
EOSINOPHIL NFR BLD: 5.8 % (ref 0–8)
ERYTHROCYTE [DISTWIDTH] IN BLOOD BY AUTOMATED COUNT: 13.4 % (ref 11.5–14.5)
EST. GFR  (AFRICAN AMERICAN): 51.9 ML/MIN/1.73 M^2
EST. GFR  (NON AFRICAN AMERICAN): 44.9 ML/MIN/1.73 M^2
ESTIMATED AVG GLUCOSE: 148 MG/DL (ref 68–131)
GLUCOSE SERPL-MCNC: 142 MG/DL (ref 70–110)
HBA1C MFR BLD: 6.8 % (ref 4–5.6)
HCT VFR BLD AUTO: 45.7 % (ref 40–54)
HDLC SERPL-MCNC: 46 MG/DL (ref 40–75)
HDLC SERPL: 26.4 % (ref 20–50)
HGB BLD-MCNC: 14.9 G/DL (ref 14–18)
IMM GRANULOCYTES # BLD AUTO: 0.07 K/UL (ref 0–0.04)
IMM GRANULOCYTES NFR BLD AUTO: 0.7 % (ref 0–0.5)
LDLC SERPL CALC-MCNC: 87.8 MG/DL (ref 63–159)
LYMPHOCYTES # BLD AUTO: 2.6 K/UL (ref 1–4.8)
LYMPHOCYTES NFR BLD: 26.3 % (ref 18–48)
MCH RBC QN AUTO: 28.7 PG (ref 27–31)
MCHC RBC AUTO-ENTMCNC: 32.6 G/DL (ref 32–36)
MCV RBC AUTO: 88 FL (ref 82–98)
MONOCYTES # BLD AUTO: 0.7 K/UL (ref 0.3–1)
MONOCYTES NFR BLD: 6.8 % (ref 4–15)
NEUTROPHILS # BLD AUTO: 5.8 K/UL (ref 1.8–7.7)
NEUTROPHILS NFR BLD: 60 % (ref 38–73)
NONHDLC SERPL-MCNC: 128 MG/DL
NRBC BLD-RTO: 0 /100 WBC
PLATELET # BLD AUTO: 231 K/UL (ref 150–350)
PMV BLD AUTO: 11.7 FL (ref 9.2–12.9)
POTASSIUM SERPL-SCNC: 4 MMOL/L (ref 3.5–5.1)
PROT SERPL-MCNC: 7.3 G/DL (ref 6–8.4)
RBC # BLD AUTO: 5.19 M/UL (ref 4.6–6.2)
SODIUM SERPL-SCNC: 137 MMOL/L (ref 136–145)
TRIGL SERPL-MCNC: 201 MG/DL (ref 30–150)
WBC # BLD AUTO: 9.72 K/UL (ref 3.9–12.7)

## 2021-03-05 PROCEDURE — 80061 LIPID PANEL: CPT | Performed by: FAMILY MEDICINE

## 2021-03-05 PROCEDURE — 85025 COMPLETE CBC W/AUTO DIFF WBC: CPT | Performed by: FAMILY MEDICINE

## 2021-03-05 PROCEDURE — 80053 COMPREHEN METABOLIC PANEL: CPT | Performed by: FAMILY MEDICINE

## 2021-03-05 PROCEDURE — 36415 COLL VENOUS BLD VENIPUNCTURE: CPT | Mod: PO | Performed by: FAMILY MEDICINE

## 2021-03-05 PROCEDURE — 83036 HEMOGLOBIN GLYCOSYLATED A1C: CPT | Performed by: FAMILY MEDICINE

## 2021-03-10 DIAGNOSIS — N18.9 CHRONIC KIDNEY DISEASE, UNSPECIFIED CKD STAGE: ICD-10-CM

## 2021-04-01 ENCOUNTER — PATIENT MESSAGE (OUTPATIENT)
Dept: ADMINISTRATIVE | Facility: HOSPITAL | Age: 76
End: 2021-04-01

## 2021-04-13 ENCOUNTER — PATIENT OUTREACH (OUTPATIENT)
Dept: ADMINISTRATIVE | Facility: OTHER | Age: 76
End: 2021-04-13

## 2021-04-15 ENCOUNTER — OFFICE VISIT (OUTPATIENT)
Dept: DERMATOLOGY | Facility: CLINIC | Age: 76
End: 2021-04-15
Payer: COMMERCIAL

## 2021-04-15 DIAGNOSIS — D22.61 NEVUS OF RIGHT UPPER ARM: Primary | ICD-10-CM

## 2021-04-15 DIAGNOSIS — L91.8 ACROCHORDON: ICD-10-CM

## 2021-04-15 DIAGNOSIS — L98.9 DISEASE OF SKIN AND SUBCUTANEOUS TISSUE: ICD-10-CM

## 2021-04-15 DIAGNOSIS — L73.8 SEBACEOUS HYPERPLASIA: ICD-10-CM

## 2021-04-15 DIAGNOSIS — R21 RASH AND NONSPECIFIC SKIN ERUPTION: ICD-10-CM

## 2021-04-15 DIAGNOSIS — Z12.83 SCREENING FOR SKIN CANCER: ICD-10-CM

## 2021-04-15 DIAGNOSIS — L82.1 SK (SEBORRHEIC KERATOSIS): ICD-10-CM

## 2021-04-15 DIAGNOSIS — D18.01 CHERRY ANGIOMA: ICD-10-CM

## 2021-04-15 PROCEDURE — 1159F MED LIST DOCD IN RCRD: CPT | Mod: S$GLB,,, | Performed by: DERMATOLOGY

## 2021-04-15 PROCEDURE — 11105 PUNCH BX SKIN EA SEP/ADDL: CPT | Mod: S$GLB,,, | Performed by: DERMATOLOGY

## 2021-04-15 PROCEDURE — 11105 PR PUNCH BIOPSY, SKIN, EA ADDTL LESION: ICD-10-PCS | Mod: S$GLB,,, | Performed by: DERMATOLOGY

## 2021-04-15 PROCEDURE — 88313 SPECIAL STAINS GROUP 2: CPT | Mod: 26,,, | Performed by: PATHOLOGY

## 2021-04-15 PROCEDURE — 11104 PR PUNCH BIOPSY, SKIN, SINGLE LESION: ICD-10-PCS | Mod: S$GLB,,, | Performed by: DERMATOLOGY

## 2021-04-15 PROCEDURE — 1159F PR MEDICATION LIST DOCUMENTED IN MEDICAL RECORD: ICD-10-PCS | Mod: S$GLB,,, | Performed by: DERMATOLOGY

## 2021-04-15 PROCEDURE — 1101F PT FALLS ASSESS-DOCD LE1/YR: CPT | Mod: CPTII,S$GLB,, | Performed by: DERMATOLOGY

## 2021-04-15 PROCEDURE — 88342 CHG IMMUNOCYTOCHEMISTRY: ICD-10-PCS | Mod: 26,,, | Performed by: PATHOLOGY

## 2021-04-15 PROCEDURE — 3288F FALL RISK ASSESSMENT DOCD: CPT | Mod: CPTII,S$GLB,, | Performed by: DERMATOLOGY

## 2021-04-15 PROCEDURE — 88312 PR  SPECIAL STAINS,GROUP I: ICD-10-PCS | Mod: 26,,, | Performed by: PATHOLOGY

## 2021-04-15 PROCEDURE — 1126F PR PAIN SEVERITY QUANTIFIED, NO PAIN PRESENT: ICD-10-PCS | Mod: S$GLB,,, | Performed by: DERMATOLOGY

## 2021-04-15 PROCEDURE — 99203 PR OFFICE/OUTPT VISIT, NEW, LEVL III, 30-44 MIN: ICD-10-PCS | Mod: 25,S$GLB,, | Performed by: DERMATOLOGY

## 2021-04-15 PROCEDURE — 99203 OFFICE O/P NEW LOW 30 MIN: CPT | Mod: 25,S$GLB,, | Performed by: DERMATOLOGY

## 2021-04-15 PROCEDURE — 99999 PR PBB SHADOW E&M-EST. PATIENT-LVL II: CPT | Mod: PBBFAC,,, | Performed by: DERMATOLOGY

## 2021-04-15 PROCEDURE — 11104 PUNCH BX SKIN SINGLE LESION: CPT | Mod: S$GLB,,, | Performed by: DERMATOLOGY

## 2021-04-15 PROCEDURE — 88342 IMHCHEM/IMCYTCHM 1ST ANTB: CPT | Mod: 26,,, | Performed by: PATHOLOGY

## 2021-04-15 PROCEDURE — 88312 SPECIAL STAINS GROUP 1: CPT | Mod: 59 | Performed by: PATHOLOGY

## 2021-04-15 PROCEDURE — 99999 PR PBB SHADOW E&M-EST. PATIENT-LVL II: ICD-10-PCS | Mod: PBBFAC,,, | Performed by: DERMATOLOGY

## 2021-04-15 PROCEDURE — 88312 SPECIAL STAINS GROUP 1: CPT | Mod: 26,,, | Performed by: PATHOLOGY

## 2021-04-15 PROCEDURE — 88342 IMHCHEM/IMCYTCHM 1ST ANTB: CPT | Performed by: PATHOLOGY

## 2021-04-15 PROCEDURE — 88305 TISSUE EXAM BY PATHOLOGIST: ICD-10-PCS | Mod: 26,,, | Performed by: PATHOLOGY

## 2021-04-15 PROCEDURE — 88305 TISSUE EXAM BY PATHOLOGIST: CPT | Mod: 26,,, | Performed by: PATHOLOGY

## 2021-04-15 PROCEDURE — 88313 SPECIAL STAINS GROUP 2: CPT | Performed by: PATHOLOGY

## 2021-04-15 PROCEDURE — 88313 PR  SPECIAL STAINS,GROUP II: ICD-10-PCS | Mod: 26,,, | Performed by: PATHOLOGY

## 2021-04-15 PROCEDURE — 88305 TISSUE EXAM BY PATHOLOGIST: CPT | Performed by: PATHOLOGY

## 2021-04-15 PROCEDURE — 1101F PR PT FALLS ASSESS DOC 0-1 FALLS W/OUT INJ PAST YR: ICD-10-PCS | Mod: CPTII,S$GLB,, | Performed by: DERMATOLOGY

## 2021-04-15 PROCEDURE — 3288F PR FALLS RISK ASSESSMENT DOCUMENTED: ICD-10-PCS | Mod: CPTII,S$GLB,, | Performed by: DERMATOLOGY

## 2021-04-15 PROCEDURE — 1126F AMNT PAIN NOTED NONE PRSNT: CPT | Mod: S$GLB,,, | Performed by: DERMATOLOGY

## 2021-04-21 LAB
COMMENT: NORMAL
FINAL PATHOLOGIC DIAGNOSIS: NORMAL
GROSS: NORMAL
Lab: NORMAL
MICROSCOPIC EXAM: NORMAL

## 2021-04-26 ENCOUNTER — PATIENT MESSAGE (OUTPATIENT)
Dept: FAMILY MEDICINE | Facility: CLINIC | Age: 76
End: 2021-04-26

## 2021-04-26 RX ORDER — MECLIZINE HYDROCHLORIDE 25 MG/1
25 TABLET ORAL 3 TIMES DAILY PRN
Qty: 30 TABLET | Refills: 1 | Status: SHIPPED | OUTPATIENT
Start: 2021-04-26 | End: 2022-04-29 | Stop reason: SDUPTHER

## 2021-04-30 ENCOUNTER — OFFICE VISIT (OUTPATIENT)
Dept: DERMATOLOGY | Facility: CLINIC | Age: 76
End: 2021-04-30
Payer: COMMERCIAL

## 2021-04-30 ENCOUNTER — LAB VISIT (OUTPATIENT)
Dept: LAB | Facility: HOSPITAL | Age: 76
End: 2021-04-30
Attending: DERMATOLOGY
Payer: COMMERCIAL

## 2021-04-30 DIAGNOSIS — L98.5: ICD-10-CM

## 2021-04-30 DIAGNOSIS — L92.0 GRANULOMA ANNULARE: ICD-10-CM

## 2021-04-30 DIAGNOSIS — L98.5: Primary | ICD-10-CM

## 2021-04-30 PROCEDURE — 11900 INJECT SKIN LESIONS </W 7: CPT | Mod: S$GLB,,, | Performed by: DERMATOLOGY

## 2021-04-30 PROCEDURE — 1126F PR PAIN SEVERITY QUANTIFIED, NO PAIN PRESENT: ICD-10-PCS | Mod: S$GLB,,, | Performed by: DERMATOLOGY

## 2021-04-30 PROCEDURE — 84165 PATHOLOGIST INTERPRETATION SPE: ICD-10-PCS | Mod: 26,,, | Performed by: PATHOLOGY

## 2021-04-30 PROCEDURE — 84165 PROTEIN E-PHORESIS SERUM: CPT | Performed by: DERMATOLOGY

## 2021-04-30 PROCEDURE — 99999 PR PBB SHADOW E&M-EST. PATIENT-LVL II: ICD-10-PCS | Mod: PBBFAC,,, | Performed by: DERMATOLOGY

## 2021-04-30 PROCEDURE — 84165 PROTEIN E-PHORESIS SERUM: CPT | Mod: 26,,, | Performed by: PATHOLOGY

## 2021-04-30 PROCEDURE — 3288F FALL RISK ASSESSMENT DOCD: CPT | Mod: CPTII,S$GLB,, | Performed by: DERMATOLOGY

## 2021-04-30 PROCEDURE — 3288F PR FALLS RISK ASSESSMENT DOCUMENTED: ICD-10-PCS | Mod: CPTII,S$GLB,, | Performed by: DERMATOLOGY

## 2021-04-30 PROCEDURE — 86334 IMMUNOFIX E-PHORESIS SERUM: CPT | Mod: 26,,, | Performed by: PATHOLOGY

## 2021-04-30 PROCEDURE — 99214 OFFICE O/P EST MOD 30 MIN: CPT | Mod: 25,S$GLB,, | Performed by: DERMATOLOGY

## 2021-04-30 PROCEDURE — 1159F PR MEDICATION LIST DOCUMENTED IN MEDICAL RECORD: ICD-10-PCS | Mod: S$GLB,,, | Performed by: DERMATOLOGY

## 2021-04-30 PROCEDURE — 99214 PR OFFICE/OUTPT VISIT, EST, LEVL IV, 30-39 MIN: ICD-10-PCS | Mod: 25,S$GLB,, | Performed by: DERMATOLOGY

## 2021-04-30 PROCEDURE — 1159F MED LIST DOCD IN RCRD: CPT | Mod: S$GLB,,, | Performed by: DERMATOLOGY

## 2021-04-30 PROCEDURE — 86334 PATHOLOGIST INTERPRETATION IFE: ICD-10-PCS | Mod: 26,,, | Performed by: PATHOLOGY

## 2021-04-30 PROCEDURE — 1101F PT FALLS ASSESS-DOCD LE1/YR: CPT | Mod: CPTII,S$GLB,, | Performed by: DERMATOLOGY

## 2021-04-30 PROCEDURE — 1101F PR PT FALLS ASSESS DOC 0-1 FALLS W/OUT INJ PAST YR: ICD-10-PCS | Mod: CPTII,S$GLB,, | Performed by: DERMATOLOGY

## 2021-04-30 PROCEDURE — 11900 PR INJECTION INTO SKIN LESIONS, UP TO 7: ICD-10-PCS | Mod: S$GLB,,, | Performed by: DERMATOLOGY

## 2021-04-30 PROCEDURE — 36415 COLL VENOUS BLD VENIPUNCTURE: CPT | Performed by: DERMATOLOGY

## 2021-04-30 PROCEDURE — 1126F AMNT PAIN NOTED NONE PRSNT: CPT | Mod: S$GLB,,, | Performed by: DERMATOLOGY

## 2021-04-30 PROCEDURE — 86334 IMMUNOFIX E-PHORESIS SERUM: CPT | Performed by: DERMATOLOGY

## 2021-04-30 PROCEDURE — 99999 PR PBB SHADOW E&M-EST. PATIENT-LVL II: CPT | Mod: PBBFAC,,, | Performed by: DERMATOLOGY

## 2021-04-30 RX ORDER — FLUOCINONIDE 0.5 MG/G
CREAM TOPICAL
Qty: 60 G | Refills: 3 | Status: SHIPPED | OUTPATIENT
Start: 2021-04-30 | End: 2021-10-26

## 2021-05-03 ENCOUNTER — PATIENT MESSAGE (OUTPATIENT)
Dept: DERMATOLOGY | Facility: CLINIC | Age: 76
End: 2021-05-03

## 2021-05-03 LAB
ALBUMIN SERPL ELPH-MCNC: 4.18 G/DL (ref 3.35–5.55)
ALPHA1 GLOB SERPL ELPH-MCNC: 0.27 G/DL (ref 0.17–0.41)
ALPHA2 GLOB SERPL ELPH-MCNC: 0.68 G/DL (ref 0.43–0.99)
B-GLOBULIN SERPL ELPH-MCNC: 0.94 G/DL (ref 0.5–1.1)
GAMMA GLOB SERPL ELPH-MCNC: 1.03 G/DL (ref 0.67–1.58)
INTERPRETATION SERPL IFE-IMP: NORMAL
PROT SERPL-MCNC: 7.1 G/DL (ref 6–8.4)

## 2021-05-04 LAB
PATHOLOGIST INTERPRETATION IFE: NORMAL
PATHOLOGIST INTERPRETATION SPE: NORMAL

## 2021-05-05 DIAGNOSIS — E11.9 TYPE 2 DIABETES MELLITUS WITHOUT COMPLICATION: ICD-10-CM

## 2021-05-13 ENCOUNTER — PATIENT MESSAGE (OUTPATIENT)
Dept: FAMILY MEDICINE | Facility: CLINIC | Age: 76
End: 2021-05-13

## 2021-05-13 DIAGNOSIS — E11.22 TYPE 2 DIABETES MELLITUS WITH STAGE 3A CHRONIC KIDNEY DISEASE, WITHOUT LONG-TERM CURRENT USE OF INSULIN: Primary | ICD-10-CM

## 2021-05-13 DIAGNOSIS — N18.31 TYPE 2 DIABETES MELLITUS WITH STAGE 3A CHRONIC KIDNEY DISEASE, WITHOUT LONG-TERM CURRENT USE OF INSULIN: Primary | ICD-10-CM

## 2021-05-14 ENCOUNTER — PATIENT MESSAGE (OUTPATIENT)
Dept: FAMILY MEDICINE | Facility: CLINIC | Age: 76
End: 2021-05-14

## 2021-05-15 ENCOUNTER — LAB VISIT (OUTPATIENT)
Dept: LAB | Facility: HOSPITAL | Age: 76
End: 2021-05-15
Attending: FAMILY MEDICINE
Payer: COMMERCIAL

## 2021-05-15 DIAGNOSIS — N18.31 TYPE 2 DIABETES MELLITUS WITH STAGE 3A CHRONIC KIDNEY DISEASE, WITHOUT LONG-TERM CURRENT USE OF INSULIN: ICD-10-CM

## 2021-05-15 DIAGNOSIS — E11.22 TYPE 2 DIABETES MELLITUS WITH STAGE 3A CHRONIC KIDNEY DISEASE, WITHOUT LONG-TERM CURRENT USE OF INSULIN: ICD-10-CM

## 2021-05-15 PROCEDURE — 80053 COMPREHEN METABOLIC PANEL: CPT | Performed by: FAMILY MEDICINE

## 2021-05-15 PROCEDURE — 36415 COLL VENOUS BLD VENIPUNCTURE: CPT | Mod: PO | Performed by: FAMILY MEDICINE

## 2021-05-15 PROCEDURE — 80061 LIPID PANEL: CPT | Performed by: FAMILY MEDICINE

## 2021-05-15 PROCEDURE — 85025 COMPLETE CBC W/AUTO DIFF WBC: CPT | Performed by: FAMILY MEDICINE

## 2021-05-15 PROCEDURE — 84443 ASSAY THYROID STIM HORMONE: CPT | Performed by: FAMILY MEDICINE

## 2021-05-15 PROCEDURE — 83036 HEMOGLOBIN GLYCOSYLATED A1C: CPT | Performed by: FAMILY MEDICINE

## 2021-05-16 ENCOUNTER — PATIENT MESSAGE (OUTPATIENT)
Dept: INTERNAL MEDICINE | Facility: CLINIC | Age: 76
End: 2021-05-16

## 2021-05-16 LAB
ALBUMIN SERPL BCP-MCNC: 3.8 G/DL (ref 3.5–5.2)
ALP SERPL-CCNC: 95 U/L (ref 55–135)
ALT SERPL W/O P-5'-P-CCNC: 25 U/L (ref 10–44)
ANION GAP SERPL CALC-SCNC: 11 MMOL/L (ref 8–16)
AST SERPL-CCNC: 17 U/L (ref 10–40)
BASOPHILS # BLD AUTO: 0.03 K/UL (ref 0–0.2)
BASOPHILS NFR BLD: 0.2 % (ref 0–1.9)
BILIRUB SERPL-MCNC: 1 MG/DL (ref 0.1–1)
BUN SERPL-MCNC: 16 MG/DL (ref 8–23)
CALCIUM SERPL-MCNC: 9 MG/DL (ref 8.7–10.5)
CHLORIDE SERPL-SCNC: 105 MMOL/L (ref 95–110)
CHOLEST SERPL-MCNC: 190 MG/DL (ref 120–199)
CHOLEST/HDLC SERPL: 3.4 {RATIO} (ref 2–5)
CO2 SERPL-SCNC: 24 MMOL/L (ref 23–29)
CREAT SERPL-MCNC: 1.4 MG/DL (ref 0.5–1.4)
DIFFERENTIAL METHOD: ABNORMAL
EOSINOPHIL # BLD AUTO: 0.4 K/UL (ref 0–0.5)
EOSINOPHIL NFR BLD: 2.8 % (ref 0–8)
ERYTHROCYTE [DISTWIDTH] IN BLOOD BY AUTOMATED COUNT: 13.2 % (ref 11.5–14.5)
EST. GFR  (AFRICAN AMERICAN): 56.4 ML/MIN/1.73 M^2
EST. GFR  (NON AFRICAN AMERICAN): 48.8 ML/MIN/1.73 M^2
ESTIMATED AVG GLUCOSE: 148 MG/DL (ref 68–131)
GLUCOSE SERPL-MCNC: 153 MG/DL (ref 70–110)
HBA1C MFR BLD: 6.8 % (ref 4–5.6)
HCT VFR BLD AUTO: 48.8 % (ref 40–54)
HDLC SERPL-MCNC: 56 MG/DL (ref 40–75)
HDLC SERPL: 29.5 % (ref 20–50)
HGB BLD-MCNC: 15.8 G/DL (ref 14–18)
IMM GRANULOCYTES # BLD AUTO: 0.08 K/UL (ref 0–0.04)
IMM GRANULOCYTES NFR BLD AUTO: 0.6 % (ref 0–0.5)
LDLC SERPL CALC-MCNC: 89.6 MG/DL (ref 63–159)
LYMPHOCYTES # BLD AUTO: 1.9 K/UL (ref 1–4.8)
LYMPHOCYTES NFR BLD: 14 % (ref 18–48)
MCH RBC QN AUTO: 29.1 PG (ref 27–31)
MCHC RBC AUTO-ENTMCNC: 32.4 G/DL (ref 32–36)
MCV RBC AUTO: 90 FL (ref 82–98)
MONOCYTES # BLD AUTO: 0.8 K/UL (ref 0.3–1)
MONOCYTES NFR BLD: 5.7 % (ref 4–15)
NEUTROPHILS # BLD AUTO: 10.1 K/UL (ref 1.8–7.7)
NEUTROPHILS NFR BLD: 76.7 % (ref 38–73)
NONHDLC SERPL-MCNC: 134 MG/DL
NRBC BLD-RTO: 0 /100 WBC
PLATELET # BLD AUTO: 193 K/UL (ref 150–450)
PMV BLD AUTO: 12.9 FL (ref 9.2–12.9)
POTASSIUM SERPL-SCNC: 3.9 MMOL/L (ref 3.5–5.1)
PROT SERPL-MCNC: 7.2 G/DL (ref 6–8.4)
RBC # BLD AUTO: 5.43 M/UL (ref 4.6–6.2)
SODIUM SERPL-SCNC: 140 MMOL/L (ref 136–145)
TRIGL SERPL-MCNC: 222 MG/DL (ref 30–150)
TSH SERPL DL<=0.005 MIU/L-ACNC: 1.04 UIU/ML (ref 0.4–4)
WBC # BLD AUTO: 13.23 K/UL (ref 3.9–12.7)

## 2021-05-17 ENCOUNTER — OFFICE VISIT (OUTPATIENT)
Dept: FAMILY MEDICINE | Facility: CLINIC | Age: 76
End: 2021-05-17
Payer: COMMERCIAL

## 2021-05-17 ENCOUNTER — OFFICE VISIT (OUTPATIENT)
Dept: OPTOMETRY | Facility: CLINIC | Age: 76
End: 2021-05-17
Payer: COMMERCIAL

## 2021-05-17 VITALS
DIASTOLIC BLOOD PRESSURE: 82 MMHG | HEART RATE: 63 BPM | OXYGEN SATURATION: 97 % | WEIGHT: 207.25 LBS | HEIGHT: 69 IN | BODY MASS INDEX: 30.7 KG/M2 | TEMPERATURE: 98 F | SYSTOLIC BLOOD PRESSURE: 136 MMHG

## 2021-05-17 DIAGNOSIS — R55 SYNCOPE, UNSPECIFIED SYNCOPE TYPE: ICD-10-CM

## 2021-05-17 DIAGNOSIS — E11.22 TYPE 2 DIABETES MELLITUS WITH STAGE 3A CHRONIC KIDNEY DISEASE, WITHOUT LONG-TERM CURRENT USE OF INSULIN: ICD-10-CM

## 2021-05-17 DIAGNOSIS — D72.829 LEUKOCYTOSIS, UNSPECIFIED TYPE: Primary | ICD-10-CM

## 2021-05-17 DIAGNOSIS — E11.9 TYPE 2 DIABETES MELLITUS WITHOUT OPHTHALMIC MANIFESTATIONS: Primary | ICD-10-CM

## 2021-05-17 DIAGNOSIS — H52.4 PRESBYOPIA: ICD-10-CM

## 2021-05-17 DIAGNOSIS — N18.31 TYPE 2 DIABETES MELLITUS WITH STAGE 3A CHRONIC KIDNEY DISEASE, WITHOUT LONG-TERM CURRENT USE OF INSULIN: ICD-10-CM

## 2021-05-17 DIAGNOSIS — E03.9 HYPOTHYROIDISM, UNSPECIFIED TYPE: ICD-10-CM

## 2021-05-17 DIAGNOSIS — I95.9 HYPOTENSION, UNSPECIFIED HYPOTENSION TYPE: ICD-10-CM

## 2021-05-17 DIAGNOSIS — H35.413 LATTICE DEGENERATION OF BOTH RETINAS: ICD-10-CM

## 2021-05-17 PROCEDURE — 92014 PR EYE EXAM, EST PATIENT,COMPREHESV: ICD-10-PCS | Mod: S$GLB,,, | Performed by: OPTOMETRIST

## 2021-05-17 PROCEDURE — 99999 PR PBB SHADOW E&M-EST. PATIENT-LVL IV: ICD-10-PCS | Mod: PBBFAC,,, | Performed by: OPTOMETRIST

## 2021-05-17 PROCEDURE — 99214 OFFICE O/P EST MOD 30 MIN: CPT | Mod: S$GLB,,, | Performed by: FAMILY MEDICINE

## 2021-05-17 PROCEDURE — 1101F PT FALLS ASSESS-DOCD LE1/YR: CPT | Mod: CPTII,S$GLB,, | Performed by: OPTOMETRIST

## 2021-05-17 PROCEDURE — 99999 PR PBB SHADOW E&M-EST. PATIENT-LVL V: CPT | Mod: PBBFAC,,, | Performed by: FAMILY MEDICINE

## 2021-05-17 PROCEDURE — 1159F MED LIST DOCD IN RCRD: CPT | Mod: S$GLB,,, | Performed by: FAMILY MEDICINE

## 2021-05-17 PROCEDURE — 2023F PR DILATED RETINAL EXAM W/O EVID OF RETINOPATHY: ICD-10-PCS | Mod: S$GLB,,, | Performed by: OPTOMETRIST

## 2021-05-17 PROCEDURE — 92014 COMPRE OPH EXAM EST PT 1/>: CPT | Mod: S$GLB,,, | Performed by: OPTOMETRIST

## 2021-05-17 PROCEDURE — 1101F PR PT FALLS ASSESS DOC 0-1 FALLS W/OUT INJ PAST YR: ICD-10-PCS | Mod: CPTII,S$GLB,, | Performed by: OPTOMETRIST

## 2021-05-17 PROCEDURE — 1126F PR PAIN SEVERITY QUANTIFIED, NO PAIN PRESENT: ICD-10-PCS | Mod: S$GLB,,, | Performed by: OPTOMETRIST

## 2021-05-17 PROCEDURE — 99999 PR PBB SHADOW E&M-EST. PATIENT-LVL IV: CPT | Mod: PBBFAC,,, | Performed by: OPTOMETRIST

## 2021-05-17 PROCEDURE — 3288F PR FALLS RISK ASSESSMENT DOCUMENTED: ICD-10-PCS | Mod: CPTII,S$GLB,, | Performed by: OPTOMETRIST

## 2021-05-17 PROCEDURE — 1159F PR MEDICATION LIST DOCUMENTED IN MEDICAL RECORD: ICD-10-PCS | Mod: S$GLB,,, | Performed by: FAMILY MEDICINE

## 2021-05-17 PROCEDURE — 2023F DILAT RTA XM W/O RTNOPTHY: CPT | Mod: S$GLB,,, | Performed by: OPTOMETRIST

## 2021-05-17 PROCEDURE — 3288F FALL RISK ASSESSMENT DOCD: CPT | Mod: CPTII,S$GLB,, | Performed by: OPTOMETRIST

## 2021-05-17 PROCEDURE — 1126F PR PAIN SEVERITY QUANTIFIED, NO PAIN PRESENT: ICD-10-PCS | Mod: S$GLB,,, | Performed by: FAMILY MEDICINE

## 2021-05-17 PROCEDURE — 3044F PR MOST RECENT HEMOGLOBIN A1C LEVEL <7.0%: ICD-10-PCS | Mod: CPTII,S$GLB,, | Performed by: OPTOMETRIST

## 2021-05-17 PROCEDURE — 99999 PR PBB SHADOW E&M-EST. PATIENT-LVL V: ICD-10-PCS | Mod: PBBFAC,,, | Performed by: FAMILY MEDICINE

## 2021-05-17 PROCEDURE — 99214 PR OFFICE/OUTPT VISIT, EST, LEVL IV, 30-39 MIN: ICD-10-PCS | Mod: S$GLB,,, | Performed by: FAMILY MEDICINE

## 2021-05-17 PROCEDURE — 1126F AMNT PAIN NOTED NONE PRSNT: CPT | Mod: S$GLB,,, | Performed by: FAMILY MEDICINE

## 2021-05-17 PROCEDURE — 3044F HG A1C LEVEL LT 7.0%: CPT | Mod: CPTII,S$GLB,, | Performed by: OPTOMETRIST

## 2021-05-17 PROCEDURE — 1126F AMNT PAIN NOTED NONE PRSNT: CPT | Mod: S$GLB,,, | Performed by: OPTOMETRIST

## 2021-05-17 RX ORDER — DULAGLUTIDE 3 MG/.5ML
3 INJECTION, SOLUTION SUBCUTANEOUS WEEKLY
Qty: 4 PEN | Refills: 11 | Status: SHIPPED | OUTPATIENT
Start: 2021-05-17 | End: 2022-02-20 | Stop reason: SDUPTHER

## 2021-05-28 ENCOUNTER — OFFICE VISIT (OUTPATIENT)
Dept: DERMATOLOGY | Facility: CLINIC | Age: 76
End: 2021-05-28
Payer: COMMERCIAL

## 2021-05-28 DIAGNOSIS — L98.5: Primary | ICD-10-CM

## 2021-05-28 DIAGNOSIS — L92.0 GRANULOMA ANNULARE: ICD-10-CM

## 2021-05-28 PROCEDURE — 99999 PR PBB SHADOW E&M-EST. PATIENT-LVL II: ICD-10-PCS | Mod: PBBFAC,,, | Performed by: DERMATOLOGY

## 2021-05-28 PROCEDURE — 1126F AMNT PAIN NOTED NONE PRSNT: CPT | Mod: S$GLB,,, | Performed by: DERMATOLOGY

## 2021-05-28 PROCEDURE — 11900 PR INJECTION INTO SKIN LESIONS, UP TO 7: ICD-10-PCS | Mod: S$GLB,,, | Performed by: DERMATOLOGY

## 2021-05-28 PROCEDURE — 11900 INJECT SKIN LESIONS </W 7: CPT | Mod: S$GLB,,, | Performed by: DERMATOLOGY

## 2021-05-28 PROCEDURE — 99499 NO LOS: ICD-10-PCS | Mod: S$GLB,,, | Performed by: DERMATOLOGY

## 2021-05-28 PROCEDURE — 3288F FALL RISK ASSESSMENT DOCD: CPT | Mod: CPTII,S$GLB,, | Performed by: DERMATOLOGY

## 2021-05-28 PROCEDURE — 99499 UNLISTED E&M SERVICE: CPT | Mod: S$GLB,,, | Performed by: DERMATOLOGY

## 2021-05-28 PROCEDURE — 1101F PT FALLS ASSESS-DOCD LE1/YR: CPT | Mod: CPTII,S$GLB,, | Performed by: DERMATOLOGY

## 2021-05-28 PROCEDURE — 1101F PR PT FALLS ASSESS DOC 0-1 FALLS W/OUT INJ PAST YR: ICD-10-PCS | Mod: CPTII,S$GLB,, | Performed by: DERMATOLOGY

## 2021-05-28 PROCEDURE — 3288F PR FALLS RISK ASSESSMENT DOCUMENTED: ICD-10-PCS | Mod: CPTII,S$GLB,, | Performed by: DERMATOLOGY

## 2021-05-28 PROCEDURE — 1126F PR PAIN SEVERITY QUANTIFIED, NO PAIN PRESENT: ICD-10-PCS | Mod: S$GLB,,, | Performed by: DERMATOLOGY

## 2021-05-28 PROCEDURE — 99999 PR PBB SHADOW E&M-EST. PATIENT-LVL II: CPT | Mod: PBBFAC,,, | Performed by: DERMATOLOGY

## 2021-05-31 ENCOUNTER — LAB VISIT (OUTPATIENT)
Dept: LAB | Facility: HOSPITAL | Age: 76
End: 2021-05-31
Attending: FAMILY MEDICINE
Payer: COMMERCIAL

## 2021-05-31 DIAGNOSIS — D72.829 LEUKOCYTOSIS, UNSPECIFIED TYPE: ICD-10-CM

## 2021-05-31 PROCEDURE — 87086 URINE CULTURE/COLONY COUNT: CPT | Performed by: FAMILY MEDICINE

## 2021-05-31 PROCEDURE — 81001 URINALYSIS AUTO W/SCOPE: CPT | Performed by: FAMILY MEDICINE

## 2021-05-31 PROCEDURE — 87186 SC STD MICRODIL/AGAR DIL: CPT | Performed by: FAMILY MEDICINE

## 2021-05-31 PROCEDURE — 87088 URINE BACTERIA CULTURE: CPT | Performed by: FAMILY MEDICINE

## 2021-05-31 PROCEDURE — 87077 CULTURE AEROBIC IDENTIFY: CPT | Performed by: FAMILY MEDICINE

## 2021-06-01 LAB
BILIRUB UR QL STRIP: NEGATIVE
CLARITY UR REFRACT.AUTO: CLEAR
COLOR UR AUTO: YELLOW
GLUCOSE UR QL STRIP: NEGATIVE
HGB UR QL STRIP: NEGATIVE
HYALINE CASTS UR QL AUTO: 3 /LPF
KETONES UR QL STRIP: NEGATIVE
LEUKOCYTE ESTERASE UR QL STRIP: NEGATIVE
MICROSCOPIC COMMENT: ABNORMAL
NITRITE UR QL STRIP: NEGATIVE
PH UR STRIP: 6 [PH] (ref 5–8)
PROT UR QL STRIP: NEGATIVE
RBC #/AREA URNS AUTO: 0 /HPF (ref 0–4)
SP GR UR STRIP: 1.01 (ref 1–1.03)
URN SPEC COLLECT METH UR: NORMAL
WBC #/AREA URNS AUTO: 0 /HPF (ref 0–5)

## 2021-06-03 ENCOUNTER — PATIENT MESSAGE (OUTPATIENT)
Dept: FAMILY MEDICINE | Facility: CLINIC | Age: 76
End: 2021-06-03

## 2021-06-03 LAB — BACTERIA UR CULT: ABNORMAL

## 2021-06-03 RX ORDER — AMOXICILLIN 875 MG/1
875 TABLET, FILM COATED ORAL 2 TIMES DAILY
Qty: 14 TABLET | Refills: 0 | Status: SHIPPED | OUTPATIENT
Start: 2021-06-03 | End: 2021-06-10

## 2021-06-04 ENCOUNTER — PATIENT MESSAGE (OUTPATIENT)
Dept: FAMILY MEDICINE | Facility: CLINIC | Age: 76
End: 2021-06-04

## 2021-07-10 ENCOUNTER — PATIENT MESSAGE (OUTPATIENT)
Dept: SLEEP MEDICINE | Facility: CLINIC | Age: 76
End: 2021-07-10

## 2021-07-13 NOTE — TELEPHONE ENCOUNTER
Should not be an absolute contraindication.  Can take both together especially since this just temporary.  In some patients with certain heart arrhythmias this is not recommended but his heart rhythm has been fine   THEN L 0.5 WITHIN 2 MONTHS DOI TIMES 2-5 DOI.

## 2021-08-26 ENCOUNTER — PATIENT MESSAGE (OUTPATIENT)
Dept: FAMILY MEDICINE | Facility: CLINIC | Age: 76
End: 2021-08-26

## 2021-09-23 ENCOUNTER — OUTPATIENT CASE MANAGEMENT (OUTPATIENT)
Dept: NEUROLOGY | Facility: CLINIC | Age: 76
End: 2021-09-23
Payer: COMMERCIAL

## 2021-09-23 DIAGNOSIS — R46.89 COGNITIVE AND BEHAVIORAL CHANGES: ICD-10-CM

## 2021-09-23 DIAGNOSIS — R41.89 COGNITIVE AND BEHAVIORAL CHANGES: ICD-10-CM

## 2021-09-23 PROCEDURE — 99358 PROLONG SERVICE W/O CONTACT: CPT | Mod: S$GLB,,, | Performed by: PSYCHIATRY & NEUROLOGY

## 2021-09-23 PROCEDURE — 99358 PR PROLONGED SERV,NO CONTACT,1ST HR: ICD-10-PCS | Mod: S$GLB,,, | Performed by: PSYCHIATRY & NEUROLOGY

## 2021-09-24 ENCOUNTER — OFFICE VISIT (OUTPATIENT)
Dept: NEUROLOGY | Facility: CLINIC | Age: 76
End: 2021-09-24
Payer: COMMERCIAL

## 2021-09-24 VITALS
SYSTOLIC BLOOD PRESSURE: 126 MMHG | HEART RATE: 86 BPM | WEIGHT: 209.13 LBS | DIASTOLIC BLOOD PRESSURE: 81 MMHG | BODY MASS INDEX: 30.88 KG/M2

## 2021-09-24 DIAGNOSIS — R41.82 ALTERED MENTAL STATUS, UNSPECIFIED ALTERED MENTAL STATUS TYPE: ICD-10-CM

## 2021-09-24 DIAGNOSIS — S06.0X0D CONCUSSION WITHOUT LOSS OF CONSCIOUSNESS, SUBSEQUENT ENCOUNTER: ICD-10-CM

## 2021-09-24 DIAGNOSIS — G31.84 MCI (MILD COGNITIVE IMPAIRMENT): Primary | ICD-10-CM

## 2021-09-24 DIAGNOSIS — S06.9X0D TRAUMATIC BRAIN INJURY, WITHOUT LOSS OF CONSCIOUSNESS, SUBSEQUENT ENCOUNTER: ICD-10-CM

## 2021-09-24 DIAGNOSIS — S06.5XAA SUBDURAL HEMATOMA: ICD-10-CM

## 2021-09-24 DIAGNOSIS — S06.5XAA SDH (SUBDURAL HEMATOMA): ICD-10-CM

## 2021-09-24 PROCEDURE — 1125F PR PAIN SEVERITY QUANTIFIED, PAIN PRESENT: ICD-10-PCS | Mod: CPTII,S$GLB,, | Performed by: PSYCHIATRY & NEUROLOGY

## 2021-09-24 PROCEDURE — 3079F PR MOST RECENT DIASTOLIC BLOOD PRESSURE 80-89 MM HG: ICD-10-PCS | Mod: CPTII,S$GLB,, | Performed by: PSYCHIATRY & NEUROLOGY

## 2021-09-24 PROCEDURE — 1159F MED LIST DOCD IN RCRD: CPT | Mod: CPTII,S$GLB,, | Performed by: PSYCHIATRY & NEUROLOGY

## 2021-09-24 PROCEDURE — 3288F PR FALLS RISK ASSESSMENT DOCUMENTED: ICD-10-PCS | Mod: CPTII,S$GLB,, | Performed by: PSYCHIATRY & NEUROLOGY

## 2021-09-24 PROCEDURE — 3074F SYST BP LT 130 MM HG: CPT | Mod: CPTII,S$GLB,, | Performed by: PSYCHIATRY & NEUROLOGY

## 2021-09-24 PROCEDURE — 1159F PR MEDICATION LIST DOCUMENTED IN MEDICAL RECORD: ICD-10-PCS | Mod: CPTII,S$GLB,, | Performed by: PSYCHIATRY & NEUROLOGY

## 2021-09-24 PROCEDURE — 1125F AMNT PAIN NOTED PAIN PRSNT: CPT | Mod: CPTII,S$GLB,, | Performed by: PSYCHIATRY & NEUROLOGY

## 2021-09-24 PROCEDURE — 96116 NUBHVL XM PHYS/QHP 1ST HR: CPT | Mod: S$GLB,,, | Performed by: PSYCHIATRY & NEUROLOGY

## 2021-09-24 PROCEDURE — 3044F HG A1C LEVEL LT 7.0%: CPT | Mod: CPTII,S$GLB,, | Performed by: PSYCHIATRY & NEUROLOGY

## 2021-09-24 PROCEDURE — 1100F PR PT FALLS ASSESS DOC 2+ FALLS/FALL W/INJURY/YR: ICD-10-PCS | Mod: CPTII,S$GLB,, | Performed by: PSYCHIATRY & NEUROLOGY

## 2021-09-24 PROCEDURE — 96132 PR NEUROPSYCHOLOGIC TEST EVAL SVCS, 1ST HR: ICD-10-PCS | Mod: S$GLB,,, | Performed by: PSYCHIATRY & NEUROLOGY

## 2021-09-24 PROCEDURE — 99215 OFFICE O/P EST HI 40 MIN: CPT | Mod: 25,S$GLB,, | Performed by: PSYCHIATRY & NEUROLOGY

## 2021-09-24 PROCEDURE — 3044F PR MOST RECENT HEMOGLOBIN A1C LEVEL <7.0%: ICD-10-PCS | Mod: CPTII,S$GLB,, | Performed by: PSYCHIATRY & NEUROLOGY

## 2021-09-24 PROCEDURE — 96116 PR NEUROBEHAVIORAL STATUS EXAM BY PSYCH/PHYS: ICD-10-PCS | Mod: S$GLB,,, | Performed by: PSYCHIATRY & NEUROLOGY

## 2021-09-24 PROCEDURE — 99999 PR PBB SHADOW E&M-EST. PATIENT-LVL III: CPT | Mod: PBBFAC,,, | Performed by: PSYCHIATRY & NEUROLOGY

## 2021-09-24 PROCEDURE — 99417 PROLNG OP E/M EACH 15 MIN: CPT | Mod: S$GLB,,, | Performed by: PSYCHIATRY & NEUROLOGY

## 2021-09-24 PROCEDURE — 3079F DIAST BP 80-89 MM HG: CPT | Mod: CPTII,S$GLB,, | Performed by: PSYCHIATRY & NEUROLOGY

## 2021-09-24 PROCEDURE — 1100F PTFALLS ASSESS-DOCD GE2>/YR: CPT | Mod: CPTII,S$GLB,, | Performed by: PSYCHIATRY & NEUROLOGY

## 2021-09-24 PROCEDURE — 3288F FALL RISK ASSESSMENT DOCD: CPT | Mod: CPTII,S$GLB,, | Performed by: PSYCHIATRY & NEUROLOGY

## 2021-09-24 PROCEDURE — 96132 NRPSYC TST EVAL PHYS/QHP 1ST: CPT | Mod: S$GLB,,, | Performed by: PSYCHIATRY & NEUROLOGY

## 2021-09-24 PROCEDURE — 99417 PR PROLONGED SVC, OUTPT, W/WO DIRECT PT CONTACT,  EA ADDTL 15 MIN: ICD-10-PCS | Mod: S$GLB,,, | Performed by: PSYCHIATRY & NEUROLOGY

## 2021-09-24 PROCEDURE — 3074F PR MOST RECENT SYSTOLIC BLOOD PRESSURE < 130 MM HG: ICD-10-PCS | Mod: CPTII,S$GLB,, | Performed by: PSYCHIATRY & NEUROLOGY

## 2021-09-24 PROCEDURE — 99215 PR OFFICE/OUTPT VISIT, EST, LEVL V, 40-54 MIN: ICD-10-PCS | Mod: 25,S$GLB,, | Performed by: PSYCHIATRY & NEUROLOGY

## 2021-09-24 PROCEDURE — 99999 PR PBB SHADOW E&M-EST. PATIENT-LVL III: ICD-10-PCS | Mod: PBBFAC,,, | Performed by: PSYCHIATRY & NEUROLOGY

## 2021-09-25 PROBLEM — S06.9XAA TBI (TRAUMATIC BRAIN INJURY): Status: ACTIVE | Noted: 2021-09-25

## 2021-09-25 PROBLEM — G31.84 MCI (MILD COGNITIVE IMPAIRMENT): Status: ACTIVE | Noted: 2021-09-25

## 2021-09-25 PROBLEM — S06.5XAA SDH (SUBDURAL HEMATOMA): Status: ACTIVE | Noted: 2021-09-25

## 2021-09-27 ENCOUNTER — PATIENT MESSAGE (OUTPATIENT)
Dept: FAMILY MEDICINE | Facility: CLINIC | Age: 76
End: 2021-09-27

## 2021-09-28 ENCOUNTER — OFFICE VISIT (OUTPATIENT)
Dept: FAMILY MEDICINE | Facility: CLINIC | Age: 76
End: 2021-09-28
Payer: COMMERCIAL

## 2021-09-28 ENCOUNTER — TELEPHONE (OUTPATIENT)
Dept: FAMILY MEDICINE | Facility: CLINIC | Age: 76
End: 2021-09-28

## 2021-09-28 ENCOUNTER — LAB VISIT (OUTPATIENT)
Dept: LAB | Facility: HOSPITAL | Age: 76
End: 2021-09-28
Attending: FAMILY MEDICINE
Payer: COMMERCIAL

## 2021-09-28 ENCOUNTER — TELEPHONE (OUTPATIENT)
Dept: NEUROLOGY | Facility: CLINIC | Age: 76
End: 2021-09-28

## 2021-09-28 VITALS
HEART RATE: 79 BPM | DIASTOLIC BLOOD PRESSURE: 84 MMHG | TEMPERATURE: 99 F | SYSTOLIC BLOOD PRESSURE: 140 MMHG | WEIGHT: 207.25 LBS | HEIGHT: 69 IN | OXYGEN SATURATION: 94 % | BODY MASS INDEX: 30.7 KG/M2

## 2021-09-28 DIAGNOSIS — N18.31 TYPE 2 DIABETES MELLITUS WITH STAGE 3A CHRONIC KIDNEY DISEASE, WITHOUT LONG-TERM CURRENT USE OF INSULIN: ICD-10-CM

## 2021-09-28 DIAGNOSIS — S06.0X0S: ICD-10-CM

## 2021-09-28 DIAGNOSIS — R35.89 POLYURIA: ICD-10-CM

## 2021-09-28 DIAGNOSIS — E11.22 TYPE 2 DIABETES MELLITUS WITH STAGE 3A CHRONIC KIDNEY DISEASE, WITHOUT LONG-TERM CURRENT USE OF INSULIN: ICD-10-CM

## 2021-09-28 DIAGNOSIS — I10 HYPERTENSION, UNSPECIFIED TYPE: ICD-10-CM

## 2021-09-28 DIAGNOSIS — S06.5XAA SUBDURAL HEMATOMA: Primary | ICD-10-CM

## 2021-09-28 LAB
ALBUMIN SERPL BCP-MCNC: 3.7 G/DL (ref 3.5–5.2)
ALP SERPL-CCNC: 110 U/L (ref 55–135)
ALT SERPL W/O P-5'-P-CCNC: 23 U/L (ref 10–44)
ANION GAP SERPL CALC-SCNC: 14 MMOL/L (ref 8–16)
AST SERPL-CCNC: 17 U/L (ref 10–40)
BILIRUB SERPL-MCNC: 0.7 MG/DL (ref 0.1–1)
BUN SERPL-MCNC: 13 MG/DL (ref 8–23)
CALCIUM SERPL-MCNC: 10.3 MG/DL (ref 8.7–10.5)
CHLORIDE SERPL-SCNC: 101 MMOL/L (ref 95–110)
CO2 SERPL-SCNC: 26 MMOL/L (ref 23–29)
CREAT SERPL-MCNC: 1.4 MG/DL (ref 0.5–1.4)
EST. GFR  (AFRICAN AMERICAN): 56.4 ML/MIN/1.73 M^2
EST. GFR  (NON AFRICAN AMERICAN): 48.8 ML/MIN/1.73 M^2
ESTIMATED AVG GLUCOSE: 163 MG/DL (ref 68–131)
GLUCOSE SERPL-MCNC: 134 MG/DL (ref 70–110)
HBA1C MFR BLD: 7.3 % (ref 4–5.6)
POTASSIUM SERPL-SCNC: 4 MMOL/L (ref 3.5–5.1)
PROT SERPL-MCNC: 7.6 G/DL (ref 6–8.4)
SODIUM SERPL-SCNC: 141 MMOL/L (ref 136–145)

## 2021-09-28 PROCEDURE — 3077F PR MOST RECENT SYSTOLIC BLOOD PRESSURE >= 140 MM HG: ICD-10-PCS | Mod: CPTII,S$GLB,, | Performed by: FAMILY MEDICINE

## 2021-09-28 PROCEDURE — 83036 HEMOGLOBIN GLYCOSYLATED A1C: CPT | Performed by: FAMILY MEDICINE

## 2021-09-28 PROCEDURE — 80053 COMPREHEN METABOLIC PANEL: CPT | Performed by: FAMILY MEDICINE

## 2021-09-28 PROCEDURE — 3079F PR MOST RECENT DIASTOLIC BLOOD PRESSURE 80-89 MM HG: ICD-10-PCS | Mod: CPTII,S$GLB,, | Performed by: FAMILY MEDICINE

## 2021-09-28 PROCEDURE — 99215 PR OFFICE/OUTPT VISIT, EST, LEVL V, 40-54 MIN: ICD-10-PCS | Mod: S$GLB,,, | Performed by: FAMILY MEDICINE

## 2021-09-28 PROCEDURE — 99999 PR PBB SHADOW E&M-EST. PATIENT-LVL III: CPT | Mod: PBBFAC,,, | Performed by: FAMILY MEDICINE

## 2021-09-28 PROCEDURE — 1101F PR PT FALLS ASSESS DOC 0-1 FALLS W/OUT INJ PAST YR: ICD-10-PCS | Mod: CPTII,S$GLB,, | Performed by: FAMILY MEDICINE

## 2021-09-28 PROCEDURE — 3288F PR FALLS RISK ASSESSMENT DOCUMENTED: ICD-10-PCS | Mod: CPTII,S$GLB,, | Performed by: FAMILY MEDICINE

## 2021-09-28 PROCEDURE — 85025 COMPLETE CBC W/AUTO DIFF WBC: CPT | Performed by: FAMILY MEDICINE

## 2021-09-28 PROCEDURE — 3079F DIAST BP 80-89 MM HG: CPT | Mod: CPTII,S$GLB,, | Performed by: FAMILY MEDICINE

## 2021-09-28 PROCEDURE — 99215 OFFICE O/P EST HI 40 MIN: CPT | Mod: S$GLB,,, | Performed by: FAMILY MEDICINE

## 2021-09-28 PROCEDURE — 1159F MED LIST DOCD IN RCRD: CPT | Mod: CPTII,S$GLB,, | Performed by: FAMILY MEDICINE

## 2021-09-28 PROCEDURE — 1159F PR MEDICATION LIST DOCUMENTED IN MEDICAL RECORD: ICD-10-PCS | Mod: CPTII,S$GLB,, | Performed by: FAMILY MEDICINE

## 2021-09-28 PROCEDURE — 3288F FALL RISK ASSESSMENT DOCD: CPT | Mod: CPTII,S$GLB,, | Performed by: FAMILY MEDICINE

## 2021-09-28 PROCEDURE — 1126F AMNT PAIN NOTED NONE PRSNT: CPT | Mod: CPTII,S$GLB,, | Performed by: FAMILY MEDICINE

## 2021-09-28 PROCEDURE — 3077F SYST BP >= 140 MM HG: CPT | Mod: CPTII,S$GLB,, | Performed by: FAMILY MEDICINE

## 2021-09-28 PROCEDURE — 3044F HG A1C LEVEL LT 7.0%: CPT | Mod: CPTII,S$GLB,, | Performed by: FAMILY MEDICINE

## 2021-09-28 PROCEDURE — 36415 COLL VENOUS BLD VENIPUNCTURE: CPT | Mod: PO | Performed by: FAMILY MEDICINE

## 2021-09-28 PROCEDURE — 1101F PT FALLS ASSESS-DOCD LE1/YR: CPT | Mod: CPTII,S$GLB,, | Performed by: FAMILY MEDICINE

## 2021-09-28 PROCEDURE — 3044F PR MOST RECENT HEMOGLOBIN A1C LEVEL <7.0%: ICD-10-PCS | Mod: CPTII,S$GLB,, | Performed by: FAMILY MEDICINE

## 2021-09-28 PROCEDURE — 99999 PR PBB SHADOW E&M-EST. PATIENT-LVL III: ICD-10-PCS | Mod: PBBFAC,,, | Performed by: FAMILY MEDICINE

## 2021-09-28 PROCEDURE — 1126F PR PAIN SEVERITY QUANTIFIED, NO PAIN PRESENT: ICD-10-PCS | Mod: CPTII,S$GLB,, | Performed by: FAMILY MEDICINE

## 2021-09-28 NOTE — TELEPHONE ENCOUNTER
----- Message from Nimisha Landis MA sent at 9/28/2021  1:19 PM CDT -----  Regarding: Tests  Dr Moore saw this patient this morning. He is going to have the MRI scheduled, but he was unsure if Dr Samuels would want both the EEG and the EKG scheduled also. Please let us know if you want both of these tests scheduled as well so that our referral coordinator can contact the patient for scheduling. Thank you.

## 2021-09-29 LAB
BASOPHILS # BLD AUTO: 0.06 K/UL (ref 0–0.2)
BASOPHILS NFR BLD: 0.5 % (ref 0–1.9)
DIFFERENTIAL METHOD: ABNORMAL
EOSINOPHIL # BLD AUTO: 0.9 K/UL (ref 0–0.5)
EOSINOPHIL NFR BLD: 6.9 % (ref 0–8)
ERYTHROCYTE [DISTWIDTH] IN BLOOD BY AUTOMATED COUNT: 13.5 % (ref 11.5–14.5)
HCT VFR BLD AUTO: 46 % (ref 40–54)
HGB BLD-MCNC: 15.2 G/DL (ref 14–18)
IMM GRANULOCYTES # BLD AUTO: 0.14 K/UL (ref 0–0.04)
IMM GRANULOCYTES NFR BLD AUTO: 1.1 % (ref 0–0.5)
LYMPHOCYTES # BLD AUTO: 2.7 K/UL (ref 1–4.8)
LYMPHOCYTES NFR BLD: 20.4 % (ref 18–48)
MCH RBC QN AUTO: 29.1 PG (ref 27–31)
MCHC RBC AUTO-ENTMCNC: 33 G/DL (ref 32–36)
MCV RBC AUTO: 88 FL (ref 82–98)
MONOCYTES # BLD AUTO: 0.9 K/UL (ref 0.3–1)
MONOCYTES NFR BLD: 7 % (ref 4–15)
NEUTROPHILS # BLD AUTO: 8.3 K/UL (ref 1.8–7.7)
NEUTROPHILS NFR BLD: 64.1 % (ref 38–73)
NRBC BLD-RTO: 0 /100 WBC
PLATELET # BLD AUTO: 294 K/UL (ref 150–450)
PMV BLD AUTO: 11.6 FL (ref 9.2–12.9)
RBC # BLD AUTO: 5.23 M/UL (ref 4.6–6.2)
WBC # BLD AUTO: 12.97 K/UL (ref 3.9–12.7)

## 2021-09-30 ENCOUNTER — TELEPHONE (OUTPATIENT)
Dept: FAMILY MEDICINE | Facility: CLINIC | Age: 76
End: 2021-09-30

## 2021-10-01 ENCOUNTER — IMMUNIZATION (OUTPATIENT)
Dept: INTERNAL MEDICINE | Facility: CLINIC | Age: 76
End: 2021-10-01
Payer: COMMERCIAL

## 2021-10-01 ENCOUNTER — PATIENT MESSAGE (OUTPATIENT)
Dept: FAMILY MEDICINE | Facility: CLINIC | Age: 76
End: 2021-10-01

## 2021-10-01 DIAGNOSIS — Z23 NEED FOR VACCINATION: Primary | ICD-10-CM

## 2021-10-01 PROCEDURE — 91300 COVID-19, MRNA, LNP-S, PF, 30 MCG/0.3 ML DOSE VACCINE: CPT | Mod: PBBFAC | Performed by: INTERNAL MEDICINE

## 2021-10-01 PROCEDURE — 0003A COVID-19, MRNA, LNP-S, PF, 30 MCG/0.3 ML DOSE VACCINE: CPT | Mod: PBBFAC | Performed by: INTERNAL MEDICINE

## 2021-10-04 ENCOUNTER — PATIENT MESSAGE (OUTPATIENT)
Dept: FAMILY MEDICINE | Facility: CLINIC | Age: 76
End: 2021-10-04

## 2021-10-04 DIAGNOSIS — N39.0 URINARY TRACT INFECTION WITHOUT HEMATURIA, SITE UNSPECIFIED: Primary | ICD-10-CM

## 2021-10-09 ENCOUNTER — HOSPITAL ENCOUNTER (OUTPATIENT)
Dept: RADIOLOGY | Facility: HOSPITAL | Age: 76
Discharge: HOME | End: 2021-10-09
Attending: PSYCHIATRY & NEUROLOGY
Payer: COMMERCIAL

## 2021-10-09 DIAGNOSIS — R41.82 ALTERED MENTAL STATUS, UNSPECIFIED ALTERED MENTAL STATUS TYPE: ICD-10-CM

## 2021-10-09 PROCEDURE — 70551 MRI BRAIN WITHOUT CONTRAST: ICD-10-PCS | Mod: 26,,, | Performed by: RADIOLOGY

## 2021-10-09 PROCEDURE — 70551 MRI BRAIN STEM W/O DYE: CPT | Mod: TC

## 2021-10-09 PROCEDURE — 70551 MRI BRAIN STEM W/O DYE: CPT | Mod: 26,,, | Performed by: RADIOLOGY

## 2021-10-14 DIAGNOSIS — E53.8 B12 DEFICIENCY: Primary | ICD-10-CM

## 2021-10-14 DIAGNOSIS — E71.19 MMA (METHYLMALONIC ACIDURIA): ICD-10-CM

## 2021-10-14 RX ORDER — ACETAMINOPHEN AND PHENYLEPHRINE HCL 325; 5 MG/1; MG/1
TABLET ORAL
Qty: 60 TABLET | Refills: 3 | Status: SHIPPED | OUTPATIENT
Start: 2021-10-14 | End: 2021-10-15 | Stop reason: ALTCHOICE

## 2021-10-14 RX ORDER — ACETAMINOPHEN AND PHENYLEPHRINE HCL 325; 5 MG/1; MG/1
TABLET ORAL
Qty: 60 TABLET | Refills: 3 | Status: SHIPPED | OUTPATIENT
Start: 2021-10-14 | End: 2021-10-14

## 2021-10-15 RX ORDER — ACETAMINOPHEN, DIPHENHYDRAMINE HCL, PHENYLEPHRINE HCL 325; 25; 5 MG/1; MG/1; MG/1
TABLET ORAL
Qty: 60 TABLET | Refills: 3 | Status: SHIPPED | OUTPATIENT
Start: 2021-10-15

## 2021-10-18 ENCOUNTER — PATIENT MESSAGE (OUTPATIENT)
Dept: FAMILY MEDICINE | Facility: CLINIC | Age: 76
End: 2021-10-18
Payer: COMMERCIAL

## 2021-10-19 ENCOUNTER — LAB VISIT (OUTPATIENT)
Dept: LAB | Facility: HOSPITAL | Age: 76
End: 2021-10-19
Attending: FAMILY MEDICINE
Payer: COMMERCIAL

## 2021-10-19 ENCOUNTER — PATIENT MESSAGE (OUTPATIENT)
Dept: NEUROLOGY | Facility: CLINIC | Age: 76
End: 2021-10-19

## 2021-10-19 ENCOUNTER — HOSPITAL ENCOUNTER (OUTPATIENT)
Dept: CARDIOLOGY | Facility: CLINIC | Age: 76
Discharge: HOME | End: 2021-10-19
Payer: COMMERCIAL

## 2021-10-19 ENCOUNTER — HOSPITAL ENCOUNTER (OUTPATIENT)
Dept: NEUROLOGY | Facility: CLINIC | Age: 76
Discharge: HOME | End: 2021-10-19
Payer: COMMERCIAL

## 2021-10-19 ENCOUNTER — PATIENT MESSAGE (OUTPATIENT)
Dept: NEUROLOGY | Facility: CLINIC | Age: 76
End: 2021-10-19
Payer: COMMERCIAL

## 2021-10-19 DIAGNOSIS — S06.5XAA SUBDURAL HEMATOMA: ICD-10-CM

## 2021-10-19 DIAGNOSIS — S06.0X0D CONCUSSION WITHOUT LOSS OF CONSCIOUSNESS, SUBSEQUENT ENCOUNTER: ICD-10-CM

## 2021-10-19 DIAGNOSIS — G31.84 MCI (MILD COGNITIVE IMPAIRMENT): ICD-10-CM

## 2021-10-19 DIAGNOSIS — N39.0 URINARY TRACT INFECTION WITHOUT HEMATURIA, SITE UNSPECIFIED: ICD-10-CM

## 2021-10-19 PROCEDURE — 93005 EKG 12-LEAD: ICD-10-PCS | Mod: S$GLB,,, | Performed by: PSYCHIATRY & NEUROLOGY

## 2021-10-19 PROCEDURE — 87086 URINE CULTURE/COLONY COUNT: CPT | Performed by: FAMILY MEDICINE

## 2021-10-19 PROCEDURE — 93010 ELECTROCARDIOGRAM REPORT: CPT | Mod: S$GLB,,, | Performed by: INTERNAL MEDICINE

## 2021-10-19 PROCEDURE — 93005 ELECTROCARDIOGRAM TRACING: CPT | Mod: S$GLB,,, | Performed by: PSYCHIATRY & NEUROLOGY

## 2021-10-19 PROCEDURE — 93010 EKG 12-LEAD: ICD-10-PCS | Mod: S$GLB,,, | Performed by: INTERNAL MEDICINE

## 2021-10-19 RX ORDER — LEVOTHYROXINE SODIUM 88 UG/1
88 TABLET ORAL DAILY
Qty: 90 TABLET | Refills: 3 | OUTPATIENT
Start: 2021-10-19

## 2021-10-20 ENCOUNTER — PATIENT MESSAGE (OUTPATIENT)
Dept: NEUROLOGY | Facility: CLINIC | Age: 76
End: 2021-10-20

## 2021-10-20 LAB — BACTERIA UR CULT: NO GROWTH

## 2021-10-26 ENCOUNTER — OFFICE VISIT (OUTPATIENT)
Dept: NEUROLOGY | Facility: CLINIC | Age: 76
End: 2021-10-26
Payer: COMMERCIAL

## 2021-10-26 DIAGNOSIS — S06.0X0S CONCUSSION WITHOUT LOSS OF CONSCIOUSNESS, SEQUELA: ICD-10-CM

## 2021-10-26 DIAGNOSIS — S06.5XAA SDH (SUBDURAL HEMATOMA): ICD-10-CM

## 2021-10-26 DIAGNOSIS — G31.84 MCI (MILD COGNITIVE IMPAIRMENT): Primary | ICD-10-CM

## 2021-10-26 DIAGNOSIS — E53.8 B12 DEFICIENCY: ICD-10-CM

## 2021-10-26 PROCEDURE — 1160F PR REVIEW ALL MEDS BY PRESCRIBER/CLIN PHARMACIST DOCUMENTED: ICD-10-PCS | Mod: CPTII,95,, | Performed by: PSYCHIATRY & NEUROLOGY

## 2021-10-26 PROCEDURE — 99215 PR OFFICE/OUTPT VISIT, EST, LEVL V, 40-54 MIN: ICD-10-PCS | Mod: 25,95,, | Performed by: PSYCHIATRY & NEUROLOGY

## 2021-10-26 PROCEDURE — 1160F RVW MEDS BY RX/DR IN RCRD: CPT | Mod: CPTII,95,, | Performed by: PSYCHIATRY & NEUROLOGY

## 2021-10-26 PROCEDURE — 1159F MED LIST DOCD IN RCRD: CPT | Mod: CPTII,95,, | Performed by: PSYCHIATRY & NEUROLOGY

## 2021-10-26 PROCEDURE — 99215 OFFICE O/P EST HI 40 MIN: CPT | Mod: 25,95,, | Performed by: PSYCHIATRY & NEUROLOGY

## 2021-10-26 PROCEDURE — 96116 NUBHVL XM PHYS/QHP 1ST HR: CPT | Mod: 95,,, | Performed by: PSYCHIATRY & NEUROLOGY

## 2021-10-26 PROCEDURE — 96116 PR NEUROBEHAVIORAL STATUS EXAM BY PSYCH/PHYS: ICD-10-PCS | Mod: 95,,, | Performed by: PSYCHIATRY & NEUROLOGY

## 2021-10-26 PROCEDURE — 1159F PR MEDICATION LIST DOCUMENTED IN MEDICAL RECORD: ICD-10-PCS | Mod: CPTII,95,, | Performed by: PSYCHIATRY & NEUROLOGY

## 2021-10-26 PROCEDURE — 3051F PR MOST RECENT HEMOGLOBIN A1C LEVEL 7.0 - < 8.0%: ICD-10-PCS | Mod: CPTII,95,, | Performed by: PSYCHIATRY & NEUROLOGY

## 2021-10-26 PROCEDURE — 3051F HG A1C>EQUAL 7.0%<8.0%: CPT | Mod: CPTII,95,, | Performed by: PSYCHIATRY & NEUROLOGY

## 2021-10-26 RX ORDER — ACETAMINOPHEN 500 MG
5 TABLET ORAL NIGHTLY
Qty: 45 TABLET | Refills: 0 | Status: SHIPPED | OUTPATIENT
Start: 2021-10-26

## 2021-10-28 ENCOUNTER — TELEPHONE (OUTPATIENT)
Dept: FAMILY MEDICINE | Facility: CLINIC | Age: 76
End: 2021-10-28
Payer: COMMERCIAL

## 2021-10-29 ENCOUNTER — TELEPHONE (OUTPATIENT)
Dept: FAMILY MEDICINE | Facility: CLINIC | Age: 76
End: 2021-10-29
Payer: COMMERCIAL

## 2021-11-11 ENCOUNTER — PATIENT OUTREACH (OUTPATIENT)
Dept: ADMINISTRATIVE | Facility: OTHER | Age: 76
End: 2021-11-11
Payer: COMMERCIAL

## 2021-11-15 ENCOUNTER — CLINICAL SUPPORT (OUTPATIENT)
Dept: AUDIOLOGY | Facility: CLINIC | Age: 76
End: 2021-11-15
Payer: COMMERCIAL

## 2021-11-15 ENCOUNTER — OFFICE VISIT (OUTPATIENT)
Dept: OTOLARYNGOLOGY | Facility: CLINIC | Age: 76
End: 2021-11-15
Payer: COMMERCIAL

## 2021-11-15 DIAGNOSIS — Z86.79 HISTORY OF SUBDURAL HEMATOMA: ICD-10-CM

## 2021-11-15 DIAGNOSIS — H90.A32 MIXED CONDUCTIVE AND SENSORINEURAL HEARING LOSS OF LEFT EAR WITH RESTRICTED HEARING OF RIGHT EAR: Primary | ICD-10-CM

## 2021-11-15 DIAGNOSIS — Z97.4 WEARS HEARING AID IN LEFT EAR: Primary | ICD-10-CM

## 2021-11-15 DIAGNOSIS — G31.84 MILD COGNITIVE IMPAIRMENT: ICD-10-CM

## 2021-11-15 PROCEDURE — 3051F HG A1C>EQUAL 7.0%<8.0%: CPT | Mod: CPTII,S$GLB,, | Performed by: OTOLARYNGOLOGY

## 2021-11-15 PROCEDURE — 99999 PR PBB SHADOW E&M-EST. PATIENT-LVL II: CPT | Mod: PBBFAC,,, | Performed by: OTOLARYNGOLOGY

## 2021-11-15 PROCEDURE — 1159F PR MEDICATION LIST DOCUMENTED IN MEDICAL RECORD: ICD-10-PCS | Mod: CPTII,S$GLB,, | Performed by: OTOLARYNGOLOGY

## 2021-11-15 PROCEDURE — 1160F PR REVIEW ALL MEDS BY PRESCRIBER/CLIN PHARMACIST DOCUMENTED: ICD-10-PCS | Mod: CPTII,S$GLB,, | Performed by: OTOLARYNGOLOGY

## 2021-11-15 PROCEDURE — 1159F MED LIST DOCD IN RCRD: CPT | Mod: CPTII,S$GLB,, | Performed by: OTOLARYNGOLOGY

## 2021-11-15 PROCEDURE — 99999 PR PBB SHADOW E&M-EST. PATIENT-LVL II: ICD-10-PCS | Mod: PBBFAC,,, | Performed by: OTOLARYNGOLOGY

## 2021-11-15 PROCEDURE — 92557 PR COMPREHENSIVE HEARING TEST: ICD-10-PCS | Mod: S$GLB,,, | Performed by: AUDIOLOGIST

## 2021-11-15 PROCEDURE — 92557 COMPREHENSIVE HEARING TEST: CPT | Mod: S$GLB,,, | Performed by: AUDIOLOGIST

## 2021-11-15 PROCEDURE — 99213 PR OFFICE/OUTPT VISIT, EST, LEVL III, 20-29 MIN: ICD-10-PCS | Mod: S$GLB,,, | Performed by: OTOLARYNGOLOGY

## 2021-11-15 PROCEDURE — 1160F RVW MEDS BY RX/DR IN RCRD: CPT | Mod: CPTII,S$GLB,, | Performed by: OTOLARYNGOLOGY

## 2021-11-15 PROCEDURE — 99213 OFFICE O/P EST LOW 20 MIN: CPT | Mod: S$GLB,,, | Performed by: OTOLARYNGOLOGY

## 2021-11-15 PROCEDURE — 3051F PR MOST RECENT HEMOGLOBIN A1C LEVEL 7.0 - < 8.0%: ICD-10-PCS | Mod: CPTII,S$GLB,, | Performed by: OTOLARYNGOLOGY

## 2021-11-17 ENCOUNTER — OFFICE VISIT (OUTPATIENT)
Dept: URGENT CARE | Facility: CLINIC | Age: 76
End: 2021-11-17
Payer: COMMERCIAL

## 2021-11-17 VITALS
BODY MASS INDEX: 30.6 KG/M2 | HEART RATE: 81 BPM | TEMPERATURE: 98 F | RESPIRATION RATE: 18 BRPM | OXYGEN SATURATION: 95 % | HEIGHT: 69 IN | SYSTOLIC BLOOD PRESSURE: 149 MMHG | DIASTOLIC BLOOD PRESSURE: 81 MMHG

## 2021-11-17 DIAGNOSIS — M79.671 RIGHT FOOT PAIN: Primary | ICD-10-CM

## 2021-11-17 DIAGNOSIS — S82.839A CLOSED FRACTURE OF DISTAL END OF FIBULA, UNSPECIFIED FRACTURE MORPHOLOGY, INITIAL ENCOUNTER: ICD-10-CM
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
## 2021-11-17 DIAGNOSIS — M25.559 HIP PAIN: ICD-10-CM

## 2021-11-17 PROCEDURE — 99213 OFFICE O/P EST LOW 20 MIN: CPT | Mod: S$GLB,,,

## 2021-11-17 PROCEDURE — 3051F HG A1C>EQUAL 7.0%<8.0%: CPT | Mod: CPTII,S$GLB,,

## 2021-11-17 PROCEDURE — 1159F PR MEDICATION LIST DOCUMENTED IN MEDICAL RECORD: ICD-10-PCS | Mod: CPTII,S$GLB,,

## 2021-11-17 PROCEDURE — 73630 X-RAY EXAM OF FOOT: CPT | Mod: FY,RT,S$GLB, | Performed by: RADIOLOGY

## 2021-11-17 PROCEDURE — 3077F SYST BP >= 140 MM HG: CPT | Mod: CPTII,S$GLB,,

## 2021-11-17 PROCEDURE — 1160F RVW MEDS BY RX/DR IN RCRD: CPT | Mod: CPTII,S$GLB,,

## 2021-11-17 PROCEDURE — 73610 X-RAY EXAM OF ANKLE: CPT | Mod: FY,RT,S$GLB, | Performed by: RADIOLOGY

## 2021-11-17 PROCEDURE — 3051F PR MOST RECENT HEMOGLOBIN A1C LEVEL 7.0 - < 8.0%: ICD-10-PCS | Mod: CPTII,S$GLB,,

## 2021-11-17 PROCEDURE — 73502 X-RAY EXAM HIP UNI 2-3 VIEWS: CPT | Mod: FY,RT,S$GLB, | Performed by: RADIOLOGY

## 2021-11-17 PROCEDURE — 3079F PR MOST RECENT DIASTOLIC BLOOD PRESSURE 80-89 MM HG: ICD-10-PCS | Mod: CPTII,S$GLB,,

## 2021-11-17 PROCEDURE — 73502 XR HIP WITH PELVIS WHEN PERFORMED, 2 OR 3  VIEWS RIGHT: ICD-10-PCS | Mod: FY,RT,S$GLB, | Performed by: RADIOLOGY

## 2021-11-17 PROCEDURE — 99213 PR OFFICE/OUTPT VISIT, EST, LEVL III, 20-29 MIN: ICD-10-PCS | Mod: S$GLB,,,

## 2021-11-17 PROCEDURE — 73080 X-RAY EXAM OF ELBOW: CPT | Mod: FY,LT,S$GLB, | Performed by: RADIOLOGY

## 2021-11-17 PROCEDURE — 1160F PR REVIEW ALL MEDS BY PRESCRIBER/CLIN PHARMACIST DOCUMENTED: ICD-10-PCS | Mod: CPTII,S$GLB,,

## 2021-11-17 PROCEDURE — 1159F MED LIST DOCD IN RCRD: CPT | Mod: CPTII,S$GLB,,

## 2021-11-17 PROCEDURE — 3077F PR MOST RECENT SYSTOLIC BLOOD PRESSURE >= 140 MM HG: ICD-10-PCS | Mod: CPTII,S$GLB,,

## 2021-11-17 PROCEDURE — 3079F DIAST BP 80-89 MM HG: CPT | Mod: CPTII,S$GLB,,

## 2021-11-17 PROCEDURE — 73630 XR FOOT COMPLETE 3 VIEW RIGHT: ICD-10-PCS | Mod: FY,RT,S$GLB, | Performed by: RADIOLOGY

## 2021-11-17 PROCEDURE — 73080 XR ELBOW COMPLETE 3 VIEW LEFT: ICD-10-PCS | Mod: FY,LT,S$GLB, | Performed by: RADIOLOGY

## 2021-11-17 PROCEDURE — 73610 XR ANKLE COMPLETE 3 VIEW RIGHT: ICD-10-PCS | Mod: FY,RT,S$GLB, | Performed by: RADIOLOGY

## 2021-11-17 RX ORDER — TRAMADOL HYDROCHLORIDE 50 MG/1
50 TABLET ORAL EVERY 6 HOURS PRN
Qty: 5 EACH | Refills: 0 | Status: SHIPPED | OUTPATIENT
Start: 2021-11-17 | End: 2021-11-17 | Stop reason: SINTOL

## 2021-11-19 ENCOUNTER — OFFICE VISIT (OUTPATIENT)
Dept: ORTHOPEDICS | Facility: CLINIC | Age: 76
End: 2021-11-19
Payer: COMMERCIAL

## 2021-11-19 VITALS — HEIGHT: 69 IN | BODY MASS INDEX: 30.7 KG/M2 | WEIGHT: 207.25 LBS

## 2021-11-19 DIAGNOSIS — S82.839A CLOSED FRACTURE OF DISTAL END OF FIBULA, UNSPECIFIED FRACTURE MORPHOLOGY, INITIAL ENCOUNTER: Primary | ICD-10-CM

## 2021-11-19 PROCEDURE — 1125F PR PAIN SEVERITY QUANTIFIED, PAIN PRESENT: ICD-10-PCS | Mod: CPTII,S$GLB,, | Performed by: ORTHOPAEDIC SURGERY

## 2021-11-19 PROCEDURE — 1160F PR REVIEW ALL MEDS BY PRESCRIBER/CLIN PHARMACIST DOCUMENTED: ICD-10-PCS | Mod: CPTII,S$GLB,, | Performed by: ORTHOPAEDIC SURGERY

## 2021-11-19 PROCEDURE — 1159F MED LIST DOCD IN RCRD: CPT | Mod: CPTII,S$GLB,, | Performed by: ORTHOPAEDIC SURGERY

## 2021-11-19 PROCEDURE — 1101F PT FALLS ASSESS-DOCD LE1/YR: CPT | Mod: CPTII,S$GLB,, | Performed by: ORTHOPAEDIC SURGERY

## 2021-11-19 PROCEDURE — 3288F FALL RISK ASSESSMENT DOCD: CPT | Mod: CPTII,S$GLB,, | Performed by: ORTHOPAEDIC SURGERY

## 2021-11-19 PROCEDURE — 99203 PR OFFICE/OUTPT VISIT, NEW, LEVL III, 30-44 MIN: ICD-10-PCS | Mod: S$GLB,,, | Performed by: ORTHOPAEDIC SURGERY

## 2021-11-19 PROCEDURE — 1160F RVW MEDS BY RX/DR IN RCRD: CPT | Mod: CPTII,S$GLB,, | Performed by: ORTHOPAEDIC SURGERY

## 2021-11-19 PROCEDURE — 99999 PR PBB SHADOW E&M-EST. PATIENT-LVL IV: ICD-10-PCS | Mod: PBBFAC,,, | Performed by: ORTHOPAEDIC SURGERY

## 2021-11-19 PROCEDURE — 99203 OFFICE O/P NEW LOW 30 MIN: CPT | Mod: S$GLB,,, | Performed by: ORTHOPAEDIC SURGERY

## 2021-11-19 PROCEDURE — 1159F PR MEDICATION LIST DOCUMENTED IN MEDICAL RECORD: ICD-10-PCS | Mod: CPTII,S$GLB,, | Performed by: ORTHOPAEDIC SURGERY

## 2021-11-19 PROCEDURE — 3288F PR FALLS RISK ASSESSMENT DOCUMENTED: ICD-10-PCS | Mod: CPTII,S$GLB,, | Performed by: ORTHOPAEDIC SURGERY

## 2021-11-19 PROCEDURE — 3051F PR MOST RECENT HEMOGLOBIN A1C LEVEL 7.0 - < 8.0%: ICD-10-PCS | Mod: CPTII,S$GLB,, | Performed by: ORTHOPAEDIC SURGERY

## 2021-11-19 PROCEDURE — 99999 PR PBB SHADOW E&M-EST. PATIENT-LVL IV: CPT | Mod: PBBFAC,,, | Performed by: ORTHOPAEDIC SURGERY

## 2021-11-19 PROCEDURE — 1101F PR PT FALLS ASSESS DOC 0-1 FALLS W/OUT INJ PAST YR: ICD-10-PCS | Mod: CPTII,S$GLB,, | Performed by: ORTHOPAEDIC SURGERY

## 2021-11-19 PROCEDURE — 1125F AMNT PAIN NOTED PAIN PRSNT: CPT | Mod: CPTII,S$GLB,, | Performed by: ORTHOPAEDIC SURGERY

## 2021-11-19 PROCEDURE — 3051F HG A1C>EQUAL 7.0%<8.0%: CPT | Mod: CPTII,S$GLB,, | Performed by: ORTHOPAEDIC SURGERY

## 2021-11-30 ENCOUNTER — PATIENT MESSAGE (OUTPATIENT)
Dept: FAMILY MEDICINE | Facility: CLINIC | Age: 76
End: 2021-11-30
Payer: COMMERCIAL

## 2021-11-30 DIAGNOSIS — R35.0 URINARY FREQUENCY: Primary | ICD-10-CM

## 2021-12-02 ENCOUNTER — LAB VISIT (OUTPATIENT)
Dept: LAB | Facility: HOSPITAL | Age: 76
End: 2021-12-02
Attending: FAMILY MEDICINE
Payer: COMMERCIAL

## 2021-12-02 DIAGNOSIS — R35.0 URINARY FREQUENCY: ICD-10-CM

## 2021-12-02 LAB
BILIRUB UR QL STRIP: NEGATIVE
CAOX CRY UR QL COMP ASSIST: ABNORMAL
CLARITY UR REFRACT.AUTO: CLEAR
COLOR UR AUTO: YELLOW
GLUCOSE UR QL STRIP: NEGATIVE
HGB UR QL STRIP: NEGATIVE
HYALINE CASTS UR QL AUTO: 1 /LPF
KETONES UR QL STRIP: NEGATIVE
LEUKOCYTE ESTERASE UR QL STRIP: NEGATIVE
MICROSCOPIC COMMENT: ABNORMAL
NITRITE UR QL STRIP: NEGATIVE
PH UR STRIP: 6 [PH] (ref 5–8)
PROT UR QL STRIP: NEGATIVE
RBC #/AREA URNS AUTO: 0 /HPF (ref 0–4)
SP GR UR STRIP: 1.01 (ref 1–1.03)
URN SPEC COLLECT METH UR: NORMAL
WBC #/AREA URNS AUTO: 1 /HPF (ref 0–5)

## 2021-12-02 PROCEDURE — 87086 URINE CULTURE/COLONY COUNT: CPT | Performed by: FAMILY MEDICINE

## 2021-12-02 PROCEDURE — 81001 URINALYSIS AUTO W/SCOPE: CPT | Performed by: FAMILY MEDICINE

## 2021-12-04 LAB — BACTERIA UR CULT: NORMAL

## 2021-12-07 ENCOUNTER — HOSPITAL ENCOUNTER (OUTPATIENT)
Dept: RADIOLOGY | Facility: HOSPITAL | Age: 76
Discharge: HOME | End: 2021-12-07
Attending: ORTHOPAEDIC SURGERY
Payer: COMMERCIAL

## 2021-12-07 ENCOUNTER — OFFICE VISIT (OUTPATIENT)
Dept: ORTHOPEDICS | Facility: CLINIC | Age: 76
End: 2021-12-07
Payer: COMMERCIAL

## 2021-12-07 VITALS — BODY MASS INDEX: 30.7 KG/M2 | WEIGHT: 207.25 LBS | HEIGHT: 69 IN

## 2021-12-07 DIAGNOSIS — M25.571 RIGHT ANKLE PAIN, UNSPECIFIED CHRONICITY: ICD-10-CM

## 2021-12-07 DIAGNOSIS — S82.839A CLOSED FRACTURE OF DISTAL END OF FIBULA, UNSPECIFIED FRACTURE MORPHOLOGY, INITIAL ENCOUNTER: Primary | ICD-10-CM

## 2021-12-07 DIAGNOSIS — M25.571 RIGHT ANKLE PAIN, UNSPECIFIED CHRONICITY: Primary | ICD-10-CM

## 2021-12-07 PROCEDURE — 99213 PR OFFICE/OUTPT VISIT, EST, LEVL III, 20-29 MIN: ICD-10-PCS | Mod: S$GLB,,, | Performed by: ORTHOPAEDIC SURGERY

## 2021-12-07 PROCEDURE — 99999 PR PBB SHADOW E&M-EST. PATIENT-LVL II: ICD-10-PCS | Mod: PBBFAC,,, | Performed by: ORTHOPAEDIC SURGERY

## 2021-12-07 PROCEDURE — 73610 XR ANKLE COMPLETE 3 VIEW RIGHT: ICD-10-PCS | Mod: 26,RT,, | Performed by: RADIOLOGY

## 2021-12-07 PROCEDURE — 99213 OFFICE O/P EST LOW 20 MIN: CPT | Mod: S$GLB,,, | Performed by: ORTHOPAEDIC SURGERY

## 2021-12-07 PROCEDURE — 99999 PR PBB SHADOW E&M-EST. PATIENT-LVL II: CPT | Mod: PBBFAC,,, | Performed by: ORTHOPAEDIC SURGERY

## 2021-12-07 PROCEDURE — 73610 X-RAY EXAM OF ANKLE: CPT | Mod: 26,RT,, | Performed by: RADIOLOGY

## 2021-12-07 PROCEDURE — 73610 X-RAY EXAM OF ANKLE: CPT | Mod: TC,RT

## 2021-12-09 ENCOUNTER — PATIENT MESSAGE (OUTPATIENT)
Dept: FAMILY MEDICINE | Facility: CLINIC | Age: 76
End: 2021-12-09
Payer: COMMERCIAL

## 2022-01-05 ENCOUNTER — OFFICE VISIT (OUTPATIENT)
Dept: ORTHOPEDICS | Facility: CLINIC | Age: 77
End: 2022-01-05
Payer: COMMERCIAL

## 2022-01-05 ENCOUNTER — HOSPITAL ENCOUNTER (OUTPATIENT)
Dept: RADIOLOGY | Facility: HOSPITAL | Age: 77
Discharge: HOME | End: 2022-01-05
Attending: ORTHOPAEDIC SURGERY
Payer: COMMERCIAL

## 2022-01-05 VITALS — BODY MASS INDEX: 30.7 KG/M2 | HEIGHT: 69 IN | WEIGHT: 207.25 LBS

## 2022-01-05 DIAGNOSIS — M25.571 RIGHT ANKLE PAIN, UNSPECIFIED CHRONICITY: Primary | ICD-10-CM

## 2022-01-05 DIAGNOSIS — M25.571 RIGHT ANKLE PAIN, UNSPECIFIED CHRONICITY: ICD-10-CM

## 2022-01-05 DIAGNOSIS — S82.839D: Primary | ICD-10-CM

## 2022-01-05 PROCEDURE — 1159F MED LIST DOCD IN RCRD: CPT | Mod: CPTII,S$GLB,, | Performed by: ORTHOPAEDIC SURGERY

## 2022-01-05 PROCEDURE — 73610 X-RAY EXAM OF ANKLE: CPT | Mod: TC,RT

## 2022-01-05 PROCEDURE — 3288F FALL RISK ASSESSMENT DOCD: CPT | Mod: CPTII,S$GLB,, | Performed by: ORTHOPAEDIC SURGERY

## 2022-01-05 PROCEDURE — 3072F PR LOW RISK FOR RETINOPATHY: ICD-10-PCS | Mod: CPTII,S$GLB,, | Performed by: ORTHOPAEDIC SURGERY

## 2022-01-05 PROCEDURE — 1125F AMNT PAIN NOTED PAIN PRSNT: CPT | Mod: CPTII,S$GLB,, | Performed by: ORTHOPAEDIC SURGERY

## 2022-01-05 PROCEDURE — 99999 PR PBB SHADOW E&M-EST. PATIENT-LVL III: ICD-10-PCS | Mod: PBBFAC,,, | Performed by: ORTHOPAEDIC SURGERY

## 2022-01-05 PROCEDURE — 1101F PR PT FALLS ASSESS DOC 0-1 FALLS W/OUT INJ PAST YR: ICD-10-PCS | Mod: CPTII,S$GLB,, | Performed by: ORTHOPAEDIC SURGERY

## 2022-01-05 PROCEDURE — 73610 XR ANKLE COMPLETE 3 VIEW RIGHT: ICD-10-PCS | Mod: 26,RT,, | Performed by: RADIOLOGY

## 2022-01-05 PROCEDURE — 99999 PR PBB SHADOW E&M-EST. PATIENT-LVL III: CPT | Mod: PBBFAC,,, | Performed by: ORTHOPAEDIC SURGERY

## 2022-01-05 PROCEDURE — 99213 OFFICE O/P EST LOW 20 MIN: CPT | Mod: S$GLB,,, | Performed by: ORTHOPAEDIC SURGERY

## 2022-01-05 PROCEDURE — 1101F PT FALLS ASSESS-DOCD LE1/YR: CPT | Mod: CPTII,S$GLB,, | Performed by: ORTHOPAEDIC SURGERY

## 2022-01-05 PROCEDURE — 1125F PR PAIN SEVERITY QUANTIFIED, PAIN PRESENT: ICD-10-PCS | Mod: CPTII,S$GLB,, | Performed by: ORTHOPAEDIC SURGERY

## 2022-01-05 PROCEDURE — 99213 PR OFFICE/OUTPT VISIT, EST, LEVL III, 20-29 MIN: ICD-10-PCS | Mod: S$GLB,,, | Performed by: ORTHOPAEDIC SURGERY

## 2022-01-05 PROCEDURE — 1159F PR MEDICATION LIST DOCUMENTED IN MEDICAL RECORD: ICD-10-PCS | Mod: CPTII,S$GLB,, | Performed by: ORTHOPAEDIC SURGERY

## 2022-01-05 PROCEDURE — 73610 X-RAY EXAM OF ANKLE: CPT | Mod: 26,RT,, | Performed by: RADIOLOGY

## 2022-01-05 PROCEDURE — 3288F PR FALLS RISK ASSESSMENT DOCUMENTED: ICD-10-PCS | Mod: CPTII,S$GLB,, | Performed by: ORTHOPAEDIC SURGERY

## 2022-01-05 PROCEDURE — 3072F LOW RISK FOR RETINOPATHY: CPT | Mod: CPTII,S$GLB,, | Performed by: ORTHOPAEDIC SURGERY

## 2022-01-05 NOTE — PROGRESS NOTES
Jerardo Powers  Checked in 17 minutes late for appointment    This is a 76-year-old male who returns for follow-up of a right distal fibula fracture that he sustained almost 8 weeks ago.  He reports some continued mild pain.  He has been weight-bearing in his home without the boot without much difficulty.  He does wear the boot when he goes out of the house.    Examination:  The right ankle reveals some continued mild swelling.  There is still some mild tenderness over the distal fibula.  There is no medial-sided tenderness.  He has functional motion of his ankle.    Imaging:  I ordered and reviewed an x-ray of the right ankle today.  There has been no change in the alignment of the fracture.  There is still some radiolucency visible specially on the lateral view.    Impression:  1. Closed fracture of distal end of right fibula with routine healing, subsequent encounter       Recommendation:  I believe it would be safe for him to continue progressing his weight-bearing as pain allows.  It is okay for him to be out of the boot at home as long as he is not having any pain.  He should continue to use the boot when he is walking outside of the house.  He may remove the boot to drive if necessary.      Follow-up in 4 weeks with repeat x-ray right ankle

## 2022-01-09 ENCOUNTER — PATIENT MESSAGE (OUTPATIENT)
Dept: FAMILY MEDICINE | Facility: CLINIC | Age: 77
End: 2022-01-09
Payer: COMMERCIAL

## 2022-01-10 RX ORDER — IRBESARTAN 150 MG/1
150 TABLET ORAL DAILY
Qty: 90 TABLET | Refills: 3 | Status: SHIPPED | OUTPATIENT
Start: 2022-01-10 | End: 2022-10-25 | Stop reason: SDUPTHER

## 2022-01-10 NOTE — TELEPHONE ENCOUNTER
Called patient to let him know the prescription had been sent in. Patient verbalized understanding.

## 2022-01-10 NOTE — TELEPHONE ENCOUNTER
Care Due:                  Date            Visit Type   Department     Provider  --------------------------------------------------------------------------------                                             [facility] FAMILY  Last Visit: 09-      None         MEDICINE       Adams Moore  Next Visit: None Scheduled  None         None Found                                                            Last  Test          Frequency    Reason                     Performed    Due Date  --------------------------------------------------------------------------------    HBA1C.......  6 months...  dulaglutide, metFORMIN...  09- 03-    Powered by [employer] by "[employer] Reference number: [id number].   1/10/2022 11:22:37 AM CST

## 2022-01-22 NOTE — TELEPHONE ENCOUNTER
No new care gaps identified.  Powered by [employer]. Reference number: [id number].   1/22/2022 11:08:34 AM CST

## 2022-01-23 RX ORDER — ALLOPURINOL 100 MG/1
TABLET ORAL
Qty: 90 TABLET | Refills: 3 | Status: SHIPPED | OUTPATIENT
Start: 2022-01-23 | End: 2023-02-17

## 2022-01-25 ENCOUNTER — OFFICE VISIT (OUTPATIENT)
Dept: UROLOGY | Facility: CLINIC | Age: 77
End: 2022-01-25
Payer: COMMERCIAL

## 2022-01-25 VITALS
HEART RATE: 103 BPM | HEIGHT: 69 IN | SYSTOLIC BLOOD PRESSURE: 106 MMHG | DIASTOLIC BLOOD PRESSURE: 71 MMHG | WEIGHT: 211.44 LBS | BODY MASS INDEX: 31.32 KG/M2

## 2022-01-25 DIAGNOSIS — N20.0 KIDNEY STONE: ICD-10-CM

## 2022-01-25 DIAGNOSIS — N41.1 PROSTATITIS, CHRONIC: ICD-10-CM

## 2022-01-25 DIAGNOSIS — N40.1 BENIGN PROSTATIC HYPERPLASIA WITH LOWER URINARY TRACT SYMPTOMS, SYMPTOM DETAILS UNSPECIFIED: Primary | ICD-10-CM

## 2022-01-25 PROCEDURE — 99999 PR PBB SHADOW E&M-EST. PATIENT-LVL IV: ICD-10-PCS | Mod: PBBFAC,,, | Performed by: UROLOGY

## 2022-01-25 PROCEDURE — 1159F PR MEDICATION LIST DOCUMENTED IN MEDICAL RECORD: ICD-10-PCS | Mod: CPTII,S$GLB,, | Performed by: UROLOGY

## 2022-01-25 PROCEDURE — 3074F SYST BP LT 130 MM HG: CPT | Mod: CPTII,S$GLB,, | Performed by: UROLOGY

## 2022-01-25 PROCEDURE — 1100F PTFALLS ASSESS-DOCD GE2>/YR: CPT | Mod: CPTII,S$GLB,, | Performed by: UROLOGY

## 2022-01-25 PROCEDURE — 87086 URINE CULTURE/COLONY COUNT: CPT | Performed by: STUDENT IN AN ORGANIZED HEALTH CARE EDUCATION/TRAINING PROGRAM

## 2022-01-25 PROCEDURE — 3288F FALL RISK ASSESSMENT DOCD: CPT | Mod: CPTII,S$GLB,, | Performed by: UROLOGY

## 2022-01-25 PROCEDURE — 1126F AMNT PAIN NOTED NONE PRSNT: CPT | Mod: CPTII,S$GLB,, | Performed by: UROLOGY

## 2022-01-25 PROCEDURE — 3078F DIAST BP <80 MM HG: CPT | Mod: CPTII,S$GLB,, | Performed by: UROLOGY

## 2022-01-25 PROCEDURE — 3078F PR MOST RECENT DIASTOLIC BLOOD PRESSURE < 80 MM HG: ICD-10-PCS | Mod: CPTII,S$GLB,, | Performed by: UROLOGY

## 2022-01-25 PROCEDURE — 3288F PR FALLS RISK ASSESSMENT DOCUMENTED: ICD-10-PCS | Mod: CPTII,S$GLB,, | Performed by: UROLOGY

## 2022-01-25 PROCEDURE — 99214 OFFICE O/P EST MOD 30 MIN: CPT | Mod: S$GLB,,, | Performed by: UROLOGY

## 2022-01-25 PROCEDURE — 99214 PR OFFICE/OUTPT VISIT, EST, LEVL IV, 30-39 MIN: ICD-10-PCS | Mod: S$GLB,,, | Performed by: UROLOGY

## 2022-01-25 PROCEDURE — 3072F LOW RISK FOR RETINOPATHY: CPT | Mod: CPTII,S$GLB,, | Performed by: UROLOGY

## 2022-01-25 PROCEDURE — 99999 PR PBB SHADOW E&M-EST. PATIENT-LVL IV: CPT | Mod: PBBFAC,,, | Performed by: UROLOGY

## 2022-01-25 PROCEDURE — 1126F PR PAIN SEVERITY QUANTIFIED, NO PAIN PRESENT: ICD-10-PCS | Mod: CPTII,S$GLB,, | Performed by: UROLOGY

## 2022-01-25 PROCEDURE — 3074F PR MOST RECENT SYSTOLIC BLOOD PRESSURE < 130 MM HG: ICD-10-PCS | Mod: CPTII,S$GLB,, | Performed by: UROLOGY

## 2022-01-25 PROCEDURE — 1159F MED LIST DOCD IN RCRD: CPT | Mod: CPTII,S$GLB,, | Performed by: UROLOGY

## 2022-01-25 PROCEDURE — 1100F PR PT FALLS ASSESS DOC 2+ FALLS/FALL W/INJURY/YR: ICD-10-PCS | Mod: CPTII,S$GLB,, | Performed by: UROLOGY

## 2022-01-25 PROCEDURE — 3072F PR LOW RISK FOR RETINOPATHY: ICD-10-PCS | Mod: CPTII,S$GLB,, | Performed by: UROLOGY

## 2022-01-25 RX ORDER — DUTASTERIDE AND TAMSULOSIN HYDROCHLORIDE CAPSULES .5; .4 MG/1; MG/1
CAPSULE ORAL
Qty: 90 CAPSULE | Refills: 3 | Status: SHIPPED | OUTPATIENT
Start: 2022-01-25 | End: 2023-02-17

## 2022-01-25 RX ORDER — CIPROFLOXACIN 500 MG/1
250 TABLET ORAL 2 TIMES DAILY
Qty: 28 TABLET | Refills: 0 | Status: SHIPPED | OUTPATIENT
Start: 2022-01-25 | End: 2022-02-22

## 2022-01-25 NOTE — PROGRESS NOTES
"Urology - [facility]  Clinic Note  Adi Murray MD    SUBJECTIVE:     Chief Complaint: urinary frequency    History of Present Illness:  Jerardo Powers is a 76 y.o. male who presents to clinic for follow up. He is established to our clinic. Referral from No ref. provider found     Patient is a 76 year old male with a history of urolithiasis (ESWL, URS in 2018), BPH, HLD, DM, IBS, prostatitis, presenting for urinary frequency. States that he has been having frequency, urgency, occasional UUI. No pad usage. No sensation of incomplete emptying. No straining.     No history of hematuria, dysuria. Has been taking flomax-dutasteride.    OBJECTIVE:     Estimated body mass index is 31.22 kg/m² as calculated from the following:    Height as of this encounter: 5' 9" (1.753 m).    Weight as of this encounter: 95.9 kg (211 lb 6.7 oz).    Vital Signs (Most Recent)  Pulse: 103 (01/25/22 1502)  BP: 106/71 (01/25/22 1502)    Physical Exam  Vitals reviewed.   Constitutional:       General: He is not in acute distress.     Appearance: He is well-developed. He is not diaphoretic.   HENT:      Head: Normocephalic and atraumatic.   Eyes:      General: No scleral icterus.     Extraocular Movements: EOM normal.   Cardiovascular:      Rate and Rhythm: Normal rate.   Pulmonary:      Effort: Pulmonary effort is normal. No respiratory distress.      Breath sounds: No stridor.   Abdominal:      General: There is no distension.      Palpations: Abdomen is soft.      Tenderness: There is no abdominal tenderness.   Genitourinary:     Comments: Prostate 30 g, slightly tender on palpation, no nodules or induration  Musculoskeletal:         General: Normal range of motion.      Cervical back: Normal range of motion.   Skin:     General: Skin is warm and dry.   Neurological:      Mental Status: He is alert.   Psychiatric:         Mood and Affect: Mood and affect normal.         Behavior: Behavior normal.         Lab Results   Component Value " Date    BUN 13 09/28/2021    CREATININE 1.5 (H) 10/09/2021    WBC 12.97 (H) 09/28/2021    HGB 15.2 09/28/2021    HCT 46.0 09/28/2021     09/28/2021    AST 17 09/28/2021    ALT 23 09/28/2021    ALKPHOS 110 09/28/2021    ALBUMIN 3.7 09/28/2021    HGBA1C 7.3 (H) 09/28/2021        Lab Results   Component Value Date    PSA 0.74 07/14/2017    PSA 0.59 07/17/2015    PSA 0.96 08/22/2012    PSA 1.11 04/16/2012    PSA 1.4 02/14/2011    PSA 2.4 05/12/2010    PSA 4.0 02/15/2010    PSA 4.7 (H) 08/20/2009    PSA 4.2 (H) 07/06/2009    PSA 3.6 03/25/2008    PSADIAG 1.3 02/19/2016    PSADIAG 0.62 12/01/2014    PSADIAG 0.97 12/04/2013       ASSESSMENT     1. Benign prostatic hyperplasia with lower urinary tract symptoms, symptom details unspecified    2. Prostatitis, chronic    3. Kidney stone      PLAN:   Jerardo was seen today for urinary frequency and oab.    Diagnoses and all orders for this visit:    Benign prostatic hyperplasia with lower urinary tract symptoms, symptom details unspecified  -     CULTURE, URINE  -     X-Ray KUB; Future  -     dutasteride-tamsulosin 0.5-0.4 mg CM24; Take nightly    Prostatitis, chronic    Kidney stone    Other orders  -     ciprofloxacin HCl (CIPRO) 500 MG tablet; Take 0.5 tablets (250 mg total) by mouth 2 (two) times daily.    - urine for culture   - KUB to assess for any remaining stone burden  - cipro for presumed prostatitis    Adi Murray MD

## 2022-01-27 LAB — BACTERIA UR CULT: NO GROWTH

## 2022-02-04 ENCOUNTER — OFFICE VISIT (OUTPATIENT)
Dept: ORTHOPEDICS | Facility: CLINIC | Age: 77
End: 2022-02-04
Payer: COMMERCIAL

## 2022-02-04 ENCOUNTER — HOSPITAL ENCOUNTER (OUTPATIENT)
Dept: RADIOLOGY | Facility: HOSPITAL | Age: 77
Discharge: HOME | End: 2022-02-04
Attending: ORTHOPAEDIC SURGERY
Payer: COMMERCIAL

## 2022-02-04 VITALS — HEIGHT: 69 IN | WEIGHT: 211.44 LBS | BODY MASS INDEX: 31.32 KG/M2

## 2022-02-04 DIAGNOSIS — M25.571 RIGHT ANKLE PAIN, UNSPECIFIED CHRONICITY: ICD-10-CM

## 2022-02-04 DIAGNOSIS — M25.571 RIGHT ANKLE PAIN, UNSPECIFIED CHRONICITY: Primary | ICD-10-CM

## 2022-02-04 DIAGNOSIS — S82.839D: Primary | ICD-10-CM

## 2022-02-04 PROCEDURE — 73610 X-RAY EXAM OF ANKLE: CPT | Mod: TC,RT

## 2022-02-04 PROCEDURE — 1126F PR PAIN SEVERITY QUANTIFIED, NO PAIN PRESENT: ICD-10-PCS | Mod: CPTII,S$GLB,, | Performed by: ORTHOPAEDIC SURGERY

## 2022-02-04 PROCEDURE — 1101F PT FALLS ASSESS-DOCD LE1/YR: CPT | Mod: CPTII,S$GLB,, | Performed by: ORTHOPAEDIC SURGERY

## 2022-02-04 PROCEDURE — 99213 OFFICE O/P EST LOW 20 MIN: CPT | Mod: S$GLB,,, | Performed by: ORTHOPAEDIC SURGERY

## 2022-02-04 PROCEDURE — 1101F PR PT FALLS ASSESS DOC 0-1 FALLS W/OUT INJ PAST YR: ICD-10-PCS | Mod: CPTII,S$GLB,, | Performed by: ORTHOPAEDIC SURGERY

## 2022-02-04 PROCEDURE — 99999 PR PBB SHADOW E&M-EST. PATIENT-LVL III: ICD-10-PCS | Mod: PBBFAC,,, | Performed by: ORTHOPAEDIC SURGERY

## 2022-02-04 PROCEDURE — 73610 X-RAY EXAM OF ANKLE: CPT | Mod: 26,RT,, | Performed by: RADIOLOGY

## 2022-02-04 PROCEDURE — 99213 PR OFFICE/OUTPT VISIT, EST, LEVL III, 20-29 MIN: ICD-10-PCS | Mod: S$GLB,,, | Performed by: ORTHOPAEDIC SURGERY

## 2022-02-04 PROCEDURE — 1126F AMNT PAIN NOTED NONE PRSNT: CPT | Mod: CPTII,S$GLB,, | Performed by: ORTHOPAEDIC SURGERY

## 2022-02-04 PROCEDURE — 73610 XR ANKLE COMPLETE 3 VIEW RIGHT: ICD-10-PCS | Mod: 26,RT,, | Performed by: RADIOLOGY

## 2022-02-04 PROCEDURE — 3072F PR LOW RISK FOR RETINOPATHY: ICD-10-PCS | Mod: CPTII,S$GLB,, | Performed by: ORTHOPAEDIC SURGERY

## 2022-02-04 PROCEDURE — 3288F PR FALLS RISK ASSESSMENT DOCUMENTED: ICD-10-PCS | Mod: CPTII,S$GLB,, | Performed by: ORTHOPAEDIC SURGERY

## 2022-02-04 PROCEDURE — 3288F FALL RISK ASSESSMENT DOCD: CPT | Mod: CPTII,S$GLB,, | Performed by: ORTHOPAEDIC SURGERY

## 2022-02-04 PROCEDURE — 1159F PR MEDICATION LIST DOCUMENTED IN MEDICAL RECORD: ICD-10-PCS | Mod: CPTII,S$GLB,, | Performed by: ORTHOPAEDIC SURGERY

## 2022-02-04 PROCEDURE — 99999 PR PBB SHADOW E&M-EST. PATIENT-LVL III: CPT | Mod: PBBFAC,,, | Performed by: ORTHOPAEDIC SURGERY

## 2022-02-04 PROCEDURE — 1159F MED LIST DOCD IN RCRD: CPT | Mod: CPTII,S$GLB,, | Performed by: ORTHOPAEDIC SURGERY

## 2022-02-04 PROCEDURE — 3072F LOW RISK FOR RETINOPATHY: CPT | Mod: CPTII,S$GLB,, | Performed by: ORTHOPAEDIC SURGERY

## 2022-02-04 NOTE — PROGRESS NOTES
Jerardo Powers  Returns today for follow-up.  This is a 76-year-old male who returns for follow-up of a right distal fibula fracture that he sustained about 3 months ago.  He returns today and reports some continued mild soreness of his ankle but overall getting better.    Examination:  The right ankle reveals no significant swelling today.  There is minimal tenderness over the distal fibula.  He has functional motion of the ankle.    Imaging:  I ordered and reviewed an x-ray of his right ankle today.  There is still some mild radiolucency but improved from previous.  There has been no change in the alignment of the fracture.    Impression:  1. Closed fracture of distal end of right fibula with routine healing, subsequent encounter         Recommendation:  I think we just need to continue to give this time.  He can progress his activities as tolerated.    Follow-up in 6 weeks if necessary

## 2022-02-20 DIAGNOSIS — E11.22 TYPE 2 DIABETES MELLITUS WITH STAGE 3A CHRONIC KIDNEY DISEASE, WITHOUT LONG-TERM CURRENT USE OF INSULIN: Primary | ICD-10-CM

## 2022-02-20 DIAGNOSIS — N18.31 TYPE 2 DIABETES MELLITUS WITH STAGE 3A CHRONIC KIDNEY DISEASE, WITHOUT LONG-TERM CURRENT USE OF INSULIN: Primary | ICD-10-CM

## 2022-02-20 NOTE — TELEPHONE ENCOUNTER
No new care gaps identified.  Powered by [employer]. Reference number: [id number].   2/20/2022 12:06:09 PM CST

## 2022-02-21 RX ORDER — DULAGLUTIDE 3 MG/.5ML
3 INJECTION, SOLUTION SUBCUTANEOUS WEEKLY
Qty: 4 PEN | Refills: 11 | Status: SHIPPED | OUTPATIENT
Start: 2022-02-21 | End: 2023-03-21

## 2022-02-21 NOTE — TELEPHONE ENCOUNTER
Approved Trulicity, needs PA apparently.  Already on metformin.  History of diabetes plus stage 3 chronic kidney disease.  Tolerates G LP 1 agonist therapy with Trulicity.  Cannot take SGLT2 therapy given frequent UTI history    Hemoglobin A1C (%)   Date Value   09/28/2021 7.3 (H)   05/15/2021 6.8 (H)   03/05/2021 6.8 (H)   09/30/2020 6.9 (H)   12/06/2019 6.9 (H)   09/04/2019 6.5 (H)

## 2022-02-28 ENCOUNTER — OFFICE VISIT (OUTPATIENT)
Dept: OPTOMETRY | Facility: CLINIC | Age: 77
End: 2022-02-28
Payer: COMMERCIAL

## 2022-02-28 DIAGNOSIS — H57.11 PAIN OF RIGHT EYE: Primary | ICD-10-CM

## 2022-02-28 DIAGNOSIS — E11.9 TYPE 2 DIABETES MELLITUS WITHOUT OPHTHALMIC MANIFESTATIONS: ICD-10-CM

## 2022-02-28 DIAGNOSIS — H52.4 PRESBYOPIA: ICD-10-CM

## 2022-02-28 DIAGNOSIS — H04.123 DRY EYE SYNDROME OF BOTH EYES: ICD-10-CM

## 2022-02-28 DIAGNOSIS — H35.413 LATTICE DEGENERATION OF BOTH RETINAS: ICD-10-CM

## 2022-02-28 PROCEDURE — 1159F PR MEDICATION LIST DOCUMENTED IN MEDICAL RECORD: ICD-10-PCS | Mod: CPTII,S$GLB,, | Performed by: OPTOMETRIST

## 2022-02-28 PROCEDURE — 92015 DETERMINE REFRACTIVE STATE: CPT | Mod: S$GLB,,, | Performed by: OPTOMETRIST

## 2022-02-28 PROCEDURE — 92015 PR REFRACTION: ICD-10-PCS | Mod: S$GLB,,, | Performed by: OPTOMETRIST

## 2022-02-28 PROCEDURE — 3288F FALL RISK ASSESSMENT DOCD: CPT | Mod: CPTII,S$GLB,, | Performed by: OPTOMETRIST

## 2022-02-28 PROCEDURE — 1159F MED LIST DOCD IN RCRD: CPT | Mod: CPTII,S$GLB,, | Performed by: OPTOMETRIST

## 2022-02-28 PROCEDURE — 99999 PR PBB SHADOW E&M-EST. PATIENT-LVL III: ICD-10-PCS | Mod: PBBFAC,,, | Performed by: OPTOMETRIST

## 2022-02-28 PROCEDURE — 99999 PR PBB SHADOW E&M-EST. PATIENT-LVL III: CPT | Mod: PBBFAC,,, | Performed by: OPTOMETRIST

## 2022-02-28 PROCEDURE — 92014 PR EYE EXAM, EST PATIENT,COMPREHESV: ICD-10-PCS | Mod: S$GLB,,, | Performed by: OPTOMETRIST

## 2022-02-28 PROCEDURE — 1125F PR PAIN SEVERITY QUANTIFIED, PAIN PRESENT: ICD-10-PCS | Mod: CPTII,S$GLB,, | Performed by: OPTOMETRIST

## 2022-02-28 PROCEDURE — 1101F PT FALLS ASSESS-DOCD LE1/YR: CPT | Mod: CPTII,S$GLB,, | Performed by: OPTOMETRIST

## 2022-02-28 PROCEDURE — 1125F AMNT PAIN NOTED PAIN PRSNT: CPT | Mod: CPTII,S$GLB,, | Performed by: OPTOMETRIST

## 2022-02-28 PROCEDURE — 2023F PR DILATED RETINAL EXAM W/O EVID OF RETINOPATHY: ICD-10-PCS | Mod: CPTII,S$GLB,, | Performed by: OPTOMETRIST

## 2022-02-28 PROCEDURE — 92014 COMPRE OPH EXAM EST PT 1/>: CPT | Mod: S$GLB,,, | Performed by: OPTOMETRIST

## 2022-02-28 PROCEDURE — 2023F DILAT RTA XM W/O RTNOPTHY: CPT | Mod: CPTII,S$GLB,, | Performed by: OPTOMETRIST

## 2022-02-28 PROCEDURE — 1101F PR PT FALLS ASSESS DOC 0-1 FALLS W/OUT INJ PAST YR: ICD-10-PCS | Mod: CPTII,S$GLB,, | Performed by: OPTOMETRIST

## 2022-02-28 PROCEDURE — 3288F PR FALLS RISK ASSESSMENT DOCUMENTED: ICD-10-PCS | Mod: CPTII,S$GLB,, | Performed by: OPTOMETRIST

## 2022-02-28 RX ORDER — PREDNISOLONE ACETATE 10 MG/ML
1 SUSPENSION/ DROPS OPHTHALMIC 3 TIMES DAILY
Qty: 10 ML | Refills: 2 | Status: SHIPPED | OUTPATIENT
Start: 2022-02-28

## 2022-02-28 NOTE — PROGRESS NOTES
"                   HPI     Pt here for complaints of blurred vision. Pt states intermittent   "pressure" pain 6-7/10 OD for the past month. Pt states burning eyes OU   for the past month. Pt states trouble with distance [facility] and has to lower   glasses in order to see clearly. Pt states longstanding black floaters OU.   Pt states that he never filled glasses Rx from last visit.    DLS: 05/17/2021 with Dr. 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  Hemoglobin A1C       Date                     Value               Ref Range             Status                09/28/2021               7.3 (H)             4.0 - 5.6 %           Final              Comment:    ADA Screening Guidelines:  5.7-6.4%  Consistent with   prediabetes  >or=6.5%  Consistent with diabetes    High levels of fetal   hemoglobin interfere with the HbA1C  assay. Heterozygous hemoglobin   variants (HbS, HgC, etc)do  not significantly interfere with this assay.     However, presence of multiple variants may affect accuracy.         05/15/2021               6.8 (H)             4.0 - 5.6 %           Final              Comment:    ADA Screening Guidelines:  5.7-6.4%  Consistent with   prediabetes  >or=6.5%  Consistent with diabetes    High levels of fetal   hemoglobin interfere with the HbA1C  assay. Heterozygous hemoglobin   variants (HbS, HgC, etc)do  not significantly interfere with this assay.     However, presence of multiple variants may affect accuracy.         03/05/2021               6.8 (H)             4.0 - 5.6 %           Final              Comment:    ADA Screening Guidelines:  5.7-6.4%  Consistent with   prediabetes  >or=6.5%  Consistent with diabetes    High levels of fetal   hemoglobin interfere with the HbA1C  assay. Heterozygous hemoglobin   variants (HbS, HgC, etc)do  not significantly interfere with this assay.     However, presence of multiple variants may affect " accuracy.    ----------           Last edited by An Smith on 2/28/2022  1:49 PM. (History)            Assessment /Plan     For exam results, see Encounter Report.    Pain of right eye  -     prednisoLONE acetate (PRED FORTE) 1 % DrpS; Place 1 drop into both eyes 3 (three) times daily.  Dispense: 10 mL; Refill: 2    Dry eye syndrome of both eyes    Type 2 diabetes mellitus without ophthalmic manifestations    Lattice degeneration of both retinas    Presbyopia      1-2.  Patient reports eye pain and burning randomly over the last month.  Start PF tid OU.    3.  No retinopathy--monitor yearly.  BS control.  Eye health normal OU.    4.  Laser treatment OU--no new holes, tears, or breaks.  5.  Continue w/ current rx.  Will recheck refraction at next visit.           RTC 3-4 weeks for dry eye check and refraction.

## 2022-03-22 ENCOUNTER — OFFICE VISIT (OUTPATIENT)
Dept: OPTOMETRY | Facility: CLINIC | Age: 77
End: 2022-03-22
Payer: COMMERCIAL

## 2022-03-22 DIAGNOSIS — H52.4 PRESBYOPIA: ICD-10-CM

## 2022-03-22 DIAGNOSIS — H57.11 PAIN OF RIGHT EYE: Primary | ICD-10-CM

## 2022-03-22 DIAGNOSIS — H04.123 DRY EYE SYNDROME OF BOTH EYES: ICD-10-CM

## 2022-03-22 PROCEDURE — 1126F AMNT PAIN NOTED NONE PRSNT: CPT | Mod: CPTII,S$GLB,, | Performed by: OPTOMETRIST

## 2022-03-22 PROCEDURE — 3288F FALL RISK ASSESSMENT DOCD: CPT | Mod: CPTII,S$GLB,, | Performed by: OPTOMETRIST

## 2022-03-22 PROCEDURE — 1159F PR MEDICATION LIST DOCUMENTED IN MEDICAL RECORD: ICD-10-PCS | Mod: CPTII,S$GLB,, | Performed by: OPTOMETRIST

## 2022-03-22 PROCEDURE — 1126F PR PAIN SEVERITY QUANTIFIED, NO PAIN PRESENT: ICD-10-PCS | Mod: CPTII,S$GLB,, | Performed by: OPTOMETRIST

## 2022-03-22 PROCEDURE — 3288F PR FALLS RISK ASSESSMENT DOCUMENTED: ICD-10-PCS | Mod: CPTII,S$GLB,, | Performed by: OPTOMETRIST

## 2022-03-22 PROCEDURE — 92012 PR EYE EXAM, EST PATIENT,INTERMED: ICD-10-PCS | Mod: S$GLB,,, | Performed by: OPTOMETRIST

## 2022-03-22 PROCEDURE — 99999 PR PBB SHADOW E&M-EST. PATIENT-LVL III: CPT | Mod: PBBFAC,,, | Performed by: OPTOMETRIST

## 2022-03-22 PROCEDURE — 1101F PR PT FALLS ASSESS DOC 0-1 FALLS W/OUT INJ PAST YR: ICD-10-PCS | Mod: CPTII,S$GLB,, | Performed by: OPTOMETRIST

## 2022-03-22 PROCEDURE — 99999 PR PBB SHADOW E&M-EST. PATIENT-LVL III: ICD-10-PCS | Mod: PBBFAC,,, | Performed by: OPTOMETRIST

## 2022-03-22 PROCEDURE — 1159F MED LIST DOCD IN RCRD: CPT | Mod: CPTII,S$GLB,, | Performed by: OPTOMETRIST

## 2022-03-22 PROCEDURE — 92012 INTRM OPH EXAM EST PATIENT: CPT | Mod: S$GLB,,, | Performed by: OPTOMETRIST

## 2022-03-22 PROCEDURE — 1101F PT FALLS ASSESS-DOCD LE1/YR: CPT | Mod: CPTII,S$GLB,, | Performed by: OPTOMETRIST

## 2022-03-22 NOTE — PROGRESS NOTES
HPI     Last eye exam was 2/28/22 with Dr. Rivera.  Patient states decrease in scratchy feeling but overall it seems the same.    PF BID- TID OU    Last edited by Bridget Escamilla MA on 3/22/2022 10:19 AM. (History)            Assessment /Plan     For exam results, see Encounter Report.    Pain of right eye    Dry eye syndrome of both eyes    Presbyopia            1-2.  Pain has resolved.  Taper off of PF: 2x/day x 3 days, 1x/day x 3 days, then stop.  After finishing PF start Systane at least 2x/day OU.  3.  Bifocal rx given--optional.

## 2022-04-07 ENCOUNTER — IMMUNIZATION (OUTPATIENT)
Dept: INTERNAL MEDICINE | Facility: CLINIC | Age: 77
End: 2022-04-07
Payer: COMMERCIAL

## 2022-04-07 DIAGNOSIS — Z23 NEED FOR VACCINATION: Primary | ICD-10-CM

## 2022-04-07 PROCEDURE — 91305 COVID-19, MRNA, LNP-S, PF, 30 MCG/0.3 ML DOSE VACCINE (PFIZER): CPT | Mod: PBBFAC | Performed by: INTERNAL MEDICINE

## 2022-04-08 ENCOUNTER — HOSPITAL ENCOUNTER (OUTPATIENT)
Dept: RADIOLOGY | Facility: HOSPITAL | Age: 77
Discharge: HOME | End: 2022-04-08
Attending: ORTHOPAEDIC SURGERY
Payer: COMMERCIAL

## 2022-04-08 ENCOUNTER — OFFICE VISIT (OUTPATIENT)
Dept: ORTHOPEDICS | Facility: CLINIC | Age: 77
End: 2022-04-08
Payer: COMMERCIAL

## 2022-04-08 VITALS — WEIGHT: 211.44 LBS | HEIGHT: 69 IN | BODY MASS INDEX: 31.32 KG/M2

## 2022-04-08 DIAGNOSIS — G89.29 CHRONIC FOOT PAIN, RIGHT: Primary | ICD-10-CM

## 2022-04-08 DIAGNOSIS — M79.671 CHRONIC FOOT PAIN, RIGHT: Primary | ICD-10-CM

## 2022-04-08 DIAGNOSIS — M25.571 RIGHT ANKLE PAIN, UNSPECIFIED CHRONICITY: ICD-10-CM

## 2022-04-08 DIAGNOSIS — S82.839G: ICD-10-CM

## 2022-04-08 DIAGNOSIS — G89.29 CHRONIC FOOT PAIN, RIGHT: ICD-10-CM

## 2022-04-08 DIAGNOSIS — M25.571 RIGHT ANKLE PAIN, UNSPECIFIED CHRONICITY: Primary | ICD-10-CM

## 2022-04-08 DIAGNOSIS — M79.671 CHRONIC FOOT PAIN, RIGHT: ICD-10-CM

## 2022-04-08 PROCEDURE — 3288F FALL RISK ASSESSMENT DOCD: CPT | Mod: CPTII,S$GLB,, | Performed by: ORTHOPAEDIC SURGERY

## 2022-04-08 PROCEDURE — 99213 OFFICE O/P EST LOW 20 MIN: CPT | Mod: S$GLB,,, | Performed by: ORTHOPAEDIC SURGERY

## 2022-04-08 PROCEDURE — 73610 XR ANKLE COMPLETE 3 VIEW RIGHT: ICD-10-PCS | Mod: 26,RT,, | Performed by: RADIOLOGY

## 2022-04-08 PROCEDURE — 1159F MED LIST DOCD IN RCRD: CPT | Mod: CPTII,S$GLB,, | Performed by: ORTHOPAEDIC SURGERY

## 2022-04-08 PROCEDURE — 99213 PR OFFICE/OUTPT VISIT, EST, LEVL III, 20-29 MIN: ICD-10-PCS | Mod: S$GLB,,, | Performed by: ORTHOPAEDIC SURGERY

## 2022-04-08 PROCEDURE — 1101F PT FALLS ASSESS-DOCD LE1/YR: CPT | Mod: CPTII,S$GLB,, | Performed by: ORTHOPAEDIC SURGERY

## 2022-04-08 PROCEDURE — 1160F RVW MEDS BY RX/DR IN RCRD: CPT | Mod: CPTII,S$GLB,, | Performed by: ORTHOPAEDIC SURGERY

## 2022-04-08 PROCEDURE — 1160F PR REVIEW ALL MEDS BY PRESCRIBER/CLIN PHARMACIST DOCUMENTED: ICD-10-PCS | Mod: CPTII,S$GLB,, | Performed by: ORTHOPAEDIC SURGERY

## 2022-04-08 PROCEDURE — 1125F AMNT PAIN NOTED PAIN PRSNT: CPT | Mod: CPTII,S$GLB,, | Performed by: ORTHOPAEDIC SURGERY

## 2022-04-08 PROCEDURE — 73610 X-RAY EXAM OF ANKLE: CPT | Mod: 26,RT,, | Performed by: RADIOLOGY

## 2022-04-08 PROCEDURE — 73630 X-RAY EXAM OF FOOT: CPT | Mod: 26,RT,, | Performed by: RADIOLOGY

## 2022-04-08 PROCEDURE — 3288F PR FALLS RISK ASSESSMENT DOCUMENTED: ICD-10-PCS | Mod: CPTII,S$GLB,, | Performed by: ORTHOPAEDIC SURGERY

## 2022-04-08 PROCEDURE — 73630 X-RAY EXAM OF FOOT: CPT | Mod: TC,RT

## 2022-04-08 PROCEDURE — 1159F PR MEDICATION LIST DOCUMENTED IN MEDICAL RECORD: ICD-10-PCS | Mod: CPTII,S$GLB,, | Performed by: ORTHOPAEDIC SURGERY

## 2022-04-08 PROCEDURE — 73630 XR FOOT COMPLETE 3 VIEW RIGHT: ICD-10-PCS | Mod: 26,RT,, | Performed by: RADIOLOGY

## 2022-04-08 PROCEDURE — 99999 PR PBB SHADOW E&M-EST. PATIENT-LVL IV: CPT | Mod: PBBFAC,,, | Performed by: ORTHOPAEDIC SURGERY

## 2022-04-08 PROCEDURE — 1125F PR PAIN SEVERITY QUANTIFIED, PAIN PRESENT: ICD-10-PCS | Mod: CPTII,S$GLB,, | Performed by: ORTHOPAEDIC SURGERY

## 2022-04-08 PROCEDURE — 99999 PR PBB SHADOW E&M-EST. PATIENT-LVL IV: ICD-10-PCS | Mod: PBBFAC,,, | Performed by: ORTHOPAEDIC SURGERY

## 2022-04-08 PROCEDURE — 3072F LOW RISK FOR RETINOPATHY: CPT | Mod: CPTII,S$GLB,, | Performed by: ORTHOPAEDIC SURGERY

## 2022-04-08 PROCEDURE — 73610 X-RAY EXAM OF ANKLE: CPT | Mod: TC,RT

## 2022-04-08 PROCEDURE — 1101F PR PT FALLS ASSESS DOC 0-1 FALLS W/OUT INJ PAST YR: ICD-10-PCS | Mod: CPTII,S$GLB,, | Performed by: ORTHOPAEDIC SURGERY

## 2022-04-08 PROCEDURE — 3072F PR LOW RISK FOR RETINOPATHY: ICD-10-PCS | Mod: CPTII,S$GLB,, | Performed by: ORTHOPAEDIC SURGERY

## 2022-04-08 NOTE — PROGRESS NOTES
Jerardo Powers  Returns today for follow-up.  It has been 5 months since he injured his right foot and ankle.  He sustained a nondisplaced fracture of his right distal fibula which we have treated with closed measures including boot immobilization and protected weight-bearing.  His last visit was about 8 weeks ago at which time he was reporting some continued mild soreness but was overall getting better.  He returns today and reports that over the last couple months he really has not seen much improvement.  He has had some continued pain with prolonged walking.  He also reports pain on the dorsum of his right foot which she states has been present since the injury.  He states his foot pain is actually worsened his ankle pain at this time.    Examination:  He walks in today with a slight antalgic gait.  The right ankle reveals some mild swelling compared to the left.  He has minimal tenderness over the distal fibula on the right side.  He has functional motion of his ankle.  He has some diffuse tenderness of his midfoot dorsally.  He is neurovascularly intact.    Imaging:  I ordered reviewed x-rays of his right ankle and his right foot today.  The right ankle x-ray reveals some continued but decreased radiolucency of the fracture site of the distal fibula.  There is no significant callus formation.  There has been no displacement of the fracture.  The standing right foot x-rays today reveal no obvious by bony abnormality.  The standing lateral view does show some slight sag at the naviculocuneiform articulation.    Impression:  1. Chronic foot pain, right  X-Ray Foot Complete Right    Ambulatory referral/consult to Physical/Occupational Therapy   2. Closed fracture of distal end of right fibula with delayed healing, subsequent encounter  Ambulatory referral/consult to Physical/Occupational Therapy       Recommendation:  I would recommend continuing to give this further time.  He is wearing a nonsupportive shoes and I  suggested to him that he get into more supportive closed shoes and I also suggested obtaining some orthotics.  I think he might benefit from some physical therapy, so I will order some therapy.  Essentially, however, I think we just need to wait this out.  He can do low-impact activities as tolerated.  I suggest he might try stationary bike for exercise.    Follow-up in 8 weeks with repeat x-ray right ankle

## 2022-04-29 RX ORDER — MECLIZINE HYDROCHLORIDE 25 MG/1
25 TABLET ORAL 3 TIMES DAILY PRN
Qty: 30 TABLET | Refills: 1 | Status: SHIPPED | OUTPATIENT
Start: 2022-04-29

## 2022-04-29 NOTE — TELEPHONE ENCOUNTER
No new care gaps identified.  Powered by [employer]. Reference number: [id number].   4/29/2022 3:57:15 PM CDT

## 2022-05-17 ENCOUNTER — TELEPHONE (OUTPATIENT)
Dept: GASTROENTEROLOGY | Facility: CLINIC | Age: 77
End: 2022-05-17
Payer: COMMERCIAL

## 2022-05-17 NOTE — TELEPHONE ENCOUNTER
----- Message from Terri Ng sent at 5/17/2022 12:18 PM CDT -----  Contact: KETAN RUSSELL [[id number]]  Type: Patient Call Back    Who called:KETAN RUSSELL [[id number]]    What is the request in detail: the patient did not go into detail     Can the clinic reply by [facility]?    Would the patient rather a call back or a response via [facility]?     Best call back number: [phone] (mobile)    Additional Information:

## 2022-05-17 NOTE — TELEPHONE ENCOUNTER
MA spoke with pt   Pt wants to schedule a follow up with Dr. Keanu Colunga schedule is not at this time   Ma will patient once schedule opens

## 2022-05-20 ENCOUNTER — CLINICAL SUPPORT (OUTPATIENT)
Dept: REHABILITATION | Facility: HOSPITAL | Age: 77
End: 2022-05-20
Payer: COMMERCIAL

## 2022-05-20 DIAGNOSIS — R29.898 DECREASED STRENGTH OF LOWER EXTREMITY: ICD-10-CM

## 2022-05-20 DIAGNOSIS — M79.671 CHRONIC FOOT PAIN, RIGHT: ICD-10-CM

## 2022-05-20 DIAGNOSIS — S82.839G: ICD-10-CM

## 2022-05-20 DIAGNOSIS — Z74.09 IMPAIRED FUNCTIONAL MOBILITY, BALANCE, GAIT, AND ENDURANCE: ICD-10-CM

## 2022-05-20 DIAGNOSIS — G89.29 CHRONIC FOOT PAIN, RIGHT: ICD-10-CM

## 2022-05-20 PROCEDURE — 97161 PT EVAL LOW COMPLEX 20 MIN: CPT | Mod: PN

## 2022-05-20 PROCEDURE — 97110 THERAPEUTIC EXERCISES: CPT | Mod: PN
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
## 2022-05-27 PROBLEM — Z74.09 IMPAIRED FUNCTIONAL MOBILITY, BALANCE, GAIT, AND ENDURANCE: Status: ACTIVE | Noted: 2022-05-27

## 2022-05-27 PROBLEM — R29.898 DECREASED STRENGTH OF LOWER EXTREMITY: Status: ACTIVE | Noted: 2022-05-27

## 2022-06-27 DIAGNOSIS — E11.9 TYPE 2 DIABETES MELLITUS WITHOUT COMPLICATION, UNSPECIFIED WHETHER LONG TERM INSULIN USE: ICD-10-CM

## 2022-07-14 ENCOUNTER — PATIENT MESSAGE (OUTPATIENT)
Dept: GASTROENTEROLOGY | Facility: CLINIC | Age: 77
End: 2022-07-14
Payer: COMMERCIAL

## 2022-07-14 ENCOUNTER — TELEPHONE (OUTPATIENT)
Dept: GASTROENTEROLOGY | Facility: CLINIC | Age: 77
End: 2022-07-14
Payer: COMMERCIAL

## 2022-07-19 ENCOUNTER — PATIENT MESSAGE (OUTPATIENT)
Dept: RESEARCH | Facility: CLINIC | Age: 77
End: 2022-07-19
Payer: COMMERCIAL

## 2022-10-19 ENCOUNTER — IMMUNIZATION (OUTPATIENT)
Dept: INTERNAL MEDICINE | Facility: CLINIC | Age: 77
End: 2022-10-19
Payer: COMMERCIAL

## 2022-10-19 DIAGNOSIS — Z23 NEED FOR VACCINATION: Primary | ICD-10-CM

## 2022-10-19 PROCEDURE — 0124A COVID-19, MRNA, LNP-S, BIVALENT BOOSTER, PF, 30 MCG/0.3 ML DOSE: CPT | Mod: PBBFAC | Performed by: INTERNAL MEDICINE

## 2022-10-19 PROCEDURE — 91312 COVID-19, MRNA, LNP-S, BIVALENT BOOSTER, PF, 30 MCG/0.3 ML DOSE: CPT | Mod: S$GLB,,, | Performed by: INTERNAL MEDICINE

## 2022-10-19 PROCEDURE — 91312 COVID-19, MRNA, LNP-S, BIVALENT BOOSTER, PF, 30 MCG/0.3 ML DOSE: ICD-10-PCS | Mod: S$GLB,,, | Performed by: INTERNAL MEDICINE

## 2022-10-25 ENCOUNTER — CLINICAL SUPPORT (OUTPATIENT)
Dept: FAMILY MEDICINE | Facility: CLINIC | Age: 77
End: 2022-10-25
Payer: COMMERCIAL

## 2022-10-25 DIAGNOSIS — Z23 NEEDS FLU SHOT: Primary | ICD-10-CM

## 2022-10-25 PROCEDURE — 90694 VACC AIIV4 NO PRSRV 0.5ML IM: CPT | Mod: S$GLB,,, | Performed by: FAMILY MEDICINE

## 2022-10-25 PROCEDURE — 90471 FLU VACCINE - QUADRIVALENT - ADJUVANTED: ICD-10-PCS | Mod: S$GLB,,, | Performed by: FAMILY MEDICINE

## 2022-10-25 PROCEDURE — 90694 FLU VACCINE - QUADRIVALENT - ADJUVANTED: ICD-10-PCS | Mod: S$GLB,,, | Performed by: FAMILY MEDICINE

## 2022-10-25 PROCEDURE — 90471 IMMUNIZATION ADMIN: CPT | Mod: S$GLB,,, | Performed by: FAMILY MEDICINE

## 2022-11-09 ENCOUNTER — TELEPHONE (OUTPATIENT)
Dept: FAMILY MEDICINE | Facility: CLINIC | Age: 77
End: 2022-11-09
Payer: COMMERCIAL

## 2022-11-09 DIAGNOSIS — E55.9 VITAMIN D DEFICIENCY: ICD-10-CM

## 2022-11-09 DIAGNOSIS — M10.9 GOUT, UNSPECIFIED CAUSE, UNSPECIFIED CHRONICITY, UNSPECIFIED SITE: ICD-10-CM

## 2022-11-09 DIAGNOSIS — E11.22 TYPE 2 DIABETES MELLITUS WITH STAGE 3A CHRONIC KIDNEY DISEASE, WITHOUT LONG-TERM CURRENT USE OF INSULIN: Primary | ICD-10-CM

## 2022-11-09 DIAGNOSIS — E11.22 TYPE 2 DIABETES MELLITUS WITH STAGE 3 CHRONIC KIDNEY DISEASE, WITHOUT LONG-TERM CURRENT USE OF INSULIN, UNSPECIFIED WHETHER STAGE 3A OR 3B CKD: ICD-10-CM

## 2022-11-09 DIAGNOSIS — N18.31 TYPE 2 DIABETES MELLITUS WITH STAGE 3A CHRONIC KIDNEY DISEASE, WITHOUT LONG-TERM CURRENT USE OF INSULIN: Primary | ICD-10-CM

## 2022-11-09 DIAGNOSIS — N18.30 TYPE 2 DIABETES MELLITUS WITH STAGE 3 CHRONIC KIDNEY DISEASE, WITHOUT LONG-TERM CURRENT USE OF INSULIN, UNSPECIFIED WHETHER STAGE 3A OR 3B CKD: ICD-10-CM

## 2022-11-09 DIAGNOSIS — E53.8 VITAMIN B12 DEFICIENCY: ICD-10-CM

## 2022-11-09 NOTE — TELEPHONE ENCOUNTER
Orders Placed This Encounter   Procedures    CBC Auto Differential    Comprehensive Metabolic Panel    Lipid Panel    TSH    Vitamin B12    Vitamin D    Hemoglobin A1C    Microalbumin/Creatinine Ratio, Urine    Uric Acid     Can get labs prior to visit.

## 2022-11-09 NOTE — TELEPHONE ENCOUNTER
Patient requesting labs (specifically requested A1C, Uric acid, Vit D3, Vit B12, CBC, Lipid profile, thyroid) to schedule annual.

## 2022-12-08 ENCOUNTER — PATIENT OUTREACH (OUTPATIENT)
Dept: ADMINISTRATIVE | Facility: HOSPITAL | Age: 77
End: 2022-12-08
Payer: COMMERCIAL

## 2022-12-12 ENCOUNTER — LAB VISIT (OUTPATIENT)
Dept: LAB | Facility: HOSPITAL | Age: 77
End: 2022-12-12
Attending: FAMILY MEDICINE
Payer: COMMERCIAL

## 2022-12-12 DIAGNOSIS — M10.9 GOUT, UNSPECIFIED CAUSE, UNSPECIFIED CHRONICITY, UNSPECIFIED SITE: ICD-10-CM

## 2022-12-12 DIAGNOSIS — E55.9 VITAMIN D DEFICIENCY: ICD-10-CM

## 2022-12-12 DIAGNOSIS — E11.22 TYPE 2 DIABETES MELLITUS WITH STAGE 3 CHRONIC KIDNEY DISEASE, WITHOUT LONG-TERM CURRENT USE OF INSULIN, UNSPECIFIED WHETHER STAGE 3A OR 3B CKD: ICD-10-CM

## 2022-12-12 DIAGNOSIS — E53.8 VITAMIN B12 DEFICIENCY: ICD-10-CM

## 2022-12-12 DIAGNOSIS — N18.30 TYPE 2 DIABETES MELLITUS WITH STAGE 3 CHRONIC KIDNEY DISEASE, WITHOUT LONG-TERM CURRENT USE OF INSULIN, UNSPECIFIED WHETHER STAGE 3A OR 3B CKD: ICD-10-CM

## 2022-12-12 LAB
25(OH)D3+25(OH)D2 SERPL-MCNC: 34 NG/ML (ref 30–96)
ALBUMIN SERPL BCP-MCNC: 3.8 G/DL (ref 3.5–5.2)
ALP SERPL-CCNC: 90 U/L (ref 55–135)
ALT SERPL W/O P-5'-P-CCNC: 21 U/L (ref 10–44)
ANION GAP SERPL CALC-SCNC: 14 MMOL/L (ref 8–16)
AST SERPL-CCNC: 17 U/L (ref 10–40)
BASOPHILS # BLD AUTO: 0.03 K/UL (ref 0–0.2)
BASOPHILS NFR BLD: 0.3 % (ref 0–1.9)
BILIRUB SERPL-MCNC: 0.7 MG/DL (ref 0.1–1)
BUN SERPL-MCNC: 11 MG/DL (ref 8–23)
CALCIUM SERPL-MCNC: 8.9 MG/DL (ref 8.7–10.5)
CHLORIDE SERPL-SCNC: 106 MMOL/L (ref 95–110)
CHOLEST SERPL-MCNC: 187 MG/DL (ref 120–199)
CHOLEST/HDLC SERPL: 3.5 {RATIO} (ref 2–5)
CO2 SERPL-SCNC: 21 MMOL/L (ref 23–29)
CREAT SERPL-MCNC: 1.6 MG/DL (ref 0.5–1.4)
DIFFERENTIAL METHOD: ABNORMAL
EOSINOPHIL # BLD AUTO: 0.6 K/UL (ref 0–0.5)
EOSINOPHIL NFR BLD: 5.2 % (ref 0–8)
ERYTHROCYTE [DISTWIDTH] IN BLOOD BY AUTOMATED COUNT: 12.7 % (ref 11.5–14.5)
EST. GFR  (NO RACE VARIABLE): 44 ML/MIN/1.73 M^2
ESTIMATED AVG GLUCOSE: 140 MG/DL (ref 68–131)
GLUCOSE SERPL-MCNC: 145 MG/DL (ref 70–110)
HBA1C MFR BLD: 6.5 % (ref 4–5.6)
HCT VFR BLD AUTO: 45.4 % (ref 40–54)
HDLC SERPL-MCNC: 54 MG/DL (ref 40–75)
HDLC SERPL: 28.9 % (ref 20–50)
HGB BLD-MCNC: 15.2 G/DL (ref 14–18)
IMM GRANULOCYTES # BLD AUTO: 0.11 K/UL (ref 0–0.04)
IMM GRANULOCYTES NFR BLD AUTO: 1 % (ref 0–0.5)
LDLC SERPL CALC-MCNC: 63.2 MG/DL (ref 63–159)
LYMPHOCYTES # BLD AUTO: 2.8 K/UL (ref 1–4.8)
LYMPHOCYTES NFR BLD: 25 % (ref 18–48)
MCH RBC QN AUTO: 28.8 PG (ref 27–31)
MCHC RBC AUTO-ENTMCNC: 33.5 G/DL (ref 32–36)
MCV RBC AUTO: 86 FL (ref 82–98)
MONOCYTES # BLD AUTO: 0.8 K/UL (ref 0.3–1)
MONOCYTES NFR BLD: 6.9 % (ref 4–15)
NEUTROPHILS # BLD AUTO: 6.8 K/UL (ref 1.8–7.7)
NEUTROPHILS NFR BLD: 61.6 % (ref 38–73)
NONHDLC SERPL-MCNC: 133 MG/DL
NRBC BLD-RTO: 0 /100 WBC
PLATELET # BLD AUTO: 282 K/UL (ref 150–450)
PMV BLD AUTO: 11.4 FL (ref 9.2–12.9)
POTASSIUM SERPL-SCNC: 3.5 MMOL/L (ref 3.5–5.1)
PROT SERPL-MCNC: 7.4 G/DL (ref 6–8.4)
RBC # BLD AUTO: 5.28 M/UL (ref 4.6–6.2)
SODIUM SERPL-SCNC: 141 MMOL/L (ref 136–145)
TRIGL SERPL-MCNC: 349 MG/DL (ref 30–150)
TSH SERPL DL<=0.005 MIU/L-ACNC: 1.58 UIU/ML (ref 0.4–4)
URATE SERPL-MCNC: 6 MG/DL (ref 3.4–7)
VIT B12 SERPL-MCNC: 181 PG/ML (ref 210–950)
WBC # BLD AUTO: 11.01 K/UL (ref 3.9–12.7)

## 2022-12-12 PROCEDURE — 82306 VITAMIN D 25 HYDROXY: CPT | Performed by: FAMILY MEDICINE

## 2022-12-12 PROCEDURE — 83036 HEMOGLOBIN GLYCOSYLATED A1C: CPT | Performed by: FAMILY MEDICINE

## 2022-12-12 PROCEDURE — 36415 COLL VENOUS BLD VENIPUNCTURE: CPT | Performed by: FAMILY MEDICINE

## 2022-12-12 PROCEDURE — 80053 COMPREHEN METABOLIC PANEL: CPT | Performed by: FAMILY MEDICINE

## 2022-12-12 PROCEDURE — 80061 LIPID PANEL: CPT | Performed by: FAMILY MEDICINE

## 2022-12-12 PROCEDURE — 84443 ASSAY THYROID STIM HORMONE: CPT | Performed by: FAMILY MEDICINE

## 2022-12-12 PROCEDURE — 85025 COMPLETE CBC W/AUTO DIFF WBC: CPT | Performed by: FAMILY MEDICINE

## 2022-12-12 PROCEDURE — 84550 ASSAY OF BLOOD/URIC ACID: CPT | Performed by: FAMILY MEDICINE

## 2022-12-12 PROCEDURE — 82607 VITAMIN B-12: CPT | Performed by: FAMILY MEDICINE

## 2023-01-10 ENCOUNTER — PATIENT MESSAGE (OUTPATIENT)
Dept: FAMILY MEDICINE | Facility: CLINIC | Age: 78
End: 2023-01-10
Payer: COMMERCIAL

## 2023-01-10 ENCOUNTER — PATIENT OUTREACH (OUTPATIENT)
Dept: ADMINISTRATIVE | Facility: HOSPITAL | Age: 78
End: 2023-01-10
Payer: COMMERCIAL

## 2023-01-12 ENCOUNTER — OFFICE VISIT (OUTPATIENT)
Dept: GASTROENTEROLOGY | Facility: CLINIC | Age: 78
End: 2023-01-12
Payer: COMMERCIAL

## 2023-01-12 DIAGNOSIS — R14.0 BLOATING: ICD-10-CM

## 2023-01-12 DIAGNOSIS — K21.9 GASTROESOPHAGEAL REFLUX DISEASE, UNSPECIFIED WHETHER ESOPHAGITIS PRESENT: Primary | ICD-10-CM

## 2023-01-12 PROCEDURE — 99203 PR OFFICE/OUTPT VISIT, NEW, LEVL III, 30-44 MIN: ICD-10-PCS | Mod: 95,,, | Performed by: INTERNAL MEDICINE

## 2023-01-12 PROCEDURE — 99203 OFFICE O/P NEW LOW 30 MIN: CPT | Mod: 95,,, | Performed by: INTERNAL MEDICINE

## 2023-01-12 PROCEDURE — 3072F LOW RISK FOR RETINOPATHY: CPT | Mod: CPTII,95,, | Performed by: INTERNAL MEDICINE

## 2023-01-12 PROCEDURE — 3072F PR LOW RISK FOR RETINOPATHY: ICD-10-PCS | Mod: CPTII,95,, | Performed by: INTERNAL MEDICINE

## 2023-01-12 RX ORDER — FAMOTIDINE 20 MG/1
20 TABLET, FILM COATED ORAL NIGHTLY
Qty: 30 TABLET | Refills: 3 | Status: SHIPPED | OUTPATIENT
Start: 2023-01-12 | End: 2023-02-17

## 2023-01-12 NOTE — PROGRESS NOTES
The patient location is: Home  The chief complaint leading to consultation is: Bloating    Visit type: audiovisual    Face to Face time with patient: 20 minutes of total time spent on the encounter, which includes face to face time and non-face to face time preparing to see the patient (eg, review of tests), Obtaining and/or reviewing separately obtained history, Documenting clinical information in the electronic or other health record, Independently interpreting results (not separately reported) and communicating results to the patient/family/caregiver, or Care coordination (not separately reported).         Each patient to whom he or she provides medical services by telemedicine is:  (1) informed of the relationship between the physician and patient and the respective role of any other health care provider with respect to management of the patient; and (2) notified that he or she may decline to receive medical services by telemedicine and may withdraw from such care at any time.    Notes: Mr. Powers is a 77 year old gentleman last seen in clinic (2018) has undergone an extensive workup in the past, which includes an upper endoscopy, colonoscopy without any clear etiology.  He has had multiple abdominal imagings and in the past has noticed that when he gets off the metformin, his symptoms are significantly better.  He denies any weight loss, nausea, vomiting, or change in appetite. Last colonoscopy was in 2016.     Having loose stools with meals (especially  foods). Had stopped Nexium and currently on OTC antacids prn. Having heartburn and acid regurgitation.     PAST MEDICAL, SURGICAL, SOCIAL AND FAMILY HISTORY:  Reviewed.     MEDICATIONS AND ALLERGIES:  Reviewed.     REVIEW OF SYSTEMS:  CONSTITUTIONAL:  No fever, no chills, no weight loss.  Appetite is normal.  EYES:  No visual changes.  ENT:  No odynophagia or hoarseness of voice; decreased hearing  CARDIOVASCULAR:  No angina or  palpitation.  RESPIRATORY:  No shortness of breath or wheezing.  GENITOURINARY:  No dysuria or frequency.  MUSCULOSKELETAL:  No myalgia, no arthralgia.  SKIN:  No pruritus or eczema.  NEUROLOGIC:  No headache, no seizures.  PSYCHIATRIC:  No anxiety or depression.  GASTROINTESTINAL:  See HPI.     PHYSICAL EXAMINATION: Virtual visit     IMPRESSION:  Abdominal bloating and loose bowel movements; GERD     RECOMMENDATION:   Start 2 Smarter nutrition enzymes after meals  Famotidine 20 mg at bedtime  Virtual visit in 1 month.

## 2023-01-24 ENCOUNTER — OFFICE VISIT (OUTPATIENT)
Dept: FAMILY MEDICINE | Facility: CLINIC | Age: 78
End: 2023-01-24
Payer: COMMERCIAL

## 2023-01-24 VITALS
BODY MASS INDEX: 31.06 KG/M2 | OXYGEN SATURATION: 99 % | HEIGHT: 69 IN | SYSTOLIC BLOOD PRESSURE: 134 MMHG | WEIGHT: 209.69 LBS | HEART RATE: 77 BPM | DIASTOLIC BLOOD PRESSURE: 76 MMHG

## 2023-01-24 DIAGNOSIS — E53.8 VITAMIN B12 DEFICIENCY: ICD-10-CM

## 2023-01-24 DIAGNOSIS — E11.22 TYPE 2 DIABETES MELLITUS WITH STAGE 3A CHRONIC KIDNEY DISEASE, WITHOUT LONG-TERM CURRENT USE OF INSULIN: ICD-10-CM

## 2023-01-24 DIAGNOSIS — Z00.00 ROUTINE GENERAL MEDICAL EXAMINATION AT A HEALTH CARE FACILITY: Primary | ICD-10-CM

## 2023-01-24 DIAGNOSIS — N18.31 TYPE 2 DIABETES MELLITUS WITH STAGE 3A CHRONIC KIDNEY DISEASE, WITHOUT LONG-TERM CURRENT USE OF INSULIN: ICD-10-CM

## 2023-01-24 DIAGNOSIS — E03.9 HYPOTHYROIDISM, UNSPECIFIED TYPE: ICD-10-CM

## 2023-01-24 DIAGNOSIS — E55.9 VITAMIN D DEFICIENCY: ICD-10-CM

## 2023-01-24 DIAGNOSIS — J43.8 OTHER EMPHYSEMA: ICD-10-CM

## 2023-01-24 DIAGNOSIS — M10.9 GOUT, UNSPECIFIED CAUSE, UNSPECIFIED CHRONICITY, UNSPECIFIED SITE: ICD-10-CM

## 2023-01-24 PROCEDURE — 3288F PR FALLS RISK ASSESSMENT DOCUMENTED: ICD-10-PCS | Mod: CPTII,S$GLB,, | Performed by: FAMILY MEDICINE

## 2023-01-24 PROCEDURE — 1159F MED LIST DOCD IN RCRD: CPT | Mod: CPTII,S$GLB,, | Performed by: FAMILY MEDICINE

## 2023-01-24 PROCEDURE — 3075F PR MOST RECENT SYSTOLIC BLOOD PRESS GE 130-139MM HG: ICD-10-PCS | Mod: CPTII,S$GLB,, | Performed by: FAMILY MEDICINE

## 2023-01-24 PROCEDURE — 3078F PR MOST RECENT DIASTOLIC BLOOD PRESSURE < 80 MM HG: ICD-10-PCS | Mod: CPTII,S$GLB,, | Performed by: FAMILY MEDICINE

## 2023-01-24 PROCEDURE — 1126F AMNT PAIN NOTED NONE PRSNT: CPT | Mod: CPTII,S$GLB,, | Performed by: FAMILY MEDICINE

## 2023-01-24 PROCEDURE — 1100F PR PT FALLS ASSESS DOC 2+ FALLS/FALL W/INJURY/YR: ICD-10-PCS | Mod: CPTII,S$GLB,, | Performed by: FAMILY MEDICINE

## 2023-01-24 PROCEDURE — 3072F LOW RISK FOR RETINOPATHY: CPT | Mod: CPTII,S$GLB,, | Performed by: FAMILY MEDICINE

## 2023-01-24 PROCEDURE — 3078F DIAST BP <80 MM HG: CPT | Mod: CPTII,S$GLB,, | Performed by: FAMILY MEDICINE

## 2023-01-24 PROCEDURE — 1160F RVW MEDS BY RX/DR IN RCRD: CPT | Mod: CPTII,S$GLB,, | Performed by: FAMILY MEDICINE

## 2023-01-24 PROCEDURE — 99397 PR PREVENTIVE VISIT,EST,65 & OVER: ICD-10-PCS | Mod: S$GLB,,, | Performed by: FAMILY MEDICINE

## 2023-01-24 PROCEDURE — 99397 PER PM REEVAL EST PAT 65+ YR: CPT | Mod: S$GLB,,, | Performed by: FAMILY MEDICINE

## 2023-01-24 PROCEDURE — 3072F PR LOW RISK FOR RETINOPATHY: ICD-10-PCS | Mod: CPTII,S$GLB,, | Performed by: FAMILY MEDICINE

## 2023-01-24 PROCEDURE — 99999 PR PBB SHADOW E&M-EST. PATIENT-LVL IV: ICD-10-PCS | Mod: PBBFAC,,, | Performed by: FAMILY MEDICINE

## 2023-01-24 PROCEDURE — 3288F FALL RISK ASSESSMENT DOCD: CPT | Mod: CPTII,S$GLB,, | Performed by: FAMILY MEDICINE

## 2023-01-24 PROCEDURE — 1126F PR PAIN SEVERITY QUANTIFIED, NO PAIN PRESENT: ICD-10-PCS | Mod: CPTII,S$GLB,, | Performed by: FAMILY MEDICINE

## 2023-01-24 PROCEDURE — 99999 PR PBB SHADOW E&M-EST. PATIENT-LVL IV: CPT | Mod: PBBFAC,,, | Performed by: FAMILY MEDICINE

## 2023-01-24 PROCEDURE — 3075F SYST BP GE 130 - 139MM HG: CPT | Mod: CPTII,S$GLB,, | Performed by: FAMILY MEDICINE

## 2023-01-24 PROCEDURE — 1160F PR REVIEW ALL MEDS BY PRESCRIBER/CLIN PHARMACIST DOCUMENTED: ICD-10-PCS | Mod: CPTII,S$GLB,, | Performed by: FAMILY MEDICINE

## 2023-01-24 PROCEDURE — 1159F PR MEDICATION LIST DOCUMENTED IN MEDICAL RECORD: ICD-10-PCS | Mod: CPTII,S$GLB,, | Performed by: FAMILY MEDICINE

## 2023-01-24 PROCEDURE — 1100F PTFALLS ASSESS-DOCD GE2>/YR: CPT | Mod: CPTII,S$GLB,, | Performed by: FAMILY MEDICINE

## 2023-01-24 NOTE — PATIENT INSTRUCTIONS
"Over the counter pain therapies (creams/patch):    1. Lidocaine patch    2. aspercreme    3. "2 old goats"    4. "blue emu"    5. salonpas     7. biofreeze    "
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
## 2023-03-13 DIAGNOSIS — E11.22 TYPE 2 DIABETES MELLITUS WITH STAGE 3A CHRONIC KIDNEY DISEASE, WITHOUT LONG-TERM CURRENT USE OF INSULIN: ICD-10-CM

## 2023-03-13 DIAGNOSIS — N18.31 TYPE 2 DIABETES MELLITUS WITH STAGE 3A CHRONIC KIDNEY DISEASE, WITHOUT LONG-TERM CURRENT USE OF INSULIN: ICD-10-CM

## 2023-03-13 RX ORDER — DULAGLUTIDE 3 MG/.5ML
3 INJECTION, SOLUTION SUBCUTANEOUS WEEKLY
Qty: 4 PEN | Refills: 11 | Status: CANCELLED | OUTPATIENT
Start: 2023-03-13

## 2023-03-13 NOTE — TELEPHONE ENCOUNTER
Care Due:                  Date            Visit Type   Department     Provider  --------------------------------------------------------------------------------                                EP -                              [facility]  Last Visit: 01-      CARE ([facility])   [facility]       Adams Moore                              [facility]  Next Visit: 04-      CARE ([facility])   [facility]                                                            Last  Test          Frequency    Reason                     Performed    Due Date  --------------------------------------------------------------------------------    HBA1C.......  6 months...  dulaglutide, metFORMIN...  12-   06-    [facility] Embedded Care Gaps. Reference number: [id number]. 3/13/2023   5:08:24 AM CDT

## 2023-03-21 DIAGNOSIS — N18.31 TYPE 2 DIABETES MELLITUS WITH STAGE 3A CHRONIC KIDNEY DISEASE, WITHOUT LONG-TERM CURRENT USE OF INSULIN: ICD-10-CM

## 2023-03-21 DIAGNOSIS — E11.22 TYPE 2 DIABETES MELLITUS WITH STAGE 3A CHRONIC KIDNEY DISEASE, WITHOUT LONG-TERM CURRENT USE OF INSULIN: ICD-10-CM

## 2023-03-21 RX ORDER — DULAGLUTIDE 3 MG/.5ML
3 INJECTION, SOLUTION SUBCUTANEOUS WEEKLY
Qty: 4 PEN | Refills: 11 | Status: CANCELLED | OUTPATIENT
Start: 2023-03-21

## 2023-03-21 NOTE — TELEPHONE ENCOUNTER
No new care gaps identified.  [facility] Embedded Care Gaps. Reference number: [id number]. 3/21/2023   11:42:57 AM MADDIET

## 2023-03-22 ENCOUNTER — OFFICE VISIT (OUTPATIENT)
Dept: OPTOMETRY | Facility: CLINIC | Age: 78
End: 2023-03-22
Payer: COMMERCIAL

## 2023-03-22 DIAGNOSIS — H35.413 LATTICE DEGENERATION OF BOTH RETINAS: ICD-10-CM

## 2023-03-22 DIAGNOSIS — H52.4 PRESBYOPIA: ICD-10-CM

## 2023-03-22 DIAGNOSIS — H04.123 DRY EYE SYNDROME OF BOTH EYES: ICD-10-CM

## 2023-03-22 DIAGNOSIS — E11.9 TYPE 2 DIABETES MELLITUS WITHOUT OPHTHALMIC MANIFESTATIONS: Primary | ICD-10-CM

## 2023-03-22 PROCEDURE — 1159F PR MEDICATION LIST DOCUMENTED IN MEDICAL RECORD: ICD-10-PCS | Mod: CPTII,S$GLB,, | Performed by: OPTOMETRIST

## 2023-03-22 PROCEDURE — 1160F PR REVIEW ALL MEDS BY PRESCRIBER/CLIN PHARMACIST DOCUMENTED: ICD-10-PCS | Mod: CPTII,S$GLB,, | Performed by: OPTOMETRIST

## 2023-03-22 PROCEDURE — 1159F MED LIST DOCD IN RCRD: CPT | Mod: CPTII,S$GLB,, | Performed by: OPTOMETRIST

## 2023-03-22 PROCEDURE — 3288F FALL RISK ASSESSMENT DOCD: CPT | Mod: CPTII,S$GLB,, | Performed by: OPTOMETRIST

## 2023-03-22 PROCEDURE — 99999 PR PBB SHADOW E&M-EST. PATIENT-LVL IV: ICD-10-PCS | Mod: PBBFAC,,, | Performed by: OPTOMETRIST

## 2023-03-22 PROCEDURE — 92015 DETERMINE REFRACTIVE STATE: CPT | Mod: S$GLB,,, | Performed by: OPTOMETRIST

## 2023-03-22 PROCEDURE — 1101F PT FALLS ASSESS-DOCD LE1/YR: CPT | Mod: CPTII,S$GLB,, | Performed by: OPTOMETRIST

## 2023-03-22 PROCEDURE — 92014 COMPRE OPH EXAM EST PT 1/>: CPT | Mod: S$GLB,,, | Performed by: OPTOMETRIST

## 2023-03-22 PROCEDURE — 99999 PR PBB SHADOW E&M-EST. PATIENT-LVL IV: CPT | Mod: PBBFAC,,, | Performed by: OPTOMETRIST

## 2023-03-22 PROCEDURE — 1101F PR PT FALLS ASSESS DOC 0-1 FALLS W/OUT INJ PAST YR: ICD-10-PCS | Mod: CPTII,S$GLB,, | Performed by: OPTOMETRIST

## 2023-03-22 PROCEDURE — 1160F RVW MEDS BY RX/DR IN RCRD: CPT | Mod: CPTII,S$GLB,, | Performed by: OPTOMETRIST

## 2023-03-22 PROCEDURE — 1126F AMNT PAIN NOTED NONE PRSNT: CPT | Mod: CPTII,S$GLB,, | Performed by: OPTOMETRIST

## 2023-03-22 PROCEDURE — 3288F PR FALLS RISK ASSESSMENT DOCUMENTED: ICD-10-PCS | Mod: CPTII,S$GLB,, | Performed by: OPTOMETRIST

## 2023-03-22 PROCEDURE — 92015 PR REFRACTION: ICD-10-PCS | Mod: S$GLB,,, | Performed by: OPTOMETRIST

## 2023-03-22 PROCEDURE — 92014 PR EYE EXAM, EST PATIENT,COMPREHESV: ICD-10-PCS | Mod: S$GLB,,, | Performed by: OPTOMETRIST

## 2023-03-22 PROCEDURE — 2023F PR DILATED RETINAL EXAM W/O EVID OF RETINOPATHY: ICD-10-PCS | Mod: CPTII,S$GLB,, | Performed by: OPTOMETRIST

## 2023-03-22 PROCEDURE — 1126F PR PAIN SEVERITY QUANTIFIED, NO PAIN PRESENT: ICD-10-PCS | Mod: CPTII,S$GLB,, | Performed by: OPTOMETRIST

## 2023-03-22 PROCEDURE — 2023F DILAT RTA XM W/O RTNOPTHY: CPT | Mod: CPTII,S$GLB,, | Performed by: OPTOMETRIST

## 2023-03-22 RX ORDER — HYDROCODONE BITARTRATE AND ACETAMINOPHEN 7.5; 325 MG/1; MG/1
1 TABLET ORAL EVERY 6 HOURS PRN
COMMUNITY
Start: 2023-01-23

## 2023-03-22 RX ORDER — COVID-19 MOLECULAR TEST ASSAY
KIT MISCELLANEOUS
COMMUNITY
Start: 2022-11-26

## 2023-03-22 RX ORDER — IBUPROFEN 600 MG/1
600 TABLET ORAL EVERY 6 HOURS PRN
COMMUNITY
Start: 2023-01-23

## 2023-03-22 RX ORDER — AMOXICILLIN 500 MG/1
500 CAPSULE ORAL 3 TIMES DAILY
COMMUNITY
Start: 2023-01-23 | End: 2023-04-25

## 2023-03-22 NOTE — PROGRESS NOTES
NARINDER    HERBERT: 03/22 with Dr. Rivera  Chief complaint (CC): Patient is here for annual eye exam today.  Patient   states his eyes burn in the morning and he has trouble opening them. Not   using any drops. Glasses still fine.  Patient has had a floater for   several months, unsure of which eye.  Glasses? +5 yrs. old  Contacts? -  H/o eye surgery, injections or laser: PC IOL OU  H/o eye injury: -  Known eye conditions? See above  Family h/o eye conditions? Brother with retinal detachment  Eye gtts? -      (-) Flashes (+)  Floaters (-) Mucous   (-)  Tearing (-) Itching (+) Burning   (-) Headaches (+) Eye Pain/discomfort (-) Irritation   (-)  Redness (-) Double vision (-) Blurry vision    Diabetic? + a couple of days ago  A1c? Hemoglobin A1C       Date                     Value               Ref Range             Status                12/12/2022               6.5 (H)             4.0 - 5.6 %           Final                 09/28/2021               7.3 (H)             4.0 - 5.6 %           Final                 05/15/2021               6.8 (H)             4.0 - 5.6 %           Final                    Last edited by Gabby Matos on 3/22/2023  9:52 AM.            Assessment /Plan     For exam results, see Encounter Report.      Type 2 diabetes mellitus without ophthalmic manifestations  BS control. No signs of diabetic retinopathy. Monitor with annual exam.    Lattice degeneration of both retinas  Good laser OU. Monitor.     Dry eye syndrome of both eyes  Recommend Systane Complete TID-QId OU to aid with symptoms of dry eyes.    Presbyopia  SRx released to patient. Patient educated on lens options. Normal ocular health. RTC 1 year for routine exam.

## 2023-03-24 ENCOUNTER — TELEPHONE (OUTPATIENT)
Dept: PHARMACY | Facility: CLINIC | Age: 78
End: 2023-03-24
Payer: COMMERCIAL

## 2023-04-05 ENCOUNTER — PATIENT MESSAGE (OUTPATIENT)
Dept: FAMILY MEDICINE | Facility: CLINIC | Age: 78
End: 2023-04-05
Payer: COMMERCIAL

## 2023-04-24 ENCOUNTER — LAB VISIT (OUTPATIENT)
Dept: LAB | Facility: HOSPITAL | Age: 78
End: 2023-04-24
Attending: FAMILY MEDICINE
Payer: COMMERCIAL

## 2023-04-24 DIAGNOSIS — E03.9 HYPOTHYROIDISM, UNSPECIFIED TYPE: ICD-10-CM

## 2023-04-24 DIAGNOSIS — M10.9 GOUT, UNSPECIFIED CAUSE, UNSPECIFIED CHRONICITY, UNSPECIFIED SITE: ICD-10-CM

## 2023-04-24 DIAGNOSIS — E11.22 TYPE 2 DIABETES MELLITUS WITH STAGE 3A CHRONIC KIDNEY DISEASE, WITHOUT LONG-TERM CURRENT USE OF INSULIN: ICD-10-CM

## 2023-04-24 DIAGNOSIS — N18.31 TYPE 2 DIABETES MELLITUS WITH STAGE 3A CHRONIC KIDNEY DISEASE, WITHOUT LONG-TERM CURRENT USE OF INSULIN: ICD-10-CM

## 2023-04-24 DIAGNOSIS — E53.8 VITAMIN B12 DEFICIENCY: ICD-10-CM

## 2023-04-24 DIAGNOSIS — E55.9 VITAMIN D DEFICIENCY: ICD-10-CM

## 2023-04-24 LAB
ALBUMIN SERPL BCP-MCNC: 3.8 G/DL (ref 3.5–5.2)
ALP SERPL-CCNC: 96 U/L (ref 55–135)
ALT SERPL W/O P-5'-P-CCNC: 36 U/L (ref 10–44)
ANION GAP SERPL CALC-SCNC: 11 MMOL/L (ref 8–16)
AST SERPL-CCNC: 20 U/L (ref 10–40)
BASOPHILS # BLD AUTO: 0.03 K/UL (ref 0–0.2)
BASOPHILS NFR BLD: 0.3 % (ref 0–1.9)
BILIRUB SERPL-MCNC: 0.5 MG/DL (ref 0.1–1)
BUN SERPL-MCNC: 14 MG/DL (ref 8–23)
CALCIUM SERPL-MCNC: 9.3 MG/DL (ref 8.7–10.5)
CHLORIDE SERPL-SCNC: 103 MMOL/L (ref 95–110)
CHOLEST SERPL-MCNC: 185 MG/DL (ref 120–199)
CHOLEST/HDLC SERPL: 3.6 {RATIO} (ref 2–5)
CO2 SERPL-SCNC: 25 MMOL/L (ref 23–29)
CREAT SERPL-MCNC: 1.4 MG/DL (ref 0.5–1.4)
DIFFERENTIAL METHOD: ABNORMAL
EOSINOPHIL # BLD AUTO: 0.3 K/UL (ref 0–0.5)
EOSINOPHIL NFR BLD: 3.3 % (ref 0–8)
ERYTHROCYTE [DISTWIDTH] IN BLOOD BY AUTOMATED COUNT: 12.9 % (ref 11.5–14.5)
EST. GFR  (NO RACE VARIABLE): 51.8 ML/MIN/1.73 M^2
ESTIMATED AVG GLUCOSE: 131 MG/DL (ref 68–131)
GLUCOSE SERPL-MCNC: 134 MG/DL (ref 70–110)
HBA1C MFR BLD: 6.2 % (ref 4–5.6)
HCT VFR BLD AUTO: 45.9 % (ref 40–54)
HDLC SERPL-MCNC: 51 MG/DL (ref 40–75)
HDLC SERPL: 27.6 % (ref 20–50)
HGB BLD-MCNC: 15.1 G/DL (ref 14–18)
IMM GRANULOCYTES # BLD AUTO: 0.12 K/UL (ref 0–0.04)
IMM GRANULOCYTES NFR BLD AUTO: 1.2 % (ref 0–0.5)
LDLC SERPL CALC-MCNC: 94.8 MG/DL (ref 63–159)
LYMPHOCYTES # BLD AUTO: 2.4 K/UL (ref 1–4.8)
LYMPHOCYTES NFR BLD: 24.4 % (ref 18–48)
MCH RBC QN AUTO: 29.3 PG (ref 27–31)
MCHC RBC AUTO-ENTMCNC: 32.9 G/DL (ref 32–36)
MCV RBC AUTO: 89 FL (ref 82–98)
MONOCYTES # BLD AUTO: 0.7 K/UL (ref 0.3–1)
MONOCYTES NFR BLD: 6.8 % (ref 4–15)
NEUTROPHILS # BLD AUTO: 6.2 K/UL (ref 1.8–7.7)
NEUTROPHILS NFR BLD: 64 % (ref 38–73)
NONHDLC SERPL-MCNC: 134 MG/DL
NRBC BLD-RTO: 0 /100 WBC
PLATELET # BLD AUTO: 324 K/UL (ref 150–450)
PMV BLD AUTO: 11.3 FL (ref 9.2–12.9)
POTASSIUM SERPL-SCNC: 4.5 MMOL/L (ref 3.5–5.1)
PROT SERPL-MCNC: 7.1 G/DL (ref 6–8.4)
RBC # BLD AUTO: 5.16 M/UL (ref 4.6–6.2)
SODIUM SERPL-SCNC: 139 MMOL/L (ref 136–145)
TRIGL SERPL-MCNC: 196 MG/DL (ref 30–150)
TSH SERPL DL<=0.005 MIU/L-ACNC: 2.46 UIU/ML (ref 0.4–4)
URATE SERPL-MCNC: 5.9 MG/DL (ref 3.4–7)
VIT B12 SERPL-MCNC: 405 PG/ML (ref 210–950)
WBC # BLD AUTO: 9.69 K/UL (ref 3.9–12.7)

## 2023-04-24 PROCEDURE — 84443 ASSAY THYROID STIM HORMONE: CPT | Performed by: FAMILY MEDICINE

## 2023-04-24 PROCEDURE — 36415 COLL VENOUS BLD VENIPUNCTURE: CPT | Mod: PO | Performed by: FAMILY MEDICINE

## 2023-04-24 PROCEDURE — 80061 LIPID PANEL: CPT | Performed by: FAMILY MEDICINE

## 2023-04-24 PROCEDURE — 80053 COMPREHEN METABOLIC PANEL: CPT | Performed by: FAMILY MEDICINE

## 2023-04-24 PROCEDURE — 84550 ASSAY OF BLOOD/URIC ACID: CPT | Performed by: FAMILY MEDICINE

## 2023-04-24 PROCEDURE — 85025 COMPLETE CBC W/AUTO DIFF WBC: CPT | Performed by: FAMILY MEDICINE

## 2023-04-24 PROCEDURE — 82607 VITAMIN B-12: CPT | Performed by: FAMILY MEDICINE

## 2023-04-24 PROCEDURE — 83036 HEMOGLOBIN GLYCOSYLATED A1C: CPT | Performed by: FAMILY MEDICINE

## 2023-04-25 ENCOUNTER — OFFICE VISIT (OUTPATIENT)
Dept: FAMILY MEDICINE | Facility: CLINIC | Age: 78
End: 2023-04-25
Payer: COMMERCIAL

## 2023-04-25 VITALS
HEIGHT: 69 IN | WEIGHT: 209.44 LBS | OXYGEN SATURATION: 96 % | DIASTOLIC BLOOD PRESSURE: 68 MMHG | BODY MASS INDEX: 31.02 KG/M2 | SYSTOLIC BLOOD PRESSURE: 130 MMHG | HEART RATE: 87 BPM

## 2023-04-25 DIAGNOSIS — E03.9 HYPOTHYROIDISM, UNSPECIFIED TYPE: ICD-10-CM

## 2023-04-25 DIAGNOSIS — E11.22 TYPE 2 DIABETES MELLITUS WITH STAGE 3A CHRONIC KIDNEY DISEASE, WITHOUT LONG-TERM CURRENT USE OF INSULIN: Primary | ICD-10-CM

## 2023-04-25 DIAGNOSIS — E55.9 VITAMIN D DEFICIENCY: ICD-10-CM

## 2023-04-25 DIAGNOSIS — M10.9 GOUT, UNSPECIFIED CAUSE, UNSPECIFIED CHRONICITY, UNSPECIFIED SITE: ICD-10-CM

## 2023-04-25 DIAGNOSIS — E53.8 VITAMIN B12 DEFICIENCY: ICD-10-CM

## 2023-04-25 DIAGNOSIS — N18.31 TYPE 2 DIABETES MELLITUS WITH STAGE 3A CHRONIC KIDNEY DISEASE, WITHOUT LONG-TERM CURRENT USE OF INSULIN: Primary | ICD-10-CM

## 2023-04-25 PROCEDURE — 1126F PR PAIN SEVERITY QUANTIFIED, NO PAIN PRESENT: ICD-10-PCS | Mod: CPTII,S$GLB,, | Performed by: FAMILY MEDICINE

## 2023-04-25 PROCEDURE — 1126F AMNT PAIN NOTED NONE PRSNT: CPT | Mod: CPTII,S$GLB,, | Performed by: FAMILY MEDICINE

## 2023-04-25 PROCEDURE — 99214 PR OFFICE/OUTPT VISIT, EST, LEVL IV, 30-39 MIN: ICD-10-PCS | Mod: S$GLB,,, | Performed by: FAMILY MEDICINE

## 2023-04-25 PROCEDURE — 3288F FALL RISK ASSESSMENT DOCD: CPT | Mod: CPTII,S$GLB,, | Performed by: FAMILY MEDICINE

## 2023-04-25 PROCEDURE — 3078F DIAST BP <80 MM HG: CPT | Mod: CPTII,S$GLB,, | Performed by: FAMILY MEDICINE

## 2023-04-25 PROCEDURE — 3078F PR MOST RECENT DIASTOLIC BLOOD PRESSURE < 80 MM HG: ICD-10-PCS | Mod: CPTII,S$GLB,, | Performed by: FAMILY MEDICINE

## 2023-04-25 PROCEDURE — 3075F SYST BP GE 130 - 139MM HG: CPT | Mod: CPTII,S$GLB,, | Performed by: FAMILY MEDICINE

## 2023-04-25 PROCEDURE — 1101F PR PT FALLS ASSESS DOC 0-1 FALLS W/OUT INJ PAST YR: ICD-10-PCS | Mod: CPTII,S$GLB,, | Performed by: FAMILY MEDICINE

## 2023-04-25 PROCEDURE — 1101F PT FALLS ASSESS-DOCD LE1/YR: CPT | Mod: CPTII,S$GLB,, | Performed by: FAMILY MEDICINE

## 2023-04-25 PROCEDURE — 99999 PR PBB SHADOW E&M-EST. PATIENT-LVL IV: ICD-10-PCS | Mod: PBBFAC,,, | Performed by: FAMILY MEDICINE

## 2023-04-25 PROCEDURE — 3075F PR MOST RECENT SYSTOLIC BLOOD PRESS GE 130-139MM HG: ICD-10-PCS | Mod: CPTII,S$GLB,, | Performed by: FAMILY MEDICINE

## 2023-04-25 PROCEDURE — 1159F PR MEDICATION LIST DOCUMENTED IN MEDICAL RECORD: ICD-10-PCS | Mod: CPTII,S$GLB,, | Performed by: FAMILY MEDICINE

## 2023-04-25 PROCEDURE — 1159F MED LIST DOCD IN RCRD: CPT | Mod: CPTII,S$GLB,, | Performed by: FAMILY MEDICINE

## 2023-04-25 PROCEDURE — 99999 PR PBB SHADOW E&M-EST. PATIENT-LVL IV: CPT | Mod: PBBFAC,,, | Performed by: FAMILY MEDICINE

## 2023-04-25 PROCEDURE — 3288F PR FALLS RISK ASSESSMENT DOCUMENTED: ICD-10-PCS | Mod: CPTII,S$GLB,, | Performed by: FAMILY MEDICINE

## 2023-04-25 PROCEDURE — 99214 OFFICE O/P EST MOD 30 MIN: CPT | Mod: S$GLB,,, | Performed by: FAMILY MEDICINE

## 2023-04-25 PROCEDURE — 3072F LOW RISK FOR RETINOPATHY: CPT | Mod: CPTII,S$GLB,, | Performed by: FAMILY MEDICINE

## 2023-04-25 PROCEDURE — 3072F PR LOW RISK FOR RETINOPATHY: ICD-10-PCS | Mod: CPTII,S$GLB,, | Performed by: FAMILY MEDICINE

## 2023-04-25 RX ORDER — CHOLECALCIFEROL (VITAMIN D3) 25 MCG
1000 TABLET ORAL DAILY
COMMUNITY

## 2023-04-25 RX ORDER — AMOXICILLIN 500 MG
CAPSULE ORAL DAILY
COMMUNITY
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
## 2023-05-09 ENCOUNTER — PATIENT MESSAGE (OUTPATIENT)
Dept: FAMILY MEDICINE | Facility: CLINIC | Age: 78
End: 2023-05-09

## 2023-05-09 ENCOUNTER — E-VISIT (OUTPATIENT)
Dept: FAMILY MEDICINE | Facility: CLINIC | Age: 78
End: 2023-05-09
Payer: COMMERCIAL

## 2023-05-09 DIAGNOSIS — J06.9 UPPER RESPIRATORY TRACT INFECTION, UNSPECIFIED TYPE: Primary | ICD-10-CM

## 2023-05-09 PROCEDURE — 99421 OL DIG E/M SVC 5-10 MIN: CPT | Mod: 95,,, | Performed by: FAMILY MEDICINE

## 2023-05-09 PROCEDURE — 99421 PR E&M, ONLINE DIGIT, EST, < 7 DAYS, 5-10 MINS: ICD-10-PCS | Mod: 95,,, | Performed by: FAMILY MEDICINE

## 2023-05-10 RX ORDER — FLUTICASONE PROPIONATE 50 MCG
2 SPRAY, SUSPENSION (ML) NASAL DAILY
Qty: 16 G | Refills: 4 | Status: SHIPPED | OUTPATIENT
Start: 2023-05-10

## 2023-05-10 RX ORDER — ALBUTEROL SULFATE 90 UG/1
2 AEROSOL, METERED RESPIRATORY (INHALATION) EVERY 6 HOURS PRN
Qty: 18 G | Refills: 0 | Status: SHIPPED | OUTPATIENT
Start: 2023-05-10 | End: 2023-06-23

## 2023-05-10 NOTE — PROGRESS NOTES
Patient ID: Jerardo Powers is a 77 y.o. male.    Chief Complaint: URI    The patient initiated a request through [employer] on 2023 for evaluation and management with a chief complaint of  uri      I evaluated the questionnaire submission on 05/10/2023    Hi I reviewed your questionnaire regarding stuffed nose, cough, fatigue, sore throat symptoms.   Symptoms may well be part of a viral respiratory infection  You can definitely continue the mucinex d and tylenol to control cough/congestion and pain symptoms respectively.  In case of allergy symptoms coinciding, i'll send you a flonase spray prescription, 2 sprays per nostril once daily for the next week.  You can try neti pot saline sinus rinse too available over the counter (you would need to use sterile/distilled water to mix the solution for the rinse.  I will send over albuterol inhaler to see if this helps with any possible bronchitis symptoms causing cough or shortness of breath, 2 puffs inhaled up to 3-4 times daily if needed for shortness of breath or wheezing.     If symptoms are not improving by the next week or are getting worse with fever or shortness of breath increasing, you would need to be evaluated further in the office.     Adams Moore MD             Medications Ordered This Encounter   Medications    albuterol (VENTOLIN HFA) 90 mcg/actuation inhaler       Sig: Inhale 2 puffs into the lungs every 6 (six) hours as needed for Wheezing or Shortness of Breath. Rescue       Dispense:  18 g       Refill:  0       Any insurance-covered equivalent albuterol alternative is acceptable    fluticasone propionate (FLONASE) 50 mcg/actuation nasal spray       Si sprays (100 mcg total) by Each Nostril route once daily.       Dispense:  16 g       Refill:  4         [facility]/pharmacy #[id number] - [place] - [address] AT [facility] [place], [place]  [address] [address]  Phone: [phone] Fax: [phone]  .    [facility]  Respitatory/Cough Questionnaire    5/9/2023 12:49 PM CDT - Filed by Patient   Do you agree to participate in an E-Visit? Yes   If you have any of the following symptoms, please present to your local ER or call 911:  I acknowledge   What is the main issue that you would like for your doctor to address today? Stuffed up nose. I had yellow discharge from right nose and blood from left which has stopped. I can't  breath through my nose. Feel shortness of breath for little walking. No fever.     t.   Are you able to take your vital signs? Yes   Systolic Blood Pressure: 137   Diastolic Blood Pressure: 89   Weight: 206.8   Height: 69.5   Pulse: 78   Temperature: 96.6   Respiration rate: 18   Pulse Oxygen: 94   What symptoms do you currently have?  Cough;  Fatigue;  Nasal Congestion;  Muscle or body aches;  Runny nose;  Sore throat   Describe your cough: Dry   Have you had any of the following? Difficulty breathing   Please enter a few details about your swallowing, breathing, or visual problems. Shortness of breath, hard to breath through nose   Have you ever smoked? I have smoked in the past   Have you had a fever? No   When did your symptoms first appear? 5/1/2023   In the last two weeks, have you been in close contact with someone who has COVID-19 or the Flu? No   In the last two weeks, have you worked or volunteered in a healthcare facility or as a ? Healthcare facilities include a hospital, medical or dental clinic, long-term care facility, or nursing home No   Do you live in a long-term care facility, nursing home, group home, or homeless shelter? No   List what you have done or taken to help your symptoms. Tylenol and Mucinex D   How severe are your symptoms? Moderate   Have your symptoms improved since they first appeared? Better   Have you taken an at home Covid test? Yes   What were the results? Negative   Have you taken a Flu test? Yes   What were the results? Negative   Have you been fully  vaccinated for COVID? (2 Pfizer, 2 Moderna or 1 Froylan & Froylan vaccine injections) Yes   Have you received a booster? Yes   Have you recieved a Flu shot? No   Do you have transportation to get tested for COVID if it is indicated and ordered for you at an [facility] location? Yes   Provide any information you feel is important to your history not asked above    Please attach any relevant images or files          Recent Labs Obtained:  No visits with results within 7 Day(s) from this visit.   Latest known visit with results is:   Lab Visit on 04/24/2023   Component Date Value Ref Range Status    WBC 04/24/2023 9.69  3.90 - 12.70 K/uL Final    RBC 04/24/2023 5.16  4.60 - 6.20 M/uL Final    Hemoglobin 04/24/2023 15.1  14.0 - 18.0 g/dL Final    Hematocrit 04/24/2023 45.9  40.0 - 54.0 % Final    MCV 04/24/2023 89  82 - 98 fL Final    MCH 04/24/2023 29.3  27.0 - 31.0 pg Final    MCHC 04/24/2023 32.9  32.0 - 36.0 g/dL Final    RDW 04/24/2023 12.9  11.5 - 14.5 % Final    Platelets 04/24/2023 324  150 - 450 K/uL Final    MPV 04/24/2023 11.3  9.2 - 12.9 fL Final    Immature Granulocytes 04/24/2023 1.2 (H)  0.0 - 0.5 % Final    Gran # (ANC) 04/24/2023 6.2  1.8 - 7.7 K/uL Final    Immature Grans (Abs) 04/24/2023 0.12 (H)  0.00 - 0.04 K/uL Final    Comment: Mild elevation in immature granulocytes is non specific and   can be seen in a variety of conditions including stress response,   acute inflammation, trauma and pregnancy. Correlation with other   laboratory and clinical findings is essential.      Lymph # 04/24/2023 2.4  1.0 - 4.8 K/uL Final    Mono # 04/24/2023 0.7  0.3 - 1.0 K/uL Final    Eos # 04/24/2023 0.3  0.0 - 0.5 K/uL Final    Baso # 04/24/2023 0.03  0.00 - 0.20 K/uL Final    nRBC 04/24/2023 0  0 /100 WBC Final    Gran % 04/24/2023 64.0  38.0 - 73.0 % Final    Lymph % 04/24/2023 24.4  18.0 - 48.0 % Final    Mono % 04/24/2023 6.8  4.0 - 15.0 % Final    Eosinophil % 04/24/2023 3.3  0.0 - 8.0 % Final    Basophil %  04/24/2023 0.3  0.0 - 1.9 % Final    Differential Method 04/24/2023 Automated   Final    Sodium 04/24/2023 139  136 - 145 mmol/L Final    Potassium 04/24/2023 4.5  3.5 - 5.1 mmol/L Final    Chloride 04/24/2023 103  95 - 110 mmol/L Final    CO2 04/24/2023 25  23 - 29 mmol/L Final    Glucose 04/24/2023 134 (H)  70 - 110 mg/dL Final    BUN 04/24/2023 14  8 - 23 mg/dL Final    Creatinine 04/24/2023 1.4  0.5 - 1.4 mg/dL Final    Calcium 04/24/2023 9.3  8.7 - 10.5 mg/dL Final    Total Protein 04/24/2023 7.1  6.0 - 8.4 g/dL Final    Albumin 04/24/2023 3.8  3.5 - 5.2 g/dL Final    Total Bilirubin 04/24/2023 0.5  0.1 - 1.0 mg/dL Final    Comment: For infants and newborns, interpretation of results should be based  on gestational age, weight and in agreement with clinical  observations.    Premature Infant recommended reference ranges:  Up to 24 hours.............<8.0 mg/dL  Up to 48 hours............<12.0 mg/dL  3-5 days..................<15.0 mg/dL  6-29 days.................<15.0 mg/dL      Alkaline Phosphatase 04/24/2023 96  55 - 135 U/L Final    AST 04/24/2023 20  10 - 40 U/L Final    ALT 04/24/2023 36  10 - 44 U/L Final    Anion Gap 04/24/2023 11  8 - 16 mmol/L Final    eGFR 04/24/2023 51.8 (A)  >60 mL/min/1.73 m^2 Final    Vitamin B-12 04/24/2023 405  210 - 950 pg/mL Final    Cholesterol 04/24/2023 185  120 - 199 mg/dL Final    Comment: The National Cholesterol Education Program (NCEP) has set the  following guidelines (reference ranges) for Cholesterol:  Optimal.....................<200 mg/dL  Borderline High.............200-239 mg/dL  High........................> or = 240 mg/dL      Triglycerides 04/24/2023 196 (H)  30 - 150 mg/dL Final    Comment: The National Cholesterol Education Program (NCEP) has set the  following guidelines (reference values) for triglycerides:  Normal......................<150 mg/dL  Borderline High.............150-199 mg/dL  High........................200-499 mg/dL      HDL 04/24/2023 51   40 - 75 mg/dL Final    Comment: The National Cholesterol Education Program (NCEP) has set the  following guidelines (reference values) for HDL Cholesterol:  Low...............<40 mg/dL  Optimal...........>60 mg/dL      LDL Cholesterol 04/24/2023 94.8  63.0 - 159.0 mg/dL Final    Comment: The National Cholesterol Education Program (NCEP) has set the  following guidelines (reference values) for LDL Cholesterol:  Optimal.......................<130 mg/dL  Borderline High...............130-159 mg/dL  High..........................160-189 mg/dL  Very High.....................>190 mg/dL      HDL/Cholesterol Ratio 04/24/2023 27.6  20.0 - 50.0 % Final    Total Cholesterol/HDL Ratio 04/24/2023 3.6  2.0 - 5.0 Final    Non-HDL Cholesterol 04/24/2023 134  mg/dL Final    Comment: Risk category and Non-HDL cholesterol goals:  Coronary heart disease (CHD)or equivalent (10-year risk of CHD >20%):  Non-HDL cholesterol goal     <130 mg/dL  Two or more CHD risk factors and 10-year risk of CHD <= 20%:  Non-HDL cholesterol goal     <160 mg/dL  0 to 1 CHD risk factor:  Non-HDL cholesterol goal     <190 mg/dL      Hemoglobin A1C 04/24/2023 6.2 (H)  4.0 - 5.6 % Final    Comment: ADA Screening Guidelines:  5.7-6.4%  Consistent with prediabetes  >or=6.5%  Consistent with diabetes    High levels of fetal hemoglobin interfere with the HbA1C  assay. Heterozygous hemoglobin variants (HbS, HgC, etc)do  not significantly interfere with this assay.   However, presence of multiple variants may affect accuracy.      Estimated Avg Glucose 04/24/2023 131  68 - 131 mg/dL Final    TSH 04/24/2023 2.461  0.400 - 4.000 uIU/mL Final    Uric Acid 04/24/2023 5.9  3.4 - 7.0 mg/dL Final       Encounter Diagnosis   Name Primary?    Upper respiratory tract infection, unspecified type Yes        No orders of the defined types were placed in this encounter.     Medications Ordered This Encounter   Medications    albuterol (VENTOLIN HFA) 90 mcg/actuation inhaler      Sig: Inhale 2 puffs into the lungs every 6 (six) hours as needed for Wheezing or Shortness of Breath. Rescue     Dispense:  18 g     Refill:  0     Any insurance-covered equivalent albuterol alternative is acceptable    fluticasone propionate (FLONASE) 50 mcg/actuation nasal spray     Si sprays (100 mcg total) by Each Nostril route once daily.     Dispense:  16 g     Refill:  4        No follow-ups on file.      E-Visit Time Tracking:    Day 1 Time (in minutes): 6     Total Time (in minutes): 6

## 2023-06-18 ENCOUNTER — OFFICE VISIT (OUTPATIENT)
Dept: URGENT CARE | Facility: CLINIC | Age: 78
End: 2023-06-18
Payer: COMMERCIAL

## 2023-06-18 VITALS
HEIGHT: 69 IN | WEIGHT: 209 LBS | RESPIRATION RATE: 18 BRPM | HEART RATE: 68 BPM | BODY MASS INDEX: 30.96 KG/M2 | OXYGEN SATURATION: 95 % | SYSTOLIC BLOOD PRESSURE: 136 MMHG | TEMPERATURE: 98 F | DIASTOLIC BLOOD PRESSURE: 76 MMHG

## 2023-06-18 DIAGNOSIS — M25.561 ACUTE PAIN OF BOTH KNEES: ICD-10-CM

## 2023-06-18 DIAGNOSIS — M25.562 ACUTE PAIN OF BOTH KNEES: ICD-10-CM
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
## 2023-06-18 DIAGNOSIS — S80.212A ABRASION OF LEFT KNEE, INITIAL ENCOUNTER: ICD-10-CM

## 2023-06-18 PROCEDURE — 99213 OFFICE O/P EST LOW 20 MIN: CPT | Mod: S$GLB,,, | Performed by: NURSE PRACTITIONER

## 2023-06-18 PROCEDURE — 99213 PR OFFICE/OUTPT VISIT, EST, LEVL III, 20-29 MIN: ICD-10-PCS | Mod: S$GLB,,, | Performed by: NURSE PRACTITIONER

## 2023-06-18 PROCEDURE — 73564 X-RAY EXAM KNEE 4 OR MORE: CPT | Mod: FY,S$GLB,, | Performed by: RADIOLOGY

## 2023-06-18 PROCEDURE — 73564 XR KNEE COMP 4 OR MORE VIEWS BILAT: ICD-10-PCS | Mod: FY,S$GLB,, | Performed by: RADIOLOGY

## 2023-06-18 RX ORDER — MUPIROCIN 20 MG/G
OINTMENT TOPICAL 3 TIMES DAILY
Qty: 22 G | Refills: 0 | Status: SHIPPED | OUTPATIENT
Start: 2023-06-18 | End: 2023-06-25

## 2023-06-18 RX ORDER — IBUPROFEN 200 MG
400 TABLET ORAL
Status: COMPLETED | OUTPATIENT
Start: 2023-06-18 | End: 2023-06-18

## 2023-06-18 RX ADMIN — Medication 400 MG: at 05:06

## 2023-06-18 NOTE — PATIENT INSTRUCTIONS
Fall, initial encounter  -     X-Ray Knee Complete 4 Or More Views Bilat    Abrasion of left knee, initial encounter    -     mupirocin (BACTROBAN) 2 % ointment; Apply topically 3 (three) times daily. for 7 days  Dispense: 22 g; Refill: 0      Wound Care    Gently clean wound with soap and water twice daily.    Signs of Infection:  Increase in redness (In initial stages, redness is normal at the wound edge but should not continue to increase)  Increase in pain and swelling (Similar to redness, pain and swelling is a normal part of healing, but should not continue or worsen after 3-5 days)  Yellowish drainage or fever after a few days      Acute pain of both knees  -     ibuprofen tablet 400 mg - given in clinic    Rest - Rest the injured area,      Ice - Apply ice  to affected area for the first 24-48 hours (DO NOT APPLY ICE DIRECTLY TO THE SKIN.  DO NOT LEAVE ON AFFECTED BODY PART FOR MORE THAN 15 MINUTES AT AT TIME TO AVOID INJURY TO SOFT TISSUE)      Compression - Wear ACE wrap or splint provided for compression and comfort to help reduce pain and swelling     Elevate - Elevated affected area higher than your heart to reduce swelling     - Tylenol or Ibuprofen as directed as needed for pain.    Take ibuprofen 600-800 mg every 6-8 hours for pain and inflammation.  Do not take ibuprofen if you have a history of GI bleeding, kidney disease, or if you take blood thinners.    You can also take Tylenol/acetaminophen 650-1000 mg every 6-8 hours for added pain relief. Avoid tylenol if you have a history of liver disease.      Follow up with your PCP in 1 week or as needed if no improvement.      If your condition worsens or fails to improve we recommend that you receive another evaluation at the ER immediately or contact your PCP to discuss your concerns or return here.

## 2023-06-18 NOTE — PROGRESS NOTES
"Subjective:      Patient ID: Ujchristiane Powers is a 77 y.o. male.    Vitals:  height is 5' 9" (1.753 m) and weight is 94.8 kg (209 lb). His temperature is 97.8 °F (36.6 °C). His blood pressure is 136/76 and his pulse is 68. His respiration is 18 and oxygen saturation is 95%.     Chief Complaint: Fall      Pt is a 76 yo male presenting with bilateral knee pain r/t fall from 1-2 hours ago.  Pt reports he tripped walking over a speed bump in the parking lot and landed with both knees onto the concrete.    Fall  The accident occurred Less than 1 hour ago. The fall occurred while walking. He landed on [place]. The point of impact was the left knee and right knee. The pain is present in the left knee and right knee. The pain is at a severity of 6/10. The pain is moderate. The symptoms are aggravated by movement and flexion. Pertinent negatives include no fever or headaches. He has tried nothing for the symptoms. The treatment provided no relief.     Constitution: Negative for chills, fatigue and fever.   Cardiovascular:  Negative for chest pain.   Respiratory:  Negative for chest tightness and shortness of breath.    Musculoskeletal:  Positive for pain (bilateral knees) and joint swelling (and bruising - right anterior knee).   Skin:  Positive for abrasion (left anterior knee).   Neurological:  Negative for headaches.    Objective:     Physical Exam   Constitutional: He is oriented to person, place, and time.   Eyes: EOM are normal.   Cardiovascular: Normal rate and regular rhythm.   Pulmonary/Chest: Effort normal and breath sounds normal. No respiratory distress.   Musculoskeletal:      Right knee: He exhibits swelling, ecchymosis and erythema. He exhibits normal range of motion. No tenderness found.      Left knee: He exhibits swelling and laceration (abrasion). He exhibits normal range of motion. No tenderness found.      Right lower leg: He exhibits no swelling. No edema.      Left lower leg: He exhibits no swelling. No " edema.        Legs:       Right foot: Normal capillary refill. No swelling.      Left foot: Normal capillary refill. No swelling.   Neurological: He is alert and oriented to person, place, and time.   Skin: Skin is warm and dry. Lacerations - lower ext.:  left knee (abrasion)         Comments: left anterior knee has open ~ < 2cm abrasion with mild sanguious drainage.  Cleaned with hydrogen peroxide and dressed with mupirocin and nonstick dressing   Nursing note and vitals reviewed.    Assessment:     1. Fall, initial encounter    2. Acute pain of both knees    3. Abrasion of left knee, initial encounter        Plan:       Fall, initial encounter  -     X-Ray Knee Complete 4 Or More Views Bilat; Future; Expected date: 06/18/2023    Acute pain of both knees  -     ibuprofen tablet 400 mg    Abrasion of left knee, initial encounter  -     mupirocin (BACTROBAN) 2 % ointment; Apply topically 3 (three) times daily. for 7 days  Dispense: 22 g; Refill: 0      Patient Instructions   Fall, initial encounter  -     X-Ray Knee Complete 4 Or More Views Bilat    Abrasion of left knee, initial encounter    -     mupirocin (BACTROBAN) 2 % ointment; Apply topically 3 (three) times daily. for 7 days  Dispense: 22 g; Refill: 0      Wound Care    Gently clean wound with soap and water twice daily.    Signs of Infection:  Increase in redness (In initial stages, redness is normal at the wound edge but should not continue to increase)  Increase in pain and swelling (Similar to redness, pain and swelling is a normal part of healing, but should not continue or worsen after 3-5 days)  Yellowish drainage or fever after a few days      Acute pain of both knees  -     ibuprofen tablet 400 mg - given in clinic    Rest - Rest the injured area,      Ice - Apply ice  to affected area for the first 24-48 hours (DO NOT APPLY ICE DIRECTLY TO THE SKIN.  DO NOT LEAVE ON AFFECTED BODY PART FOR MORE THAN 15 MINUTES AT AT TIME TO AVOID INJURY TO SOFT  TISSUE)      Compression - Wear ACE wrap or splint provided for compression and comfort to help reduce pain and swelling     Elevate - Elevated affected area higher than your heart to reduce swelling     - Tylenol or Ibuprofen as directed as needed for pain.    Take ibuprofen 600-800 mg every 6-8 hours for pain and inflammation.  Do not take ibuprofen if you have a history of GI bleeding, kidney disease, or if you take blood thinners.    You can also take Tylenol/acetaminophen 650-1000 mg every 6-8 hours for added pain relief. Avoid tylenol if you have a history of liver disease.      Follow up with your PCP in 1 week or as needed if no improvement.      If your condition worsens or fails to improve we recommend that you receive another evaluation at the ER immediately or contact your PCP to discuss your concerns or return here.

## 2023-06-23 DIAGNOSIS — J06.9 UPPER RESPIRATORY TRACT INFECTION, UNSPECIFIED TYPE: ICD-10-CM

## 2023-06-23 RX ORDER — ALBUTEROL SULFATE 90 UG/1
AEROSOL, METERED RESPIRATORY (INHALATION)
Qty: 18 G | Refills: 3 | Status: SHIPPED | OUTPATIENT
Start: 2023-06-23

## 2023-06-23 NOTE — TELEPHONE ENCOUNTER
Refill Routing Note   Medication(s) are not appropriate for processing by [facility] for the following reason(s):      New or recently adjusted medication    [facility] action(s):  Defer None identified          Appointments  past 12m or future 3m with PCP    Date Provider   Last Visit   4/25/2023 Adams Moore MD   Next Visit   10/25/2023 Adams Moore MD   ED visits in past 90 days: 0        Note composed:9:26 AM 06/23/2023

## 2023-06-23 NOTE — TELEPHONE ENCOUNTER
No care due was identified.  [facility] Embedded Care Due Messages. Reference number: [id number].   6/23/2023 12:53:54 AM CDT

## 2023-08-18 ENCOUNTER — PATIENT MESSAGE (OUTPATIENT)
Dept: UROLOGY | Facility: CLINIC | Age: 78
End: 2023-08-18
Payer: COMMERCIAL

## 2023-09-05 ENCOUNTER — TELEPHONE (OUTPATIENT)
Dept: FAMILY MEDICINE | Facility: CLINIC | Age: 78
End: 2023-09-05
Payer: COMMERCIAL

## 2023-09-05 NOTE — TELEPHONE ENCOUNTER
Spoke to pt and stated that he has been having leg pain, stomach issues, lack of appetite, light headedness. Pt was offered a sooner appt, but wanted to schedule with Dr. Moore. Pt was scheduled for an appt.

## 2023-09-06 ENCOUNTER — TELEPHONE (OUTPATIENT)
Dept: GASTROENTEROLOGY | Facility: CLINIC | Age: 78
End: 2023-09-06
Payer: COMMERCIAL

## 2023-09-06 NOTE — TELEPHONE ENCOUNTER
Ma called patient to schedule appt and next opening was 12/08/23 and patient stated that was a long way out and cancel appt

## 2023-09-10 NOTE — TELEPHONE ENCOUNTER
Ma called pt to reschedule 8/16 appt   Pt vm full    [email or web address]

## 2023-09-11 ENCOUNTER — OFFICE VISIT (OUTPATIENT)
Dept: FAMILY MEDICINE | Facility: CLINIC | Age: 78
End: 2023-09-11
Payer: COMMERCIAL

## 2023-09-11 ENCOUNTER — HOSPITAL ENCOUNTER (OUTPATIENT)
Dept: RADIOLOGY | Facility: HOSPITAL | Age: 78
Discharge: HOME | End: 2023-09-11
Attending: FAMILY MEDICINE
Payer: COMMERCIAL

## 2023-09-11 VITALS
HEART RATE: 71 BPM | HEIGHT: 69 IN | BODY MASS INDEX: 30.98 KG/M2 | SYSTOLIC BLOOD PRESSURE: 114 MMHG | WEIGHT: 209.19 LBS | DIASTOLIC BLOOD PRESSURE: 68 MMHG | OXYGEN SATURATION: 97 %

## 2023-09-11 DIAGNOSIS — M79.641 BILATERAL HAND PAIN: ICD-10-CM

## 2023-09-11 DIAGNOSIS — E03.9 HYPOTHYROIDISM, UNSPECIFIED TYPE: ICD-10-CM

## 2023-09-11 DIAGNOSIS — M79.673 PAIN OF FOOT, UNSPECIFIED LATERALITY: ICD-10-CM

## 2023-09-11 DIAGNOSIS — N18.31 TYPE 2 DIABETES MELLITUS WITH STAGE 3A CHRONIC KIDNEY DISEASE, WITHOUT LONG-TERM CURRENT USE OF INSULIN: Primary | ICD-10-CM

## 2023-09-11 DIAGNOSIS — M79.671 FOOT PAIN, RIGHT: ICD-10-CM

## 2023-09-11 DIAGNOSIS — M25.561 PAIN IN BOTH KNEES, UNSPECIFIED CHRONICITY: ICD-10-CM

## 2023-09-11 DIAGNOSIS — M25.562 PAIN IN BOTH KNEES, UNSPECIFIED CHRONICITY: ICD-10-CM

## 2023-09-11 DIAGNOSIS — E53.8 VITAMIN B12 DEFICIENCY: ICD-10-CM

## 2023-09-11 DIAGNOSIS — M79.642 BILATERAL HAND PAIN: ICD-10-CM

## 2023-09-11 DIAGNOSIS — E11.22 TYPE 2 DIABETES MELLITUS WITH STAGE 3A CHRONIC KIDNEY DISEASE, WITHOUT LONG-TERM CURRENT USE OF INSULIN: Primary | ICD-10-CM

## 2023-09-11 DIAGNOSIS — M10.9 GOUT, UNSPECIFIED CAUSE, UNSPECIFIED CHRONICITY, UNSPECIFIED SITE: ICD-10-CM

## 2023-09-11 DIAGNOSIS — R19.7 DIARRHEA, UNSPECIFIED TYPE: ICD-10-CM

## 2023-09-11 PROCEDURE — 1160F RVW MEDS BY RX/DR IN RCRD: CPT | Mod: CPTII,S$GLB,, | Performed by: FAMILY MEDICINE

## 2023-09-11 PROCEDURE — 73130 X-RAY EXAM OF HAND: CPT | Mod: 26,RT,, | Performed by: RADIOLOGY

## 2023-09-11 PROCEDURE — 99215 PR OFFICE/OUTPT VISIT, EST, LEVL V, 40-54 MIN: ICD-10-PCS | Mod: S$GLB,,, | Performed by: FAMILY MEDICINE

## 2023-09-11 PROCEDURE — 1125F AMNT PAIN NOTED PAIN PRSNT: CPT | Mod: CPTII,S$GLB,, | Performed by: FAMILY MEDICINE

## 2023-09-11 PROCEDURE — 1159F MED LIST DOCD IN RCRD: CPT | Mod: CPTII,S$GLB,, | Performed by: FAMILY MEDICINE

## 2023-09-11 PROCEDURE — 73130 X-RAY EXAM OF HAND: CPT | Mod: 26,LT,, | Performed by: RADIOLOGY

## 2023-09-11 PROCEDURE — 99999 PR PBB SHADOW E&M-EST. PATIENT-LVL IV: CPT | Mod: PBBFAC,,, | Performed by: FAMILY MEDICINE

## 2023-09-11 PROCEDURE — 99999 PR PBB SHADOW E&M-EST. PATIENT-LVL IV: ICD-10-PCS | Mod: PBBFAC,,, | Performed by: FAMILY MEDICINE

## 2023-09-11 PROCEDURE — 1125F PR PAIN SEVERITY QUANTIFIED, PAIN PRESENT: ICD-10-PCS | Mod: CPTII,S$GLB,, | Performed by: FAMILY MEDICINE

## 2023-09-11 PROCEDURE — 3074F PR MOST RECENT SYSTOLIC BLOOD PRESSURE < 130 MM HG: ICD-10-PCS | Mod: CPTII,S$GLB,, | Performed by: FAMILY MEDICINE

## 2023-09-11 PROCEDURE — 3078F PR MOST RECENT DIASTOLIC BLOOD PRESSURE < 80 MM HG: ICD-10-PCS | Mod: CPTII,S$GLB,, | Performed by: FAMILY MEDICINE

## 2023-09-11 PROCEDURE — 1159F PR MEDICATION LIST DOCUMENTED IN MEDICAL RECORD: ICD-10-PCS | Mod: CPTII,S$GLB,, | Performed by: FAMILY MEDICINE

## 2023-09-11 PROCEDURE — 73630 X-RAY EXAM OF FOOT: CPT | Mod: 26,LT,, | Performed by: RADIOLOGY

## 2023-09-11 PROCEDURE — 73630 XR FOOT COMPLETE 3 VIEW BILATERAL: ICD-10-PCS | Mod: 26,RT,, | Performed by: RADIOLOGY

## 2023-09-11 PROCEDURE — 3074F SYST BP LT 130 MM HG: CPT | Mod: CPTII,S$GLB,, | Performed by: FAMILY MEDICINE

## 2023-09-11 PROCEDURE — 3288F PR FALLS RISK ASSESSMENT DOCUMENTED: ICD-10-PCS | Mod: CPTII,S$GLB,, | Performed by: FAMILY MEDICINE

## 2023-09-11 PROCEDURE — 3078F DIAST BP <80 MM HG: CPT | Mod: CPTII,S$GLB,, | Performed by: FAMILY MEDICINE

## 2023-09-11 PROCEDURE — 99215 OFFICE O/P EST HI 40 MIN: CPT | Mod: S$GLB,,, | Performed by: FAMILY MEDICINE

## 2023-09-11 PROCEDURE — 73130 X-RAY EXAM OF HAND: CPT | Mod: TC,50,FY,PO

## 2023-09-11 PROCEDURE — 1100F PTFALLS ASSESS-DOCD GE2>/YR: CPT | Mod: CPTII,S$GLB,, | Performed by: FAMILY MEDICINE

## 2023-09-11 PROCEDURE — 73630 X-RAY EXAM OF FOOT: CPT | Mod: 26,RT,, | Performed by: RADIOLOGY

## 2023-09-11 PROCEDURE — 3288F FALL RISK ASSESSMENT DOCD: CPT | Mod: CPTII,S$GLB,, | Performed by: FAMILY MEDICINE

## 2023-09-11 PROCEDURE — 73130 XR HAND COMPLETE 3 VIEWS BILATERAL: ICD-10-PCS | Mod: 26,RT,, | Performed by: RADIOLOGY

## 2023-09-11 PROCEDURE — 1100F PR PT FALLS ASSESS DOC 2+ FALLS/FALL W/INJURY/YR: ICD-10-PCS | Mod: CPTII,S$GLB,, | Performed by: FAMILY MEDICINE

## 2023-09-11 PROCEDURE — 1160F PR REVIEW ALL MEDS BY PRESCRIBER/CLIN PHARMACIST DOCUMENTED: ICD-10-PCS | Mod: CPTII,S$GLB,, | Performed by: FAMILY MEDICINE

## 2023-09-11 PROCEDURE — 73630 X-RAY EXAM OF FOOT: CPT | Mod: TC,50,FY,PO

## 2023-09-11 RX ORDER — GABAPENTIN 300 MG/1
300 CAPSULE ORAL NIGHTLY
Qty: 30 CAPSULE | Refills: 11 | Status: SHIPPED | OUTPATIENT
Start: 2023-09-11

## 2023-09-11 RX ORDER — DICLOFENAC SODIUM 10 MG/G
2 GEL TOPICAL 3 TIMES DAILY PRN
Qty: 100 G | Refills: 11 | Status: SHIPPED | OUTPATIENT
Start: 2023-09-11

## 2023-09-11 NOTE — PATIENT INSTRUCTIONS
Decrease metformin to 2 pills daily (in case of GI upset issues)    Check labs    Diclofenac gel as needed to feet/hands/knees if pain    See exercises for knees, feet         Orders Placed This Encounter   Procedures    X-Ray Foot Complete Bilateral    X-Ray Hand 3 View Bilateral    Vitamin B12    Hemoglobin A1C    CBC Auto Differential    Comprehensive Metabolic Panel    Lipid Panel    TSH    Microalbumin/Creatinine Ratio, Urine    Uric Acid     Medications Ordered This Encounter   Medications    diclofenac sodium (VOLTAREN) 1 % Gel     Sig: Apply 2 g topically 3 (three) times daily as needed (pain).     Dispense:  100 g     Refill:  11    gabapentin (NEURONTIN) 300 MG capsule     Sig: Take 1 capsule (300 mg total) by mouth every evening.     Dispense:  30 capsule     Refill:  11       Plantar fasciitis rehabilitation exercises    You may begin exercising the muscles of your foot right away by gently stretching them as follows:     Prone hip extension: Lie on your stomach with your legs straight out behind you. Tighten the buttocks and thigh muscles of your injured leg and lift it off the floor about 8 inches. Keep your knee straight. Hold for 5 seconds. Then lower your leg and relax. Do 3 sets of 10.   Towel stretch: Sit on a hard surface with one leg stretched out in front of you. Loop a towel around your toes and the ball of your foot and pull the towel toward your body keeping your knee straight. Hold this position for 15 to 30 seconds then relax. Repeat 3 times.   When the towel stretch becomes too easy, you may begin doing the standing calf stretch.   Standing calf stretch: Facing a wall, put your hands against the wall at about eye level. Keep one leg back with the heel on the floor, and the other leg forward. Turn your back foot slightly inward (as if you were pigeon-toed) as you slowly lean into the wall until you feel a stretch in the back of your calf. Hold for 15 to 30 seconds. Repeat 3 times and then  switch the position of your legs and repeat the exercise 3 times. Do this exercise several times each day.   Sitting plantar fascia stretch: Sit in a chair and cross one foot over your other knee. Grab the base of your toes and pull them back toward your leg until you feel a comfortable stretch. Hold 15 seconds and repeat 3 times.     When you can stand comfortably on your injured foot, you can begin standing to stretch the bottom of your foot using the plantar fascia stretch.       Achilles stretch: Stand with the ball of one foot on a stair. Reach for the bottom step with your heel until you feel a stretch in the arch of your foot. Hold this position for 15 to 30 seconds and then relax. Repeat 3 times.     After you have stretched the bottom muscles of your foot, you can begin strengthening the top muscles of your foot.  Frozen can roll: Roll your bare injured foot back and forth from your heel to your mid-arch over a frozen juice can. Repeat for 3 to 5 minutes. This exercise is particularly helpful if done first thing in the morning.   Towel pickup: With your heel on the ground,  a towel with your toes. Release. Repeat 10 to 20 times. When this gets easy, add more resistance by placing a book or small weight on the towel.   Balance and reach exercises   Stand upright next to a chair with your injured leg farthest from the chair. This will provide you with support if you need it. Stand just on the foot of your injured leg. Try to raise the arch of this foot while keeping your toes on the floor.   Keep your foot in this position and reach forward in front of you with the hand farthest away from the chair, allowing your knee to bend. Repeat this 10 times while maintaining the arch height. This exercise can be made more difficult by reaching farther in front of you. Do 2 sets.    the same position as above. While maintaining your arch height, reach the hand farthest away from the chair across your body  toward the chair. The farther you reach, the more challenging the exercise. Do 2 sets of 10.   Heel raise: Balance yourself while standing behind a chair or counter. Using the chair to help you, raise your body up onto your toes and hold for 5 seconds. Then slowly lower yourself down without holding onto the chair. Hold onto the chair or counter if you need to. When this exercise becomes less painful, try lowering on one leg only. Repeat 10 times. Do 3 sets of 10.   Side-lying leg lift: Lying on your uninjured side, tighten the front thigh muscles on your top leg and lift that leg 8 to 10 inches away from the other leg. Keep the leg straight and lower slowly. Do 3 sets of 10.     Written by Paola Barrow MS, PT, and Lucie Mcadams PT, [facility], [facility], for "[employer].   Published by "[employer].  © 2009 [facility] and/or its affiliates. All Rights Reserved
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
## 2023-09-12 ENCOUNTER — LAB VISIT (OUTPATIENT)
Dept: LAB | Facility: HOSPITAL | Age: 78
End: 2023-09-12
Attending: FAMILY MEDICINE
Payer: COMMERCIAL

## 2023-09-12 ENCOUNTER — PATIENT MESSAGE (OUTPATIENT)
Dept: FAMILY MEDICINE | Facility: CLINIC | Age: 78
End: 2023-09-12
Payer: COMMERCIAL

## 2023-09-12 DIAGNOSIS — M25.561 PAIN IN BOTH KNEES, UNSPECIFIED CHRONICITY: ICD-10-CM

## 2023-09-12 DIAGNOSIS — M25.562 PAIN IN BOTH KNEES, UNSPECIFIED CHRONICITY: ICD-10-CM

## 2023-09-12 DIAGNOSIS — E11.22 TYPE 2 DIABETES MELLITUS WITH STAGE 3A CHRONIC KIDNEY DISEASE, WITHOUT LONG-TERM CURRENT USE OF INSULIN: ICD-10-CM

## 2023-09-12 DIAGNOSIS — E03.9 HYPOTHYROIDISM, UNSPECIFIED TYPE: ICD-10-CM

## 2023-09-12 DIAGNOSIS — N18.31 TYPE 2 DIABETES MELLITUS WITH STAGE 3A CHRONIC KIDNEY DISEASE, WITHOUT LONG-TERM CURRENT USE OF INSULIN: ICD-10-CM

## 2023-09-12 DIAGNOSIS — M79.671 FOOT PAIN, RIGHT: ICD-10-CM

## 2023-09-12 DIAGNOSIS — R19.7 DIARRHEA, UNSPECIFIED TYPE: ICD-10-CM

## 2023-09-12 DIAGNOSIS — M10.9 GOUT, UNSPECIFIED CAUSE, UNSPECIFIED CHRONICITY, UNSPECIFIED SITE: ICD-10-CM

## 2023-09-12 DIAGNOSIS — E53.8 VITAMIN B12 DEFICIENCY: ICD-10-CM

## 2023-09-12 LAB
ALBUMIN SERPL BCP-MCNC: 3.8 G/DL (ref 3.5–5.2)
ALP SERPL-CCNC: 87 U/L (ref 55–135)
ALT SERPL W/O P-5'-P-CCNC: 19 U/L (ref 10–44)
ANION GAP SERPL CALC-SCNC: 14 MMOL/L (ref 8–16)
AST SERPL-CCNC: 18 U/L (ref 10–40)
BASOPHILS # BLD AUTO: 0.04 K/UL (ref 0–0.2)
BASOPHILS NFR BLD: 0.4 % (ref 0–1.9)
BILIRUB SERPL-MCNC: 0.9 MG/DL (ref 0.1–1)
BUN SERPL-MCNC: 14 MG/DL (ref 8–23)
CALCIUM SERPL-MCNC: 9.2 MG/DL (ref 8.7–10.5)
CHLORIDE SERPL-SCNC: 103 MMOL/L (ref 95–110)
CHOLEST SERPL-MCNC: 180 MG/DL (ref 120–199)
CHOLEST/HDLC SERPL: 3.7 {RATIO} (ref 2–5)
CO2 SERPL-SCNC: 21 MMOL/L (ref 23–29)
CREAT SERPL-MCNC: 1.4 MG/DL (ref 0.5–1.4)
DIFFERENTIAL METHOD: ABNORMAL
EOSINOPHIL # BLD AUTO: 0.6 K/UL (ref 0–0.5)
EOSINOPHIL NFR BLD: 6.7 % (ref 0–8)
ERYTHROCYTE [DISTWIDTH] IN BLOOD BY AUTOMATED COUNT: 13.2 % (ref 11.5–14.5)
EST. GFR  (NO RACE VARIABLE): 51.8 ML/MIN/1.73 M^2
ESTIMATED AVG GLUCOSE: 131 MG/DL (ref 68–131)
GLUCOSE SERPL-MCNC: 108 MG/DL (ref 70–110)
HBA1C MFR BLD: 6.2 % (ref 4–5.6)
HCT VFR BLD AUTO: 43.6 % (ref 40–54)
HDLC SERPL-MCNC: 49 MG/DL (ref 40–75)
HDLC SERPL: 27.2 % (ref 20–50)
HGB BLD-MCNC: 14.9 G/DL (ref 14–18)
IMM GRANULOCYTES # BLD AUTO: 0.07 K/UL (ref 0–0.04)
IMM GRANULOCYTES NFR BLD AUTO: 0.7 % (ref 0–0.5)
LDLC SERPL CALC-MCNC: 87.2 MG/DL (ref 63–159)
LYMPHOCYTES # BLD AUTO: 2.3 K/UL (ref 1–4.8)
LYMPHOCYTES NFR BLD: 24.3 % (ref 18–48)
MCH RBC QN AUTO: 28.7 PG (ref 27–31)
MCHC RBC AUTO-ENTMCNC: 34.2 G/DL (ref 32–36)
MCV RBC AUTO: 84 FL (ref 82–98)
MONOCYTES # BLD AUTO: 0.7 K/UL (ref 0.3–1)
MONOCYTES NFR BLD: 7.2 % (ref 4–15)
NEUTROPHILS # BLD AUTO: 5.8 K/UL (ref 1.8–7.7)
NEUTROPHILS NFR BLD: 60.7 % (ref 38–73)
NONHDLC SERPL-MCNC: 131 MG/DL
NRBC BLD-RTO: 0 /100 WBC
PLATELET # BLD AUTO: 259 K/UL (ref 150–450)
PMV BLD AUTO: 11.5 FL (ref 9.2–12.9)
POTASSIUM SERPL-SCNC: 4.1 MMOL/L (ref 3.5–5.1)
PROT SERPL-MCNC: 7.1 G/DL (ref 6–8.4)
RBC # BLD AUTO: 5.19 M/UL (ref 4.6–6.2)
SODIUM SERPL-SCNC: 138 MMOL/L (ref 136–145)
TRIGL SERPL-MCNC: 219 MG/DL (ref 30–150)
TSH SERPL DL<=0.005 MIU/L-ACNC: 1.52 UIU/ML (ref 0.4–4)
URATE SERPL-MCNC: 6 MG/DL (ref 3.4–7)
VIT B12 SERPL-MCNC: 456 PG/ML (ref 210–950)
WBC # BLD AUTO: 9.52 K/UL (ref 3.9–12.7)

## 2023-09-12 PROCEDURE — 83036 HEMOGLOBIN GLYCOSYLATED A1C: CPT | Performed by: FAMILY MEDICINE

## 2023-09-12 PROCEDURE — 80053 COMPREHEN METABOLIC PANEL: CPT | Performed by: FAMILY MEDICINE

## 2023-09-12 PROCEDURE — 85025 COMPLETE CBC W/AUTO DIFF WBC: CPT | Performed by: FAMILY MEDICINE

## 2023-09-12 PROCEDURE — 36415 COLL VENOUS BLD VENIPUNCTURE: CPT | Mod: PO | Performed by: FAMILY MEDICINE

## 2023-09-12 PROCEDURE — 84443 ASSAY THYROID STIM HORMONE: CPT | Performed by: FAMILY MEDICINE

## 2023-09-12 PROCEDURE — 82607 VITAMIN B-12: CPT | Performed by: FAMILY MEDICINE

## 2023-09-12 PROCEDURE — 84550 ASSAY OF BLOOD/URIC ACID: CPT | Performed by: FAMILY MEDICINE

## 2023-09-12 PROCEDURE — 80061 LIPID PANEL: CPT | Performed by: FAMILY MEDICINE

## 2023-10-09 DIAGNOSIS — E11.22 TYPE 2 DIABETES MELLITUS WITH STAGE 3A CHRONIC KIDNEY DISEASE, WITHOUT LONG-TERM CURRENT USE OF INSULIN: ICD-10-CM

## 2023-10-09 DIAGNOSIS — N18.31 TYPE 2 DIABETES MELLITUS WITH STAGE 3A CHRONIC KIDNEY DISEASE, WITHOUT LONG-TERM CURRENT USE OF INSULIN: ICD-10-CM

## 2023-10-09 RX ORDER — IRBESARTAN 150 MG/1
TABLET ORAL
Qty: 90 TABLET | Refills: 3 | Status: SHIPPED | OUTPATIENT
Start: 2023-10-09

## 2023-10-09 RX ORDER — ALLOPURINOL 100 MG/1
TABLET ORAL
Qty: 90 TABLET | Refills: 3 | Status: SHIPPED | OUTPATIENT
Start: 2023-10-09

## 2023-10-09 RX ORDER — DULAGLUTIDE 3 MG/.5ML
3 INJECTION, SOLUTION SUBCUTANEOUS WEEKLY
Qty: 12 PEN | Refills: 1 | Status: SHIPPED | OUTPATIENT
Start: 2023-10-09 | End: 2023-10-31

## 2023-10-09 NOTE — TELEPHONE ENCOUNTER
Refill Decision Note      Refill Decision Note   Jerardo Powers  is requesting a refill authorization.  Brief Assessment and Rationale for Refill:  Approve     Medication Therapy Plan:         Pharmacist review requested: Yes   Extended chart review required: Yes   Comments:     Note composed:1:34 PM 10/09/2023             Appointments     Last Visit   9/11/2023 Adams Moore MD   Next Visit   10/31/2023 Adams Moore MD

## 2023-10-09 NOTE — TELEPHONE ENCOUNTER
No care due was identified.  [facility] Embedded Care Due Messages. Reference number: [id number].   10/09/2023 8:24:32 AM CDT

## 2023-10-09 NOTE — TELEPHONE ENCOUNTER
Refill Routing Note   Medication(s) are not appropriate for processing by [facility] for the following reason(s):      Drug-disease interaction  Drug-drug interaction    [facility] action(s):  Approve  Defer Care Due:  None identified     Medication Therapy Plan: TRULICITY and Diarrhea; Diarrhea, unspecified type; irbesartan    Pharmacist review requested: Yes     Appointments  past 12m or future 3m with PCP    Date Provider   Last Visit   9/11/2023 Adams Moore MD   Next Visit   10/31/2023 Adams Moore MD   ED visits in past 90 days: 0        Note composed:9:27 AM 10/09/2023

## 2023-10-23 ENCOUNTER — ON-DEMAND VIRTUAL (OUTPATIENT)
Dept: URGENT CARE | Facility: CLINIC | Age: 78
End: 2023-10-23
Payer: COMMERCIAL

## 2023-10-23 DIAGNOSIS — T30.0 BURN: Primary | ICD-10-CM

## 2023-10-23 PROCEDURE — 99213 OFFICE O/P EST LOW 20 MIN: CPT | Mod: 95,,,

## 2023-10-23 PROCEDURE — 99213 PR OFFICE/OUTPT VISIT, EST, LEVL III, 20-29 MIN: ICD-10-PCS | Mod: 95,,,

## 2023-10-23 RX ORDER — MUPIROCIN 20 MG/G
OINTMENT TOPICAL 3 TIMES DAILY
Qty: 22 G | Refills: 0 | Status: SHIPPED | OUTPATIENT
Start: 2023-10-23
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
## 2023-10-23 NOTE — PATIENT INSTRUCTIONS
You must understand that you've received an Urgent Care treatment only and that you may be released before all your medical problems are known or treated. You, the patient, will arrange for follow up care as instructed.  Follow up with your PCP or specialty clinic as directed in the next 1-2 weeks if not improved or as needed.  You can call [phone] to schedule an appointment with the appropriate provider.  If your condition worsens we recommend that you receive another evaluation at the emergency room immediately or contact your primary medical clinics after hours call service to discuss your concerns.  Please return here or go to the Emergency Department for any concerns or worsening of condition.  Please if you smoke please consider quitting. [facility] Smoke cessation hotline number is [phone], available at this number is free counseling and medications to live a healthier life!         If you were prescribed a narcotic or controlled medication, do not drive or operate heavy equipment or machinery while taking these medications.

## 2023-10-31 ENCOUNTER — OFFICE VISIT (OUTPATIENT)
Dept: FAMILY MEDICINE | Facility: CLINIC | Age: 78
End: 2023-10-31
Payer: COMMERCIAL

## 2023-10-31 VITALS
DIASTOLIC BLOOD PRESSURE: 74 MMHG | TEMPERATURE: 97 F | BODY MASS INDEX: 31.58 KG/M2 | SYSTOLIC BLOOD PRESSURE: 132 MMHG | WEIGHT: 213.19 LBS | HEART RATE: 70 BPM | OXYGEN SATURATION: 98 % | HEIGHT: 69 IN

## 2023-10-31 DIAGNOSIS — M17.12 PRIMARY OSTEOARTHRITIS OF LEFT KNEE: ICD-10-CM

## 2023-10-31 DIAGNOSIS — R19.7 DIARRHEA, UNSPECIFIED TYPE: ICD-10-CM

## 2023-10-31 DIAGNOSIS — E11.22 TYPE 2 DIABETES MELLITUS WITH STAGE 3A CHRONIC KIDNEY DISEASE, WITHOUT LONG-TERM CURRENT USE OF INSULIN: Primary | ICD-10-CM

## 2023-10-31 DIAGNOSIS — N18.31 TYPE 2 DIABETES MELLITUS WITH STAGE 3A CHRONIC KIDNEY DISEASE, WITHOUT LONG-TERM CURRENT USE OF INSULIN: Primary | ICD-10-CM

## 2023-10-31 DIAGNOSIS — E03.9 HYPOTHYROIDISM, UNSPECIFIED TYPE: ICD-10-CM

## 2023-10-31 DIAGNOSIS — T22.211A PARTIAL THICKNESS BURN OF RIGHT FOREARM, INITIAL ENCOUNTER: ICD-10-CM

## 2023-10-31 DIAGNOSIS — R51.9 NONINTRACTABLE HEADACHE, UNSPECIFIED CHRONICITY PATTERN, UNSPECIFIED HEADACHE TYPE: ICD-10-CM

## 2023-10-31 PROCEDURE — 3078F PR MOST RECENT DIASTOLIC BLOOD PRESSURE < 80 MM HG: ICD-10-PCS | Mod: CPTII,S$GLB,, | Performed by: FAMILY MEDICINE

## 2023-10-31 PROCEDURE — 3075F SYST BP GE 130 - 139MM HG: CPT | Mod: CPTII,S$GLB,, | Performed by: FAMILY MEDICINE

## 2023-10-31 PROCEDURE — 1159F MED LIST DOCD IN RCRD: CPT | Mod: CPTII,S$GLB,, | Performed by: FAMILY MEDICINE

## 2023-10-31 PROCEDURE — 1125F AMNT PAIN NOTED PAIN PRSNT: CPT | Mod: CPTII,S$GLB,, | Performed by: FAMILY MEDICINE

## 2023-10-31 PROCEDURE — 1159F PR MEDICATION LIST DOCUMENTED IN MEDICAL RECORD: ICD-10-PCS | Mod: CPTII,S$GLB,, | Performed by: FAMILY MEDICINE

## 2023-10-31 PROCEDURE — 1125F PR PAIN SEVERITY QUANTIFIED, PAIN PRESENT: ICD-10-PCS | Mod: CPTII,S$GLB,, | Performed by: FAMILY MEDICINE

## 2023-10-31 PROCEDURE — 99215 OFFICE O/P EST HI 40 MIN: CPT | Mod: S$GLB,,, | Performed by: FAMILY MEDICINE

## 2023-10-31 PROCEDURE — 3288F FALL RISK ASSESSMENT DOCD: CPT | Mod: CPTII,S$GLB,, | Performed by: FAMILY MEDICINE

## 2023-10-31 PROCEDURE — 3288F PR FALLS RISK ASSESSMENT DOCUMENTED: ICD-10-PCS | Mod: CPTII,S$GLB,, | Performed by: FAMILY MEDICINE

## 2023-10-31 PROCEDURE — 99999 PR PBB SHADOW E&M-EST. PATIENT-LVL V: ICD-10-PCS | Mod: PBBFAC,,, | Performed by: FAMILY MEDICINE

## 2023-10-31 PROCEDURE — 1101F PT FALLS ASSESS-DOCD LE1/YR: CPT | Mod: CPTII,S$GLB,, | Performed by: FAMILY MEDICINE

## 2023-10-31 PROCEDURE — 1101F PR PT FALLS ASSESS DOC 0-1 FALLS W/OUT INJ PAST YR: ICD-10-PCS | Mod: CPTII,S$GLB,, | Performed by: FAMILY MEDICINE

## 2023-10-31 PROCEDURE — 99215 PR OFFICE/OUTPT VISIT, EST, LEVL V, 40-54 MIN: ICD-10-PCS | Mod: S$GLB,,, | Performed by: FAMILY MEDICINE

## 2023-10-31 PROCEDURE — 99999 PR PBB SHADOW E&M-EST. PATIENT-LVL V: CPT | Mod: PBBFAC,,, | Performed by: FAMILY MEDICINE

## 2023-10-31 PROCEDURE — 3075F PR MOST RECENT SYSTOLIC BLOOD PRESS GE 130-139MM HG: ICD-10-PCS | Mod: CPTII,S$GLB,, | Performed by: FAMILY MEDICINE

## 2023-10-31 PROCEDURE — 3078F DIAST BP <80 MM HG: CPT | Mod: CPTII,S$GLB,, | Performed by: FAMILY MEDICINE

## 2023-10-31 RX ORDER — DULAGLUTIDE 4.5 MG/.5ML
4.5 INJECTION, SOLUTION SUBCUTANEOUS
Qty: 4 PEN | Refills: 11 | Status: SHIPPED | OUTPATIENT
Start: 2023-10-31 | End: 2024-03-27 | Stop reason: RX

## 2023-12-08 NOTE — TELEPHONE ENCOUNTER
dexI can send the dexcom (, transmitter, and sensor) prescriptions to your pharmacy but I am not sure about your insurance coverage for this.  I will also need to put a diabetic education referral on getting you trained on to how to use it.  I will place this referral and they should contact you regarding appointment    Adams Moore MD    ===View-only below this line===      ----- Message -----     From: Jerardo Powers     Sent: 12/9/2020 12:56 AM CST       To: Adams Moore MD  Subject: Prescription    Dr. Moore,  I understand there is a device called DEXCON G6 that is used for monitoring blood sugar without pricking finger. Can you please send a prescription for this to [facility] at [facility], Phone [phone].  Thanks.  Jerardo Powers  [phone]    
Render In Strict Bullet Format?: No
Initiate Treatment: Jublia solution daily
Detail Level: Simple

## 2023-12-20 NOTE — TELEPHONE ENCOUNTER
Care Due:                  Date            Visit Type   Department     Provider  --------------------------------------------------------------------------------                                EP -                              PRIMARY      [facility] FAMILY  Last Visit: 10-      CARE ([facility])   MEDICINE       Adams Moore  Next Visit: None Scheduled  None         None Found                                                            Last  Test          Frequency    Reason                     Performed    Due Date  --------------------------------------------------------------------------------    HBA1C.......  6 months...  dulaglutide, metFORMIN...  09-   03-    [facility] Embedded Care Due Messages. Reference number: [id number].   12/20/2023 12:13:18 AM CST

## 2023-12-22 RX ORDER — CITALOPRAM 20 MG/1
20 TABLET, FILM COATED ORAL
Qty: 90 TABLET | Refills: 3 | Status: SHIPPED | OUTPATIENT
Start: 2023-12-22

## 2023-12-22 NOTE — TELEPHONE ENCOUNTER
Refill Routing Note   Medication(s) are not appropriate for processing by [facility] for the following reason(s):        Drug-disease interaction: citalopram and Hypokalemia     [facility] action(s):  Defer     Requires labs : Yes             Appointments  past 12m or future 3m with PCP    Date Provider   Last Visit   10/31/2023 Adams Moore MD   Next Visit   2/6/2024 Adams Moore MD   ED visits in past 90 days: 0        Note composed:5:48 PM 12/22/2023

## 2024-01-13 ENCOUNTER — OFFICE VISIT (OUTPATIENT)
Dept: URGENT CARE | Facility: CLINIC | Age: 79
End: 2024-01-13
Payer: COMMERCIAL

## 2024-01-13 VITALS
OXYGEN SATURATION: 96 % | TEMPERATURE: 98 F | WEIGHT: 213.19 LBS | HEART RATE: 73 BPM | BODY MASS INDEX: 31.58 KG/M2 | DIASTOLIC BLOOD PRESSURE: 75 MMHG | SYSTOLIC BLOOD PRESSURE: 146 MMHG | RESPIRATION RATE: 18 BRPM | HEIGHT: 69 IN

## 2024-01-13 DIAGNOSIS — M25.561 ACUTE PAIN OF RIGHT KNEE: Primary | ICD-10-CM

## 2024-01-13 DIAGNOSIS — M17.11 PRIMARY OSTEOARTHRITIS OF RIGHT KNEE: ICD-10-CM

## 2024-01-13 PROCEDURE — 99214 OFFICE O/P EST MOD 30 MIN: CPT | Mod: S$GLB,,, | Performed by: FAMILY MEDICINE

## 2024-01-13 PROCEDURE — 73562 X-RAY EXAM OF KNEE 3: CPT | Mod: FY,S$GLB,, | Performed by: RADIOLOGY

## 2024-01-13 RX ORDER — DICLOFENAC SODIUM 75 MG/1
75 TABLET, DELAYED RELEASE ORAL 2 TIMES DAILY
Qty: 30 TABLET | Refills: 0 | Status: SHIPPED | OUTPATIENT
Start: 2024-01-13 | End: 2024-02-06

## 2024-01-13 RX ORDER — DICLOFENAC SODIUM 75 MG/1
75 TABLET, DELAYED RELEASE ORAL 2 TIMES DAILY
Qty: 60 TABLET | Refills: 0 | Status: SHIPPED | OUTPATIENT
Start: 2024-01-13 | End: 2024-01-13 | Stop reason: SDUPTHER

## 2024-01-13 NOTE — PROGRESS NOTES
"Subjective:      Patient ID: ARAVINDjchristiane Powers is a 78 y.o. male.    Vitals:  height is 5' 9" (1.753 m) and weight is 96.7 kg (213 lb 3 oz). His oral temperature is 97.6 °F (36.4 °C). His blood pressure is 146/75 (abnormal) and his pulse is 73. His respiration is 18 and oxygen saturation is 96%.     Chief Complaint: Knee Pain    Pt coming into clinic with knee pain that started 3 weeks ago, treatment includes nothing.  Has hx of gout, not sure where, also c/o bilateral knee pain, right worse then left  C/o pain in right knee x 2-3 weeks, no injury or trauma  States hurts to move      Knee Pain   The incident occurred 6 to 12 hours ago. The incident occurred at home. There was no injury mechanism. The pain is present in the right knee and left knee. The quality of the pain is described as aching. The pain is at a severity of 7/10. The pain is moderate. The pain has been Constant since onset. Pertinent negatives include no inability to bear weight, loss of motion, loss of sensation, muscle weakness, numbness or tingling. The symptoms are aggravated by movement. He has tried nothing for the symptoms. The treatment provided no relief.       Neurological:  Negative for numbness.      Objective:     Physical Exam   Constitutional: He is oriented to person, place, and time. He appears well-developed. He is cooperative.  Non-toxic appearance. He does not appear ill. No distress.   HENT:   Head: Normocephalic and atraumatic.   Ears:   Right Ear: Hearing, tympanic membrane, external ear and ear canal normal.   Left Ear: Hearing, tympanic membrane, external ear and ear canal normal.   Nose: Nose normal. No mucosal edema, rhinorrhea or nasal deformity. No epistaxis. Right sinus exhibits no maxillary sinus tenderness and no frontal sinus tenderness. Left sinus exhibits no maxillary sinus tenderness and no frontal sinus tenderness.   Mouth/Throat: Uvula is midline, oropharynx is clear and moist and mucous membranes are normal. No " trismus in the jaw. Normal dentition. No uvula swelling. No posterior oropharyngeal erythema.   Eyes: Conjunctivae and lids are normal. Right eye exhibits no discharge. Left eye exhibits no discharge. No scleral icterus.   Neck: Trachea normal and phonation normal. Neck supple.   Cardiovascular: Normal rate, regular rhythm, normal heart sounds and normal pulses.   Pulmonary/Chest: Effort normal and breath sounds normal. No respiratory distress.   Abdominal: Normal appearance and bowel sounds are normal. He exhibits no distension and no mass. Soft. There is no abdominal tenderness.   Musculoskeletal: Normal range of motion.         General: No deformity. Normal range of motion.      Comments: Right knee with mild warmth  Tender medial collateral ligament  Negative naterior drawer test     Neurological: He is alert and oriented to person, place, and time. He exhibits normal muscle tone. Coordination normal.   Skin: Skin is warm, dry, intact, not diaphoretic and not pale.   Psychiatric: His speech is normal and behavior is normal. Judgment and thought content normal.   Nursing note and vitals reviewed.      Assessment:     1. Acute pain of right knee    2. Primary osteoarthritis of right knee        Plan:       Acute pain of right knee  -     X-Ray Knee 3 View Right; Future; Expected date: 01/13/2024    Primary osteoarthritis of right knee    Other orders  -     diclofenac (VOLTAREN) 75 MG EC tablet; Take 1 tablet (75 mg total) by mouth 2 (two) times daily.  Dispense: 60 tablet; Refill: 0

## 2024-01-31 ENCOUNTER — LAB VISIT (OUTPATIENT)
Dept: LAB | Facility: HOSPITAL | Age: 79
End: 2024-01-31
Attending: FAMILY MEDICINE
Payer: COMMERCIAL

## 2024-01-31 DIAGNOSIS — N18.31 TYPE 2 DIABETES MELLITUS WITH STAGE 3A CHRONIC KIDNEY DISEASE, WITHOUT LONG-TERM CURRENT USE OF INSULIN: ICD-10-CM

## 2024-01-31 DIAGNOSIS — E11.22 TYPE 2 DIABETES MELLITUS WITH STAGE 3A CHRONIC KIDNEY DISEASE, WITHOUT LONG-TERM CURRENT USE OF INSULIN: ICD-10-CM

## 2024-01-31 LAB
ALBUMIN SERPL BCP-MCNC: 4 G/DL (ref 3.5–5.2)
ALP SERPL-CCNC: 89 U/L (ref 55–135)
ALT SERPL W/O P-5'-P-CCNC: 26 U/L (ref 10–44)
ANION GAP SERPL CALC-SCNC: 10 MMOL/L (ref 8–16)
AST SERPL-CCNC: 21 U/L (ref 10–40)
BASOPHILS # BLD AUTO: 0.04 K/UL (ref 0–0.2)
BASOPHILS NFR BLD: 0.4 % (ref 0–1.9)
BILIRUB SERPL-MCNC: 0.7 MG/DL (ref 0.1–1)
BUN SERPL-MCNC: 14 MG/DL (ref 8–23)
CALCIUM SERPL-MCNC: 9.9 MG/DL (ref 8.7–10.5)
CHLORIDE SERPL-SCNC: 106 MMOL/L (ref 95–110)
CHOLEST SERPL-MCNC: 151 MG/DL (ref 120–199)
CHOLEST/HDLC SERPL: 3.6 {RATIO} (ref 2–5)
CO2 SERPL-SCNC: 21 MMOL/L (ref 23–29)
CREAT SERPL-MCNC: 1.6 MG/DL (ref 0.5–1.4)
DIFFERENTIAL METHOD BLD: ABNORMAL
EOSINOPHIL # BLD AUTO: 0.6 K/UL (ref 0–0.5)
EOSINOPHIL NFR BLD: 5.1 % (ref 0–8)
ERYTHROCYTE [DISTWIDTH] IN BLOOD BY AUTOMATED COUNT: 13.2 % (ref 11.5–14.5)
EST. GFR  (NO RACE VARIABLE): 43.8 ML/MIN/1.73 M^2
ESTIMATED AVG GLUCOSE: 137 MG/DL (ref 68–131)
GLUCOSE SERPL-MCNC: 124 MG/DL (ref 70–110)
HBA1C MFR BLD: 6.4 % (ref 4–5.6)
HCT VFR BLD AUTO: 45.8 % (ref 40–54)
HDLC SERPL-MCNC: 42 MG/DL (ref 40–75)
HDLC SERPL: 27.8 % (ref 20–50)
HGB BLD-MCNC: 15.2 G/DL (ref 14–18)
IMM GRANULOCYTES # BLD AUTO: 0.09 K/UL (ref 0–0.04)
IMM GRANULOCYTES NFR BLD AUTO: 0.8 % (ref 0–0.5)
LDLC SERPL CALC-MCNC: 71 MG/DL (ref 63–159)
LYMPHOCYTES # BLD AUTO: 2.5 K/UL (ref 1–4.8)
LYMPHOCYTES NFR BLD: 23.1 % (ref 18–48)
MCH RBC QN AUTO: 29.4 PG (ref 27–31)
MCHC RBC AUTO-ENTMCNC: 33.2 G/DL (ref 32–36)
MCV RBC AUTO: 89 FL (ref 82–98)
MONOCYTES # BLD AUTO: 0.7 K/UL (ref 0.3–1)
MONOCYTES NFR BLD: 6.3 % (ref 4–15)
NEUTROPHILS # BLD AUTO: 7 K/UL (ref 1.8–7.7)
NEUTROPHILS NFR BLD: 64.3 % (ref 38–73)
NONHDLC SERPL-MCNC: 109 MG/DL
NRBC BLD-RTO: 0 /100 WBC
PLATELET # BLD AUTO: 257 K/UL (ref 150–450)
PMV BLD AUTO: 11 FL (ref 9.2–12.9)
POTASSIUM SERPL-SCNC: 4.1 MMOL/L (ref 3.5–5.1)
PROT SERPL-MCNC: 7.1 G/DL (ref 6–8.4)
RBC # BLD AUTO: 5.17 M/UL (ref 4.6–6.2)
SODIUM SERPL-SCNC: 137 MMOL/L (ref 136–145)
TRIGL SERPL-MCNC: 190 MG/DL (ref 30–150)
TSH SERPL DL<=0.005 MIU/L-ACNC: 2.13 UIU/ML (ref 0.4–4)
WBC # BLD AUTO: 10.9 K/UL (ref 3.9–12.7)

## 2024-01-31 PROCEDURE — 80053 COMPREHEN METABOLIC PANEL: CPT | Performed by: FAMILY MEDICINE

## 2024-01-31 PROCEDURE — 36415 COLL VENOUS BLD VENIPUNCTURE: CPT | Mod: PO | Performed by: FAMILY MEDICINE

## 2024-01-31 PROCEDURE — 80061 LIPID PANEL: CPT | Performed by: FAMILY MEDICINE

## 2024-01-31 PROCEDURE — 83036 HEMOGLOBIN GLYCOSYLATED A1C: CPT | Performed by: FAMILY MEDICINE

## 2024-01-31 PROCEDURE — 85025 COMPLETE CBC W/AUTO DIFF WBC: CPT | Performed by: FAMILY MEDICINE

## 2024-01-31 PROCEDURE — 84443 ASSAY THYROID STIM HORMONE: CPT | Performed by: FAMILY MEDICINE

## 2024-02-06 ENCOUNTER — OFFICE VISIT (OUTPATIENT)
Dept: FAMILY MEDICINE | Facility: CLINIC | Age: 79
End: 2024-02-06
Payer: COMMERCIAL

## 2024-02-06 VITALS
OXYGEN SATURATION: 97 % | SYSTOLIC BLOOD PRESSURE: 118 MMHG | TEMPERATURE: 98 F | DIASTOLIC BLOOD PRESSURE: 74 MMHG | WEIGHT: 209.69 LBS | HEART RATE: 73 BPM | BODY MASS INDEX: 31.06 KG/M2 | HEIGHT: 69 IN

## 2024-02-06 DIAGNOSIS — E53.8 VITAMIN B12 DEFICIENCY: ICD-10-CM

## 2024-02-06 DIAGNOSIS — E03.9 HYPOTHYROIDISM, UNSPECIFIED TYPE: ICD-10-CM

## 2024-02-06 DIAGNOSIS — S06.5XAA SDH (SUBDURAL HEMATOMA): ICD-10-CM

## 2024-02-06 DIAGNOSIS — N18.31 TYPE 2 DIABETES MELLITUS WITH STAGE 3A CHRONIC KIDNEY DISEASE, WITHOUT LONG-TERM CURRENT USE OF INSULIN: Primary | ICD-10-CM

## 2024-02-06 DIAGNOSIS — M10.9 GOUT, UNSPECIFIED CAUSE, UNSPECIFIED CHRONICITY, UNSPECIFIED SITE: ICD-10-CM

## 2024-02-06 DIAGNOSIS — N18.32 STAGE 3B CHRONIC KIDNEY DISEASE: ICD-10-CM

## 2024-02-06 DIAGNOSIS — M17.0 PRIMARY OSTEOARTHRITIS OF BOTH KNEES: ICD-10-CM

## 2024-02-06 DIAGNOSIS — J43.8 OTHER EMPHYSEMA: ICD-10-CM

## 2024-02-06 DIAGNOSIS — E11.22 TYPE 2 DIABETES MELLITUS WITH STAGE 3A CHRONIC KIDNEY DISEASE, WITHOUT LONG-TERM CURRENT USE OF INSULIN: Primary | ICD-10-CM

## 2024-02-06 PROCEDURE — 3288F FALL RISK ASSESSMENT DOCD: CPT | Mod: CPTII,S$GLB,, | Performed by: FAMILY MEDICINE

## 2024-02-06 PROCEDURE — 1101F PT FALLS ASSESS-DOCD LE1/YR: CPT | Mod: CPTII,S$GLB,, | Performed by: FAMILY MEDICINE

## 2024-02-06 PROCEDURE — 3078F DIAST BP <80 MM HG: CPT | Mod: CPTII,S$GLB,, | Performed by: FAMILY MEDICINE

## 2024-02-06 PROCEDURE — 3074F SYST BP LT 130 MM HG: CPT | Mod: CPTII,S$GLB,, | Performed by: FAMILY MEDICINE

## 2024-02-06 PROCEDURE — 1159F MED LIST DOCD IN RCRD: CPT | Mod: CPTII,S$GLB,, | Performed by: FAMILY MEDICINE

## 2024-02-06 PROCEDURE — 1126F AMNT PAIN NOTED NONE PRSNT: CPT | Mod: CPTII,S$GLB,, | Performed by: FAMILY MEDICINE

## 2024-02-06 PROCEDURE — 99215 OFFICE O/P EST HI 40 MIN: CPT | Mod: S$GLB,,, | Performed by: FAMILY MEDICINE

## 2024-02-06 PROCEDURE — 3072F LOW RISK FOR RETINOPATHY: CPT | Mod: CPTII,S$GLB,, | Performed by: FAMILY MEDICINE

## 2024-02-06 PROCEDURE — 99999 PR PBB SHADOW E&M-EST. PATIENT-LVL V: CPT | Mod: PBBFAC,,, | Performed by: FAMILY MEDICINE
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
## 2024-02-20 ENCOUNTER — CLINICAL SUPPORT (OUTPATIENT)
Dept: REHABILITATION | Facility: HOSPITAL | Age: 79
End: 2024-02-20
Attending: FAMILY MEDICINE
Payer: COMMERCIAL

## 2024-02-20 DIAGNOSIS — R26.9 GAIT ABNORMALITY: ICD-10-CM

## 2024-02-20 DIAGNOSIS — M25.562 CHRONIC PAIN OF BOTH KNEES: Primary | ICD-10-CM

## 2024-02-20 DIAGNOSIS — G89.29 CHRONIC PAIN OF BOTH KNEES: Primary | ICD-10-CM

## 2024-02-20 DIAGNOSIS — M25.561 CHRONIC PAIN OF BOTH KNEES: Primary | ICD-10-CM

## 2024-02-20 DIAGNOSIS — M17.0 PRIMARY OSTEOARTHRITIS OF BOTH KNEES: ICD-10-CM

## 2024-02-20 PROCEDURE — 97110 THERAPEUTIC EXERCISES: CPT

## 2024-02-20 PROCEDURE — 97140 MANUAL THERAPY 1/> REGIONS: CPT

## 2024-02-20 PROCEDURE — 97161 PT EVAL LOW COMPLEX 20 MIN: CPT
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
## 2024-02-28 ENCOUNTER — CLINICAL SUPPORT (OUTPATIENT)
Dept: REHABILITATION | Facility: HOSPITAL | Age: 79
End: 2024-02-28
Payer: COMMERCIAL

## 2024-02-28 DIAGNOSIS — G89.29 CHRONIC PAIN OF BOTH KNEES: ICD-10-CM

## 2024-02-28 DIAGNOSIS — R29.898 DECREASED STRENGTH OF LOWER EXTREMITY: Primary | ICD-10-CM

## 2024-02-28 DIAGNOSIS — M25.561 CHRONIC PAIN OF BOTH KNEES: ICD-10-CM

## 2024-02-28 DIAGNOSIS — R26.9 GAIT ABNORMALITY: ICD-10-CM

## 2024-02-28 DIAGNOSIS — M25.562 CHRONIC PAIN OF BOTH KNEES: ICD-10-CM

## 2024-02-28 PROCEDURE — 97530 THERAPEUTIC ACTIVITIES: CPT

## 2024-02-28 PROCEDURE — 97110 THERAPEUTIC EXERCISES: CPT

## 2024-02-28 NOTE — PROGRESS NOTES
"[facility]   Physical Therapy Treatment Note     Name: Jerardo Powers  Clinic Number: [id number]    Therapy Diagnosis:   Encounter Diagnoses   Name Primary?    Decreased strength of lower extremity Yes    Chronic pain of both knees     Gait abnormality      Physician: Adams Moore MD    Visit Date: 2/28/2024    Physician Orders: PT Eval and Treat   Medical Diagnosis from Referral: M17.0 (ICD-10-CM) - Primary osteoarthritis of both knees   Evaluation Date: 2/20/2024  Authorization Period Expiration: 02/05/2024  Plan of Care Expiration: 05/14/2024  Progress Note Due: 03/19/2024  Visit # / Visits authorized: 1/20 (+eval)  FOTO: #1 given at eval     Precautions: Standard, Diabetes, and Fall      Time In: 0230 pm  Time Out: 0330 pm  Total Billable Time: 60 minutes    SUBJECTIVE     Pt reports: he completed his HEP a couple times but felt that the clamshells hurt his lower back.  He was compliant with home exercise program.  Response to previous treatment: no change  Functional change: no change    Pain: 3/10  Location: bilateral knee      OBJECTIVE     Objective Measures updated at progress report unless specified.     Treatment       Jerardo received the treatments listed below:      THERAPEUTIC EXERCISES to develop strength, endurance, ROM, flexibility, posture, and core stabilization for 50 minutes including:  Upright bike; Lv 6.0; 8 min   Quad sets 2 x 20 x 5"   DL Leg Press; 100#; 3 x 12  Lateral band walks; YTB; 2 x turf    MANUAL THERAPY TECHNIQUES including Joint mobilizations and Soft tissue Mobilization were applied to B knee for 00 minutes.    NEUROMUSCULAR RE-EDUCATION ACTIVITIES to improve Balance, Coordination, Kinesthetic, Sense, Proprioception, and Posture for 00 minutes.  The following were included:     THERAPEUTIC ACTIVITIES to improve dynamic and functional performance for 10 minutes including.  Sit to stand 22" box; 3 x 10  DL heel raise; 2 x10      Patient Education " and Home Exercises     Home Exercises Provided and Patient Education Provided     Education provided:   - continue HEP    Written Home Exercises Provided: yes. Exercises were reviewed and Jerardo was able to demonstrate them prior to the end of the session.  Jerardo demonstrated good  understanding of the education provided. See EMR under Patient Instructions for exercises provided during therapy sessions    ASSESSMENT     Jerardo was very fatigued by all activities, most notably by quad sets. He was able to complete all exercises with prolonged rest breaks. Will continue with functional strengthening as well as quad control.     Jerardo Is progressing well towards his goals.   Pt prognosis is Good.     Pt will continue to benefit from skilled outpatient physical therapy to address the deficits listed in the problem list box on initial evaluation, provide pt/family education and to maximize pt's level of independence in the home and community environment.     Pt's spiritual, cultural and educational needs considered and pt agreeable to plan of care and goals.     Anticipated barriers to physical therapy: compliance with HEP    Goals:   Short Term Goals: (3 weeks)  1. Pt will be independent with HEP in order to supplement patient in improving functional mobility. - progressing, not met  2. Pt will improve B knee active hyperextension ROM to 5 deg hyper in order to decrease fat pad impringment. - progressing, not met  3. Pt will improve hip flexor/quadriceps mobility to WNL in order to improve hip/knee AROM and improved gait function - progressing, not met  4. Pt will improve hip abduction strength from 3+/5 to 4+/5 to improve functional gait deviation. - progressing, not met     Long Term Goals: (6 weeks)  1. Pt will be independent with updated HEP supplement PT in improving functional mobility. - progressing, not met  2. Pt will improve FOTO knee survey score to </= 44 % limited in order to demo improved functional  mobility. - progressing, not met  3. Pt will demonstrate tolerance to 10 min walking on treadmill with minimal gait compensation and no increase in pain to demonstrate tolerance to community ambulation. - progressing, not met  5. Pt will perform at least 10 sit to stands without UE support on 30 second sit to stand test in order to demo improved ability to perform transfers. - progressing, not met    PLAN     Continue to progress quad control    Shauna Negrete, PT, DPT

## 2024-03-12 RX ORDER — METFORMIN HYDROCHLORIDE 500 MG/1
1000 TABLET, EXTENDED RELEASE ORAL 2 TIMES DAILY WITH MEALS
Qty: 360 TABLET | Refills: 1 | Status: SHIPPED | OUTPATIENT
Start: 2024-03-12

## 2024-03-12 NOTE — TELEPHONE ENCOUNTER
Refill Routing Note   Medication(s) are not appropriate for processing by [facility] for the following reason(s):        Required labs abnormal    [facility] action(s):  Defer        Medication Therapy Plan: eGFR of 43.8 on 1/31/24; Metformin recommended dose is 500 mg twice daily; deferred to pcp    Pharmacist review requested: Yes   Extended chart review required: Yes     Appointments  past 12m or future 3m with PCP    Date Provider   Last Visit   2/6/2024 Adams Moore MD   Next Visit   5/6/2024 Adams Moore MD   ED visits in past 90 days: 0        Note composed:11:41 AM 03/12/2024

## 2024-03-12 NOTE — TELEPHONE ENCOUNTER
Refill Routing Note   Medication(s) are not appropriate for processing by [facility] for the following reason(s):        Required labs abnormal  CREATININE 1.6 (H) 01/31/2024      EGFRNORACEVR 43.8 (A) 01/31/2024       [facility] action(s):  Defer             Pharmacist review requested: Yes     Appointments  past 12m or future 3m with PCP    Date Provider   Last Visit   2/6/2024 Adams Moore MD   Next Visit   5/6/2024 Adams Moore MD   ED visits in past 90 days: 0        Note composed:10:20 AM 03/12/2024

## 2024-03-12 NOTE — TELEPHONE ENCOUNTER
No care due was identified.  [facility] Embedded Care Due Messages. Reference number: [id number].   3/12/2024 12:17:43 AM CDT

## 2024-03-13 ENCOUNTER — CLINICAL SUPPORT (OUTPATIENT)
Dept: REHABILITATION | Facility: HOSPITAL | Age: 79
End: 2024-03-13
Payer: COMMERCIAL

## 2024-03-13 DIAGNOSIS — R29.898 DECREASED STRENGTH OF LOWER EXTREMITY: Primary | ICD-10-CM

## 2024-03-13 DIAGNOSIS — M25.561 CHRONIC PAIN OF BOTH KNEES: ICD-10-CM

## 2024-03-13 DIAGNOSIS — R26.9 GAIT ABNORMALITY: ICD-10-CM

## 2024-03-13 DIAGNOSIS — G89.29 CHRONIC PAIN OF BOTH KNEES: ICD-10-CM

## 2024-03-13 DIAGNOSIS — M25.562 CHRONIC PAIN OF BOTH KNEES: ICD-10-CM

## 2024-03-13 PROCEDURE — 97110 THERAPEUTIC EXERCISES: CPT

## 2024-03-13 PROCEDURE — 97530 THERAPEUTIC ACTIVITIES: CPT

## 2024-03-13 NOTE — PROGRESS NOTES
"[facility]   Physical Therapy Treatment Note     Name: Jerardo Powers  Clinic Number: [id number]    Therapy Diagnosis:   Encounter Diagnoses   Name Primary?    Decreased strength of lower extremity Yes    Chronic pain of both knees     Gait abnormality      Physician: Adams Moore MD    Visit Date: 3/13/2024    Physician Orders: PT Eval and Treat   Medical Diagnosis from Referral: M17.0 (ICD-10-CM) - Primary osteoarthritis of both knees   Evaluation Date: 2/20/2024  Authorization Period Expiration: 02/05/2024  Plan of Care Expiration: 05/14/2024  Progress Note Due: 03/19/2024  Visit # / Visits authorized: 2/20 (+eval)  FOTO: #1 given at eval     Precautions: Standard, Diabetes, and Fall      Time In: 0230 pm  Time Out: 0330 pm  Total Billable Time: 60 minutes    SUBJECTIVE     Pt reports: he has not done any of the exercises at home. He rates his knee pain at 1-2/10.   He was not compliant with home exercise program.  Response to previous treatment: no change  Functional change: no change    Pain: 1/10  Location: bilateral knee      OBJECTIVE     Objective Measures updated at progress report unless specified.     Treatment       Jerardo received the treatments listed below:      THERAPEUTIC EXERCISES to develop strength, endurance, ROM, flexibility, posture, and core stabilization for 50 minutes including:  Upright bike; Lv 4.0; 10 min   Quad sets 2 x 15 x 5"   DL Leg Press; 100#; 2 x 10  Lateral band walks; YTB; 2 x 20 ft  SLR 1 x 15    MANUAL THERAPY TECHNIQUES including Joint mobilizations and Soft tissue Mobilization were applied to B knee for 00 minutes.    NEUROMUSCULAR RE-EDUCATION ACTIVITIES to improve Balance, Coordination, Kinesthetic, Sense, Proprioception, and Posture for 00 minutes.  The following were included:     THERAPEUTIC ACTIVITIES to improve dynamic and functional performance for 10 minutes including.  Mini squats 2 x 10     NP:  Sit to stand 22" box; 3 x 10  DL " heel raise; 2 x10      Patient Education and Home Exercises     Home Exercises Provided and Patient Education Provided     Education provided:   - continue HEP    Written Home Exercises Provided: yes. Exercises were reviewed and Jerardo was able to demonstrate them prior to the end of the session.  Jerardo demonstrated good  understanding of the education provided. See EMR under Patient Instructions for exercises provided during therapy sessions    ASSESSMENT     Jerardo presented with feelings of achy-ness in bilateral knees that he reports as 1-2/10. Patient was fatigued with all activities, most notably mini squats and leg press. He described a feeling of warmness and soreness in bilateral quads that he attributes to muscle activity. He required prolonged rest break between activities, especially after biking. Will continue to progress LE strengthening and muscle activity.    Jerardo Is progressing well towards his goals.   Pt prognosis is Good.     Pt will continue to benefit from skilled outpatient physical therapy to address the deficits listed in the problem list box on initial evaluation, provide pt/family education and to maximize pt's level of independence in the home and community environment.     Pt's spiritual, cultural and educational needs considered and pt agreeable to plan of care and goals.     Anticipated barriers to physical therapy: compliance with HEP    Goals:   Short Term Goals: (3 weeks)  1. Pt will be independent with HEP in order to supplement patient in improving functional mobility. - progressing, not met  2. Pt will improve B knee active hyperextension ROM to 5 deg hyper in order to decrease fat pad impringment. - progressing, not met  3. Pt will improve hip flexor/quadriceps mobility to WNL in order to improve hip/knee AROM and improved gait function - progressing, not met  4. Pt will improve hip abduction strength from 3+/5 to 4+/5 to improve functional gait deviation. - progressing, not  met     Long Term Goals: (6 weeks)  1. Pt will be independent with updated HEP supplement PT in improving functional mobility. - progressing, not met  2. Pt will improve FOTO knee survey score to </= 44 % limited in order to demo improved functional mobility. - progressing, not met  3. Pt will demonstrate tolerance to 10 min walking on treadmill with minimal gait compensation and no increase in pain to demonstrate tolerance to community ambulation. - progressing, not met  5. Pt will perform at least 10 sit to stands without UE support on 30 second sit to stand test in order to demo improved ability to perform transfers. - progressing, not met    PLAN     Continue to progress quad control    Shauna Negrete, PT, DPT     Co-treated with Tej Burch, SPT    I certify that I was present in the room directing the student in service delivery and guiding them using my skilled judgment. As the co-signing therapist I have reviewed the students documentation and am responsible for the treatment, assessment, and plan.

## 2024-03-18 ENCOUNTER — TELEPHONE (OUTPATIENT)
Dept: FAMILY MEDICINE | Facility: CLINIC | Age: 79
End: 2024-03-18
Payer: COMMERCIAL

## 2024-03-18 ENCOUNTER — PATIENT MESSAGE (OUTPATIENT)
Dept: ADMINISTRATIVE | Facility: HOSPITAL | Age: 79
End: 2024-03-18
Payer: COMMERCIAL

## 2024-03-18 ENCOUNTER — PATIENT OUTREACH (OUTPATIENT)
Dept: ADMINISTRATIVE | Facility: HOSPITAL | Age: 79
End: 2024-03-18
Payer: COMMERCIAL

## 2024-03-18 DIAGNOSIS — N18.30 TYPE 2 DIABETES MELLITUS WITH STAGE 3 CHRONIC KIDNEY DISEASE, WITHOUT LONG-TERM CURRENT USE OF INSULIN, UNSPECIFIED WHETHER STAGE 3A OR 3B CKD: Primary | ICD-10-CM

## 2024-03-18 DIAGNOSIS — E11.22 TYPE 2 DIABETES MELLITUS WITH STAGE 3 CHRONIC KIDNEY DISEASE, WITHOUT LONG-TERM CURRENT USE OF INSULIN, UNSPECIFIED WHETHER STAGE 3A OR 3B CKD: Primary | ICD-10-CM

## 2024-03-18 NOTE — TELEPHONE ENCOUNTER
----- Message from Ana Maria Pollack LPN sent at 3/18/2024  2:44 PM CDT -----  Regarding: Lab Orders  Hello,    This patient is coming to see you on 05/06/24.  He's due for a microalbumin urine.  Would you like to include any additional labs? Please advise.

## 2024-03-18 NOTE — PROGRESS NOTES
Population Health Chart Review & Patient Outreach Details      Additional [facility] Health Notes:    Sent portal msg per patient's request.       Updates Requested / Reviewed:      Updated Care Coordination Note, Care Everywhere, Care Team Updated, and Immunizations Reconciliation Completed or Queried: [facility] Topics Overdue:      [facility] Score: 1     Urine Screening    RSV Vaccine                  Health Maintenance Topic(s) Outreach Outcomes & Actions Taken:    Lab(s) - Outreach Outcomes & Actions Taken  : Overdue Lab(s) Ordered and Primary Care Follow Up Visit Scheduled     Eye Exam - Outreach Outcomes & Actions Taken  : Eye Exam Screening Order Placed

## 2024-03-18 NOTE — TELEPHONE ENCOUNTER
Should have additional labs including CMP, lipid, A1c that he can get done before next visit along with his urine microalbumin.

## 2024-03-19 ENCOUNTER — CLINICAL SUPPORT (OUTPATIENT)
Dept: REHABILITATION | Facility: HOSPITAL | Age: 79
End: 2024-03-19
Payer: COMMERCIAL

## 2024-03-19 DIAGNOSIS — G89.29 CHRONIC PAIN OF BOTH KNEES: ICD-10-CM

## 2024-03-19 DIAGNOSIS — R29.898 DECREASED STRENGTH OF LOWER EXTREMITY: Primary | ICD-10-CM

## 2024-03-19 DIAGNOSIS — M25.561 CHRONIC PAIN OF BOTH KNEES: ICD-10-CM

## 2024-03-19 DIAGNOSIS — M25.562 CHRONIC PAIN OF BOTH KNEES: ICD-10-CM

## 2024-03-19 DIAGNOSIS — R26.9 GAIT ABNORMALITY: ICD-10-CM

## 2024-03-19 PROCEDURE — 97530 THERAPEUTIC ACTIVITIES: CPT

## 2024-03-19 PROCEDURE — 97110 THERAPEUTIC EXERCISES: CPT

## 2024-03-19 NOTE — PROGRESS NOTES
"[facility]   Physical Therapy Treatment Note     Name: Jerardo Powers  Clinic Number: [id number]    Therapy Diagnosis:   Encounter Diagnoses   Name Primary?    Decreased strength of lower extremity Yes    Chronic pain of both knees     Gait abnormality        Physician: Adams Moore MD    Visit Date: 3/19/2024    Physician Orders: PT Eval and Treat   Medical Diagnosis from Referral: M17.0 (ICD-10-CM) - Primary osteoarthritis of both knees   Evaluation Date: 2/20/2024  Authorization Period Expiration: 02/05/2024  Plan of Care Expiration: 05/14/2024  Progress Note Due: 03/19/2024  Visit # / Visits authorized: 3/20 (+eval)  FOTO: #1 given at eval     Precautions: Standard, Diabetes, and Fall      Time In: 0205 pm  Time Out: 0300 pm  Total Billable Time: 45 minutes     SUBJECTIVE     Pt reports: he continues to have knee pain that he rates as 4-5/10. He has been taking the stairs and walking more throughout the day, as opposed to taking the elevator. He reports increased knee pain with ascending stairs vs descending.   He was not compliant with home exercise program.  Response to previous treatment: no change  Functional change: no change    Pain: 4-5/10  Location: bilateral knee      OBJECTIVE     Objective Measures updated at progress report unless specified.     Treatment       Jerardo received the treatments listed below:      THERAPEUTIC EXERCISES to develop strength, endurance, ROM, flexibility, posture, and core stabilization for 25 minutes including:  Upright bike; Lv 4.0; 10 min   Eccentric SL leg press 120# 1 x 10 B   Pt education    NP:  Quad sets 2 x 15 x 5"   DL Leg Press; 100#; 2 x 10  Lateral band walks; YTB; 2 x 20 ft  SLR 1 x 15    MANUAL THERAPY TECHNIQUES including Joint mobilizations and Soft tissue Mobilization were applied to B knee for 05 minutes.  R fat pad mobilizations     NEUROMUSCULAR RE-EDUCATION ACTIVITIES to improve Balance, Coordination, Kinesthetic, Sense, " "Proprioception, and Posture for 00 minutes.  The following were included:     THERAPEUTIC ACTIVITIES to improve dynamic and functional performance for 25 minutes including.  Step ups 4" 2 x 10 B  Step downs 3" 1 x 10 B- heavy cues and rest breaks required      NP:  Sit to stand 22" box; 3 x 10  DL heel raise; 2 x10      Patient Education and Home Exercises     Home Exercises Provided and Patient Education Provided     Education provided:   - continue HEP    Written Home Exercises Provided: yes. Exercises were reviewed and Jerardo was able to demonstrate them prior to the end of the session.  Jerardo demonstrated good  understanding of the education provided. See EMR under Patient Instructions for exercises provided during therapy sessions    ASSESSMENT   Jerardo presented to PT with 4/10 anterior pain in bilateral knees. Pt demonstrated femoral adduction/internal rotation during step up and step down activities. During step ups with the left lower extremtiy patient demonstrated knee buckling at 30 degrees of knee extension. He was able to correct the knee buckling with cues for improved quad control. Patient reported increased difficulty with eccentric step downs and would benefit from continued eccentric strengthening in bilateral lower extremities to improve tolerance to functional activities. Jerardo requires continuous motivation and verbal and tactile cues throughout session to remain focused. Will continue to progress as able.     Jerardo Is progressing well towards his goals.   Pt prognosis is Good.     Pt will continue to benefit from skilled outpatient physical therapy to address the deficits listed in the problem list box on initial evaluation, provide pt/family education and to maximize pt's level of independence in the home and community environment.     Pt's spiritual, cultural and educational needs considered and pt agreeable to plan of care and goals.     Anticipated barriers to physical therapy: compliance " with HEP    Goals:   Short Term Goals: (3 weeks)  1. Pt will be independent with HEP in order to supplement patient in improving functional mobility. - progressing, not met  2. Pt will improve B knee active hyperextension ROM to 5 deg hyper in order to decrease fat pad impringment. - progressing, not met  3. Pt will improve hip flexor/quadriceps mobility to WNL in order to improve hip/knee AROM and improved gait function - progressing, not met  4. Pt will improve hip abduction strength from 3+/5 to 4+/5 to improve functional gait deviation. - progressing, not met     Long Term Goals: (6 weeks)  1. Pt will be independent with updated HEP supplement PT in improving functional mobility. - progressing, not met  2. Pt will improve FOTO knee survey score to </= 44 % limited in order to demo improved functional mobility. - progressing, not met  3. Pt will demonstrate tolerance to 10 min walking on treadmill with minimal gait compensation and no increase in pain to demonstrate tolerance to community ambulation. - progressing, not met  5. Pt will perform at least 10 sit to stands without UE support on 30 second sit to stand test in order to demo improved ability to perform transfers. - progressing, not met    PLAN     Continue to progress quad control    Shauna Negrete, PT, DPT     Co-treated with Tej Burch, SPT    I certify that I was present in the room directing the student in service delivery and guiding them using my skilled judgment. As the co-signing therapist I have reviewed the students documentation and am responsible for the treatment, assessment, and plan.

## 2024-03-23 NOTE — TELEPHONE ENCOUNTER
03/22/24 1743   Call Information   Date of Call 03/22/24   Time of Call 1743   Name of person requesting the team Yudelka CASTILLO   Title of person requesting team RN   RRT Arrival time 1745   Reason for call   Type of RRT Adult   Primary reason for call Cardiovascular   Cardiovascular SBP less than 90   Was patient transferred from the ED, ICU, or PACU within last 24 hours prior to RRT call? Yes   SBAR   Situation SBP 70's, -150's   Background Per MD note: history of multiple myeloma status post autologous HSCT on 3/13/24 whose post transplant course has been complicated by ESBL E coli bacteremia and new episodes of supraventricular tachycardia.   Notable History/Conditions Cancer;Transplant   Assessment Patient alert, answering questions appropriately. Daughter at bedside translating. PERRL. Reports sharp L shoulder pain. Lung sounds clear. -150's, irregular. Occasional ectopy. Abdomen soft, non tender. Reports mild lower abdominal pain (PRN Tylenol given approx 1.5 hours ago).   Interventions ECG;Fluid bolus;Meds;Labs   Patient Outcome   Patient Outcome Transferred to  (C pending)   RRT Team   Attending/Primary/Covering Physician Zach Monet MD/ BMT Adult   Date Attending Physician notified 03/22/24   Time Attending Physician notified 1743   Physician(s) Radha Kyle, PA-C   Lead JUANY CASTILLO   RT NA   Other staff Zeeshan PFEIFFER  (Took over for Lead JUANY Mistry at 1915)   Post RRT Intervention Assessment   Post RRT Assessment Transferred to [facility]  (IM status but bed in ICU)   Date Follow Up Done 03/22/24   Time Follow Up Done 2035   Comments transferred to higher level of care w/o complication        Has noticed bloody urine with passing tissues.  I asked him to come to the ER.  They can do CT urogram.  I may need to do urgent cysto to evaluate what it is bleeding from his urinary tracts.

## 2024-03-27 RX ORDER — SEMAGLUTIDE 2.68 MG/ML
2 INJECTION, SOLUTION SUBCUTANEOUS
Qty: 3 ML | Refills: 11 | Status: SHIPPED | OUTPATIENT
Start: 2024-03-27

## 2024-04-01 NOTE — TELEPHONE ENCOUNTER
Unable to initiate PA via Epic link. Completed it on Cover My Meds and got an approval.     CaseId:[id number];Status:Approved;Review Type:Prior Auth;Coverage Start Date:04/01/2024;Coverage End Date:04/01/2025;. Authorization Expiration Date: April 1, 2025.

## 2024-04-07 RX ORDER — AMLODIPINE BESYLATE 10 MG/1
TABLET ORAL
Qty: 90 TABLET | Refills: 3 | Status: SHIPPED | OUTPATIENT
Start: 2024-04-07

## 2024-04-07 NOTE — TELEPHONE ENCOUNTER
No care due was identified.  [facility] Embedded Care Due Messages. Reference number: [id number].   4/07/2024 12:54:52 AM CDT

## 2024-04-07 NOTE — TELEPHONE ENCOUNTER
Refill Decision Note   Jerardo Powers  is requesting a refill authorization.  Brief Assessment and Rationale for Refill:  Approve     Medication Therapy Plan:        Comments:     Note composed:2:30 PM 04/07/2024

## 2024-04-14 RX ORDER — LEVOTHYROXINE SODIUM 88 UG/1
TABLET ORAL
Qty: 90 TABLET | Refills: 3 | Status: SHIPPED | OUTPATIENT
Start: 2024-04-14

## 2024-04-14 NOTE — TELEPHONE ENCOUNTER
No care due was identified.  [facility] Embedded Care Due Messages. Reference number: [id number].   4/14/2024 1:07:10 AM CDT

## 2024-04-14 NOTE — TELEPHONE ENCOUNTER
Refill Decision Note   Jerardo Powers  is requesting a refill authorization.  Brief Assessment and Rationale for Refill:  Approve     Medication Therapy Plan:         Comments:     Note composed:6:49 PM 04/14/2024             Appointments     Last Visit   2/6/2024 Adams Moore MD   Next Visit   5/6/2024 Adams Moore MD

## 2024-04-30 ENCOUNTER — LAB VISIT (OUTPATIENT)
Dept: LAB | Facility: HOSPITAL | Age: 79
End: 2024-04-30
Attending: FAMILY MEDICINE
Payer: COMMERCIAL

## 2024-04-30 DIAGNOSIS — N18.31 TYPE 2 DIABETES MELLITUS WITH STAGE 3A CHRONIC KIDNEY DISEASE, WITHOUT LONG-TERM CURRENT USE OF INSULIN: ICD-10-CM

## 2024-04-30 DIAGNOSIS — E11.22 TYPE 2 DIABETES MELLITUS WITH STAGE 3A CHRONIC KIDNEY DISEASE, WITHOUT LONG-TERM CURRENT USE OF INSULIN: ICD-10-CM

## 2024-04-30 LAB
ALBUMIN SERPL BCP-MCNC: 3.8 G/DL (ref 3.5–5.2)
ALBUMIN/CREAT UR: 10.7 UG/MG (ref 0–30)
ALP SERPL-CCNC: 117 U/L (ref 55–135)
ALT SERPL W/O P-5'-P-CCNC: 22 U/L (ref 10–44)
ANION GAP SERPL CALC-SCNC: 11 MMOL/L (ref 8–16)
AST SERPL-CCNC: 16 U/L (ref 10–40)
BILIRUB SERPL-MCNC: 0.7 MG/DL (ref 0.1–1)
BUN SERPL-MCNC: 15 MG/DL (ref 8–23)
CALCIUM SERPL-MCNC: 9.7 MG/DL (ref 8.7–10.5)
CHLORIDE SERPL-SCNC: 102 MMOL/L (ref 95–110)
CHOLEST SERPL-MCNC: 159 MG/DL (ref 120–199)
CHOLEST/HDLC SERPL: 4 {RATIO} (ref 2–5)
CO2 SERPL-SCNC: 28 MMOL/L (ref 23–29)
CREAT SERPL-MCNC: 1.4 MG/DL (ref 0.5–1.4)
CREAT UR-MCNC: 103 MG/DL (ref 23–375)
EST. GFR  (NO RACE VARIABLE): 51 ML/MIN/1.73 M^2
ESTIMATED AVG GLUCOSE: 146 MG/DL (ref 68–131)
GLUCOSE SERPL-MCNC: 107 MG/DL (ref 70–110)
HBA1C MFR BLD: 6.7 % (ref 4–5.6)
HDLC SERPL-MCNC: 40 MG/DL (ref 40–75)
HDLC SERPL: 25.2 % (ref 20–50)
LDLC SERPL CALC-MCNC: 48 MG/DL (ref 63–159)
MICROALBUMIN UR DL<=1MG/L-MCNC: 11 UG/ML
NONHDLC SERPL-MCNC: 119 MG/DL
POTASSIUM SERPL-SCNC: 4 MMOL/L (ref 3.5–5.1)
PROT SERPL-MCNC: 7.2 G/DL (ref 6–8.4)
SODIUM SERPL-SCNC: 141 MMOL/L (ref 136–145)
TRIGL SERPL-MCNC: 355 MG/DL (ref 30–150)

## 2024-04-30 PROCEDURE — 80053 COMPREHEN METABOLIC PANEL: CPT | Performed by: FAMILY MEDICINE

## 2024-04-30 PROCEDURE — 82043 UR ALBUMIN QUANTITATIVE: CPT | Performed by: FAMILY MEDICINE

## 2024-04-30 PROCEDURE — 83036 HEMOGLOBIN GLYCOSYLATED A1C: CPT | Performed by: FAMILY MEDICINE

## 2024-04-30 PROCEDURE — 36415 COLL VENOUS BLD VENIPUNCTURE: CPT | Performed by: FAMILY MEDICINE

## 2024-04-30 PROCEDURE — 80061 LIPID PANEL: CPT | Performed by: FAMILY MEDICINE

## 2024-05-06 ENCOUNTER — OFFICE VISIT (OUTPATIENT)
Dept: FAMILY MEDICINE | Facility: CLINIC | Age: 79
End: 2024-05-06
Payer: COMMERCIAL

## 2024-05-06 VITALS
BODY MASS INDEX: 30.92 KG/M2 | HEIGHT: 69 IN | DIASTOLIC BLOOD PRESSURE: 68 MMHG | HEART RATE: 84 BPM | SYSTOLIC BLOOD PRESSURE: 122 MMHG | OXYGEN SATURATION: 96 % | TEMPERATURE: 98 F | WEIGHT: 208.75 LBS

## 2024-05-06 DIAGNOSIS — E03.9 HYPOTHYROIDISM, UNSPECIFIED TYPE: ICD-10-CM

## 2024-05-06 DIAGNOSIS — R23.2 HOT FLASHES: ICD-10-CM

## 2024-05-06 DIAGNOSIS — E11.22 TYPE 2 DIABETES MELLITUS WITH STAGE 3A CHRONIC KIDNEY DISEASE, WITHOUT LONG-TERM CURRENT USE OF INSULIN: Primary | ICD-10-CM

## 2024-05-06 DIAGNOSIS — M17.0 PRIMARY OSTEOARTHRITIS OF BOTH KNEES: ICD-10-CM

## 2024-05-06 DIAGNOSIS — M10.9 GOUT, UNSPECIFIED CAUSE, UNSPECIFIED CHRONICITY, UNSPECIFIED SITE: ICD-10-CM

## 2024-05-06 DIAGNOSIS — E53.8 VITAMIN B12 DEFICIENCY: ICD-10-CM

## 2024-05-06 DIAGNOSIS — E55.9 VITAMIN D DEFICIENCY: ICD-10-CM

## 2024-05-06 DIAGNOSIS — N18.31 TYPE 2 DIABETES MELLITUS WITH STAGE 3A CHRONIC KIDNEY DISEASE, WITHOUT LONG-TERM CURRENT USE OF INSULIN: Primary | ICD-10-CM

## 2024-05-06 PROCEDURE — 99999 PR PBB SHADOW E&M-EST. PATIENT-LVL V: CPT | Mod: PBBFAC,,, | Performed by: FAMILY MEDICINE

## 2024-05-06 PROCEDURE — 1101F PT FALLS ASSESS-DOCD LE1/YR: CPT | Mod: CPTII,S$GLB,, | Performed by: FAMILY MEDICINE

## 2024-05-06 PROCEDURE — 3074F SYST BP LT 130 MM HG: CPT | Mod: CPTII,S$GLB,, | Performed by: FAMILY MEDICINE

## 2024-05-06 PROCEDURE — G2211 COMPLEX E/M VISIT ADD ON: HCPCS | Mod: S$GLB,,, | Performed by: FAMILY MEDICINE

## 2024-05-06 PROCEDURE — 3072F LOW RISK FOR RETINOPATHY: CPT | Mod: CPTII,S$GLB,, | Performed by: FAMILY MEDICINE

## 2024-05-06 PROCEDURE — 3288F FALL RISK ASSESSMENT DOCD: CPT | Mod: CPTII,S$GLB,, | Performed by: FAMILY MEDICINE

## 2024-05-06 PROCEDURE — 1159F MED LIST DOCD IN RCRD: CPT | Mod: CPTII,S$GLB,, | Performed by: FAMILY MEDICINE

## 2024-05-06 PROCEDURE — 99214 OFFICE O/P EST MOD 30 MIN: CPT | Mod: S$GLB,,, | Performed by: FAMILY MEDICINE

## 2024-05-06 PROCEDURE — 3078F DIAST BP <80 MM HG: CPT | Mod: CPTII,S$GLB,, | Performed by: FAMILY MEDICINE

## 2024-05-06 PROCEDURE — 1125F AMNT PAIN NOTED PAIN PRSNT: CPT | Mod: CPTII,S$GLB,, | Performed by: FAMILY MEDICINE

## 2024-05-06 RX ORDER — IRBESARTAN 300 MG/1
300 TABLET ORAL NIGHTLY
Qty: 90 TABLET | Refills: 3 | Status: SHIPPED | OUTPATIENT
Start: 2024-05-06 | End: 2025-05-06
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
## 2024-06-23 RX ORDER — DICLOFENAC SODIUM 75 MG/1
75 TABLET, DELAYED RELEASE ORAL 2 TIMES DAILY
Qty: 60 TABLET | Refills: 0 | Status: SHIPPED | OUTPATIENT
Start: 2024-06-23

## 2024-06-23 RX ORDER — METFORMIN HYDROCHLORIDE 500 MG/1
1000 TABLET, EXTENDED RELEASE ORAL 2 TIMES DAILY WITH MEALS
Qty: 360 TABLET | Refills: 1 | Status: SHIPPED | OUTPATIENT
Start: 2024-06-23

## 2024-06-23 NOTE — TELEPHONE ENCOUNTER
Care Due:                  Date            Visit Type   Department     Provider  --------------------------------------------------------------------------------                                EP -                              PRIMARY      [facility] FAMILY  Last Visit: 05-      CARE ([facility])   MEDICINE       Adams Moore  Next Visit: None Scheduled  None         None Found                                                            Last  Test          Frequency    Reason                     Performed    Due Date  --------------------------------------------------------------------------------    Uric Acid...  12 months..  allopurinoL..............  09- 09-    [facility] Embedded Care Due Messages. Reference number: [id number].   6/23/2024 10:59:05 AM CDT

## 2024-06-23 NOTE — TELEPHONE ENCOUNTER
Refill Routing Note   Medication(s) are not appropriate for processing by [facility] for the following reason(s):        Due for refill >6 months ago    [facility] action(s):  Defer  Approve     Requires labs : Yes             Appointments  past 12m or future 3m with PCP    Date Provider   Last Visit   5/6/2024 Adams Moore MD   Next Visit   8/12/2024 Adams Moore MD   ED visits in past 90 days: 0        Note composed:12:32 PM 06/23/2024

## 2024-06-24 RX ORDER — FLUTICASONE PROPIONATE 50 MCG
2 SPRAY, SUSPENSION (ML) NASAL
Qty: 16 ML | Refills: 4 | Status: SHIPPED | OUTPATIENT
Start: 2024-06-24

## 2024-07-18 ENCOUNTER — TELEPHONE (OUTPATIENT)
Dept: OPTOMETRY | Facility: CLINIC | Age: 79
End: 2024-07-18
Payer: COMMERCIAL

## 2024-07-22 ENCOUNTER — OFFICE VISIT (OUTPATIENT)
Dept: OPTOMETRY | Facility: CLINIC | Age: 79
End: 2024-07-22
Payer: COMMERCIAL

## 2024-07-22 DIAGNOSIS — E11.22 TYPE 2 DIABETES MELLITUS WITH STAGE 3 CHRONIC KIDNEY DISEASE, WITHOUT LONG-TERM CURRENT USE OF INSULIN, UNSPECIFIED WHETHER STAGE 3A OR 3B CKD: Primary | ICD-10-CM

## 2024-07-22 DIAGNOSIS — H52.4 PRESBYOPIA: ICD-10-CM

## 2024-07-22 DIAGNOSIS — Z98.890 H/O LASER PHOTOCOAGULATION OF RETINA: ICD-10-CM

## 2024-07-22 DIAGNOSIS — H04.123 DRY EYE SYNDROME OF BOTH EYES: ICD-10-CM

## 2024-07-22 DIAGNOSIS — H52.203 ASTIGMATISM OF BOTH EYES, UNSPECIFIED TYPE: ICD-10-CM

## 2024-07-22 DIAGNOSIS — H35.413 LATTICE DEGENERATION OF BOTH RETINAS: ICD-10-CM

## 2024-07-22 DIAGNOSIS — E11.9 TYPE 2 DIABETES MELLITUS WITHOUT OPHTHALMIC MANIFESTATIONS: ICD-10-CM

## 2024-07-22 DIAGNOSIS — N18.30 TYPE 2 DIABETES MELLITUS WITH STAGE 3 CHRONIC KIDNEY DISEASE, WITHOUT LONG-TERM CURRENT USE OF INSULIN, UNSPECIFIED WHETHER STAGE 3A OR 3B CKD: Primary | ICD-10-CM

## 2024-07-22 DIAGNOSIS — Z96.1 PSEUDOPHAKIA OF BOTH EYES: ICD-10-CM

## 2024-07-22 DIAGNOSIS — H43.812 PVD (POSTERIOR VITREOUS DETACHMENT), LEFT EYE: ICD-10-CM

## 2024-07-22 PROCEDURE — 2023F DILAT RTA XM W/O RTNOPTHY: CPT | Mod: CPTII,S$GLB,, | Performed by: OPTOMETRIST

## 2024-07-22 PROCEDURE — 1160F RVW MEDS BY RX/DR IN RCRD: CPT | Mod: CPTII,S$GLB,, | Performed by: OPTOMETRIST

## 2024-07-22 PROCEDURE — 92014 COMPRE OPH EXAM EST PT 1/>: CPT | Mod: S$GLB,,, | Performed by: OPTOMETRIST

## 2024-07-22 PROCEDURE — 1159F MED LIST DOCD IN RCRD: CPT | Mod: CPTII,S$GLB,, | Performed by: OPTOMETRIST

## 2024-07-22 PROCEDURE — 92015 DETERMINE REFRACTIVE STATE: CPT | Mod: S$GLB,,, | Performed by: OPTOMETRIST

## 2024-07-22 PROCEDURE — 99999 PR PBB SHADOW E&M-EST. PATIENT-LVL III: CPT | Mod: PBBFAC,,, | Performed by: OPTOMETRIST

## 2024-07-22 NOTE — PROGRESS NOTES
HPI    HERBERT: 03/22/2023  MRX: 4 yrs old  Gtt's: Systane QD OU    Jerardo Powers is a 78 y.o. male who comes in for diabetic eye exam.   Pt. Reports vision have changed at distance.  Pt. Recently started using   Ozempic and wants to make sure it will not affect his vision.  Pt. Have trouble opening eyes in the morning due to burning sensation.  Pt. Denies pain or discomfort.     (+)blurred vision  (--)Headaches  (--)diplopia  (--)flashes  (+)floaters   (--)pain  (--)Itching  (--)tearing  (+)burning  (+)Dryness  (+) OTC Drops Systane   (--)Photophobia      Last edited by Fabiola Martínez on 7/22/2024  1:57 PM.            Assessment /Plan     For exam results, see Encounter Report.    Type 2 diabetes mellitus with stage 3 chronic kidney disease, without long-term current use of insulin, unspecified whether stage 3a or 3b CKD  Type 2 diabetes mellitus without ophthalmic manifestations   Monitor yearly    PVD (posterior vitreous detachment), left eye  Lattice degeneration of both retinas  H/O laser photocoagulation of retina   Stable, monitor     Dry eye syndrome of both eyes   Increase systane to BID/PRN    Pseudophakia of both eyes   Distance lens OD, MFIOL OS   ASHANTI  Jan/ Fen 2013    Presbyopia  Astigmatism of both eyes, unspecified type   Rx specs    RTC 1 year, sooner PRN

## 2024-08-02 ENCOUNTER — TELEPHONE (OUTPATIENT)
Dept: OPTOMETRY | Facility: CLINIC | Age: 79
End: 2024-08-02
Payer: COMMERCIAL

## 2024-08-02 ENCOUNTER — TELEPHONE (OUTPATIENT)
Dept: OPHTHALMOLOGY | Facility: CLINIC | Age: 79
End: 2024-08-02
Payer: COMMERCIAL

## 2024-08-02 NOTE — TELEPHONE ENCOUNTER
Spoke to pt about OS symptoms and informed him that triage will contact him in regards to scheduling an appointment.       ----- Message from Fabi Willis sent at 8/2/2024 10:58 AM CDT -----  Regarding: Scheduling Request  Contact: Jerardo Powers  Scheduling Request           Appt Type:  ASAP     Date/Time Preference:ASAP     Treating Provider:Jaya Iglesiaser Name:Jerardo Powers      Contact Preference:[phone] (home)       Comments/notes:Patient is calling stating that his left eye is busted and bleeding. I asked patient if he needed to go to ER he said no he wanted to talk to doctor. Requesting a call back

## 2024-08-02 NOTE — TELEPHONE ENCOUNTER
----- Message from Joy Duran sent at 8/2/2024  3:07 PM CDT -----  Pt calling in regards to stating he missed call from triage to schedule an urgent [place]row for bleeding eye , please call     Confirmed patient's contact info below:  Contact Name: Jerardo Powers  Phone Number: [phone]

## 2024-08-02 NOTE — TELEPHONE ENCOUNTER
----- Message from Joy Duran sent at 8/2/2024  3:07 PM CDT -----  Pt calling in regards to stating he missed call from triage to schedule an urgent [facility] for bleeding eye , please call     Confirmed patient's contact info below:  Contact Name: Jerardo Powers  Phone Number: [phone]

## 2024-08-06 ENCOUNTER — LAB VISIT (OUTPATIENT)
Dept: LAB | Facility: HOSPITAL | Age: 79
End: 2024-08-06
Attending: FAMILY MEDICINE
Payer: COMMERCIAL

## 2024-08-06 DIAGNOSIS — R23.2 HOT FLASHES: ICD-10-CM

## 2024-08-06 DIAGNOSIS — N18.31 TYPE 2 DIABETES MELLITUS WITH STAGE 3A CHRONIC KIDNEY DISEASE, WITHOUT LONG-TERM CURRENT USE OF INSULIN: ICD-10-CM

## 2024-08-06 DIAGNOSIS — E03.9 HYPOTHYROIDISM, UNSPECIFIED TYPE: ICD-10-CM

## 2024-08-06 DIAGNOSIS — E53.8 VITAMIN B12 DEFICIENCY: ICD-10-CM

## 2024-08-06 DIAGNOSIS — M10.9 GOUT, UNSPECIFIED CAUSE, UNSPECIFIED CHRONICITY, UNSPECIFIED SITE: ICD-10-CM

## 2024-08-06 DIAGNOSIS — E55.9 VITAMIN D DEFICIENCY: ICD-10-CM

## 2024-08-06 DIAGNOSIS — M17.0 PRIMARY OSTEOARTHRITIS OF BOTH KNEES: ICD-10-CM

## 2024-08-06 DIAGNOSIS — E11.22 TYPE 2 DIABETES MELLITUS WITH STAGE 3A CHRONIC KIDNEY DISEASE, WITHOUT LONG-TERM CURRENT USE OF INSULIN: ICD-10-CM

## 2024-08-06 LAB
25(OH)D3+25(OH)D2 SERPL-MCNC: 54 NG/ML (ref 30–96)
ALBUMIN SERPL BCP-MCNC: 3.7 G/DL (ref 3.5–5.2)
ALP SERPL-CCNC: 97 U/L (ref 55–135)
ALT SERPL W/O P-5'-P-CCNC: 24 U/L (ref 10–44)
ANION GAP SERPL CALC-SCNC: 12 MMOL/L (ref 8–16)
AST SERPL-CCNC: 18 U/L (ref 10–40)
BASOPHILS # BLD AUTO: 0.02 K/UL (ref 0–0.2)
BASOPHILS NFR BLD: 0.2 % (ref 0–1.9)
BILIRUB SERPL-MCNC: 0.7 MG/DL (ref 0.1–1)
BUN SERPL-MCNC: 12 MG/DL (ref 8–23)
CALCIUM SERPL-MCNC: 9.8 MG/DL (ref 8.7–10.5)
CHLORIDE SERPL-SCNC: 104 MMOL/L (ref 95–110)
CHOLEST SERPL-MCNC: 163 MG/DL (ref 120–199)
CHOLEST/HDLC SERPL: 3.5 {RATIO} (ref 2–5)
CO2 SERPL-SCNC: 22 MMOL/L (ref 23–29)
CREAT SERPL-MCNC: 1.3 MG/DL (ref 0.5–1.4)
DIFFERENTIAL METHOD BLD: ABNORMAL
EOSINOPHIL # BLD AUTO: 0.5 K/UL (ref 0–0.5)
EOSINOPHIL NFR BLD: 4.5 % (ref 0–8)
ERYTHROCYTE [DISTWIDTH] IN BLOOD BY AUTOMATED COUNT: 13.1 % (ref 11.5–14.5)
EST. GFR  (NO RACE VARIABLE): 56.2 ML/MIN/1.73 M^2
ESTIMATED AVG GLUCOSE: 137 MG/DL (ref 68–131)
GLUCOSE SERPL-MCNC: 129 MG/DL (ref 70–110)
HBA1C MFR BLD: 6.4 % (ref 4–5.6)
HCT VFR BLD AUTO: 47 % (ref 40–54)
HDLC SERPL-MCNC: 47 MG/DL (ref 40–75)
HDLC SERPL: 28.8 % (ref 20–50)
HGB BLD-MCNC: 15.4 G/DL (ref 14–18)
IMM GRANULOCYTES # BLD AUTO: 0.07 K/UL (ref 0–0.04)
IMM GRANULOCYTES NFR BLD AUTO: 0.7 % (ref 0–0.5)
LDLC SERPL CALC-MCNC: 74 MG/DL (ref 63–159)
LYMPHOCYTES # BLD AUTO: 2.4 K/UL (ref 1–4.8)
LYMPHOCYTES NFR BLD: 22.7 % (ref 18–48)
MCH RBC QN AUTO: 30 PG (ref 27–31)
MCHC RBC AUTO-ENTMCNC: 32.8 G/DL (ref 32–36)
MCV RBC AUTO: 91 FL (ref 82–98)
MONOCYTES # BLD AUTO: 0.8 K/UL (ref 0.3–1)
MONOCYTES NFR BLD: 7.4 % (ref 4–15)
NEUTROPHILS # BLD AUTO: 6.7 K/UL (ref 1.8–7.7)
NEUTROPHILS NFR BLD: 64.5 % (ref 38–73)
NONHDLC SERPL-MCNC: 116 MG/DL
NRBC BLD-RTO: 0 /100 WBC
PLATELET # BLD AUTO: 242 K/UL (ref 150–450)
PMV BLD AUTO: 11.8 FL (ref 9.2–12.9)
POTASSIUM SERPL-SCNC: 4 MMOL/L (ref 3.5–5.1)
PROT SERPL-MCNC: 6.9 G/DL (ref 6–8.4)
RBC # BLD AUTO: 5.14 M/UL (ref 4.6–6.2)
SODIUM SERPL-SCNC: 138 MMOL/L (ref 136–145)
TESTOST SERPL-MCNC: 190 NG/DL (ref 304–1227)
TRIGL SERPL-MCNC: 210 MG/DL (ref 30–150)
TSH SERPL DL<=0.005 MIU/L-ACNC: 2.36 UIU/ML (ref 0.4–4)
URATE SERPL-MCNC: 5.1 MG/DL (ref 3.4–7)
VIT B12 SERPL-MCNC: 898 PG/ML (ref 210–950)
WBC # BLD AUTO: 10.37 K/UL (ref 3.9–12.7)

## 2024-08-06 PROCEDURE — 36415 COLL VENOUS BLD VENIPUNCTURE: CPT | Mod: PO | Performed by: FAMILY MEDICINE

## 2024-08-06 PROCEDURE — 84443 ASSAY THYROID STIM HORMONE: CPT | Performed by: FAMILY MEDICINE

## 2024-08-06 PROCEDURE — 80053 COMPREHEN METABOLIC PANEL: CPT | Performed by: FAMILY MEDICINE

## 2024-08-06 PROCEDURE — 82306 VITAMIN D 25 HYDROXY: CPT | Performed by: FAMILY MEDICINE

## 2024-08-06 PROCEDURE — 84403 ASSAY OF TOTAL TESTOSTERONE: CPT | Performed by: FAMILY MEDICINE

## 2024-08-06 PROCEDURE — 84550 ASSAY OF BLOOD/URIC ACID: CPT | Performed by: FAMILY MEDICINE

## 2024-08-06 PROCEDURE — 83036 HEMOGLOBIN GLYCOSYLATED A1C: CPT | Performed by: FAMILY MEDICINE

## 2024-08-06 PROCEDURE — 85025 COMPLETE CBC W/AUTO DIFF WBC: CPT | Performed by: FAMILY MEDICINE

## 2024-08-06 PROCEDURE — 80061 LIPID PANEL: CPT | Performed by: FAMILY MEDICINE

## 2024-08-06 PROCEDURE — 82607 VITAMIN B-12: CPT | Performed by: FAMILY MEDICINE

## 2024-08-12 ENCOUNTER — OFFICE VISIT (OUTPATIENT)
Dept: FAMILY MEDICINE | Facility: CLINIC | Age: 79
End: 2024-08-12
Payer: COMMERCIAL

## 2024-08-12 VITALS
HEIGHT: 69 IN | DIASTOLIC BLOOD PRESSURE: 66 MMHG | OXYGEN SATURATION: 96 % | SYSTOLIC BLOOD PRESSURE: 120 MMHG | BODY MASS INDEX: 31.09 KG/M2 | WEIGHT: 209.88 LBS | TEMPERATURE: 98 F | HEART RATE: 86 BPM

## 2024-08-12 DIAGNOSIS — E55.9 VITAMIN D DEFICIENCY: ICD-10-CM

## 2024-08-12 DIAGNOSIS — I10 PRIMARY HYPERTENSION: ICD-10-CM

## 2024-08-12 DIAGNOSIS — N18.31 TYPE 2 DIABETES MELLITUS WITH STAGE 3A CHRONIC KIDNEY DISEASE, WITHOUT LONG-TERM CURRENT USE OF INSULIN: Primary | ICD-10-CM

## 2024-08-12 DIAGNOSIS — M10.9 GOUT, UNSPECIFIED CAUSE, UNSPECIFIED CHRONICITY, UNSPECIFIED SITE: ICD-10-CM

## 2024-08-12 DIAGNOSIS — E78.5 DYSLIPIDEMIA: ICD-10-CM

## 2024-08-12 DIAGNOSIS — E03.9 HYPOTHYROIDISM, UNSPECIFIED TYPE: ICD-10-CM

## 2024-08-12 DIAGNOSIS — M17.0 PRIMARY OSTEOARTHRITIS OF BOTH KNEES: ICD-10-CM

## 2024-08-12 DIAGNOSIS — R23.2 HOT FLASHES: ICD-10-CM

## 2024-08-12 DIAGNOSIS — E53.8 VITAMIN B12 DEFICIENCY: ICD-10-CM

## 2024-08-12 DIAGNOSIS — E11.22 TYPE 2 DIABETES MELLITUS WITH STAGE 3A CHRONIC KIDNEY DISEASE, WITHOUT LONG-TERM CURRENT USE OF INSULIN: Primary | ICD-10-CM

## 2024-08-12 DIAGNOSIS — R79.89 LOW TESTOSTERONE IN MALE: ICD-10-CM

## 2024-08-12 PROCEDURE — 99999 PR PBB SHADOW E&M-EST. PATIENT-LVL V: CPT | Mod: PBBFAC,,, | Performed by: FAMILY MEDICINE

## 2024-08-12 PROCEDURE — 3074F SYST BP LT 130 MM HG: CPT | Mod: CPTII,S$GLB,, | Performed by: FAMILY MEDICINE

## 2024-08-12 PROCEDURE — 1159F MED LIST DOCD IN RCRD: CPT | Mod: CPTII,S$GLB,, | Performed by: FAMILY MEDICINE

## 2024-08-12 PROCEDURE — 99214 OFFICE O/P EST MOD 30 MIN: CPT | Mod: S$GLB,,, | Performed by: FAMILY MEDICINE

## 2024-08-12 PROCEDURE — G2211 COMPLEX E/M VISIT ADD ON: HCPCS | Mod: S$GLB,,, | Performed by: FAMILY MEDICINE

## 2024-08-12 PROCEDURE — 3078F DIAST BP <80 MM HG: CPT | Mod: CPTII,S$GLB,, | Performed by: FAMILY MEDICINE

## 2024-08-12 RX ORDER — TRIAMCINOLONE ACETONIDE 1 MG/G
CREAM TOPICAL 2 TIMES DAILY
Qty: 15 G | Refills: 1 | Status: CANCELLED | OUTPATIENT
Start: 2024-08-12 | End: 2024-08-22
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
## 2024-09-09 ENCOUNTER — TELEPHONE (OUTPATIENT)
Dept: SLEEP MEDICINE | Facility: CLINIC | Age: 79
End: 2024-09-09
Payer: COMMERCIAL

## 2024-09-09 DIAGNOSIS — G47.33 OSA (OBSTRUCTIVE SLEEP APNEA): Primary | ICD-10-CM

## 2024-09-09 RX ORDER — CITALOPRAM 20 MG/1
20 TABLET, FILM COATED ORAL
Qty: 90 TABLET | Refills: 3 | Status: SHIPPED | OUTPATIENT
Start: 2024-09-09

## 2024-09-09 NOTE — TELEPHONE ENCOUNTER
I have discussed with Jerardo Powers his CPAP report - Benefiting from CPAP use in terms of sleep continuity and daytime sleepiness, compliant    Will reorder supplies    Patient ID: [id number] Compliance Summary 7/13/2024 - 9/7/2024 (57 days) Days with Device Usage 55 days Days without Device Usage 2 days Percent Days with Device Usage 96.5% Cumulative Usage 16 days 17 hrs. 48 mins. 19 secs. Maximum Usage (1 Day) 12 hrs. 7 mins. 35 secs. Average Usage (All Days) 7 hrs. 2 mins. 57 secs. Average Usage (Days Used) 7 hrs. 18 mins. 19 secs. Minimum Usage (1 Day) 10 secs. Percent of Days with Usage >= 4 Hours 91.2% Percent of Days with Usage < 4 Hours 8.8% Date Range Total Blower Time 18 days 8 hrs. 2 mins. 56 secs. Average AHI 4.4 Auto-CPAP Summary Auto-CPAP Mean Pressure 12.9 cmH2O Auto-CPAP Peak Average Pressure 16.4 cmH2O Device Pressure <= 90% of Time 15.6 cmH2O Average Time in Large Leak Per Day 46 secs. Device Settings as of 9/7/2024 AutoCPAP - A-Flex Device Settings Device Mode Parameter Value Min Pressure 11.5 cmH2O Max Pressure 18 cmH2O A-Flex Setting 3 Auto Off Off Auto On On Ramp+ Time 15 minutes Ramp+ Start Pressure 10.0 cmH2O Mask Resistance Off Tubing Type 22 Opti-Start Off EZ-Start Disabled Patient Controls Access On Patient Data Access On Tube Temperature 3 Humidifier 4

## 2024-09-09 NOTE — TELEPHONE ENCOUNTER
No care due was identified.  [facility] Embedded Care Due Messages. Reference number: [id number].   9/09/2024 12:22:05 PM CDT

## 2024-09-10 NOTE — TELEPHONE ENCOUNTER
Refill Decision Note   Jerardo Powers  is requesting a refill authorization.  Brief Assessment and Rationale for Refill:  Approve     Medication Therapy Plan:       Medication Reconciliation Completed: No   Comments:     No Care Gaps recommended.     Note composed:9:59 PM 09/09/2024

## 2024-09-23 RX ORDER — FLUTICASONE PROPIONATE 50 MCG
SPRAY, SUSPENSION (ML) NASAL
Qty: 48 ML | Refills: 1 | Status: SHIPPED | OUTPATIENT
Start: 2024-09-23

## 2024-11-04 ENCOUNTER — LAB VISIT (OUTPATIENT)
Dept: LAB | Facility: HOSPITAL | Age: 79
End: 2024-11-04
Attending: FAMILY MEDICINE
Payer: COMMERCIAL

## 2024-11-04 DIAGNOSIS — N18.31 TYPE 2 DIABETES MELLITUS WITH STAGE 3A CHRONIC KIDNEY DISEASE, WITHOUT LONG-TERM CURRENT USE OF INSULIN: ICD-10-CM

## 2024-11-04 DIAGNOSIS — E11.22 TYPE 2 DIABETES MELLITUS WITH STAGE 3A CHRONIC KIDNEY DISEASE, WITHOUT LONG-TERM CURRENT USE OF INSULIN: ICD-10-CM

## 2024-11-04 DIAGNOSIS — E03.9 HYPOTHYROIDISM, UNSPECIFIED TYPE: ICD-10-CM

## 2024-11-04 DIAGNOSIS — R79.89 LOW TESTOSTERONE IN MALE: ICD-10-CM

## 2024-11-04 DIAGNOSIS — R23.2 HOT FLASHES: ICD-10-CM

## 2024-11-04 LAB
ALBUMIN SERPL BCP-MCNC: 3.8 G/DL (ref 3.5–5.2)
ALP SERPL-CCNC: 109 U/L (ref 40–150)
ALT SERPL W/O P-5'-P-CCNC: 29 U/L (ref 10–44)
ANION GAP SERPL CALC-SCNC: 10 MMOL/L (ref 8–16)
AST SERPL-CCNC: 20 U/L (ref 10–40)
BASOPHILS # BLD AUTO: 0.04 K/UL (ref 0–0.2)
BASOPHILS NFR BLD: 0.4 % (ref 0–1.9)
BILIRUB SERPL-MCNC: 0.9 MG/DL (ref 0.1–1)
BUN SERPL-MCNC: 10 MG/DL (ref 8–23)
CALCIUM SERPL-MCNC: 9.4 MG/DL (ref 8.7–10.5)
CHLORIDE SERPL-SCNC: 103 MMOL/L (ref 95–110)
CHOLEST SERPL-MCNC: 165 MG/DL (ref 120–199)
CHOLEST/HDLC SERPL: 3.5 {RATIO} (ref 2–5)
CO2 SERPL-SCNC: 25 MMOL/L (ref 23–29)
CREAT SERPL-MCNC: 1.5 MG/DL (ref 0.5–1.4)
DIFFERENTIAL METHOD BLD: ABNORMAL
EOSINOPHIL # BLD AUTO: 0.5 K/UL (ref 0–0.5)
EOSINOPHIL NFR BLD: 5.8 % (ref 0–8)
ERYTHROCYTE [DISTWIDTH] IN BLOOD BY AUTOMATED COUNT: 12.9 % (ref 11.5–14.5)
EST. GFR  (NO RACE VARIABLE): 47.4 ML/MIN/1.73 M^2
ESTIMATED AVG GLUCOSE: 146 MG/DL (ref 68–131)
FSH SERPL-ACNC: 12.44 MIU/ML (ref 0.95–11.95)
GLUCOSE SERPL-MCNC: 146 MG/DL (ref 70–110)
HBA1C MFR BLD: 6.7 % (ref 4–5.6)
HCT VFR BLD AUTO: 47.6 % (ref 40–54)
HDLC SERPL-MCNC: 47 MG/DL (ref 40–75)
HDLC SERPL: 28.5 % (ref 20–50)
HGB BLD-MCNC: 15.5 G/DL (ref 14–18)
IMM GRANULOCYTES # BLD AUTO: 0.11 K/UL (ref 0–0.04)
IMM GRANULOCYTES NFR BLD AUTO: 1.2 % (ref 0–0.5)
LDLC SERPL CALC-MCNC: 81.2 MG/DL (ref 63–159)
LH SERPL-ACNC: 4.6 MIU/ML (ref 0.6–12.1)
LYMPHOCYTES # BLD AUTO: 1.7 K/UL (ref 1–4.8)
LYMPHOCYTES NFR BLD: 19.3 % (ref 18–48)
MCH RBC QN AUTO: 29.3 PG (ref 27–31)
MCHC RBC AUTO-ENTMCNC: 32.6 G/DL (ref 32–36)
MCV RBC AUTO: 90 FL (ref 82–98)
MONOCYTES # BLD AUTO: 0.6 K/UL (ref 0.3–1)
MONOCYTES NFR BLD: 6.7 % (ref 4–15)
NEUTROPHILS # BLD AUTO: 6 K/UL (ref 1.8–7.7)
NEUTROPHILS NFR BLD: 66.6 % (ref 38–73)
NONHDLC SERPL-MCNC: 118 MG/DL
NRBC BLD-RTO: 0 /100 WBC
PLATELET # BLD AUTO: 243 K/UL (ref 150–450)
PMV BLD AUTO: 12.1 FL (ref 9.2–12.9)
POTASSIUM SERPL-SCNC: 4.2 MMOL/L (ref 3.5–5.1)
PROLACTIN SERPL IA-MCNC: 9.2 NG/ML (ref 3.5–19.4)
PROT SERPL-MCNC: 7 G/DL (ref 6–8.4)
RBC # BLD AUTO: 5.29 M/UL (ref 4.6–6.2)
SODIUM SERPL-SCNC: 138 MMOL/L (ref 136–145)
TESTOST SERPL-MCNC: 186 NG/DL (ref 304–1227)
TRIGL SERPL-MCNC: 184 MG/DL (ref 30–150)
TSH SERPL DL<=0.005 MIU/L-ACNC: 1.05 UIU/ML (ref 0.4–4)
WBC # BLD AUTO: 9.01 K/UL (ref 3.9–12.7)

## 2024-11-04 PROCEDURE — 83001 ASSAY OF GONADOTROPIN (FSH): CPT | Performed by: FAMILY MEDICINE

## 2024-11-04 PROCEDURE — 84403 ASSAY OF TOTAL TESTOSTERONE: CPT | Performed by: FAMILY MEDICINE

## 2024-11-04 PROCEDURE — 83036 HEMOGLOBIN GLYCOSYLATED A1C: CPT | Performed by: FAMILY MEDICINE

## 2024-11-04 PROCEDURE — 83002 ASSAY OF GONADOTROPIN (LH): CPT | Performed by: FAMILY MEDICINE

## 2024-11-04 PROCEDURE — 85025 COMPLETE CBC W/AUTO DIFF WBC: CPT | Performed by: FAMILY MEDICINE

## 2024-11-04 PROCEDURE — 80061 LIPID PANEL: CPT | Performed by: FAMILY MEDICINE

## 2024-11-04 PROCEDURE — 80053 COMPREHEN METABOLIC PANEL: CPT | Performed by: FAMILY MEDICINE

## 2024-11-04 PROCEDURE — 84443 ASSAY THYROID STIM HORMONE: CPT | Performed by: FAMILY MEDICINE

## 2024-11-04 PROCEDURE — 84146 ASSAY OF PROLACTIN: CPT | Performed by: FAMILY MEDICINE

## 2024-11-11 ENCOUNTER — TELEPHONE (OUTPATIENT)
Dept: FAMILY MEDICINE | Facility: CLINIC | Age: 79
End: 2024-11-11
Payer: COMMERCIAL

## 2024-11-11 ENCOUNTER — OFFICE VISIT (OUTPATIENT)
Dept: FAMILY MEDICINE | Facility: CLINIC | Age: 79
End: 2024-11-11
Payer: COMMERCIAL

## 2024-11-11 VITALS
TEMPERATURE: 98 F | SYSTOLIC BLOOD PRESSURE: 122 MMHG | WEIGHT: 209 LBS | HEIGHT: 69 IN | BODY MASS INDEX: 30.96 KG/M2 | DIASTOLIC BLOOD PRESSURE: 68 MMHG

## 2024-11-11 DIAGNOSIS — E11.22 TYPE 2 DIABETES MELLITUS WITH STAGE 3A CHRONIC KIDNEY DISEASE, WITHOUT LONG-TERM CURRENT USE OF INSULIN: Primary | ICD-10-CM

## 2024-11-11 DIAGNOSIS — R10.11 RUQ PAIN: ICD-10-CM

## 2024-11-11 DIAGNOSIS — I10 PRIMARY HYPERTENSION: ICD-10-CM

## 2024-11-11 DIAGNOSIS — K76.0 FATTY LIVER: ICD-10-CM

## 2024-11-11 DIAGNOSIS — M10.9 GOUT, UNSPECIFIED CAUSE, UNSPECIFIED CHRONICITY, UNSPECIFIED SITE: ICD-10-CM

## 2024-11-11 DIAGNOSIS — R79.89 LOW TESTOSTERONE IN MALE: ICD-10-CM

## 2024-11-11 DIAGNOSIS — N18.31 TYPE 2 DIABETES MELLITUS WITH STAGE 3A CHRONIC KIDNEY DISEASE, WITHOUT LONG-TERM CURRENT USE OF INSULIN: Primary | ICD-10-CM

## 2024-11-11 DIAGNOSIS — R23.2 HOT FLASHES: ICD-10-CM

## 2024-11-11 DIAGNOSIS — E03.9 HYPOTHYROIDISM, UNSPECIFIED TYPE: ICD-10-CM

## 2024-11-11 DIAGNOSIS — E55.9 VITAMIN D DEFICIENCY: ICD-10-CM

## 2024-11-11 DIAGNOSIS — E78.5 DYSLIPIDEMIA: ICD-10-CM

## 2024-11-11 DIAGNOSIS — E53.8 VITAMIN B12 DEFICIENCY: ICD-10-CM

## 2024-11-11 DIAGNOSIS — M17.0 PRIMARY OSTEOARTHRITIS OF BOTH KNEES: ICD-10-CM

## 2024-11-11 PROCEDURE — 1126F AMNT PAIN NOTED NONE PRSNT: CPT | Mod: CPTII,S$GLB,, | Performed by: FAMILY MEDICINE

## 2024-11-11 PROCEDURE — G2211 COMPLEX E/M VISIT ADD ON: HCPCS | Mod: S$GLB,,, | Performed by: FAMILY MEDICINE

## 2024-11-11 PROCEDURE — 99214 OFFICE O/P EST MOD 30 MIN: CPT | Mod: S$GLB,,, | Performed by: FAMILY MEDICINE

## 2024-11-11 PROCEDURE — 3074F SYST BP LT 130 MM HG: CPT | Mod: CPTII,S$GLB,, | Performed by: FAMILY MEDICINE

## 2024-11-11 PROCEDURE — 1101F PT FALLS ASSESS-DOCD LE1/YR: CPT | Mod: CPTII,S$GLB,, | Performed by: FAMILY MEDICINE

## 2024-11-11 PROCEDURE — 3078F DIAST BP <80 MM HG: CPT | Mod: CPTII,S$GLB,, | Performed by: FAMILY MEDICINE

## 2024-11-11 PROCEDURE — 1159F MED LIST DOCD IN RCRD: CPT | Mod: CPTII,S$GLB,, | Performed by: FAMILY MEDICINE

## 2024-11-11 PROCEDURE — 99999 PR PBB SHADOW E&M-EST. PATIENT-LVL IV: CPT | Mod: PBBFAC,,, | Performed by: FAMILY MEDICINE

## 2024-11-11 PROCEDURE — 3288F FALL RISK ASSESSMENT DOCD: CPT | Mod: CPTII,S$GLB,, | Performed by: FAMILY MEDICINE
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
## 2024-11-15 ENCOUNTER — HOSPITAL ENCOUNTER (OUTPATIENT)
Dept: RADIOLOGY | Facility: HOSPITAL | Age: 79
Discharge: HOME | End: 2024-11-15
Attending: FAMILY MEDICINE
Payer: COMMERCIAL

## 2024-11-15 DIAGNOSIS — R10.11 RUQ PAIN: ICD-10-CM

## 2024-11-15 PROCEDURE — 76700 US EXAM ABDOM COMPLETE: CPT | Mod: TC

## 2024-11-15 PROCEDURE — 76700 US EXAM ABDOM COMPLETE: CPT | Mod: 26,,, | Performed by: RADIOLOGY

## 2025-01-13 DIAGNOSIS — E11.22 TYPE 2 DIABETES MELLITUS WITH STAGE 3 CHRONIC KIDNEY DISEASE, WITHOUT LONG-TERM CURRENT USE OF INSULIN, UNSPECIFIED WHETHER STAGE 3A OR 3B CKD: ICD-10-CM

## 2025-01-13 DIAGNOSIS — N18.30 TYPE 2 DIABETES MELLITUS WITH STAGE 3 CHRONIC KIDNEY DISEASE, WITHOUT LONG-TERM CURRENT USE OF INSULIN, UNSPECIFIED WHETHER STAGE 3A OR 3B CKD: ICD-10-CM

## 2025-01-13 RX ORDER — SEMAGLUTIDE 2.68 MG/ML
2 INJECTION, SOLUTION SUBCUTANEOUS
Qty: 3 ML | Refills: 11 | Status: SHIPPED | OUTPATIENT
Start: 2025-01-13 | End: 2025-01-29 | Stop reason: SDUPTHER

## 2025-01-14 ENCOUNTER — PATIENT MESSAGE (OUTPATIENT)
Dept: FAMILY MEDICINE | Facility: CLINIC | Age: 80
End: 2025-01-14
Payer: COMMERCIAL

## 2025-01-14 ENCOUNTER — TELEPHONE (OUTPATIENT)
Dept: FAMILY MEDICINE | Facility: CLINIC | Age: 80
End: 2025-01-14
Payer: COMMERCIAL

## 2025-01-15 ENCOUNTER — TELEPHONE (OUTPATIENT)
Dept: FAMILY MEDICINE | Facility: CLINIC | Age: 80
End: 2025-01-15
Payer: COMMERCIAL

## 2025-01-15 NOTE — TELEPHONE ENCOUNTER
----- Message from Darcie sent at 1/14/2025  2:29 PM CST -----  Type:  Needs Medical Advice    Who Called: pt  Would the patient rather a call back or a response via [facility]? call  Best Call Back Number:  [phone]  Additional Information: pt would like to speak regarding getting prior authorization for medication semaglutide (OZEMPIC) 2 mg/dose (8 mg/3 mL) PnIj  states insurance called in

## 2025-01-15 NOTE — TELEPHONE ENCOUNTER
PT STATED THAT HE HAVE [facility] AND HIS pA NEEDS TO GO THERE PT WAS NOTIFIED THAT WE DIOGO THAT INFORMATION IN ORDERS TO PROCEED , HE STATED THAT HE SEND THAT INFORMATION THROUGH THE CHART AND ALL WE NEED IS THE NUMBER

## 2025-01-16 ENCOUNTER — TELEPHONE (OUTPATIENT)
Dept: FAMILY MEDICINE | Facility: CLINIC | Age: 80
End: 2025-01-16
Payer: COMMERCIAL

## 2025-01-24 ENCOUNTER — PATIENT MESSAGE (OUTPATIENT)
Dept: FAMILY MEDICINE | Facility: CLINIC | Age: 80
End: 2025-01-24
Payer: COMMERCIAL

## 2025-01-24 DIAGNOSIS — E11.22 TYPE 2 DIABETES MELLITUS WITH STAGE 3 CHRONIC KIDNEY DISEASE, WITHOUT LONG-TERM CURRENT USE OF INSULIN, UNSPECIFIED WHETHER STAGE 3A OR 3B CKD: ICD-10-CM

## 2025-01-24 DIAGNOSIS — N18.30 TYPE 2 DIABETES MELLITUS WITH STAGE 3 CHRONIC KIDNEY DISEASE, WITHOUT LONG-TERM CURRENT USE OF INSULIN, UNSPECIFIED WHETHER STAGE 3A OR 3B CKD: ICD-10-CM

## 2025-01-29 ENCOUNTER — TELEPHONE (OUTPATIENT)
Dept: FAMILY MEDICINE | Facility: CLINIC | Age: 80
End: 2025-01-29
Payer: COMMERCIAL

## 2025-01-29 DIAGNOSIS — N18.30 TYPE 2 DIABETES MELLITUS WITH STAGE 3 CHRONIC KIDNEY DISEASE, WITHOUT LONG-TERM CURRENT USE OF INSULIN, UNSPECIFIED WHETHER STAGE 3A OR 3B CKD: ICD-10-CM

## 2025-01-29 DIAGNOSIS — E11.22 TYPE 2 DIABETES MELLITUS WITH STAGE 3 CHRONIC KIDNEY DISEASE, WITHOUT LONG-TERM CURRENT USE OF INSULIN, UNSPECIFIED WHETHER STAGE 3A OR 3B CKD: ICD-10-CM

## 2025-01-29 RX ORDER — SEMAGLUTIDE 2.68 MG/ML
2 INJECTION, SOLUTION SUBCUTANEOUS
Qty: 3 ML | Refills: 11 | Status: SHIPPED | OUTPATIENT
Start: 2025-01-29

## 2025-01-29 NOTE — TELEPHONE ENCOUNTER
Patient has been out of his Ozempic  shots his authorization was approved but [facility] is on back order and wants to know what to do. Please advice.

## 2025-01-29 NOTE — TELEPHONE ENCOUNTER
I will send to the [facility] pharmacy here at [place] and he can let me know whenever [facility] has this back in stock and we can send it back over there.    Medications Ordered This Encounter   Medications    semaglutide (OZEMPIC) 2 mg/dose (8 mg/3 mL) PnIj     Sig: Inject 2 mg into the skin every 7 days.     Dispense:  3 mL     Refill:  11

## 2025-01-29 NOTE — TELEPHONE ENCOUNTER
----- Message from Ashley sent at 1/29/2025 12:02 PM CST -----  Regarding: PA needed  Name of Who is Calling:Lois-Daughter            What is the request in detail: Pt is requesting a call back because he has not had his medication in a while because the Rx is waiting on the PA for his medication for a month. Please send the PA for the medication cause he's been without to long.            Can the clinic reply by [facility]:No            What Number to Call Back if not in [facility]:[phone]

## 2025-01-30 ENCOUNTER — TELEPHONE (OUTPATIENT)
Dept: FAMILY MEDICINE | Facility: CLINIC | Age: 80
End: 2025-01-30
Payer: COMMERCIAL

## 2025-02-04 ENCOUNTER — LAB VISIT (OUTPATIENT)
Dept: LAB | Facility: HOSPITAL | Age: 80
End: 2025-02-04
Attending: FAMILY MEDICINE
Payer: COMMERCIAL

## 2025-02-04 DIAGNOSIS — E78.5 DYSLIPIDEMIA: ICD-10-CM

## 2025-02-04 DIAGNOSIS — M17.0 PRIMARY OSTEOARTHRITIS OF BOTH KNEES: ICD-10-CM

## 2025-02-04 DIAGNOSIS — N18.31 TYPE 2 DIABETES MELLITUS WITH STAGE 3A CHRONIC KIDNEY DISEASE, WITHOUT LONG-TERM CURRENT USE OF INSULIN: ICD-10-CM

## 2025-02-04 DIAGNOSIS — E55.9 VITAMIN D DEFICIENCY: ICD-10-CM

## 2025-02-04 DIAGNOSIS — R79.89 LOW TESTOSTERONE IN MALE: ICD-10-CM

## 2025-02-04 DIAGNOSIS — R23.2 HOT FLASHES: ICD-10-CM

## 2025-02-04 DIAGNOSIS — E11.22 TYPE 2 DIABETES MELLITUS WITH STAGE 3A CHRONIC KIDNEY DISEASE, WITHOUT LONG-TERM CURRENT USE OF INSULIN: ICD-10-CM

## 2025-02-04 DIAGNOSIS — I10 PRIMARY HYPERTENSION: ICD-10-CM

## 2025-02-04 DIAGNOSIS — E03.9 HYPOTHYROIDISM, UNSPECIFIED TYPE: ICD-10-CM

## 2025-02-04 DIAGNOSIS — E53.8 VITAMIN B12 DEFICIENCY: ICD-10-CM

## 2025-02-04 DIAGNOSIS — R10.11 RUQ PAIN: ICD-10-CM

## 2025-02-04 DIAGNOSIS — M10.9 GOUT, UNSPECIFIED CAUSE, UNSPECIFIED CHRONICITY, UNSPECIFIED SITE: ICD-10-CM

## 2025-02-04 LAB
ALBUMIN SERPL BCP-MCNC: 3.5 G/DL (ref 3.5–5.2)
ALBUMIN/CREAT UR: 6.1 UG/MG (ref 0–30)
ALP SERPL-CCNC: 98 U/L (ref 40–150)
ALT SERPL W/O P-5'-P-CCNC: 40 U/L (ref 10–44)
ANION GAP SERPL CALC-SCNC: 9 MMOL/L (ref 8–16)
AST SERPL-CCNC: 25 U/L (ref 10–40)
BASOPHILS # BLD AUTO: 0.03 K/UL (ref 0–0.2)
BASOPHILS NFR BLD: 0.3 % (ref 0–1.9)
BILIRUB SERPL-MCNC: 0.8 MG/DL (ref 0.1–1)
BUN SERPL-MCNC: 13 MG/DL (ref 8–23)
CALCIUM SERPL-MCNC: 9.3 MG/DL (ref 8.7–10.5)
CHLORIDE SERPL-SCNC: 100 MMOL/L (ref 95–110)
CHOLEST SERPL-MCNC: 167 MG/DL (ref 120–199)
CHOLEST/HDLC SERPL: 3.8 {RATIO} (ref 2–5)
CO2 SERPL-SCNC: 26 MMOL/L (ref 23–29)
CREAT SERPL-MCNC: 1.3 MG/DL (ref 0.5–1.4)
CREAT UR-MCNC: 99 MG/DL (ref 23–375)
DIFFERENTIAL METHOD BLD: ABNORMAL
EOSINOPHIL # BLD AUTO: 0.5 K/UL (ref 0–0.5)
EOSINOPHIL NFR BLD: 4.9 % (ref 0–8)
ERYTHROCYTE [DISTWIDTH] IN BLOOD BY AUTOMATED COUNT: 12.8 % (ref 11.5–14.5)
EST. GFR  (NO RACE VARIABLE): 55.9 ML/MIN/1.73 M^2
ESTIMATED AVG GLUCOSE: 154 MG/DL (ref 68–131)
GLUCOSE SERPL-MCNC: 147 MG/DL (ref 70–110)
HBA1C MFR BLD: 7 % (ref 4–5.6)
HCT VFR BLD AUTO: 44 % (ref 40–54)
HDLC SERPL-MCNC: 44 MG/DL (ref 40–75)
HDLC SERPL: 26.3 % (ref 20–50)
HGB BLD-MCNC: 14.7 G/DL (ref 14–18)
IMM GRANULOCYTES # BLD AUTO: 0.09 K/UL (ref 0–0.04)
IMM GRANULOCYTES NFR BLD AUTO: 0.9 % (ref 0–0.5)
LDLC SERPL CALC-MCNC: 66.6 MG/DL (ref 63–159)
LYMPHOCYTES # BLD AUTO: 2.1 K/UL (ref 1–4.8)
LYMPHOCYTES NFR BLD: 21.1 % (ref 18–48)
MCH RBC QN AUTO: 30 PG (ref 27–31)
MCHC RBC AUTO-ENTMCNC: 33.4 G/DL (ref 32–36)
MCV RBC AUTO: 90 FL (ref 82–98)
MICROALBUMIN UR DL<=1MG/L-MCNC: 6 UG/ML
MONOCYTES # BLD AUTO: 0.8 K/UL (ref 0.3–1)
MONOCYTES NFR BLD: 7.9 % (ref 4–15)
NEUTROPHILS # BLD AUTO: 6.4 K/UL (ref 1.8–7.7)
NEUTROPHILS NFR BLD: 64.9 % (ref 38–73)
NONHDLC SERPL-MCNC: 123 MG/DL
NRBC BLD-RTO: 0 /100 WBC
PLATELET # BLD AUTO: 236 K/UL (ref 150–450)
PMV BLD AUTO: 11.9 FL (ref 9.2–12.9)
POTASSIUM SERPL-SCNC: 4.4 MMOL/L (ref 3.5–5.1)
PROT SERPL-MCNC: 6.9 G/DL (ref 6–8.4)
RBC # BLD AUTO: 4.9 M/UL (ref 4.6–6.2)
SODIUM SERPL-SCNC: 135 MMOL/L (ref 136–145)
TRIGL SERPL-MCNC: 282 MG/DL (ref 30–150)
WBC # BLD AUTO: 9.94 K/UL (ref 3.9–12.7)

## 2025-02-04 PROCEDURE — 82570 ASSAY OF URINE CREATININE: CPT | Performed by: FAMILY MEDICINE

## 2025-02-04 PROCEDURE — 83036 HEMOGLOBIN GLYCOSYLATED A1C: CPT | Performed by: FAMILY MEDICINE

## 2025-02-04 PROCEDURE — 36415 COLL VENOUS BLD VENIPUNCTURE: CPT | Mod: PO | Performed by: FAMILY MEDICINE

## 2025-02-04 PROCEDURE — 80053 COMPREHEN METABOLIC PANEL: CPT | Performed by: FAMILY MEDICINE

## 2025-02-04 PROCEDURE — 80061 LIPID PANEL: CPT | Performed by: FAMILY MEDICINE

## 2025-02-04 PROCEDURE — 85025 COMPLETE CBC W/AUTO DIFF WBC: CPT | Performed by: FAMILY MEDICINE

## 2025-02-07 ENCOUNTER — OFFICE VISIT (OUTPATIENT)
Dept: FAMILY MEDICINE | Facility: CLINIC | Age: 80
End: 2025-02-07
Payer: COMMERCIAL

## 2025-02-07 VITALS
DIASTOLIC BLOOD PRESSURE: 72 MMHG | BODY MASS INDEX: 31.21 KG/M2 | OXYGEN SATURATION: 96 % | HEART RATE: 73 BPM | SYSTOLIC BLOOD PRESSURE: 120 MMHG | WEIGHT: 210.75 LBS | TEMPERATURE: 98 F | HEIGHT: 69 IN

## 2025-02-07 DIAGNOSIS — Z12.11 COLON CANCER SCREENING: ICD-10-CM

## 2025-02-07 DIAGNOSIS — E55.9 VITAMIN D DEFICIENCY: ICD-10-CM

## 2025-02-07 DIAGNOSIS — K76.0 FATTY LIVER: ICD-10-CM

## 2025-02-07 DIAGNOSIS — J43.8 OTHER EMPHYSEMA: ICD-10-CM

## 2025-02-07 DIAGNOSIS — I10 PRIMARY HYPERTENSION: ICD-10-CM

## 2025-02-07 DIAGNOSIS — E78.5 DYSLIPIDEMIA: ICD-10-CM

## 2025-02-07 DIAGNOSIS — E11.22 TYPE 2 DIABETES MELLITUS WITH STAGE 3 CHRONIC KIDNEY DISEASE, WITHOUT LONG-TERM CURRENT USE OF INSULIN, UNSPECIFIED WHETHER STAGE 3A OR 3B CKD: Primary | ICD-10-CM

## 2025-02-07 DIAGNOSIS — M17.0 PRIMARY OSTEOARTHRITIS OF BOTH KNEES: ICD-10-CM

## 2025-02-07 DIAGNOSIS — E03.9 HYPOTHYROIDISM, UNSPECIFIED TYPE: ICD-10-CM

## 2025-02-07 DIAGNOSIS — N18.30 TYPE 2 DIABETES MELLITUS WITH STAGE 3 CHRONIC KIDNEY DISEASE, WITHOUT LONG-TERM CURRENT USE OF INSULIN, UNSPECIFIED WHETHER STAGE 3A OR 3B CKD: Primary | ICD-10-CM

## 2025-02-07 DIAGNOSIS — R19.7 DIARRHEA, UNSPECIFIED TYPE: ICD-10-CM

## 2025-02-07 DIAGNOSIS — E53.8 VITAMIN B12 DEFICIENCY: ICD-10-CM

## 2025-02-07 DIAGNOSIS — R79.89 LOW TESTOSTERONE IN MALE: ICD-10-CM

## 2025-02-07 DIAGNOSIS — M10.9 GOUT, UNSPECIFIED CAUSE, UNSPECIFIED CHRONICITY, UNSPECIFIED SITE: ICD-10-CM

## 2025-02-07 DIAGNOSIS — N18.32 STAGE 3B CHRONIC KIDNEY DISEASE: ICD-10-CM

## 2025-02-07 PROCEDURE — 1159F MED LIST DOCD IN RCRD: CPT | Mod: CPTII,S$GLB,, | Performed by: FAMILY MEDICINE

## 2025-02-07 PROCEDURE — 99214 OFFICE O/P EST MOD 30 MIN: CPT | Mod: S$GLB,,, | Performed by: FAMILY MEDICINE

## 2025-02-07 PROCEDURE — G2211 COMPLEX E/M VISIT ADD ON: HCPCS | Mod: S$GLB,,, | Performed by: FAMILY MEDICINE

## 2025-02-07 PROCEDURE — 3072F LOW RISK FOR RETINOPATHY: CPT | Mod: CPTII,S$GLB,, | Performed by: FAMILY MEDICINE

## 2025-02-07 PROCEDURE — 1126F AMNT PAIN NOTED NONE PRSNT: CPT | Mod: CPTII,S$GLB,, | Performed by: FAMILY MEDICINE

## 2025-02-07 PROCEDURE — 1101F PT FALLS ASSESS-DOCD LE1/YR: CPT | Mod: CPTII,S$GLB,, | Performed by: FAMILY MEDICINE

## 2025-02-07 PROCEDURE — 3074F SYST BP LT 130 MM HG: CPT | Mod: CPTII,S$GLB,, | Performed by: FAMILY MEDICINE

## 2025-02-07 PROCEDURE — 3078F DIAST BP <80 MM HG: CPT | Mod: CPTII,S$GLB,, | Performed by: FAMILY MEDICINE

## 2025-02-07 PROCEDURE — 99999 PR PBB SHADOW E&M-EST. PATIENT-LVL V: CPT | Mod: PBBFAC,,, | Performed by: FAMILY MEDICINE

## 2025-02-07 PROCEDURE — 1160F RVW MEDS BY RX/DR IN RCRD: CPT | Mod: CPTII,S$GLB,, | Performed by: FAMILY MEDICINE

## 2025-02-07 PROCEDURE — 3288F FALL RISK ASSESSMENT DOCD: CPT | Mod: CPTII,S$GLB,, | Performed by: FAMILY MEDICINE

## 2025-02-07 NOTE — PATIENT INSTRUCTIONS
Decrease metformin to 1 pill am and 1 pill pm to see if diarrhea improves at all. Continue ozempic.
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
## 2025-02-17 ENCOUNTER — TELEPHONE (OUTPATIENT)
Dept: OPTOMETRY | Facility: CLINIC | Age: 80
End: 2025-02-17
Payer: COMMERCIAL

## 2025-02-17 ENCOUNTER — PATIENT MESSAGE (OUTPATIENT)
Dept: OPTOMETRY | Facility: CLINIC | Age: 80
End: 2025-02-17
Payer: COMMERCIAL

## 2025-02-17 NOTE — TELEPHONE ENCOUNTER
Spoke with patient to schedule urgent appt. Feels like there is a white bump underneath his eyelid. Will try warm compressions in OS

## 2025-02-17 NOTE — TELEPHONE ENCOUNTER
----- Message from [place] OD sent at 2/17/2025  1:50 PM CST -----  Contact: [phone]  Friday at 9 or 9:40 or 12;40  ----- Message -----  From: Aminata Jensen  Sent: 2/17/2025   1:45 PM CST  To: [place] OD    Do you want to double book any time slots?  ----- Message -----  From: Bella Quintero  Sent: 2/17/2025  11:41 AM CST  To: Jaya ACEVEDO Staff    Patient is calling to reschedule appointment. Please call to assist. Was not aware of the appointment

## 2025-02-20 ENCOUNTER — OFFICE VISIT (OUTPATIENT)
Dept: OPTOMETRY | Facility: CLINIC | Age: 80
End: 2025-02-20
Payer: COMMERCIAL

## 2025-02-20 ENCOUNTER — PATIENT MESSAGE (OUTPATIENT)
Dept: ENDOSCOPY | Facility: HOSPITAL | Age: 80
End: 2025-02-20

## 2025-02-20 ENCOUNTER — CLINICAL SUPPORT (OUTPATIENT)
Dept: ENDOSCOPY | Facility: HOSPITAL | Age: 80
End: 2025-02-20
Attending: FAMILY MEDICINE
Payer: COMMERCIAL

## 2025-02-20 VITALS — WEIGHT: 210 LBS | BODY MASS INDEX: 31.1 KG/M2 | HEIGHT: 69 IN

## 2025-02-20 DIAGNOSIS — H02.886 MEIBOMIAN GLAND DISEASE OF LEFT EYE: ICD-10-CM

## 2025-02-20 DIAGNOSIS — H04.123 DRY EYE SYNDROME OF BOTH EYES: Primary | ICD-10-CM

## 2025-02-20 DIAGNOSIS — Z12.11 ENCOUNTER FOR SCREENING COLONOSCOPY: Primary | ICD-10-CM

## 2025-02-20 DIAGNOSIS — H16.109 SUPERFICIAL KERATITIS, UNSPECIFIED LATERALITY: ICD-10-CM

## 2025-02-20 DIAGNOSIS — Z12.11 COLON CANCER SCREENING: ICD-10-CM

## 2025-02-20 DIAGNOSIS — R19.7 DIARRHEA, UNSPECIFIED TYPE: ICD-10-CM

## 2025-02-20 RX ORDER — TOBRAMYCIN AND DEXAMETHASONE 3; 1 MG/ML; MG/ML
1-2 SUSPENSION/ DROPS OPHTHALMIC 4 TIMES DAILY
Qty: 5 ML | Refills: 0 | Status: SHIPPED | OUTPATIENT
Start: 2025-02-20 | End: 2025-03-02

## 2025-02-20 NOTE — PROGRESS NOTES
HPI    DLS: 7/22/2024 - Dr. Lake   Urgent    Pt states that for the last week he has been OS pain and noticed a bump on   JERSON. Pt states that he noticed that his vision has been fluctuating.     GTTS:   Systane QHS   Last edited by Hilary Borden MA on 2/20/2025 10:13 AM.            Assessment /Plan     For exam results, see Encounter Report.    Dry eye syndrome of both eyes  Superficial keratitis, unspecified laterality  Meibomian gland disease of left eye    Start WC/ ocusoft lid scrubs AM/PM  Start-     tobramycin-dexAMETHasone 0.3-0.1% (TOBRADEX) 0.3-0.1 % DrpS; Place 1-2 drops into the left eye 4 (four) times daily. for 10 days  Dispense: 5 mL; Refill: 0    Continue with systane BID OU      RTC July 2025 for annual/ OCT mac

## 2025-02-20 NOTE — PLAN OF CARE
Referral for procedure from PAT appointment      Spoke to patient to schedule procedure(s) Colonoscopy       Physician to perform procedure(s) Dr. JG Colunga  Date of Procedure (s) 03/06/2025  Arrival Time 1:15 PM   Time of Procedure(s) 2:15 PM    Location of Procedure(s) [facility] [address] Floor  Type of Rx Prep sent to patient: PEG  Instructions provided to patient via [facility]    Patient was informed on the following information and verbalized understanding. Screening questionnaire reviewed with patient and complete. If procedure requires anesthesia, a responsible adult needs to be present to accompany the patient home, patient cannot drive after receiving anesthesia. Appointment details are tentative, especially check-in time. Patient will receive a prep-op call 7 days prior to confirm check-in time for procedure. If applicable the patient should contact their pharmacy to verify Rx for procedure prep is ready for pick-up. Patient was advised to call the scheduling department at [phone] if pharmacy states no Rx is available. Patient was advised to call the endoscopy scheduling department if any questions or concerns arise.      [facility] Endoscopy Scheduling Department

## 2025-02-21 RX ORDER — IRBESARTAN 300 MG/1
300 TABLET ORAL NIGHTLY
Qty: 90 TABLET | Refills: 3 | Status: SHIPPED | OUTPATIENT
Start: 2025-02-21

## 2025-02-21 NOTE — TELEPHONE ENCOUNTER
Refill Decision Note   Jerardo Powers  is requesting a refill authorization.  Brief Assessment and Rationale for Refill:  Approve     Medication Therapy Plan:         Comments:     Note composed:2:17 AM 02/21/2025

## 2025-02-21 NOTE — TELEPHONE ENCOUNTER
No care due was identified.  [facility] Embedded Care Due Messages. Reference number: [id number].   2/21/2025 12:02:43 AM CST

## 2025-02-25 ENCOUNTER — PATIENT MESSAGE (OUTPATIENT)
Dept: GASTROENTEROLOGY | Facility: CLINIC | Age: 80
End: 2025-02-25
Payer: COMMERCIAL

## 2025-02-26 ENCOUNTER — TELEPHONE (OUTPATIENT)
Dept: ENDOSCOPY | Facility: HOSPITAL | Age: 80
End: 2025-02-26
Payer: COMMERCIAL

## 2025-02-26 NOTE — TELEPHONE ENCOUNTER
Patient rescheduled.  Original referral for procedure from PAT appointment    Spoke to Jerardo Powers to reschedule Colonoscopy       Physician to perform procedure(s) Dr. JG Colunga  Date of Procedure (s) 3/11/25  Arrival Time 1:30 PM  Time of Procedure(s) 2:30 PM   Location of Procedure(s) [place] [address]nd Floor  Type of Rx Prep sent to patient's pharmacy: Suprep  Instructions provided to patient via [facility]  Patient denies use of blood thinners.  Patient is taking Ozempic (Semaglutide), instructed to take last dose on/before 3/3/25.   The following information was discussed with patient, and patient verbalized understanding:  Screening questionnaire reviewed with patient and complete. If procedure requires anesthesia, a responsible adult needs to be present to accompany the patient home. Appointment details are tentative, especially check-in time. Patient will receive a pre-op call 7 days prior to appointment to confirm check-in time for procedure. If applicable the patient should contact their pharmacy to verify Rx for procedure prep is ready for pick-up. Patient was instructed to call the scheduling department at [phone] if pharmacy states no Rx is available. Patient was also advised to call the endoscopy scheduling department if any questions or concerns arise.       Endoscopy Scheduling Department

## 2025-03-07 ENCOUNTER — ANESTHESIA EVENT (OUTPATIENT)
Dept: ENDOSCOPY | Facility: HOSPITAL | Age: 80
End: 2025-03-07
Payer: COMMERCIAL
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
## 2025-03-08 NOTE — TELEPHONE ENCOUNTER
No care due was identified.  [facility] Embedded Care Due Messages. Reference number: [id number].   3/08/2025 12:19:24 AM CST

## 2025-03-09 RX ORDER — METFORMIN HYDROCHLORIDE 500 MG/1
1000 TABLET, EXTENDED RELEASE ORAL 2 TIMES DAILY WITH MEALS
Qty: 360 TABLET | Refills: 1 | Status: SHIPPED | OUTPATIENT
Start: 2025-03-09

## 2025-03-09 NOTE — TELEPHONE ENCOUNTER
Refill Decision Note   Jerardo Powers  is requesting a refill authorization.  Brief Assessment and Rationale for Refill:  Approve     Medication Therapy Plan:        Comments:     Note composed:10:04 AM 03/09/2025

## 2025-03-11 ENCOUNTER — ANESTHESIA (OUTPATIENT)
Dept: ENDOSCOPY | Facility: HOSPITAL | Age: 80
End: 2025-03-11
Payer: COMMERCIAL

## 2025-03-11 ENCOUNTER — HOSPITAL ENCOUNTER (OUTPATIENT)
Facility: HOSPITAL | Age: 80
Discharge: HOME | End: 2025-03-11
Attending: INTERNAL MEDICINE | Admitting: INTERNAL MEDICINE
Payer: COMMERCIAL

## 2025-03-11 VITALS
HEART RATE: 59 BPM | RESPIRATION RATE: 17 BRPM | TEMPERATURE: 97 F | SYSTOLIC BLOOD PRESSURE: 120 MMHG | DIASTOLIC BLOOD PRESSURE: 79 MMHG | OXYGEN SATURATION: 93 %

## 2025-03-11 DIAGNOSIS — Z86.0100 HISTORY OF COLON POLYPS: Primary | ICD-10-CM

## 2025-03-11 LAB — POCT GLUCOSE: 136 MG/DL (ref 70–110)

## 2025-03-11 PROCEDURE — 45380 COLONOSCOPY AND BIOPSY: CPT | Mod: 33 | Performed by: INTERNAL MEDICINE

## 2025-03-11 PROCEDURE — 88305 TISSUE EXAM BY PATHOLOGIST: CPT | Mod: 26,,, | Performed by: PATHOLOGY

## 2025-03-11 PROCEDURE — 45380 COLONOSCOPY AND BIOPSY: CPT | Mod: 33,,, | Performed by: INTERNAL MEDICINE

## 2025-03-11 PROCEDURE — 88305 TISSUE EXAM BY PATHOLOGIST: CPT | Performed by: PATHOLOGY

## 2025-03-11 PROCEDURE — 63600175 PHARM REV CODE 636 W HCPCS: Performed by: NURSE ANESTHETIST, CERTIFIED REGISTERED

## 2025-03-11 PROCEDURE — 27201012 HC FORCEPS, HOT/COLD, DISP: Performed by: INTERNAL MEDICINE

## 2025-03-11 PROCEDURE — 25000003 PHARM REV CODE 250: Performed by: INTERNAL MEDICINE

## 2025-03-11 PROCEDURE — 37000008 HC ANESTHESIA 1ST 15 MINUTES: Performed by: INTERNAL MEDICINE

## 2025-03-11 PROCEDURE — 82962 GLUCOSE BLOOD TEST: CPT | Performed by: INTERNAL MEDICINE

## 2025-03-11 PROCEDURE — 94761 N-INVAS EAR/PLS OXIMETRY MLT: CPT

## 2025-03-11 PROCEDURE — 99900035 HC TECH TIME PER 15 MIN (STAT)

## 2025-03-11 PROCEDURE — 37000009 HC ANESTHESIA EA ADD 15 MINS: Performed by: INTERNAL MEDICINE

## 2025-03-11 RX ORDER — PROPOFOL 10 MG/ML
VIAL (ML) INTRAVENOUS CONTINUOUS PRN
Status: DISCONTINUED | OUTPATIENT
Start: 2025-03-11 | End: 2025-03-11

## 2025-03-11 RX ORDER — SODIUM CHLORIDE 9 MG/ML
INJECTION, SOLUTION INTRAVENOUS CONTINUOUS
Status: DISCONTINUED | OUTPATIENT
Start: 2025-03-11 | End: 2025-03-11 | Stop reason: HOSPADM

## 2025-03-11 RX ORDER — LIDOCAINE HYDROCHLORIDE 20 MG/ML
INJECTION INTRAVENOUS
Status: DISCONTINUED | OUTPATIENT
Start: 2025-03-11 | End: 2025-03-11

## 2025-03-11 RX ORDER — PROPOFOL 10 MG/ML
VIAL (ML) INTRAVENOUS
Status: DISCONTINUED | OUTPATIENT
Start: 2025-03-11 | End: 2025-03-11

## 2025-03-11 RX ADMIN — PROPOFOL 150 MCG/KG/MIN: 10 INJECTION, EMULSION INTRAVENOUS at 02:03

## 2025-03-11 RX ADMIN — LIDOCAINE HYDROCHLORIDE 40 MG: 20 INJECTION INTRAVENOUS at 02:03

## 2025-03-11 RX ADMIN — PROPOFOL 80 MG: 10 INJECTION, EMULSION INTRAVENOUS at 02:03

## 2025-03-11 RX ADMIN — SODIUM CHLORIDE: 0.9 INJECTION, SOLUTION INTRAVENOUS at 02:03

## 2025-03-11 NOTE — ANESTHESIA POSTPROCEDURE EVALUATION
Anesthesia Post Evaluation    Patient: Jerardo Powers    Procedure(s) Performed: Procedure(s) (LRB):  COLONOSCOPY (N/A)    Final Anesthesia Type: general      Patient location during evaluation: PACU  Patient participation: Yes- Able to Participate  Level of consciousness: awake and alert  Post-procedure vital signs: reviewed and stable  Pain management: adequate  Airway patency: patent    PONV status at discharge: No PONV  Anesthetic complications: no      Cardiovascular status: stable  Respiratory status: spontaneous ventilation  Hydration status: euvolemic  Follow-up not needed.          Vitals Value Taken Time   /74 03/11/25 15:31   Temp 36.3 °C (97.3 °F) 03/11/25 15:17   Pulse 59 03/11/25 15:37   Resp 20 03/11/25 15:37   SpO2 94 % 03/11/25 15:37   Vitals shown include unfiled device data.      No case tracking events are documented in the log.      Pain/Khushbu Score: Khushbu Score: 9 (3/11/2025  3:30 PM)

## 2025-03-11 NOTE — PROVATION PATIENT INSTRUCTIONS
Discharge Summary/Instructions after an Endoscopic Procedure  Patient Name: Jerardo Powers  Patient MRN: [id number]  Patient YOB: 1945 Tuesday, March 11, 2025  Noah Colunga MD  Dear patient,  As a result of recent federal legislation (The Federal Cures Act), you may   receive lab or pathology results from your procedure in your [facility]   account before your physician is able to contact you. Your physician or   their representative will relay the results to you with their   recommendations at their soonest availability.  Thank you,  RESTRICTIONS:  During your procedure today, you received medications for sedation.  These   medications may affect your judgment, balance and coordination.  Therefore,   for 24 hours, you have the following restrictions:   - DO NOT drive a car, operate machinery, make legal/financial decisions,   sign important papers or drink alcohol.    ACTIVITY:  Today: no heavy lifting, straining or running due to procedural   sedation/anesthesia.  The following day: return to full activity including work.  DIET:  Eat and drink normally unless instructed otherwise.     TREATMENT FOR COMMON SIDE EFFECTS:  - Mild abdominal pain, nausea, belching, bloating or excessive gas:  rest,   eat lightly and use a heating pad.  - Sore Throat: treat with throat lozenges and/or gargle with warm salt   water.  - Because air was used during the procedure, expelling large amounts of air   from your rectum or belching is normal.  - If a bowel prep was taken, you may not have a bowel movement for 1-3 days.    This is normal.  SYMPTOMS TO WATCH FOR AND REPORT TO YOUR PHYSICIAN:  1. Abdominal pain or bloating, other than gas cramps.  2. Chest pain.  3. Back pain.  4. Signs of infection such as: chills or fever occurring within 24 hours   after the procedure.  5. Rectal bleeding, which would show as bright red, maroon, or black stools.   (A tablespoon of blood from the rectum is not serious, especially  if   hemorrhoids are present.)  6. Vomiting.  7. Weakness or dizziness.  GO DIRECTLY TO THE NEAREST EMERGENCY ROOM IF YOU HAVE ANY OF THE FOLLOWING:      Difficulty breathing              Chills and/or fever over 101 F   Persistent vomiting and/or vomiting blood   Severe abdominal pain   Severe chest pain   Black, tarry stools   Bleeding- more than one tablespoon   Any other symptom or condition that you feel may need urgent attention  Your doctor recommends these additional instructions:  If any biopsies were taken, your doctors clinic will contact you in 1 to 2   weeks with any results.  - Patient has a contact number available for emergencies.  The signs and   symptoms of potential delayed complications were discussed with the   patient.  Return to normal activities tomorrow.  Written discharge   instructions were provided to the patient.   - Discharge patient to home.   - Continue present medications.   - Resume previous diet.   - Await pathology results.   - Repeat colonoscopy is not recommended for surveillance.   For questions, problems or results please call your physician - Noah Colunga MD at Work:  [phone].  [facility], EMERGENCY ROOM PHONE NUMBER: [phone]  IF A COMPLICATION OR EMERGENCY SITUATION ARISES AND YOU ARE UNABLE TO REACH   YOUR PHYSICIAN - GO DIRECTLY TO THE EMERGENCY ROOM.  Noah Colunga MD  3/11/2025 3:18:26 PM  This report has been verified and signed electronically.  Dear patient,  As a result of recent federal legislation (The Federal Cures Act), you may   receive lab or pathology results from your procedure in your [facility]   account before your physician is able to contact you. Your physician or   their representative will relay the results to you with their   recommendations at their soonest availability.  Thank you,  PROVATION

## 2025-03-11 NOTE — PLAN OF CARE
Pt in preop bay 2, VSS, and IV inserted. Pt denies any open wounds on body. Last used ozempic 3/2.  Pt needs procedural consents signed, otherwise ready to roll.

## 2025-03-11 NOTE — H&P
Short Stay Endoscopy History and Physical    PCP - Adams Moore MD    Procedure - Colonoscopy  Sedation: GA  ASA - per anesthesia  Mallampati - per anesthesia  History of Anesthesia problems - no  Family history Anesthesia problems -  no     HPI:  This is a 79 y.o. male here for evaluation of : History of colon polyps, diarrhea    Reflux - no  Dysphagia - no  Abdominal pain - no  Diarrhea - yes    ROS:  Constitutional: No fevers, chills, No weight loss  ENT: No allergies  CV: No chest pain  Pulm: No cough, No shortness of breath  Ophtho: No vision changes  GI: see HPI  Medical History:  has a past medical history of Anxiety, Arthritis, BPH (benign prostatic hyperplasia), BPH (benign prostatic hypertrophy), Cataract, CKD (chronic kidney disease) stage 3, GFR 30-59 ml/min (05/05/2014), Colon polyps, Diabetes mellitus type II, Dry eye syndrome, Emphysema NEC, Fatty liver, Gout, Hematuria, Hyperlipidemia, Hypertension, Hypothyroidism, IBS (irritable bowel syndrome), Joint pain, Kidney stones, Lattice degeneration, Lattice degeneration of both retinas, JANEEN (obstructive sleep apnea), Plantar fasciitis, Reflux, Subdural hematoma (09/2021), and Vitamin B12 deficiency.    Surgical History:  has a past surgical history that includes Kidney stone surgery; Cataract extraction (1/28/2013); Cataract extraction; Colonoscopy (Sep 2011); Colonoscopy (N/A, 10/10/2016); Cystoscopy w/ ureteral stent placement (Left, 9/26/2018); Ureteroscopic removal of ureteric calculus (Left, 10/3/2018); Laser lithotripsy (Left, 10/3/2018); Cystoscopy (10/3/2018); Retrograde pyelography (Left, 10/3/2018); and Replacement of stent (Left, 10/3/2018).    Family History: family history includes Cancer in his brother; Diabetes in his mother; Macular degeneration in his brother.. Otherwise no colon cancer, inflammatory bowel disease, or GI malignancies.    Social History:  reports that he has never smoked. He has never been exposed to tobacco smoke.  He has never used smokeless tobacco. He reports that he does not drink alcohol and does not use drugs.    Review of patient's allergies indicates:   Allergen Reactions    Morphine Hives       Medications:   Prescriptions Prior to Admission[1]    Objective Findings:    Vital Signs: Per nursing notes.    Physical Exam:  General Appearance: Well appearing in no acute distress  Head:   Normocephalic, without obvious abnormality  Eyes:    No scleral icterus  Airway: Open  Neck: No restriction in mobility  Lungs: CTA bilaterally in anterior and posterior fields, no wheezes, no crackles.  Heart:  Regular rate and rhythm, S1, S2 normal, no murmurs heard  Abdomen: Soft, non tender, non distended      Labs:  Lab Results   Component Value Date    WBC 9.94 02/04/2025    HGB 14.7 02/04/2025    HCT 44.0 02/04/2025     02/04/2025    CHOL 167 02/04/2025    TRIG 282 (H) 02/04/2025    HDL 44 02/04/2025    ALT 40 02/04/2025    AST 25 02/04/2025     (L) 02/04/2025    K 4.4 02/04/2025     02/04/2025    CREATININE 1.3 02/04/2025    BUN 13 02/04/2025    CO2 26 02/04/2025    TSH 1.049 11/04/2024    PSA 0.74 07/14/2017    INR 1.0 09/26/2018    HGBA1C 7.0 (H) 02/04/2025         I have explained the risks and benefits of endoscopy procedures to the patient including but not limited to bleeding, perforation, infection, and death.    Thank you so much for allowing me to participate in the care of Jerardo Colunga MD         [1]   Medications Prior to Admission   Medication Sig Dispense Refill Last Dose/Taking    allopurinoL (ZYLOPRIM) 100 MG tablet TAKE 1 TABLET BY MOUTH EVERY DAY 90 tablet 2 Past Week    amLODIPine (NORVASC) 10 MG tablet TAKE 1 TABLET BY MOUTH EVERY DAY 90 tablet 3 3/11/2025    citalopram (CELEXA) 20 MG tablet TAKE 1 TABLET BY MOUTH EVERY DAY 90 tablet 3 Past Week    cyanocobalamin, vitamin B-12, 5,000 mcg Subl Take 1 tablet under tongue once a day for one month then continue once every  week 60 tablet 3 Past Week    diclofenac (VOLTAREN) 75 MG EC tablet TAKE 1 TABLET BY MOUTH TWICE A DAY 60 tablet 0 Past Week    irbesartan (AVAPRO) 300 MG tablet TAKE 1 TABLET BY MOUTH EVERY EVENING. 90 tablet 3 Past Week    levothyroxine (SYNTHROID) 88 MCG tablet TAKE 1 TABLET BY MOUTH EVERY DAY 90 tablet 3 Past Week    metFORMIN (GLUCOPHAGE-XR) 500 MG ER 24hr tablet TAKE 2 TABLETS BY MOUTH TWICE A DAY WITH MEALS 360 tablet 1 Past Week    omega-3 fatty acids/fish oil (FISH OIL-OMEGA-3 FATTY ACIDS) 300-1,000 mg capsule Take by mouth once daily.   Past Week    vitamin D (VITAMIN D3) 1000 units Tab Take 1,000 Units by mouth once daily.   Past Week    albuterol (PROVENTIL/VENTOLIN HFA) 90 mcg/actuation inhaler INHALE 2 PUFFS INTO THE LUNGS EVERY 6 HOURS AS NEEDED FOR WHEEZING OR SHORTNESS OF BREATH RESCUE 18 g 3     blood sugar diagnostic (CONTOUR TEST STRIPS) Strp Check sugars twice daily 100 strip 11     blood-glucose meter Misc use as directed 1 each 0     esomeprazole (NEXIUM) 40 MG capsule TAKE 1 CAPSULE (40 MG TOTAL) BY MOUTH BEFORE BREAKFAST. 90 capsule 1 More than a month    fluticasone propionate (FLONASE) 50 mcg/actuation nasal spray SPRAY 2 SPRAYS INTO EACH NOSTRIL ONCE DAILY 48 mL 1     gabapentin (NEURONTIN) 300 MG capsule Take 1 capsule (300 mg total) by mouth every evening. 30 capsule 11 More than a month    lancets 30 gauge Misc use as directed to check blood sugar three times daily 100 each 11     prednisoLONE acetate (PRED FORTE) 1 % DrpS Place 1 drop into both eyes 3 (three) times daily. (Patient not taking: Reported on 2/20/2025) 10 mL 2     semaglutide (OZEMPIC) 2 mg/dose (8 mg/3 mL) PnIj Inject 2 mg into the skin every 7 days. 3 mL 11 3/2/2025

## 2025-03-13 ENCOUNTER — TELEPHONE (OUTPATIENT)
Dept: GASTROENTEROLOGY | Facility: CLINIC | Age: 80
End: 2025-03-13
Payer: COMMERCIAL

## 2025-03-13 ENCOUNTER — RESULTS FOLLOW-UP (OUTPATIENT)
Dept: GASTROENTEROLOGY | Facility: CLINIC | Age: 80
End: 2025-03-13

## 2025-03-13 LAB
FINAL PATHOLOGIC DIAGNOSIS: NORMAL
GROSS: NORMAL
Lab: NORMAL

## 2025-03-13 NOTE — TELEPHONE ENCOUNTER
----- Message from Noah Colunga MD sent at 3/13/2025  1:42 PM CDT -----  Please notify patient, the colon biopsies were normal.  ----- Message -----  From: Julio, [facility]  Sent: 3/13/2025   1:26 PM CDT  To: Noah Colunga MD

## 2025-03-27 ENCOUNTER — TELEPHONE (OUTPATIENT)
Dept: ENDOSCOPY | Facility: HOSPITAL | Age: 80
End: 2025-03-27
Payer: COMMERCIAL

## 2025-03-27 VITALS — HEIGHT: 69 IN | BODY MASS INDEX: 31.1 KG/M2 | WEIGHT: 210 LBS

## 2025-03-27 DIAGNOSIS — R10.13 DYSPEPSIA: Primary | ICD-10-CM

## 2025-03-27 NOTE — TELEPHONE ENCOUNTER
"Referral for procedure from [facility]      Spoke to pt to schedule procedure(s) Upper Endoscopy (EGD)       Physician to perform procedure(s) Dr. JG Colunga  Date of Procedure (s) 25  Arrival Time 10:30 AM  Time of Procedure(s) 11:30 AM   Location of Procedure(s) [place] [address] Floor  Type of Rx Prep sent to patient: N/A  Instructions provided to patient via [facility]    Patient was informed on the following information and verbalized understanding. Screening questionnaire reviewed with patient and complete. If procedure requires anesthesia, a responsible adult needs to be present to accompany the patient home, patient cannot drive after receiving anesthesia. Appointment details are tentative, especially check-in time. Patient will receive a prep-op call 7 days prior to confirm check-in time for procedure. If applicable the patient should contact their pharmacy to verify Rx for procedure prep is ready for pick-up. Patient was advised to call the scheduling department at [phone] if pharmacy states no Rx is available. Patient was advised to call the endoscopy scheduling department if any questions or concerns arise.       Endoscopy Scheduling Department    Pt was offered soonest available date of  based on timing of recent GLP-1 dose. Pt declined and requested     From: Noah Colunga MD   Sent: 3/14/2025   7:17 AM CDT   To: [facility]   Subject: Endo Case Request                                Procedure: EGD     Diagnosis: Dyspepsia     Procedure Timin-12 weeks     *If within 4 weeks selected, please janay as high priority*     *If greater than 12 weeks, please select "4-12 weeks" and delay sending until 2 months prior to requested date*     Provider: Any GI provider     Location: Any Site     Additional Scheduling Information: No scheduling concerns     Prep Specifications:N/A     Have you attached a patient to this message: Yes   "

## 2025-04-05 RX ORDER — AMLODIPINE BESYLATE 10 MG/1
10 TABLET ORAL
Qty: 90 TABLET | Refills: 3 | Status: SHIPPED | OUTPATIENT
Start: 2025-04-05

## 2025-04-05 NOTE — TELEPHONE ENCOUNTER
Refill Decision Note   Jerardo Powers  is requesting a refill authorization.  Brief Assessment and Rationale for Refill:  Approve     Medication Therapy Plan:        Comments:     Note composed:1:33 AM 04/05/2025

## 2025-04-05 NOTE — TELEPHONE ENCOUNTER
No care due was identified.  [facility] Embedded Care Due Messages. Reference number: [id number].   4/05/2025 12:20:03 AM CDT

## 2025-04-11 ENCOUNTER — ANESTHESIA EVENT (OUTPATIENT)
Dept: ENDOSCOPY | Facility: HOSPITAL | Age: 80
End: 2025-04-11
Payer: COMMERCIAL

## 2025-04-11 NOTE — ANESTHESIA PREPROCEDURE EVALUATION
04/11/2025  Jerardo Powers is a 79 y.o., male.      Pre-op Assessment    I have reviewed the Patient Summary Reports.    I have reviewed the NPO Status.   I have reviewed the Medications.     Review of Systems  Anesthesia Hx:             Denies Family Hx of Anesthesia complications.    Denies Personal Hx of Anesthesia complications.                    Hematology/Oncology:  Hematology Normal   Oncology Normal                                   EENT/Dental:  EENT/Dental Normal           Cardiovascular:     Hypertension                                          Pulmonary:   COPD     Sleep Apnea                Renal/:  Renal/ Normal                 Hepatic/GI:      Liver Disease,               Musculoskeletal:  Musculoskeletal Normal                Neurological:    Neuromuscular Disease,                                   Endocrine:  Diabetes, type 2 Hypothyroidism        Obesity / BMI > 30  Dermatological:  Skin Normal    Psych:   anxiety depression                Physical Exam  General: Well nourished, Cooperative, Alert and Oriented    Airway:  Mallampati: I   Mouth Opening: Normal  TM Distance: Normal  Tongue: Normal  Neck ROM: Normal ROM    Chest/Lungs:  Normal Respiratory Rate    Heart:  Rate: Normal  Rhythm: Regular Rhythm        Anesthesia Plan  Type of Anesthesia, risks & benefits discussed:    Anesthesia Type: Gen Natural Airway  Intra-op Monitoring Plan: Standard ASA Monitors  Post Op Pain Control Plan: multimodal analgesia  Induction:  IV  Informed Consent: Informed consent signed with the Patient and all parties understand the risks and agree with anesthesia plan.  All questions answered.   ASA Score: 3  Day of Surgery Review of History & Physical: H&P Update referred to the surgeon/provider.    Ready For Surgery From Anesthesia Perspective.     .

## 2025-04-14 ENCOUNTER — ANESTHESIA (OUTPATIENT)
Dept: ENDOSCOPY | Facility: HOSPITAL | Age: 80
End: 2025-04-14
Payer: COMMERCIAL

## 2025-04-14 ENCOUNTER — HOSPITAL ENCOUNTER (OUTPATIENT)
Facility: HOSPITAL | Age: 80
Discharge: HOME | End: 2025-04-14
Attending: INTERNAL MEDICINE | Admitting: INTERNAL MEDICINE
Payer: COMMERCIAL

## 2025-04-14 VITALS
OXYGEN SATURATION: 95 % | HEART RATE: 58 BPM | HEIGHT: 69 IN | DIASTOLIC BLOOD PRESSURE: 74 MMHG | RESPIRATION RATE: 19 BRPM | SYSTOLIC BLOOD PRESSURE: 126 MMHG | BODY MASS INDEX: 30.36 KG/M2 | TEMPERATURE: 98 F | WEIGHT: 205 LBS

## 2025-04-14 DIAGNOSIS — R10.13 DYSPEPSIA: Primary | ICD-10-CM

## 2025-04-14 LAB — POCT GLUCOSE: 117 MG/DL (ref 70–110)

## 2025-04-14 PROCEDURE — 43239 EGD BIOPSY SINGLE/MULTIPLE: CPT | Performed by: INTERNAL MEDICINE

## 2025-04-14 PROCEDURE — 25000003 PHARM REV CODE 250: Performed by: NURSE ANESTHETIST, CERTIFIED REGISTERED

## 2025-04-14 PROCEDURE — 94761 N-INVAS EAR/PLS OXIMETRY MLT: CPT

## 2025-04-14 PROCEDURE — D9220A PRA ANESTHESIA: Mod: ,,, | Performed by: NURSE ANESTHETIST, CERTIFIED REGISTERED

## 2025-04-14 PROCEDURE — 25000003 PHARM REV CODE 250: Performed by: INTERNAL MEDICINE

## 2025-04-14 PROCEDURE — 43239 EGD BIOPSY SINGLE/MULTIPLE: CPT | Mod: ,,, | Performed by: INTERNAL MEDICINE

## 2025-04-14 PROCEDURE — 82962 GLUCOSE BLOOD TEST: CPT | Performed by: INTERNAL MEDICINE

## 2025-04-14 PROCEDURE — 63600175 PHARM REV CODE 636 W HCPCS: Performed by: NURSE ANESTHETIST, CERTIFIED REGISTERED

## 2025-04-14 PROCEDURE — 37000008 HC ANESTHESIA 1ST 15 MINUTES: Performed by: INTERNAL MEDICINE

## 2025-04-14 PROCEDURE — 37000009 HC ANESTHESIA EA ADD 15 MINS: Performed by: INTERNAL MEDICINE

## 2025-04-14 PROCEDURE — 82657 ENZYME CELL ACTIVITY: CPT | Performed by: INTERNAL MEDICINE

## 2025-04-14 PROCEDURE — 99900035 HC TECH TIME PER 15 MIN (STAT)

## 2025-04-14 RX ORDER — LIDOCAINE HYDROCHLORIDE 20 MG/ML
INJECTION INTRAVENOUS
Status: DISCONTINUED | OUTPATIENT
Start: 2025-04-14 | End: 2025-04-14

## 2025-04-14 RX ORDER — DEXMEDETOMIDINE HYDROCHLORIDE 100 UG/ML
INJECTION, SOLUTION INTRAVENOUS
Status: DISCONTINUED | OUTPATIENT
Start: 2025-04-14 | End: 2025-04-14

## 2025-04-14 RX ORDER — SODIUM CHLORIDE 9 MG/ML
INJECTION, SOLUTION INTRAVENOUS CONTINUOUS
Status: DISCONTINUED | OUTPATIENT
Start: 2025-04-14 | End: 2025-04-14 | Stop reason: HOSPADM

## 2025-04-14 RX ORDER — PROPOFOL 10 MG/ML
VIAL (ML) INTRAVENOUS
Status: DISCONTINUED | OUTPATIENT
Start: 2025-04-14 | End: 2025-04-14

## 2025-04-14 RX ADMIN — PROPOFOL 20 MG: 10 INJECTION, EMULSION INTRAVENOUS at 11:04

## 2025-04-14 RX ADMIN — PROPOFOL 60 MG: 10 INJECTION, EMULSION INTRAVENOUS at 11:04

## 2025-04-14 RX ADMIN — DEXMEDETOMIDINE HYDROCHLORIDE 12 MCG: 100 INJECTION, SOLUTION INTRAVENOUS at 11:04

## 2025-04-14 RX ADMIN — LIDOCAINE HYDROCHLORIDE 100 MG: 20 INJECTION INTRAVENOUS at 11:04

## 2025-04-14 RX ADMIN — SODIUM CHLORIDE: 9 INJECTION, SOLUTION INTRAVENOUS at 10:04

## 2025-04-14 NOTE — H&P
Short Stay Endoscopy History and Physical    PCP - Adams Moore MD    Procedure - EGD  Sedation: GA  ASA - per anesthesia  Mallampati - per anesthesia  History of Anesthesia problems - no  Family history Anesthesia problems -  no     HPI:  This is a 79 y.o. male here for evaluation of : Dyspepsia    Reflux - no  Dysphagia - no  Abdominal pain - no  Diarrhea - no    ROS:  Constitutional: No fevers, chills, No weight loss  ENT: No allergies  CV: No chest pain  Pulm: No cough, No shortness of breath  Ophtho: No vision changes  GI: see HPI  Medical History:  has a past medical history of Anxiety, Arthritis, BPH (benign prostatic hyperplasia), BPH (benign prostatic hypertrophy), Cataract, CKD (chronic kidney disease) stage 3, GFR 30-59 ml/min (05/05/2014), Colon polyps, Diabetes mellitus type II, Dry eye syndrome, Emphysema NEC, Fatty liver, Gout, Hematuria, Hyperlipidemia, Hypertension, Hypothyroidism, IBS (irritable bowel syndrome), Joint pain, Kidney stones, Lattice degeneration, Lattice degeneration of both retinas, JANEEN (obstructive sleep apnea), Plantar fasciitis, Reflux, Subdural hematoma (09/2021), and Vitamin B12 deficiency.    Surgical History:  has a past surgical history that includes Kidney stone surgery; Cataract extraction (1/28/2013); Cataract extraction; Colonoscopy (Sep 2011); Colonoscopy (N/A, 10/10/2016); Cystoscopy w/ ureteral stent placement (Left, 9/26/2018); Ureteroscopic removal of ureteric calculus (Left, 10/3/2018); Laser lithotripsy (Left, 10/3/2018); Cystoscopy (10/3/2018); Retrograde pyelography (Left, 10/3/2018); Replacement of stent (Left, 10/3/2018); and Colonoscopy (N/A, 3/11/2025).    Family History: family history includes Cancer in his brother; Diabetes in his mother; Macular degeneration in his brother.. Otherwise no colon cancer, inflammatory bowel disease, or GI malignancies.    Social History:  reports that he has never smoked. He has never been exposed to tobacco smoke. He  has never used smokeless tobacco. He reports that he does not drink alcohol and does not use drugs.    Review of patient's allergies indicates:   Allergen Reactions    Morphine Hives       Medications:   Prescriptions Prior to Admission[1]    Objective Findings:    Vital Signs: Per nursing notes.    Physical Exam:  General Appearance: Well appearing in no acute distress  Head:   Normocephalic, without obvious abnormality  Eyes:    No scleral icterus  Airway: Open  Neck: No restriction in mobility  Lungs: CTA bilaterally in anterior and posterior fields, no wheezes, no crackles.  Heart:  Regular rate and rhythm, S1, S2 normal, no murmurs heard  Abdomen: Soft, non tender, non distended      Labs:  Lab Results   Component Value Date    WBC 9.94 02/04/2025    HGB 14.7 02/04/2025    HCT 44.0 02/04/2025     02/04/2025    CHOL 167 02/04/2025    TRIG 282 (H) 02/04/2025    HDL 44 02/04/2025    ALT 40 02/04/2025    AST 25 02/04/2025     (L) 02/04/2025    K 4.4 02/04/2025     02/04/2025    CREATININE 1.3 02/04/2025    BUN 13 02/04/2025    CO2 26 02/04/2025    TSH 1.049 11/04/2024    PSA 0.74 07/14/2017    INR 1.0 09/26/2018    HGBA1C 7.0 (H) 02/04/2025         I have explained the risks and benefits of endoscopy procedures to the patient including but not limited to bleeding, perforation, infection, and death.    Thank you so much for allowing me to participate in the care of Jerardo Colunga MD         [1]   Medications Prior to Admission   Medication Sig Dispense Refill Last Dose/Taking    allopurinoL (ZYLOPRIM) 100 MG tablet TAKE 1 TABLET BY MOUTH EVERY DAY 90 tablet 2 4/13/2025    amLODIPine (NORVASC) 10 MG tablet TAKE 1 TABLET BY MOUTH EVERY DAY 90 tablet 3 4/13/2025    citalopram (CELEXA) 20 MG tablet TAKE 1 TABLET BY MOUTH EVERY DAY 90 tablet 3 4/13/2025    cyanocobalamin, vitamin B-12, 5,000 mcg Subl Take 1 tablet under tongue once a day for one month then continue once every week  60 tablet 3 4/13/2025    fluticasone propionate (FLONASE) 50 mcg/actuation nasal spray SPRAY 2 SPRAYS INTO EACH NOSTRIL ONCE DAILY 48 mL 1 4/13/2025    irbesartan (AVAPRO) 300 MG tablet TAKE 1 TABLET BY MOUTH EVERY EVENING. 90 tablet 3 4/14/2025    levothyroxine (SYNTHROID) 88 MCG tablet TAKE 1 TABLET BY MOUTH EVERY DAY 90 tablet 3 4/14/2025    metFORMIN (GLUCOPHAGE-XR) 500 MG ER 24hr tablet TAKE 2 TABLETS BY MOUTH TWICE A DAY WITH MEALS 360 tablet 1 4/13/2025    omega-3 fatty acids/fish oil (FISH OIL-OMEGA-3 FATTY ACIDS) 300-1,000 mg capsule Take by mouth once daily.   4/13/2025    vitamin D (VITAMIN D3) 1000 units Tab Take 1,000 Units by mouth once daily.   4/13/2025    albuterol (PROVENTIL/VENTOLIN HFA) 90 mcg/actuation inhaler INHALE 2 PUFFS INTO THE LUNGS EVERY 6 HOURS AS NEEDED FOR WHEEZING OR SHORTNESS OF BREATH RESCUE 18 g 3 More than a month    blood sugar diagnostic (CONTOUR TEST STRIPS) Strp Check sugars twice daily 100 strip 11     blood-glucose meter Misc use as directed 1 each 0     diclofenac (VOLTAREN) 75 MG EC tablet TAKE 1 TABLET BY MOUTH TWICE A DAY 60 tablet 0 More than a month    esomeprazole (NEXIUM) 40 MG capsule TAKE 1 CAPSULE (40 MG TOTAL) BY MOUTH BEFORE BREAKFAST. 90 capsule 1 More than a month    gabapentin (NEURONTIN) 300 MG capsule Take 1 capsule (300 mg total) by mouth every evening. 30 capsule 11 More than a month    lancets 30 gauge Misc use as directed to check blood sugar three times daily 100 each 11     semaglutide (OZEMPIC) 2 mg/dose (8 mg/3 mL) PnIj Inject 2 mg into the skin every 7 days. 3 mL 11 4/6/2025

## 2025-04-14 NOTE — TRANSFER OF CARE
"Anesthesia Transfer of Care Note    Patient: Jerardo Powers    Procedure(s) Performed: Procedure(s) (LRB):  EGD (ESOPHAGOGASTRODUODENOSCOPY) (N/A)    Patient location: PACU    Anesthesia Type: general    Transport from OR: Transported from OR on room air with adequate spontaneous ventilation    Post pain: adequate analgesia    Post assessment: no apparent anesthetic complications    Post vital signs: stable    Level of consciousness: awake    Nausea/Vomiting: no nausea/vomiting    Complications: none    Transfer of care protocol was followed      Last vitals: Visit Vitals  /77   Pulse (!) 57   Temp 36.7 °C (98.1 °F) (Temporal)   Resp 13   Ht 5' 9" (1.753 m)   Wt 93 kg (205 lb)   SpO2 98%   BMI 30.27 kg/m²     "

## 2025-04-14 NOTE — PROVATION PATIENT INSTRUCTIONS
Discharge Summary/Instructions after an Endoscopic Procedure  Patient Name: Jerardo Powers  Patient MRN: [id number]  Patient YOB: 1945 Monday, April 14, 2025  Noah Colunga MD  Dear patient,  As a result of recent federal legislation (The Federal Cures Act), you may   receive lab or pathology results from your procedure in your [facility]   account before your physician is able to contact you. Your physician or   their representative will relay the results to you with their   recommendations at their soonest availability.  Thank you,  RESTRICTIONS:  During your procedure today, you received medications for sedation.  These   medications may affect your judgment, balance and coordination.  Therefore,   for 24 hours, you have the following restrictions:   - DO NOT drive a car, operate machinery, make legal/financial decisions,   sign important papers or drink alcohol.    ACTIVITY:  Today: no heavy lifting, straining or running due to procedural   sedation/anesthesia.  The following day: return to full activity including work.  DIET:  Eat and drink normally unless instructed otherwise.     TREATMENT FOR COMMON SIDE EFFECTS:  - Mild abdominal pain, nausea, belching, bloating or excessive gas:  rest,   eat lightly and use a heating pad.  - Sore Throat: treat with throat lozenges and/or gargle with warm salt   water.  - Because air was used during the procedure, expelling large amounts of air   from your rectum or belching is normal.  - If a bowel prep was taken, you may not have a bowel movement for 1-3 days.    This is normal.  SYMPTOMS TO WATCH FOR AND REPORT TO YOUR PHYSICIAN:  1. Abdominal pain or bloating, other than gas cramps.  2. Chest pain.  3. Back pain.  4. Signs of infection such as: chills or fever occurring within 24 hours   after the procedure.  5. Rectal bleeding, which would show as bright red, maroon, or black stools.   (A tablespoon of blood from the rectum is not serious, especially if    hemorrhoids are present.)  6. Vomiting.  7. Weakness or dizziness.  GO DIRECTLY TO THE NEAREST EMERGENCY ROOM IF YOU HAVE ANY OF THE FOLLOWING:      Difficulty breathing              Chills and/or fever over 101 F   Persistent vomiting and/or vomiting blood   Severe abdominal pain   Severe chest pain   Black, tarry stools   Bleeding- more than one tablespoon   Any other symptom or condition that you feel may need urgent attention  Your doctor recommends these additional instructions:  If any biopsies were taken, your doctors clinic will contact you in 1 to 2   weeks with any results.  - Patient has a contact number available for emergencies.  The signs and   symptoms of potential delayed complications were discussed with the   patient.  Return to normal activities tomorrow.  Written discharge   instructions were provided to the patient.   - Discharge patient to home.   - Resume previous diet.   - Continue present medications.   - Await pathology results.   For questions, problems or results please call your physician - Noah Colunga MD at Work:  [phone].  [facility] [place], EMERGENCY ROOM PHONE NUMBER: [phone]  IF A COMPLICATION OR EMERGENCY SITUATION ARISES AND YOU ARE UNABLE TO REACH   YOUR PHYSICIAN - GO DIRECTLY TO THE EMERGENCY ROOM.  Noah Colunga MD  4/14/2025 11:30:14 AM  This report has been verified and signed electronically.  Dear patient,  As a result of recent federal legislation (The Federal Cures Act), you may   receive lab or pathology results from your procedure in your [facility]   account before your physician is able to contact you. Your physician or   their representative will relay the results to you with their   recommendations at their soonest availability.  Thank you,  PROVATION

## 2025-04-14 NOTE — ANESTHESIA POSTPROCEDURE EVALUATION
Anesthesia Post Evaluation    Patient: Jerardo Powers    Procedure(s) Performed: Procedure(s) (LRB):  EGD (ESOPHAGOGASTRODUODENOSCOPY) (N/A)    Final Anesthesia Type: general      Patient location during evaluation: PACU  Patient participation: Yes- Able to Participate  Level of consciousness: awake and alert  Post-procedure vital signs: reviewed and stable  Pain management: adequate  Airway patency: patent    PONV status at discharge: No PONV  Anesthetic complications: no      Cardiovascular status: blood pressure returned to baseline  Respiratory status: unassisted  Hydration status: euvolemic            Vitals Value Taken Time   /76 04/14/25 11:46   Temp 36.6 °C (97.9 °F) 04/14/25 11:33   Pulse 57 04/14/25 11:46   Resp 23 04/14/25 11:46   SpO2 92 % 04/14/25 11:46   Vitals shown include unfiled device data.      No case tracking events are documented in the log.      Pain/Khushbu Score: Khushbu Score: 9 (4/14/2025 11:37 AM)

## 2025-04-14 NOTE — PLAN OF CARE
Chart reviewed. Preop nursing care completed per orders. Safe surgery checklist complete. Pt denies any open wounds cuts or sores. Pt denies any metal in body. Family at bedside and belongings in locker #9. Waiting for H&P and procedural consent prior to surgery. Pt AAOX4, VSS on room air. Pt toileted, Bed locked in lowest position, Call light within reach. Pt denies any needs at this time.

## 2025-04-14 NOTE — PLAN OF CARE
MD at bedside discussing procedure with pt. Pt VSS, lying in bed. Pt eating татьяна crackers and drinking juice as requested, tolerating well.

## 2025-04-15 RX ORDER — LEVOTHYROXINE SODIUM 88 UG/1
88 TABLET ORAL
Qty: 90 TABLET | Refills: 1 | Status: SHIPPED | OUTPATIENT
Start: 2025-04-15

## 2025-04-15 NOTE — TELEPHONE ENCOUNTER
Refill Decision Note   Jerardo Powers  is requesting a refill authorization.  Brief Assessment and Rationale for Refill:  Approve     Medication Therapy Plan:  TSH-WNL      Comments:     Note composed:11:53 AM 04/15/2025             Hydroxychloroquine Counseling:  I discussed with the patient that a baseline ophthalmologic exam is needed at the start of therapy and every year thereafter while on therapy. A CBC may also be warranted for monitoring.  The side effects of this medication were discussed with the patient, including but not limited to agranulocytosis, aplastic anemia, seizures, rashes, retinopathy, and liver toxicity. Patient instructed to call the office should any adverse effect occur.  The patient verbalized understanding of the proper use and possible adverse effects of Plaquenil.  All the patient's questions and concerns were addressed.

## 2025-04-15 NOTE — TELEPHONE ENCOUNTER
No care due was identified.  [facility] Embedded Care Due Messages. Reference number: [id number].   4/15/2025 8:18:31 AM CDT

## 2025-04-16 ENCOUNTER — PATIENT MESSAGE (OUTPATIENT)
Dept: GASTROENTEROLOGY | Facility: CLINIC | Age: 80
End: 2025-04-16
Payer: COMMERCIAL

## 2025-04-16 LAB
ACID A-GLUCOSIDASE TSMI-CCNT: 71.6 NMOL/MIN/MG PROT
DISACCHARIDASES TSMI-IMP: ABNORMAL
GLUCAN 1,4-ALPHA-GLUCOSIDASE TSMI-CCNT: 6.5 NMOL/MIN/MG PROT
LACTASE TSMI-CCNT: 2 NMOL/MIN/MG PROT
PALATINASE TSMI-CCNT: 3.7 NMOL/MIN/MG PROT
PROVIDER SIGNING NAME: ABNORMAL
SUCRASE TSMI-CCNT: 13.1 NMOL/MIN/MG PROT

## 2025-05-08 ENCOUNTER — LAB VISIT (OUTPATIENT)
Dept: LAB | Facility: HOSPITAL | Age: 80
End: 2025-05-08
Attending: FAMILY MEDICINE
Payer: COMMERCIAL

## 2025-05-08 DIAGNOSIS — K76.0 FATTY LIVER: ICD-10-CM

## 2025-05-08 DIAGNOSIS — E55.9 VITAMIN D DEFICIENCY: ICD-10-CM

## 2025-05-08 DIAGNOSIS — J43.8 OTHER EMPHYSEMA: ICD-10-CM

## 2025-05-08 DIAGNOSIS — E78.5 DYSLIPIDEMIA: ICD-10-CM

## 2025-05-08 DIAGNOSIS — R79.89 LOW TESTOSTERONE IN MALE: ICD-10-CM

## 2025-05-08 DIAGNOSIS — N18.30 TYPE 2 DIABETES MELLITUS WITH STAGE 3 CHRONIC KIDNEY DISEASE, WITHOUT LONG-TERM CURRENT USE OF INSULIN, UNSPECIFIED WHETHER STAGE 3A OR 3B CKD: ICD-10-CM

## 2025-05-08 DIAGNOSIS — E11.22 TYPE 2 DIABETES MELLITUS WITH STAGE 3 CHRONIC KIDNEY DISEASE, WITHOUT LONG-TERM CURRENT USE OF INSULIN, UNSPECIFIED WHETHER STAGE 3A OR 3B CKD: ICD-10-CM

## 2025-05-08 DIAGNOSIS — N18.32 STAGE 3B CHRONIC KIDNEY DISEASE: ICD-10-CM

## 2025-05-08 DIAGNOSIS — E03.9 HYPOTHYROIDISM, UNSPECIFIED TYPE: ICD-10-CM

## 2025-05-08 DIAGNOSIS — M17.0 PRIMARY OSTEOARTHRITIS OF BOTH KNEES: ICD-10-CM

## 2025-05-08 DIAGNOSIS — M10.9 GOUT, UNSPECIFIED CAUSE, UNSPECIFIED CHRONICITY, UNSPECIFIED SITE: ICD-10-CM

## 2025-05-08 DIAGNOSIS — I10 PRIMARY HYPERTENSION: ICD-10-CM

## 2025-05-08 DIAGNOSIS — E53.8 VITAMIN B12 DEFICIENCY: ICD-10-CM

## 2025-05-08 LAB
ABSOLUTE EOSINOPHIL (OHS): 0.49 K/UL
ABSOLUTE MONOCYTE (OHS): 0.58 K/UL (ref 0.3–1)
ABSOLUTE NEUTROPHIL COUNT (OHS): 5.85 K/UL (ref 1.8–7.7)
ALBUMIN SERPL BCP-MCNC: 3.6 G/DL (ref 3.5–5.2)
ALP SERPL-CCNC: 117 UNIT/L (ref 40–150)
ALT SERPL W/O P-5'-P-CCNC: 26 UNIT/L (ref 10–44)
ANION GAP (OHS): 13 MMOL/L (ref 8–16)
AST SERPL-CCNC: 17 UNIT/L (ref 11–45)
BASOPHILS # BLD AUTO: 0.04 K/UL
BASOPHILS NFR BLD AUTO: 0.4 %
BILIRUB SERPL-MCNC: 0.9 MG/DL (ref 0.1–1)
BUN SERPL-MCNC: 13 MG/DL (ref 8–23)
CALCIUM SERPL-MCNC: 8.9 MG/DL (ref 8.7–10.5)
CHLORIDE SERPL-SCNC: 102 MMOL/L (ref 95–110)
CHOLEST SERPL-MCNC: 166 MG/DL (ref 120–199)
CHOLEST/HDLC SERPL: 3.5 {RATIO} (ref 2–5)
CO2 SERPL-SCNC: 23 MMOL/L (ref 23–29)
CREAT SERPL-MCNC: 1.3 MG/DL (ref 0.5–1.4)
EAG (OHS): 143 MG/DL (ref 68–131)
ERYTHROCYTE [DISTWIDTH] IN BLOOD BY AUTOMATED COUNT: 12.9 % (ref 11.5–14.5)
GFR SERPLBLD CREATININE-BSD FMLA CKD-EPI: 56 ML/MIN/1.73/M2
GLUCOSE SERPL-MCNC: 128 MG/DL (ref 70–110)
HBA1C MFR BLD: 6.6 % (ref 4–5.6)
HCT VFR BLD AUTO: 47.4 % (ref 40–54)
HDLC SERPL-MCNC: 48 MG/DL (ref 40–75)
HDLC SERPL: 28.9 % (ref 20–50)
HGB BLD-MCNC: 15.1 GM/DL (ref 14–18)
IMM GRANULOCYTES # BLD AUTO: 0.06 K/UL (ref 0–0.04)
IMM GRANULOCYTES NFR BLD AUTO: 0.7 % (ref 0–0.5)
LDLC SERPL CALC-MCNC: 72.2 MG/DL (ref 63–159)
LYMPHOCYTES # BLD AUTO: 2.05 K/UL (ref 1–4.8)
MCH RBC QN AUTO: 28.5 PG (ref 27–31)
MCHC RBC AUTO-ENTMCNC: 31.9 G/DL (ref 32–36)
MCV RBC AUTO: 90 FL (ref 82–98)
NONHDLC SERPL-MCNC: 118 MG/DL
NUCLEATED RBC (/100WBC) (OHS): 0 /100 WBC
PLATELET # BLD AUTO: 251 K/UL (ref 150–450)
PMV BLD AUTO: 11.7 FL (ref 9.2–12.9)
POTASSIUM SERPL-SCNC: 3.8 MMOL/L (ref 3.5–5.1)
PROT SERPL-MCNC: 7 GM/DL (ref 6–8.4)
RBC # BLD AUTO: 5.29 M/UL (ref 4.6–6.2)
RELATIVE EOSINOPHIL (OHS): 5.4 %
RELATIVE LYMPHOCYTE (OHS): 22.6 % (ref 18–48)
RELATIVE MONOCYTE (OHS): 6.4 % (ref 4–15)
RELATIVE NEUTROPHIL (OHS): 64.5 % (ref 38–73)
SODIUM SERPL-SCNC: 138 MMOL/L (ref 136–145)
TRIGL SERPL-MCNC: 229 MG/DL (ref 30–150)
TSH SERPL-ACNC: 2.23 UIU/ML (ref 0.4–4)
URATE SERPL-MCNC: 6.3 MG/DL (ref 3.4–7)
WBC # BLD AUTO: 9.07 K/UL (ref 3.9–12.7)

## 2025-05-08 PROCEDURE — 84550 ASSAY OF BLOOD/URIC ACID: CPT

## 2025-05-08 PROCEDURE — 36415 COLL VENOUS BLD VENIPUNCTURE: CPT | Mod: PO

## 2025-05-08 PROCEDURE — 85025 COMPLETE CBC W/AUTO DIFF WBC: CPT

## 2025-05-08 PROCEDURE — 80061 LIPID PANEL: CPT

## 2025-05-08 PROCEDURE — 80053 COMPREHEN METABOLIC PANEL: CPT

## 2025-05-08 PROCEDURE — 83036 HEMOGLOBIN GLYCOSYLATED A1C: CPT

## 2025-05-08 PROCEDURE — 84443 ASSAY THYROID STIM HORMONE: CPT

## 2025-05-12 ENCOUNTER — RESULTS FOLLOW-UP (OUTPATIENT)
Dept: FAMILY MEDICINE | Facility: CLINIC | Age: 80
End: 2025-05-12

## 2025-05-19 ENCOUNTER — PATIENT MESSAGE (OUTPATIENT)
Dept: ADMINISTRATIVE | Facility: HOSPITAL | Age: 80
End: 2025-05-19
Payer: COMMERCIAL

## 2025-06-20 DIAGNOSIS — E11.22 TYPE 2 DIABETES MELLITUS WITH STAGE 3 CHRONIC KIDNEY DISEASE, WITHOUT LONG-TERM CURRENT USE OF INSULIN, UNSPECIFIED WHETHER STAGE 3A OR 3B CKD: ICD-10-CM

## 2025-06-20 DIAGNOSIS — N18.30 TYPE 2 DIABETES MELLITUS WITH STAGE 3 CHRONIC KIDNEY DISEASE, WITHOUT LONG-TERM CURRENT USE OF INSULIN, UNSPECIFIED WHETHER STAGE 3A OR 3B CKD: ICD-10-CM

## 2025-06-20 RX ORDER — SEMAGLUTIDE 2.68 MG/ML
2 INJECTION, SOLUTION SUBCUTANEOUS
Qty: 3 ML | Refills: 11 | Status: SHIPPED | OUTPATIENT
Start: 2025-06-20

## 2025-06-20 NOTE — TELEPHONE ENCOUNTER
No care due was identified.  [facility] Embedded Care Due Messages. Reference number: [id number].   6/20/2025 9:08:52 AM CDT

## 2025-06-24 ENCOUNTER — PATIENT MESSAGE (OUTPATIENT)
Dept: FAMILY MEDICINE | Facility: CLINIC | Age: 80
End: 2025-06-24
Payer: COMMERCIAL

## 2025-06-25 DIAGNOSIS — N18.30 TYPE 2 DIABETES MELLITUS WITH STAGE 3 CHRONIC KIDNEY DISEASE, WITHOUT LONG-TERM CURRENT USE OF INSULIN, UNSPECIFIED WHETHER STAGE 3A OR 3B CKD: ICD-10-CM

## 2025-06-25 DIAGNOSIS — E11.22 TYPE 2 DIABETES MELLITUS WITH STAGE 3 CHRONIC KIDNEY DISEASE, WITHOUT LONG-TERM CURRENT USE OF INSULIN, UNSPECIFIED WHETHER STAGE 3A OR 3B CKD: ICD-10-CM

## 2025-06-25 RX ORDER — SEMAGLUTIDE 2.68 MG/ML
2 INJECTION, SOLUTION SUBCUTANEOUS
Qty: 3 ML | Refills: 11 | Status: SHIPPED | OUTPATIENT
Start: 2025-06-25 | End: 2025-06-27 | Stop reason: SDUPTHER

## 2025-06-25 NOTE — TELEPHONE ENCOUNTER
No care due was identified.  [facility] Embedded Care Due Messages. Reference number: [id number].   6/25/2025 4:12:45 PM CDT

## 2025-06-25 NOTE — TELEPHONE ENCOUNTER
Copied from CRM #[id number]. Topic: General Inquiry - Patient Advice  >> Jun 25, 2025 12:41 PM Misty wrote:  .Type:  Needs Medical Advice    Who Called: pt    Would the patient rather a call back or a response via [facility]? Call back  Best Call Back Number: [phone]  Additional Information:      Pt started he would like a call back to make sure his insurance is accessible and needs a PA for his medication

## 2025-06-27 DIAGNOSIS — E11.22 TYPE 2 DIABETES MELLITUS WITH STAGE 3 CHRONIC KIDNEY DISEASE, WITHOUT LONG-TERM CURRENT USE OF INSULIN, UNSPECIFIED WHETHER STAGE 3A OR 3B CKD: ICD-10-CM

## 2025-06-27 DIAGNOSIS — N18.30 TYPE 2 DIABETES MELLITUS WITH STAGE 3 CHRONIC KIDNEY DISEASE, WITHOUT LONG-TERM CURRENT USE OF INSULIN, UNSPECIFIED WHETHER STAGE 3A OR 3B CKD: ICD-10-CM

## 2025-06-27 RX ORDER — SEMAGLUTIDE 2.68 MG/ML
2 INJECTION, SOLUTION SUBCUTANEOUS
Qty: 3 ML | Refills: 11 | Status: SHIPPED | OUTPATIENT
Start: 2025-06-27

## 2025-06-27 NOTE — TELEPHONE ENCOUNTER
Copied from CRM #[id number]. Topic: General Inquiry - Return Call  >> Jun 27, 2025  9:12 AM Cindy wrote:  Type: General Call Back     Name of Caller:DIANE Galeano  Symptoms:PA  Would the patient rather a call back or a response via [facility]? Call back  Best Call Back Number:[phone]  Additional Information: Issue still not resolved.

## 2025-06-27 NOTE — TELEPHONE ENCOUNTER
No care due was identified.  [facility] Embedded Care Due Messages. Reference number: [id number].   6/27/2025 3:20:07 PM CDT

## 2025-06-30 ENCOUNTER — TELEPHONE (OUTPATIENT)
Dept: FAMILY MEDICINE | Facility: CLINIC | Age: 80
End: 2025-06-30
Payer: COMMERCIAL

## 2025-07-02 ENCOUNTER — TELEPHONE (OUTPATIENT)
Dept: FAMILY MEDICINE | Facility: CLINIC | Age: 80
End: 2025-07-02
Payer: COMMERCIAL

## 2025-07-02 NOTE — TELEPHONE ENCOUNTER
Copied from CRM #[id number]. Topic: General Inquiry - Return Call  >> Jun 30, 2025  3:13 PM Cindy wrote:  Type: General Call Back     Name of Caller:Sumit albert/ [employer] prior auth ext:[phone]  Symptoms:medication  Would the patient rather a call back or a response via [facility]? Call back  Best Call Back Number:[phone]  Additional Information: Pt needs PA sent to [facility] [phone]  fax:[phone] (prior auth)   semaglutide (OZEMPIC) 2 mg/dose (8 mg/3 mL) PnIj..there is no PA approval from [employer] at this time. Please fax or contact for approval.

## 2025-07-02 NOTE — TELEPHONE ENCOUNTER
Copied from CRM #[id number]. Topic: General Inquiry - Patient Advice  >> Jul 2, 2025 11:14 AM Marely wrote:  .Type: Patient Call Back    Who called: Izabel Galeano with [employer]     What is the request in detail: asked for a call back to discuss patient's medicine approval to make sure that it is going through his new insurance with [employer]    Can the clinic reply by [facility]? No     Would the patient rather a call back or a response via [facility]? No     Best call back number: [phone]    Additional Information:

## 2025-07-07 ENCOUNTER — TELEPHONE (OUTPATIENT)
Dept: FAMILY MEDICINE | Facility: CLINIC | Age: 80
End: 2025-07-07
Payer: COMMERCIAL

## 2025-07-08 ENCOUNTER — TELEPHONE (OUTPATIENT)
Dept: FAMILY MEDICINE | Facility: CLINIC | Age: 80
End: 2025-07-08
Payer: COMMERCIAL

## 2025-07-08 DIAGNOSIS — N18.30 TYPE 2 DIABETES MELLITUS WITH STAGE 3 CHRONIC KIDNEY DISEASE, WITHOUT LONG-TERM CURRENT USE OF INSULIN, UNSPECIFIED WHETHER STAGE 3A OR 3B CKD: ICD-10-CM

## 2025-07-08 DIAGNOSIS — E11.22 TYPE 2 DIABETES MELLITUS WITH STAGE 3 CHRONIC KIDNEY DISEASE, WITHOUT LONG-TERM CURRENT USE OF INSULIN, UNSPECIFIED WHETHER STAGE 3A OR 3B CKD: ICD-10-CM

## 2025-07-08 RX ORDER — SEMAGLUTIDE 2.68 MG/ML
2 INJECTION, SOLUTION SUBCUTANEOUS
Qty: 3 ML | Refills: 11 | Status: SHIPPED | OUTPATIENT
Start: 2025-07-08

## 2025-07-08 NOTE — TELEPHONE ENCOUNTER
No care due was identified.  [facility] Embedded Care Due Messages. Reference number: [id number].   7/08/2025 3:13:09 PM CDT

## 2025-07-08 NOTE — TELEPHONE ENCOUNTER
Copied from CRM #[id number]. Topic: Medications - Medication Refill  >> Jul 8, 2025  3:05 PM Veronika wrote:  Type:  RX Refill Request- New Pharmacy    Who Called: Insurance  ------- (1) Refill    RX Name and Strength:  semaglutide (OZEMPIC) 2 mg/dose (8 mg/3 mL) PnIj    ----------  Preferred Pharmacy with phone number:  [facility]  [phone]  Fax: [phone]    ----------  Best Call Back Number:[phone]  PA approved, pt would like sent to mail order pharmacy as soon as possible to expedite process, Thank you.    Ralf Galeano- [phone]

## 2025-07-09 ENCOUNTER — OFFICE VISIT (OUTPATIENT)
Dept: OPTOMETRY | Facility: CLINIC | Age: 80
End: 2025-07-09
Payer: COMMERCIAL

## 2025-07-09 DIAGNOSIS — Z96.1 PSEUDOPHAKIA OF BOTH EYES: ICD-10-CM

## 2025-07-09 DIAGNOSIS — H16.142 PUNCTATE KERATITIS OF LEFT EYE: ICD-10-CM

## 2025-07-09 DIAGNOSIS — E11.9 TYPE 2 DIABETES MELLITUS WITHOUT OPHTHALMIC MANIFESTATIONS: Primary | ICD-10-CM

## 2025-07-09 PROCEDURE — 1126F AMNT PAIN NOTED NONE PRSNT: CPT | Mod: CPTII,S$GLB,, | Performed by: OPTOMETRIST

## 2025-07-09 PROCEDURE — 99999 PR PBB SHADOW E&M-EST. PATIENT-LVL III: CPT | Mod: PBBFAC,,, | Performed by: OPTOMETRIST

## 2025-07-09 PROCEDURE — 1101F PT FALLS ASSESS-DOCD LE1/YR: CPT | Mod: CPTII,S$GLB,, | Performed by: OPTOMETRIST

## 2025-07-09 PROCEDURE — 3288F FALL RISK ASSESSMENT DOCD: CPT | Mod: CPTII,S$GLB,, | Performed by: OPTOMETRIST

## 2025-07-09 PROCEDURE — 92014 COMPRE OPH EXAM EST PT 1/>: CPT | Mod: S$GLB,,, | Performed by: OPTOMETRIST

## 2025-07-09 PROCEDURE — 2023F DILAT RTA XM W/O RTNOPTHY: CPT | Mod: CPTII,S$GLB,, | Performed by: OPTOMETRIST

## 2025-07-09 PROCEDURE — 92015 DETERMINE REFRACTIVE STATE: CPT | Mod: S$GLB,,, | Performed by: OPTOMETRIST

## 2025-07-09 PROCEDURE — 1159F MED LIST DOCD IN RCRD: CPT | Mod: CPTII,S$GLB,, | Performed by: OPTOMETRIST

## 2025-07-09 NOTE — PROGRESS NOTES
HPI    Pt is here today for diabetic ocular health examination. Occasional pain   OS. Patient would like to update glasses rx. Patient has trouble with   looking through proper portions in progressives.  DLS: 2/25/2025 Dr. Lake   (-)Flashes   (+)Floaters   (-)Diplopia   (-)Headaches   (-)Itching   (-)Tearing  (-)Burning  (+)Dryness   (-)Photophobia  (-)Glare   (+)Blurred VA  Past Eye Sx: PC IOL OU   Eye Meds: Systane BID OU  A1C       Date                     Value               Ref Range             Status                05/08/2025               6.6 (H)             4.0 - 5.6 %           Final            Last edited by Aminata Oh, OD on 7/9/2025  2:49 PM.            Assessment /Plan     For exam results, see Encounter Report.    Type 2 diabetes mellitus without ophthalmic manifestations    Pseudophakia of both eyes    Punctate keratitis of left eye      1. No retinopathy noted today.  Continued control with primary care physician and annual comprehensive eye exam.     2. Monitor; pt educated on condition and visual status.    Eyeglass Final Rx       Eyeglass Final Rx         Sphere Cylinder Axis Add    Right Kennett +1.25 001 +2.75    Left -0.50 +1.25 180 +2.75      Type: PAL    Expiration Date: 7/9/2026                   3. Educated pt on today's findings consistent with symptoms. Pt to begin tapered course of FML: tid OS x 1 week, then bid OS x 1 week, then qd OS x 1 week, then D/C. Also continue Systane ATs bid OU long term.  If symptoms worsen or dont improve, RTC. Monitor.        RTC in 1 year for annual eye exam unless changes noted sooner.

## 2025-07-30 ENCOUNTER — TELEPHONE (OUTPATIENT)
Dept: PHARMACY | Facility: CLINIC | Age: 80
End: 2025-07-30
Payer: COMMERCIAL

## 2025-07-30 NOTE — TELEPHONE ENCOUNTER
[facility]/Population Health Chart Review & Patient Outreach Details For Medication Adherence Project    Reason for Outreach Encounter: 3rd Party payor non-compliance report ([employer], [employer], [facility], etc)  2.  Patient Outreach Method: [employer] message  3.   Medication in question: irbesartan   LAST FILLED: 5/2/25 for 90 day supply  Hypertension Medications              amLODIPine (NORVASC) 10 MG tablet TAKE 1 TABLET BY MOUTH EVERY DAY    irbesartan (AVAPRO) 300 MG tablet TAKE 1 TABLET BY MOUTH EVERY EVENING.              4.  Reviewed and or Updates Made To: Patient Chart  5. Outreach Outcomes and/or actions taken: Sent inquiry to patient: Waiting for response.

## 2025-08-18 ENCOUNTER — TELEPHONE (OUTPATIENT)
Dept: PHARMACY | Facility: CLINIC | Age: 80
End: 2025-08-18
Payer: COMMERCIAL

## 2025-08-20 ENCOUNTER — TELEPHONE (OUTPATIENT)
Facility: CLINIC | Age: 80
End: 2025-08-20
Payer: COMMERCIAL

## 2025-08-25 ENCOUNTER — PATIENT MESSAGE (OUTPATIENT)
Dept: FAMILY MEDICINE | Facility: CLINIC | Age: 80
End: 2025-08-25
Payer: COMMERCIAL

## 2025-08-25 RX ORDER — ALLOPURINOL 100 MG/1
100 TABLET ORAL
Qty: 90 TABLET | Refills: 2 | Status: SHIPPED | OUTPATIENT
Start: 2025-08-25

## (undated) DEVICE — EXTRACTOR TIPLESS 2.4FRX1115CM

## (undated) DEVICE — PACK CYSTO

## (undated) DEVICE — SET CYSTO IRR DRP CHMBR 84IN

## (undated) DEVICE — SOL IRR NACL .9% 3000ML

## (undated) DEVICE — TRAY CYSTO BASIN

## (undated) DEVICE — GOWN SMARTGOWN LVL4 X-LONG XL

## (undated) DEVICE — CATH 5FR OPEN END URETERAL

## (undated) DEVICE — SOL IRR WATER STRL 3000 ML

## (undated) DEVICE — GUIDE WIRE MOTION .035 X 150CM

## (undated) DEVICE — FIBER MOSES 200 DFL

## (undated) DEVICE — VALVE ENDOSCOPIC(SURESEAL II)

## (undated) DEVICE — SYR 10CC LUER LOCK

## (undated) DEVICE — SET Y-TYPE TUR IRRIGATION